# Patient Record
Sex: MALE | Race: WHITE | Employment: OTHER | URBAN - METROPOLITAN AREA
[De-identification: names, ages, dates, MRNs, and addresses within clinical notes are randomized per-mention and may not be internally consistent; named-entity substitution may affect disease eponyms.]

---

## 2019-12-12 ENCOUNTER — OFFICE VISIT (OUTPATIENT)
Dept: INTERNAL MEDICINE CLINIC | Facility: CLINIC | Age: 64
End: 2019-12-12
Payer: MEDICARE

## 2019-12-12 VITALS
OXYGEN SATURATION: 98 % | SYSTOLIC BLOOD PRESSURE: 123 MMHG | TEMPERATURE: 98 F | HEART RATE: 63 BPM | RESPIRATION RATE: 17 BRPM | DIASTOLIC BLOOD PRESSURE: 73 MMHG | WEIGHT: 200 LBS

## 2019-12-12 DIAGNOSIS — E78.5 HYPERLIPIDEMIA, UNSPECIFIED HYPERLIPIDEMIA TYPE: ICD-10-CM

## 2019-12-12 DIAGNOSIS — R06.00 EXERTIONAL DYSPNEA: ICD-10-CM

## 2019-12-12 DIAGNOSIS — I25.119 CORONARY ARTERY DISEASE INVOLVING NATIVE CORONARY ARTERY OF NATIVE HEART WITH ANGINA PECTORIS (HCC): ICD-10-CM

## 2019-12-12 DIAGNOSIS — I10 ESSENTIAL HYPERTENSION: ICD-10-CM

## 2019-12-12 DIAGNOSIS — Z71.6 ENCOUNTER FOR SMOKING CESSATION COUNSELING: ICD-10-CM

## 2019-12-12 DIAGNOSIS — Z76.89 ENCOUNTER TO ESTABLISH CARE: Primary | ICD-10-CM

## 2019-12-12 DIAGNOSIS — N52.9 ERECTILE DYSFUNCTION, UNSPECIFIED ERECTILE DYSFUNCTION TYPE: ICD-10-CM

## 2019-12-12 DIAGNOSIS — R94.31 ABNORMAL EKG: ICD-10-CM

## 2019-12-12 PROCEDURE — 93000 ELECTROCARDIOGRAM COMPLETE: CPT | Performed by: INTERNAL MEDICINE

## 2019-12-12 PROCEDURE — 99204 OFFICE O/P NEW MOD 45 MIN: CPT | Performed by: INTERNAL MEDICINE

## 2019-12-12 NOTE — PROGRESS NOTES
HPI:    Patient ID: Ginna Moreau is a 71year old male. HPI   Comes in today for first time to establish care he is of [facility] descent from [facility].   Patient has not brought his medication with him today he is not sure of the names he knows he ta Negative. Negative for dysuria, flank pain and hematuria. Erectile dysfunction   Musculoskeletal: Negative. Negative for arthralgias, back pain and myalgias. Skin: Negative. Also some redness of the face   Neurological: Negative.   Benita Padilla He has normal reflexes. Skin: Skin is warm and dry. Psychiatric: He has a normal mood and affect. His behavior is normal. Judgment and thought content normal.   Nursing note and vitals reviewed.            ASSESSMENT/PLAN:   Encounter to establish care

## 2020-01-02 ENCOUNTER — HOSPITAL ENCOUNTER (OUTPATIENT)
Dept: NUCLEAR MEDICINE | Facility: HOSPITAL | Age: 65
Discharge: HOME | End: 2020-01-02
Attending: INTERNAL MEDICINE
Payer: MEDICARE

## 2020-01-02 ENCOUNTER — APPOINTMENT (OUTPATIENT)
Dept: NUCLEAR MEDICINE | Facility: HOSPITAL | Age: 65
End: 2020-01-02
Attending: INTERNAL MEDICINE
Payer: MEDICARE

## 2020-01-02 ENCOUNTER — HOSPITAL ENCOUNTER (OUTPATIENT)
Dept: CV DIAGNOSTICS | Facility: HOSPITAL | Age: 65
Discharge: HOME | End: 2020-01-02
Attending: INTERNAL MEDICINE
Payer: MEDICARE

## 2020-01-02 DIAGNOSIS — E78.5 HYPERLIPIDEMIA, UNSPECIFIED HYPERLIPIDEMIA TYPE: ICD-10-CM

## 2020-01-02 DIAGNOSIS — R06.00 EXERTIONAL DYSPNEA: ICD-10-CM

## 2020-01-02 DIAGNOSIS — R94.31 ABNORMAL EKG: ICD-10-CM

## 2020-01-02 DIAGNOSIS — I10 ESSENTIAL HYPERTENSION: ICD-10-CM

## 2020-01-02 DIAGNOSIS — I25.119 CORONARY ARTERY DISEASE INVOLVING NATIVE CORONARY ARTERY OF NATIVE HEART WITH ANGINA PECTORIS (HCC): ICD-10-CM

## 2020-01-03 ENCOUNTER — APPOINTMENT (OUTPATIENT)
Dept: NUCLEAR MEDICINE | Facility: HOSPITAL | Age: 65
End: 2020-01-03
Attending: INTERNAL MEDICINE
Payer: MEDICARE

## 2020-01-03 ENCOUNTER — HOSPITAL ENCOUNTER (OUTPATIENT)
Dept: NUCLEAR MEDICINE | Facility: HOSPITAL | Age: 65
Discharge: HOME | End: 2020-01-03
Attending: INTERNAL MEDICINE
Payer: MEDICARE

## 2020-01-03 ENCOUNTER — HOSPITAL ENCOUNTER (OUTPATIENT)
Dept: CV DIAGNOSTICS | Facility: HOSPITAL | Age: 65
End: 2020-01-03
Attending: INTERNAL MEDICINE
Payer: MEDICARE

## 2020-01-03 PROCEDURE — 78452 HT MUSCLE IMAGE SPECT MULT: CPT | Performed by: INTERNAL MEDICINE

## 2020-01-03 PROCEDURE — 93017 CV STRESS TEST TRACING ONLY: CPT | Performed by: INTERNAL MEDICINE

## 2020-01-09 ENCOUNTER — HOSPITAL ENCOUNTER (OUTPATIENT)
Dept: CV DIAGNOSTICS | Facility: HOSPITAL | Age: 65
Discharge: HOME | End: 2020-01-09
Attending: INTERNAL MEDICINE
Payer: MEDICARE

## 2020-01-09 ENCOUNTER — HOSPITAL ENCOUNTER (OUTPATIENT)
Dept: NUCLEAR MEDICINE | Facility: HOSPITAL | Age: 65
Discharge: HOME | End: 2020-01-09
Attending: INTERNAL MEDICINE
Payer: MEDICARE

## 2020-01-09 PROCEDURE — 93016 CV STRESS TEST SUPVJ ONLY: CPT | Performed by: INTERNAL MEDICINE

## 2020-01-09 PROCEDURE — 93018 CV STRESS TEST I&R ONLY: CPT | Performed by: INTERNAL MEDICINE

## 2020-01-21 PROBLEM — I10 HTN (HYPERTENSION): Status: ACTIVE | Noted: 2020-01-21

## 2020-01-21 PROBLEM — I21.9 MYOCARDIAL INFARCT (CMD): Status: ACTIVE | Noted: 2020-01-21

## 2020-01-21 PROBLEM — E78.5 HYPERLIPIDEMIA: Status: ACTIVE | Noted: 2020-01-21

## 2020-01-22 ENCOUNTER — OFFICE VISIT (OUTPATIENT)
Dept: CARDIOLOGY | Age: 65
End: 2020-01-22

## 2020-01-22 VITALS
WEIGHT: 201 LBS | DIASTOLIC BLOOD PRESSURE: 84 MMHG | HEIGHT: 68 IN | SYSTOLIC BLOOD PRESSURE: 150 MMHG | HEART RATE: 71 BPM | OXYGEN SATURATION: 97 % | RESPIRATION RATE: 18 BRPM | BODY MASS INDEX: 30.46 KG/M2

## 2020-01-22 DIAGNOSIS — E78.00 PURE HYPERCHOLESTEROLEMIA: ICD-10-CM

## 2020-01-22 DIAGNOSIS — I10 ESSENTIAL HYPERTENSION: ICD-10-CM

## 2020-01-22 DIAGNOSIS — I25.10 ATHEROSCLEROSIS OF NATIVE CORONARY ARTERY OF NATIVE HEART WITHOUT ANGINA PECTORIS: Primary | ICD-10-CM

## 2020-01-22 PROCEDURE — 99204 OFFICE O/P NEW MOD 45 MIN: CPT | Performed by: INTERNAL MEDICINE

## 2020-01-22 PROCEDURE — 3079F DIAST BP 80-89 MM HG: CPT | Performed by: INTERNAL MEDICINE

## 2020-01-22 PROCEDURE — 3077F SYST BP >= 140 MM HG: CPT | Performed by: INTERNAL MEDICINE

## 2020-01-22 RX ORDER — ATORVASTATIN CALCIUM 20 MG/1
20 TABLET, FILM COATED ORAL DAILY
COMMUNITY
End: 2020-01-22 | Stop reason: SDUPTHER

## 2020-01-22 RX ORDER — ATORVASTATIN CALCIUM 20 MG/1
20 TABLET, FILM COATED ORAL DAILY
Qty: 90 TABLET | Refills: 3 | Status: SHIPPED | OUTPATIENT
Start: 2020-01-22

## 2020-01-22 RX ORDER — METOPROLOL TARTRATE 100 MG/1
100 TABLET ORAL DAILY
COMMUNITY
End: 2020-01-22 | Stop reason: ALTCHOICE

## 2020-01-22 ASSESSMENT — PATIENT HEALTH QUESTIONNAIRE - PHQ9
1. LITTLE INTEREST OR PLEASURE IN DOING THINGS: NOT AT ALL
SUM OF ALL RESPONSES TO PHQ9 QUESTIONS 1 AND 2: 0
SUM OF ALL RESPONSES TO PHQ9 QUESTIONS 1 AND 2: 0
2. FEELING DOWN, DEPRESSED OR HOPELESS: NOT AT ALL

## 2020-01-23 ENCOUNTER — OFFICE VISIT (OUTPATIENT)
Dept: INTERNAL MEDICINE CLINIC | Facility: CLINIC | Age: 65
End: 2020-01-23
Payer: MEDICARE

## 2020-01-23 ENCOUNTER — TELEPHONE (OUTPATIENT)
Dept: CARDIOLOGY | Age: 65
End: 2020-01-23

## 2020-01-23 VITALS
TEMPERATURE: 98 F | HEART RATE: 61 BPM | RESPIRATION RATE: 16 BRPM | HEIGHT: 68 IN | BODY MASS INDEX: 30.16 KG/M2 | SYSTOLIC BLOOD PRESSURE: 148 MMHG | DIASTOLIC BLOOD PRESSURE: 70 MMHG | OXYGEN SATURATION: 98 % | WEIGHT: 199 LBS

## 2020-01-23 DIAGNOSIS — Z12.11 SCREENING FOR COLON CANCER: ICD-10-CM

## 2020-01-23 DIAGNOSIS — Z87.891 PERSONAL HISTORY OF TOBACCO USE, PRESENTING HAZARDS TO HEALTH: ICD-10-CM

## 2020-01-23 DIAGNOSIS — I10 ESSENTIAL HYPERTENSION: ICD-10-CM

## 2020-01-23 DIAGNOSIS — I25.119 CORONARY ARTERY DISEASE INVOLVING NATIVE CORONARY ARTERY OF NATIVE HEART WITH ANGINA PECTORIS (HCC): ICD-10-CM

## 2020-01-23 DIAGNOSIS — Z86.73 HISTORY OF CVA IN ADULTHOOD: ICD-10-CM

## 2020-01-23 DIAGNOSIS — Z00.00 ENCOUNTER FOR ANNUAL HEALTH EXAMINATION: ICD-10-CM

## 2020-01-23 DIAGNOSIS — Z11.59 NEED FOR HEPATITIS C SCREENING TEST: ICD-10-CM

## 2020-01-23 DIAGNOSIS — Z00.00 PREVENTATIVE HEALTH CARE: ICD-10-CM

## 2020-01-23 DIAGNOSIS — Z28.21 IMMUNIZATION NOT CARRIED OUT BECAUSE OF PATIENT REFUSAL: ICD-10-CM

## 2020-01-23 DIAGNOSIS — Z71.6 ENCOUNTER FOR SMOKING CESSATION COUNSELING: ICD-10-CM

## 2020-01-23 DIAGNOSIS — E78.5 HYPERLIPIDEMIA, UNSPECIFIED HYPERLIPIDEMIA TYPE: ICD-10-CM

## 2020-01-23 DIAGNOSIS — N52.9 ERECTILE DYSFUNCTION, UNSPECIFIED ERECTILE DYSFUNCTION TYPE: ICD-10-CM

## 2020-01-23 DIAGNOSIS — Z00.00 MEDICARE ANNUAL WELLNESS VISIT, INITIAL: Primary | ICD-10-CM

## 2020-01-23 DIAGNOSIS — Z13.6 SCREENING FOR CARDIOVASCULAR CONDITION: ICD-10-CM

## 2020-01-23 DIAGNOSIS — Z53.20 COLONOSCOPY REFUSED: ICD-10-CM

## 2020-01-23 PROBLEM — J41.0 SMOKERS' COUGH (HCC): Chronic | Status: ACTIVE | Noted: 2020-01-23

## 2020-01-23 PROCEDURE — G0402 INITIAL PREVENTIVE EXAM: HCPCS | Performed by: INTERNAL MEDICINE

## 2020-01-23 PROCEDURE — 99397 PER PM REEVAL EST PAT 65+ YR: CPT | Performed by: INTERNAL MEDICINE

## 2020-01-23 PROCEDURE — 96160 PT-FOCUSED HLTH RISK ASSMT: CPT | Performed by: INTERNAL MEDICINE

## 2020-01-23 RX ORDER — SILDENAFIL 100 MG/1
50 TABLET, FILM COATED ORAL AS NEEDED
Qty: 10 TABLET | Refills: 3 | Status: SHIPPED | OUTPATIENT
Start: 2020-01-23 | End: 2021-08-16 | Stop reason: ALTCHOICE

## 2020-01-23 RX ORDER — ASPIRIN 81 MG/1
81 TABLET, CHEWABLE ORAL DAILY
COMMUNITY
End: 2020-04-14

## 2020-01-23 NOTE — PATIENT INSTRUCTIONS
Tarik Camara's SCREENING SCHEDULE   Tests on this list are recommended by your physician but may not be covered, or covered at this frequency, by your insurer. Please check with your insurance carrier before scheduling to verify coverage.     PREVENTATIVE S found for this or any previous visit. No flowsheet data found. Fecal Occult Blood   Covered Annually No results found for: FOB, OCCULTSTOOL No flowsheet data found.      Barium Enema-   uncomfortable but covered  Covered but uncomfortable   Glaucoma Scr benefits     Recommended Websites for Advanced Directives    [email or web address] [email or web address]/[email or web address]/personal_[email or web address]. pdf  An information packet, including necessary form from the [address] website. http://[email or web address] [place]state. [place]publi

## 2020-01-23 NOTE — PROGRESS NOTES
HPI:   Julia Medina is a 71year old male who presents for a Medicare Initial Annual Wellness visit (Once after 12 month Medicare anniversary) .     Patient here for Medicare annual physical overall is doing okay last time was seen with an EKG which was abn discussion of advance directives standard forms performed Face to Face with patient and Family/surrogate (if present), and forms available to patient in AVS            He currently smokes tobacco.  Social History    Tobacco Use      Smoking status: Current mother. SOCIAL HISTORY:   He  reports that he has been smoking cigarettes. He has been smoking about 1.00 pack per day. He has never used smokeless tobacco. He reports previous alcohol use. He reports that he does not use drugs.      REVIEW OF SYSTEMS: because I misunderstand what they say:  No   I misunderstand what others are saying and make inappropriate responses:  No I avoid social activities because I cannot hear well and fear I will reply improperly:  No   Family members and friends have told me t IMMUNOASSAY; Future  -     PSA SCREEN; Future  -     LIPID PANEL; Future  -     HCV ANTIBODY; Future  -     US ABDOMINAL AORTIC ANEURYSM SCREENING (CPT=76706); Future  -     BEHAV CHNG SMOKING > 10 MIN  -     COMP METABOLIC PANEL (14);  Future  -     TSH W PLATELET; Future  -     MICROALB/CREAT RATIO, RANDOM URINE;  Future    Erectile dysfunction, unspecified erectile dysfunction type we will start on medication take as prescribed as needed effects explained that if he get an erection lasting more than 4 hour shot  -     INFLUENZA REFUSED DMG  -     OCCULT BLOOD, FECAL, IMMUNOASSAY; Future  -     PSA SCREEN; Future  -     LIPID PANEL; Future  -     HCV ANTIBODY; Future  -     US ABDOMINAL AORTIC ANEURYSM SCREENING (CPT=76706);  Future  -     BEHAV CHNG SMOKING > TO FREE T4; Future  -     CBC WITH DIFFERENTIAL WITH PLATELET; Future  -     MICROALB/CREAT RATIO, RANDOM URINE;  Future    Preventative health care refer to eye doctor     Colonoscopy refused    Other orders  -     Sildenafil Citrate (VIAGRA) 100 MG Oral T Ophthalmology Visit Annually: Diabetics, FHx Glaucoma, AA>50, > 65 No flowsheet data found.     Prostate Cancer Screening      PSA  Annually PSA due on 11/23/2004  Update Health Maintenance if applicable     Immunizations (Update Immunization Activi

## 2020-01-24 ENCOUNTER — TELEPHONE (OUTPATIENT)
Dept: INTERNAL MEDICINE CLINIC | Facility: CLINIC | Age: 65
End: 2020-01-24

## 2020-01-24 RX ORDER — ATORVASTATIN CALCIUM 20 MG/1
20 TABLET, FILM COATED ORAL NIGHTLY
COMMUNITY
End: 2020-04-14

## 2020-01-24 RX ORDER — METOPROLOL SUCCINATE 100 MG/1
100 TABLET, EXTENDED RELEASE ORAL DAILY
COMMUNITY
End: 2020-04-14

## 2020-01-24 NOTE — TELEPHONE ENCOUNTER
Added Lipitor & Metoprolol Succ. [facility]. Ok per Dr Allie Mcnally.

## 2020-01-28 ENCOUNTER — TELEPHONE (OUTPATIENT)
Dept: CARDIOLOGY | Age: 65
End: 2020-01-28

## 2020-01-28 RX ORDER — METOPROLOL SUCCINATE 100 MG/1
100 TABLET, EXTENDED RELEASE ORAL DAILY
Qty: 90 TABLET | Refills: 1 | Status: SHIPPED | OUTPATIENT
Start: 2020-01-28 | End: 2020-01-29 | Stop reason: SDUPTHER

## 2020-01-29 ENCOUNTER — TELEPHONE (OUTPATIENT)
Dept: CARDIOLOGY | Age: 65
End: 2020-01-29

## 2020-01-29 RX ORDER — METOPROLOL SUCCINATE 100 MG/1
100 TABLET, EXTENDED RELEASE ORAL DAILY
Qty: 90 TABLET | Refills: 1 | Status: SHIPPED | OUTPATIENT
Start: 2020-01-29

## 2020-02-11 ENCOUNTER — TELEPHONE (OUTPATIENT)
Dept: CASE MANAGEMENT | Age: 65
End: 2020-02-11

## 2020-02-11 NOTE — TELEPHONE ENCOUNTER
Patient is eligible for an establish care visit with Dr. Joy Jewell, no answer and call goes to a fast busy signal. Letter sent.

## 2020-02-26 ENCOUNTER — TELEPHONE (OUTPATIENT)
Dept: INTERNAL MEDICINE CLINIC | Facility: CLINIC | Age: 65
End: 2020-02-26

## 2020-02-26 NOTE — TELEPHONE ENCOUNTER
Pt states that the last time the patient saw Dr. Masood Beckwith he was told by the doctor that he was going to be prescribed a medication to stop smoking. Pt went to the pharmacy and they have not received anything from the doctors office.  Patient would like to zenia

## 2020-02-27 NOTE — TELEPHONE ENCOUNTER
Spoke with patient (verified name and ), advised Dr Rodger Paulino note and verbalized understanding.       Chantix ordered please let the patient know also please let her know about side effects if he has any symptoms of depression suicidal thoughts to stop

## 2020-04-14 ENCOUNTER — TELEPHONE (OUTPATIENT)
Dept: INTERNAL MEDICINE CLINIC | Facility: CLINIC | Age: 65
End: 2020-04-14

## 2020-04-14 RX ORDER — ASPIRIN 81 MG/1
81 TABLET, CHEWABLE ORAL DAILY
Qty: 90 TABLET | Refills: 1 | Status: ON HOLD | OUTPATIENT
Start: 2020-04-14 | End: 2021-06-29

## 2020-04-14 RX ORDER — METOPROLOL SUCCINATE 100 MG/1
100 TABLET, EXTENDED RELEASE ORAL DAILY
Qty: 90 TABLET | Refills: 1 | Status: SHIPPED | OUTPATIENT
Start: 2020-04-14 | End: 2020-06-19

## 2020-04-14 RX ORDER — ATORVASTATIN CALCIUM 20 MG/1
20 TABLET, FILM COATED ORAL NIGHTLY
Qty: 90 TABLET | Refills: 1 | Status: SHIPPED | OUTPATIENT
Start: 2020-04-14 | End: 2020-06-19

## 2020-06-19 ENCOUNTER — OFFICE VISIT (OUTPATIENT)
Dept: INTERNAL MEDICINE CLINIC | Facility: CLINIC | Age: 65
End: 2020-06-19
Payer: MEDICARE

## 2020-06-19 VITALS
BODY MASS INDEX: 31.67 KG/M2 | SYSTOLIC BLOOD PRESSURE: 138 MMHG | TEMPERATURE: 98 F | RESPIRATION RATE: 17 BRPM | OXYGEN SATURATION: 98 % | DIASTOLIC BLOOD PRESSURE: 72 MMHG | HEIGHT: 68 IN | WEIGHT: 209 LBS | HEART RATE: 70 BPM

## 2020-06-19 DIAGNOSIS — I25.119 CORONARY ARTERY DISEASE INVOLVING NATIVE CORONARY ARTERY OF NATIVE HEART WITH ANGINA PECTORIS (HCC): Primary | ICD-10-CM

## 2020-06-19 DIAGNOSIS — R20.0 FINGER NUMBNESS: ICD-10-CM

## 2020-06-19 DIAGNOSIS — I10 ESSENTIAL HYPERTENSION: ICD-10-CM

## 2020-06-19 DIAGNOSIS — E78.5 HYPERLIPIDEMIA, UNSPECIFIED HYPERLIPIDEMIA TYPE: ICD-10-CM

## 2020-06-19 DIAGNOSIS — Z71.6 ENCOUNTER FOR SMOKING CESSATION COUNSELING: ICD-10-CM

## 2020-06-19 PROCEDURE — 99214 OFFICE O/P EST MOD 30 MIN: CPT | Performed by: INTERNAL MEDICINE

## 2020-06-19 RX ORDER — METOPROLOL SUCCINATE 100 MG/1
100 TABLET, EXTENDED RELEASE ORAL DAILY
Qty: 90 TABLET | Refills: 1 | Status: SHIPPED | OUTPATIENT
Start: 2020-06-19 | End: 2020-10-08

## 2020-06-19 RX ORDER — ATORVASTATIN CALCIUM 20 MG/1
20 TABLET, FILM COATED ORAL NIGHTLY
Qty: 90 TABLET | Refills: 1 | Status: SHIPPED | OUTPATIENT
Start: 2020-06-19 | End: 2020-10-08

## 2020-06-19 NOTE — PROGRESS NOTES
HPI:    Patient ID: Rush Ruvalcaba is a 71year old male.     HPI  Comes in for 6 months follow-up overall is doing okay only complaint yesterday Saturday schedule and numbness to the index finger since around the nailbeds and not sure if he is injured it is be Allergies    HISTORY:  Past Medical History:   Diagnosis Date   • Heart attack ([facility] [address] 01/01/2010   • HTN (hypertension)    • Hyperlipemia       No past surgical history on file.    Family History   Problem Relation Age of Onset   • No Known Problems Mother hyperlipidemia type patient will do the labs continue with current treatment  Essential hypertension controlled watch salt intake  Encounter for smoking cessation counseling uses quit smoking, will try to lose weight daily exercise walking 30 to 45 minutes

## 2020-06-23 ENCOUNTER — TELEPHONE (OUTPATIENT)
Dept: INTERNAL MEDICINE CLINIC | Facility: CLINIC | Age: 65
End: 2020-06-23

## 2020-06-23 DIAGNOSIS — Z12.5 PROSTATE CANCER SCREENING: Primary | ICD-10-CM

## 2020-06-23 LAB
ALBUMIN SERPL-MCNC: 4.4 G/DL
ALBUMIN/GLOB SERPL: 1.6 {RATIO}
ALP SERPL-CCNC: 80 U/L
ALT SERPL-CCNC: 17 UNITS/L
AST SERPL-CCNC: 16 UNITS/L
BILIRUB SERPL-MCNC: 0.5 MG/DL
BUN SERPL-MCNC: 19 MG/DL
CALCIUM SERPL-MCNC: 9.3 MG/DL
CHLORIDE SERPL-SCNC: 105 MMOL/L
CHOLEST SERPL-MCNC: 130 MG/DL
CO2 SERPL-SCNC: 30 MMOL/L
CREAT SERPL-MCNC: 0.94 MG/DL
GLOBULIN SER-MCNC: 2.8 G/DL
GLUCOSE SERPL-MCNC: 106 MG/DL
HCT VFR BLD CALC: 40.6 %
HDLC SERPL-MCNC: 28 MG/DL
HGB BLD-MCNC: 13.7 G/DL
LDLC SERPL CALC-MCNC: 78 MG/DL
MCH RBC QN AUTO: 29.4 PG
MCHC RBC AUTO-ENTMCNC: 33.7 G/DL
MCV RBC AUTO: 87.1 FL
NONHDLC SERPL-MCNC: 102 MG/DL
PLATELET # BLD: 233 K/MCL
POTASSIUM SERPL-SCNC: 4.6 MMOL/L
PROT SERPL-MCNC: 7.2 G/DL
RBC # BLD: 4.66 10*6/UL
SODIUM SERPL-SCNC: 140 MMOL/L
TRIGL SERPL-MCNC: 139 MG/DL
TSH SERPL-ACNC: 2.74 MCUNITS/ML
WBC # BLD: 7.5 K/MCL

## 2020-06-23 NOTE — TELEPHONE ENCOUNTER
Patient walked into the [place] office states went to lab for lab draw they told him need extra ICD-10 code to be covered by insurance for PSA Screening.  Please call patient will come to office to

## 2020-07-02 ENCOUNTER — OFFICE VISIT (OUTPATIENT)
Dept: INTERNAL MEDICINE CLINIC | Facility: CLINIC | Age: 65
End: 2020-07-02
Payer: MEDICARE

## 2020-07-02 VITALS
HEIGHT: 68 IN | SYSTOLIC BLOOD PRESSURE: 140 MMHG | DIASTOLIC BLOOD PRESSURE: 74 MMHG | WEIGHT: 206 LBS | HEART RATE: 72 BPM | OXYGEN SATURATION: 99 % | BODY MASS INDEX: 31.22 KG/M2 | RESPIRATION RATE: 17 BRPM | TEMPERATURE: 97 F

## 2020-07-02 DIAGNOSIS — H93.12 TINNITUS OF LEFT EAR: ICD-10-CM

## 2020-07-02 DIAGNOSIS — R80.9 MICROALBUMINURIA: ICD-10-CM

## 2020-07-02 DIAGNOSIS — R73.03 PRE-DIABETES: Primary | ICD-10-CM

## 2020-07-02 DIAGNOSIS — Z86.69 HISTORY OF BELL'S PALSY: ICD-10-CM

## 2020-07-02 PROCEDURE — 99214 OFFICE O/P EST MOD 30 MIN: CPT | Performed by: INTERNAL MEDICINE

## 2020-07-02 NOTE — PROGRESS NOTES
HPI:    Patient ID: Rommel Lujan is a 71year old male.     HPI  Patient comes in today with complaint of left ear ringing as per him this been going on for a long time now sometimes is worse can last sometimes for hours sometimes less usually when he wakens Normocephalic and atraumatic. Eyes: Pupils are equal, round, and reactive to light. Conjunctivae and EOM are normal.   Neck: Normal range of motion. Neck supple. No thyromegaly present.    Cardiovascular: Normal rate, regular rhythm, normal heart sounds a

## 2020-09-09 ENCOUNTER — MED REC SCAN ONLY (OUTPATIENT)
Dept: INTERNAL MEDICINE CLINIC | Facility: CLINIC | Age: 65
End: 2020-09-09

## 2020-10-08 ENCOUNTER — OFFICE VISIT (OUTPATIENT)
Dept: INTERNAL MEDICINE CLINIC | Facility: CLINIC | Age: 65
End: 2020-10-08
Payer: MEDICARE

## 2020-10-08 VITALS
DIASTOLIC BLOOD PRESSURE: 68 MMHG | SYSTOLIC BLOOD PRESSURE: 124 MMHG | WEIGHT: 206 LBS | OXYGEN SATURATION: 99 % | RESPIRATION RATE: 17 BRPM | HEIGHT: 68 IN | HEART RATE: 74 BPM | TEMPERATURE: 98 F | BODY MASS INDEX: 31.22 KG/M2

## 2020-10-08 DIAGNOSIS — E78.5 HYPERLIPIDEMIA, UNSPECIFIED HYPERLIPIDEMIA TYPE: ICD-10-CM

## 2020-10-08 DIAGNOSIS — R73.09 ELEVATED GLUCOSE LEVEL: ICD-10-CM

## 2020-10-08 DIAGNOSIS — I10 ESSENTIAL HYPERTENSION: Primary | ICD-10-CM

## 2020-10-08 PROCEDURE — 99214 OFFICE O/P EST MOD 30 MIN: CPT | Performed by: INTERNAL MEDICINE

## 2020-10-08 PROCEDURE — 3074F SYST BP LT 130 MM HG: CPT | Performed by: INTERNAL MEDICINE

## 2020-10-08 PROCEDURE — 3078F DIAST BP <80 MM HG: CPT | Performed by: INTERNAL MEDICINE

## 2020-10-08 PROCEDURE — 36415 COLL VENOUS BLD VENIPUNCTURE: CPT | Performed by: INTERNAL MEDICINE

## 2020-10-08 PROCEDURE — 3008F BODY MASS INDEX DOCD: CPT | Performed by: INTERNAL MEDICINE

## 2020-10-08 RX ORDER — ATORVASTATIN CALCIUM 20 MG/1
20 TABLET, FILM COATED ORAL NIGHTLY
Qty: 90 TABLET | Refills: 1 | Status: SHIPPED | OUTPATIENT
Start: 2020-10-08 | End: 2021-03-23

## 2020-10-08 RX ORDER — METOPROLOL SUCCINATE 100 MG/1
100 TABLET, EXTENDED RELEASE ORAL DAILY
Qty: 90 TABLET | Refills: 1 | Status: SHIPPED | OUTPATIENT
Start: 2020-10-08 | End: 2021-03-23

## 2020-10-08 NOTE — PROGRESS NOTES
HPI:    Patient ID: Mandy Leggett is a 71year old male. HPI  Patient comes in for 3-month follow-up overall doing okay denies any complaints taking his meds his labs are stable A1c 6.2 last time.   He had recent stress test and nuclear stress test which wa Father       Social History: Social History    Tobacco Use      Smoking status: Former Smoker        Packs/day: 1.00        Types: Cigarettes        Quit date: 3/19/2020        Years since quittin.5      Smokeless tobacco: Never Used    Alcohol use: No by mouth daily. • atorvastatin 20 MG Oral Tab 90 tablet 1     Sig: Take 1 tablet (20 mg total) by mouth nightly.        Imaging & Referrals:  None        DG#[id number]

## 2020-10-28 ENCOUNTER — TELEPHONE (OUTPATIENT)
Dept: INTERNAL MEDICINE CLINIC | Facility: CLINIC | Age: 65
End: 2020-10-28

## 2020-10-28 DIAGNOSIS — H53.9 VISION CHANGES: Primary | ICD-10-CM

## 2020-10-28 NOTE — TELEPHONE ENCOUNTER
Patient came in to the [place] [id number] office he is asking for referral for eye doctor he needs eye exam and possible glasses.  He will  when ready at the [place] office

## 2020-11-12 ENCOUNTER — OFFICE VISIT (OUTPATIENT)
Dept: INTERNAL MEDICINE CLINIC | Facility: CLINIC | Age: 65
End: 2020-11-12
Payer: MEDICARE

## 2020-11-12 VITALS
HEIGHT: 68 IN | DIASTOLIC BLOOD PRESSURE: 88 MMHG | OXYGEN SATURATION: 98 % | TEMPERATURE: 98 F | BODY MASS INDEX: 31.37 KG/M2 | HEART RATE: 72 BPM | RESPIRATION RATE: 14 BRPM | WEIGHT: 207 LBS | SYSTOLIC BLOOD PRESSURE: 138 MMHG

## 2020-11-12 DIAGNOSIS — R80.9 MICROALBUMINURIA: ICD-10-CM

## 2020-11-12 DIAGNOSIS — I10 ESSENTIAL HYPERTENSION: Primary | ICD-10-CM

## 2020-11-12 DIAGNOSIS — H53.9 VISION CHANGES: ICD-10-CM

## 2020-11-12 DIAGNOSIS — R73.09 ELEVATED GLUCOSE LEVEL: ICD-10-CM

## 2020-11-12 PROCEDURE — 3008F BODY MASS INDEX DOCD: CPT | Performed by: INTERNAL MEDICINE

## 2020-11-12 PROCEDURE — 90732 PPSV23 VACC 2 YRS+ SUBQ/IM: CPT | Performed by: INTERNAL MEDICINE

## 2020-11-12 PROCEDURE — 99214 OFFICE O/P EST MOD 30 MIN: CPT | Performed by: INTERNAL MEDICINE

## 2020-11-12 PROCEDURE — G0009 ADMIN PNEUMOCOCCAL VACCINE: HCPCS | Performed by: INTERNAL MEDICINE

## 2020-11-12 PROCEDURE — 3075F SYST BP GE 130 - 139MM HG: CPT | Performed by: INTERNAL MEDICINE

## 2020-11-12 PROCEDURE — 3079F DIAST BP 80-89 MM HG: CPT | Performed by: INTERNAL MEDICINE

## 2020-11-12 PROCEDURE — G0008 ADMIN INFLUENZA VIRUS VAC: HCPCS | Performed by: INTERNAL MEDICINE

## 2020-11-12 PROCEDURE — 90662 IIV NO PRSV INCREASED AG IM: CPT | Performed by: INTERNAL MEDICINE

## 2020-11-12 RX ORDER — LISINOPRIL 2.5 MG/1
2.5 TABLET ORAL DAILY
Qty: 30 TABLET | Refills: 2 | Status: SHIPPED | OUTPATIENT
Start: 2020-11-12 | End: 2021-02-04

## 2020-11-12 NOTE — PROGRESS NOTES
HPI:    Patient ID: Lupe Smith is a 71year old male.     HPI  Is in today with complaint that he had 3 urine done blood work for drug testing thinks was told to have proteinuria with known patient to have microalbumin with elevated glucose level was suppos Exam   Constitutional: He is oriented to person, place, and time. He appears well-developed and well-nourished. HENT:   Head: Normocephalic and atraumatic. Eyes: Pupils are equal, round, and reactive to light.  Conjunctivae and EOM are normal.   Neck: N

## 2021-01-26 ENCOUNTER — TELEPHONE (OUTPATIENT)
Dept: INTERNAL MEDICINE CLINIC | Facility: CLINIC | Age: 66
End: 2021-01-26

## 2021-01-26 DIAGNOSIS — I25.10 CORONARY ARTERY DISEASE INVOLVING NATIVE CORONARY ARTERY OF NATIVE HEART WITHOUT ANGINA PECTORIS: Primary | ICD-10-CM

## 2021-01-26 NOTE — TELEPHONE ENCOUNTER
Patient walked into the office requesting referral to see Dr. Shayna Mars, he has a follow up appointment tomorrow.

## 2021-01-27 ENCOUNTER — OFFICE VISIT (OUTPATIENT)
Dept: CARDIOLOGY | Age: 66
End: 2021-01-27

## 2021-01-27 ENCOUNTER — TELEPHONE (OUTPATIENT)
Dept: CASE MANAGEMENT | Age: 66
End: 2021-01-27

## 2021-01-27 ENCOUNTER — TELEPHONE (OUTPATIENT)
Dept: CARDIOLOGY | Age: 66
End: 2021-01-27

## 2021-01-27 VITALS
DIASTOLIC BLOOD PRESSURE: 74 MMHG | HEIGHT: 68 IN | HEART RATE: 63 BPM | SYSTOLIC BLOOD PRESSURE: 158 MMHG | OXYGEN SATURATION: 98 % | BODY MASS INDEX: 31.98 KG/M2 | RESPIRATION RATE: 18 BRPM | WEIGHT: 211 LBS

## 2021-01-27 DIAGNOSIS — G47.33 OSA (OBSTRUCTIVE SLEEP APNEA): Primary | ICD-10-CM

## 2021-01-27 DIAGNOSIS — E78.00 PURE HYPERCHOLESTEROLEMIA: ICD-10-CM

## 2021-01-27 DIAGNOSIS — I25.10 ATHEROSCLEROSIS OF NATIVE CORONARY ARTERY OF NATIVE HEART WITHOUT ANGINA PECTORIS: ICD-10-CM

## 2021-01-27 DIAGNOSIS — I10 ESSENTIAL HYPERTENSION: Primary | ICD-10-CM

## 2021-01-27 DIAGNOSIS — G47.09 OTHER INSOMNIA: ICD-10-CM

## 2021-01-27 PROCEDURE — 99214 OFFICE O/P EST MOD 30 MIN: CPT | Performed by: INTERNAL MEDICINE

## 2021-01-27 PROCEDURE — 3078F DIAST BP <80 MM HG: CPT | Performed by: INTERNAL MEDICINE

## 2021-01-27 PROCEDURE — 3077F SYST BP >= 140 MM HG: CPT | Performed by: INTERNAL MEDICINE

## 2021-01-27 RX ORDER — ASPIRIN 81 MG/1
81 TABLET ORAL DAILY
COMMUNITY

## 2021-01-27 ASSESSMENT — PATIENT HEALTH QUESTIONNAIRE - PHQ9
CLINICAL INTERPRETATION OF PHQ9 SCORE: NO FURTHER SCREENING NEEDED
SUM OF ALL RESPONSES TO PHQ9 QUESTIONS 1 AND 2: 0
2. FEELING DOWN, DEPRESSED OR HOPELESS: NOT AT ALL
1. LITTLE INTEREST OR PLEASURE IN DOING THINGS: NOT AT ALL
SUM OF ALL RESPONSES TO PHQ9 QUESTIONS 1 AND 2: 0
CLINICAL INTERPRETATION OF PHQ2 SCORE: NO FURTHER SCREENING NEEDED

## 2021-01-27 NOTE — TELEPHONE ENCOUNTER
Pt is calling in regards to a sleep study. Pt notes that Dr. Shantell Clark wants him to do a HST.  Please sign off on referral.

## 2021-01-28 ENCOUNTER — MED REC SCAN ONLY (OUTPATIENT)
Dept: INTERNAL MEDICINE CLINIC | Facility: CLINIC | Age: 66
End: 2021-01-28

## 2021-02-01 ENCOUNTER — TELEPHONE (OUTPATIENT)
Dept: INTERNAL MEDICINE CLINIC | Facility: CLINIC | Age: 66
End: 2021-02-01

## 2021-02-04 RX ORDER — LISINOPRIL 2.5 MG/1
TABLET ORAL
Qty: 90 TABLET | Refills: 0 | Status: SHIPPED | OUTPATIENT
Start: 2021-02-04 | End: 2021-03-23

## 2021-03-04 DIAGNOSIS — Z23 NEED FOR VACCINATION: ICD-10-CM

## 2021-03-15 ENCOUNTER — IMMUNIZATION (OUTPATIENT)
Dept: LAB | Age: 66
End: 2021-03-15
Attending: HOSPITALIST
Payer: MEDICARE

## 2021-03-15 DIAGNOSIS — Z23 NEED FOR VACCINATION: Primary | ICD-10-CM

## 2021-03-15 PROCEDURE — 0001A SARSCOV2 VAC 30MCG/0.3ML IM: CPT

## 2021-03-23 ENCOUNTER — TELEPHONE (OUTPATIENT)
Dept: INTERNAL MEDICINE CLINIC | Facility: CLINIC | Age: 66
End: 2021-03-23

## 2021-03-23 ENCOUNTER — OFFICE VISIT (OUTPATIENT)
Dept: INTERNAL MEDICINE CLINIC | Facility: CLINIC | Age: 66
End: 2021-03-23
Payer: MEDICARE

## 2021-03-23 VITALS
OXYGEN SATURATION: 98 % | HEART RATE: 62 BPM | DIASTOLIC BLOOD PRESSURE: 72 MMHG | SYSTOLIC BLOOD PRESSURE: 138 MMHG | WEIGHT: 211.81 LBS | BODY MASS INDEX: 32.1 KG/M2 | HEIGHT: 68 IN

## 2021-03-23 DIAGNOSIS — Z87.891 HISTORY OF SMOKING: ICD-10-CM

## 2021-03-23 DIAGNOSIS — L71.9 ROSACEA: ICD-10-CM

## 2021-03-23 DIAGNOSIS — R73.03 PRE-DIABETES: ICD-10-CM

## 2021-03-23 DIAGNOSIS — R80.9 MICROALBUMINURIA: ICD-10-CM

## 2021-03-23 DIAGNOSIS — I25.119 CORONARY ARTERY DISEASE INVOLVING NATIVE CORONARY ARTERY OF NATIVE HEART WITH ANGINA PECTORIS (HCC): ICD-10-CM

## 2021-03-23 DIAGNOSIS — I10 ESSENTIAL HYPERTENSION: ICD-10-CM

## 2021-03-23 DIAGNOSIS — Z00.00 ENCOUNTER FOR ANNUAL HEALTH EXAMINATION: ICD-10-CM

## 2021-03-23 DIAGNOSIS — Z86.69 HISTORY OF BELL'S PALSY: ICD-10-CM

## 2021-03-23 DIAGNOSIS — R21 RASH OF FACE: ICD-10-CM

## 2021-03-23 DIAGNOSIS — N52.9 ERECTILE DYSFUNCTION, UNSPECIFIED ERECTILE DYSFUNCTION TYPE: ICD-10-CM

## 2021-03-23 DIAGNOSIS — E78.5 HYPERLIPIDEMIA, UNSPECIFIED HYPERLIPIDEMIA TYPE: ICD-10-CM

## 2021-03-23 DIAGNOSIS — Z00.00 MEDICARE ANNUAL WELLNESS VISIT, SUBSEQUENT: Primary | ICD-10-CM

## 2021-03-23 PROCEDURE — G0438 PPPS, INITIAL VISIT: HCPCS | Performed by: INTERNAL MEDICINE

## 2021-03-23 PROCEDURE — 3008F BODY MASS INDEX DOCD: CPT | Performed by: INTERNAL MEDICINE

## 2021-03-23 PROCEDURE — 99397 PER PM REEVAL EST PAT 65+ YR: CPT | Performed by: INTERNAL MEDICINE

## 2021-03-23 PROCEDURE — 96160 PT-FOCUSED HLTH RISK ASSMT: CPT | Performed by: INTERNAL MEDICINE

## 2021-03-23 PROCEDURE — 3078F DIAST BP <80 MM HG: CPT | Performed by: INTERNAL MEDICINE

## 2021-03-23 PROCEDURE — 3075F SYST BP GE 130 - 139MM HG: CPT | Performed by: INTERNAL MEDICINE

## 2021-03-23 RX ORDER — METRONIDAZOLE 10 MG/G
1 GEL TOPICAL 2 TIMES DAILY
Qty: 60 G | Refills: 1 | Status: SHIPPED | OUTPATIENT
Start: 2021-03-23 | End: 2021-03-24

## 2021-03-23 RX ORDER — ATORVASTATIN CALCIUM 20 MG/1
20 TABLET, FILM COATED ORAL NIGHTLY
Qty: 90 TABLET | Refills: 1 | Status: SHIPPED | OUTPATIENT
Start: 2021-03-23 | End: 2021-09-14

## 2021-03-23 RX ORDER — LISINOPRIL 2.5 MG/1
2.5 TABLET ORAL DAILY
Qty: 90 TABLET | Refills: 1 | Status: SHIPPED | OUTPATIENT
Start: 2021-03-23 | End: 2021-03-25

## 2021-03-23 RX ORDER — METOPROLOL SUCCINATE 100 MG/1
100 TABLET, EXTENDED RELEASE ORAL DAILY
Qty: 90 TABLET | Refills: 1 | Status: SHIPPED | OUTPATIENT
Start: 2021-03-23 | End: 2021-09-14

## 2021-03-23 NOTE — TELEPHONE ENCOUNTER
Patient states he was just in the office today and the pharmacy does not have a prescription for the cream for the red rash on his face. Please advise. Patient also states the address on his discharge papers is his old address.  Patient's address is [address]

## 2021-03-24 ENCOUNTER — LAB ENCOUNTER (OUTPATIENT)
Dept: LAB | Age: 66
End: 2021-03-24
Attending: INTERNAL MEDICINE
Payer: MEDICARE

## 2021-03-24 ENCOUNTER — TELEPHONE (OUTPATIENT)
Dept: INTERNAL MEDICINE CLINIC | Facility: CLINIC | Age: 66
End: 2021-03-24

## 2021-03-24 DIAGNOSIS — Z87.891 HISTORY OF SMOKING: ICD-10-CM

## 2021-03-24 DIAGNOSIS — E78.5 HYPERLIPIDEMIA, UNSPECIFIED HYPERLIPIDEMIA TYPE: ICD-10-CM

## 2021-03-24 DIAGNOSIS — R80.9 MICROALBUMINURIA: ICD-10-CM

## 2021-03-24 DIAGNOSIS — Z00.00 MEDICARE ANNUAL WELLNESS VISIT, SUBSEQUENT: ICD-10-CM

## 2021-03-24 DIAGNOSIS — N52.9 ERECTILE DYSFUNCTION, UNSPECIFIED ERECTILE DYSFUNCTION TYPE: ICD-10-CM

## 2021-03-24 DIAGNOSIS — I25.119 CORONARY ARTERY DISEASE INVOLVING NATIVE CORONARY ARTERY OF NATIVE HEART WITH ANGINA PECTORIS (HCC): ICD-10-CM

## 2021-03-24 DIAGNOSIS — I10 ESSENTIAL HYPERTENSION: ICD-10-CM

## 2021-03-24 DIAGNOSIS — R73.03 PRE-DIABETES: ICD-10-CM

## 2021-03-24 LAB
ALBUMIN SERPL-MCNC: 3.8 G/DL (ref 3.4–5)
ALBUMIN/GLOB SERPL: 0.9 {RATIO} (ref 1–2)
ALP LIVER SERPL-CCNC: 79 U/L
ALT SERPL-CCNC: 28 U/L
ANION GAP SERPL CALC-SCNC: 4 MMOL/L (ref 0–18)
AST SERPL-CCNC: 16 U/L (ref 15–37)
BASOPHILS # BLD AUTO: 0.05 X10(3) UL (ref 0–0.2)
BASOPHILS NFR BLD AUTO: 0.7 %
BILIRUB SERPL-MCNC: 0.6 MG/DL (ref 0.1–2)
BILIRUB UR QL: NEGATIVE
BUN BLD-MCNC: 9 MG/DL (ref 7–18)
BUN/CREAT SERPL: 9 (ref 10–20)
CALCIUM BLD-MCNC: 9.2 MG/DL (ref 8.5–10.1)
CHLORIDE SERPL-SCNC: 107 MMOL/L (ref 98–112)
CHOLEST SMN-MCNC: 135 MG/DL (ref ?–200)
CLARITY UR: CLEAR
CO2 SERPL-SCNC: 28 MMOL/L (ref 21–32)
COLOR UR: YELLOW
COMPLEXED PSA SERPL-MCNC: 0.98 NG/ML (ref ?–4)
CREAT BLD-MCNC: 1 MG/DL
CREAT UR-SCNC: 153 MG/DL
DEPRECATED RDW RBC AUTO: 39.6 FL (ref 35.1–46.3)
EOSINOPHIL # BLD AUTO: 0.17 X10(3) UL (ref 0–0.7)
EOSINOPHIL NFR BLD AUTO: 2.3 %
ERYTHROCYTE [DISTWIDTH] IN BLOOD BY AUTOMATED COUNT: 12.3 % (ref 11–15)
EST. AVERAGE GLUCOSE BLD GHB EST-MCNC: 154 MG/DL (ref 68–126)
GLOBULIN PLAS-MCNC: 4.1 G/DL (ref 2.8–4.4)
GLUCOSE BLD-MCNC: 99 MG/DL (ref 70–99)
GLUCOSE UR-MCNC: NEGATIVE MG/DL
HBA1C MFR BLD HPLC: 7 % (ref ?–5.7)
HCT VFR BLD AUTO: 42.7 %
HDLC SERPL-MCNC: 30 MG/DL (ref 40–59)
HGB BLD-MCNC: 14.4 G/DL
HGB UR QL STRIP.AUTO: NEGATIVE
IMM GRANULOCYTES # BLD AUTO: 0.03 X10(3) UL (ref 0–1)
IMM GRANULOCYTES NFR BLD: 0.4 %
KETONES UR-MCNC: NEGATIVE MG/DL
LDLC SERPL CALC-MCNC: 83 MG/DL (ref ?–100)
LEUKOCYTE ESTERASE UR QL STRIP.AUTO: NEGATIVE
LYMPHOCYTES # BLD AUTO: 2.38 X10(3) UL (ref 1–4)
LYMPHOCYTES NFR BLD AUTO: 32.6 %
M PROTEIN MFR SERPL ELPH: 7.9 G/DL (ref 6.4–8.2)
MCH RBC QN AUTO: 29.8 PG (ref 26–34)
MCHC RBC AUTO-ENTMCNC: 33.7 G/DL (ref 31–37)
MCV RBC AUTO: 88.2 FL
MICROALBUMIN UR-MCNC: 45.3 MG/DL
MICROALBUMIN/CREAT 24H UR-RTO: 296.1 UG/MG (ref ?–30)
MONOCYTES # BLD AUTO: 0.52 X10(3) UL (ref 0.1–1)
MONOCYTES NFR BLD AUTO: 7.1 %
NEUTROPHILS # BLD AUTO: 4.14 X10 (3) UL (ref 1.5–7.7)
NEUTROPHILS # BLD AUTO: 4.14 X10(3) UL (ref 1.5–7.7)
NEUTROPHILS NFR BLD AUTO: 56.9 %
NITRITE UR QL STRIP.AUTO: NEGATIVE
NONHDLC SERPL-MCNC: 105 MG/DL (ref ?–130)
OSMOLALITY SERPL CALC.SUM OF ELEC: 287 MOSM/KG (ref 275–295)
PATIENT FASTING Y/N/NP: YES
PATIENT FASTING Y/N/NP: YES
PH UR: 6 [PH] (ref 5–8)
PLATELET # BLD AUTO: 238 10(3)UL (ref 150–450)
POTASSIUM SERPL-SCNC: 4 MMOL/L (ref 3.5–5.1)
PROT UR-MCNC: 100 MG/DL
RBC # BLD AUTO: 4.84 X10(6)UL
SODIUM SERPL-SCNC: 139 MMOL/L (ref 136–145)
SP GR UR STRIP: 1.02 (ref 1–1.03)
TRIGL SERPL-MCNC: 108 MG/DL (ref 30–149)
TSI SER-ACNC: 2.81 MIU/ML (ref 0.36–3.74)
UROBILINOGEN UR STRIP-ACNC: <2
VLDLC SERPL CALC-MCNC: 22 MG/DL (ref 0–30)
WBC # BLD AUTO: 7.3 X10(3) UL (ref 4–11)

## 2021-03-24 PROCEDURE — 80053 COMPREHEN METABOLIC PANEL: CPT

## 2021-03-24 PROCEDURE — 36415 COLL VENOUS BLD VENIPUNCTURE: CPT

## 2021-03-24 PROCEDURE — 3051F HG A1C>EQUAL 7.0%<8.0%: CPT | Performed by: INTERNAL MEDICINE

## 2021-03-24 PROCEDURE — 82043 UR ALBUMIN QUANTITATIVE: CPT

## 2021-03-24 PROCEDURE — 81001 URINALYSIS AUTO W/SCOPE: CPT

## 2021-03-24 PROCEDURE — 3060F POS MICROALBUMINURIA REV: CPT | Performed by: INTERNAL MEDICINE

## 2021-03-24 PROCEDURE — 83036 HEMOGLOBIN GLYCOSYLATED A1C: CPT

## 2021-03-24 PROCEDURE — 85025 COMPLETE CBC W/AUTO DIFF WBC: CPT

## 2021-03-24 PROCEDURE — 82570 ASSAY OF URINE CREATININE: CPT

## 2021-03-24 PROCEDURE — 84443 ASSAY THYROID STIM HORMONE: CPT

## 2021-03-24 PROCEDURE — 80061 LIPID PANEL: CPT

## 2021-03-24 RX ORDER — METRONIDAZOLE 7.5 MG/G
1 LOTION TOPICAL 2 TIMES DAILY
Qty: 59 ML | Refills: 1 | Status: SHIPPED | OUTPATIENT
Start: 2021-03-24

## 2021-03-24 RX ORDER — METRONIDAZOLE 10 MG/G
1 GEL TOPICAL 2 TIMES DAILY
Qty: 60 G | Refills: 1 | Status: SHIPPED | OUTPATIENT
Start: 2021-03-24 | End: 2021-03-24

## 2021-03-24 NOTE — TELEPHONE ENCOUNTER
Tried to call [facility] pharmacy but kept on getting a busy signal.     Patient indicated that [facility] indicated that his insurance covered part of the metronidazole. Patient stated that was costing $200. Went over [employer] discounts for patient and same medicat

## 2021-03-24 NOTE — TELEPHONE ENCOUNTER
Pt came to  stating the following medication was too expensive and asked if Dr could please substitute with a less expensive brand. •  metroNIDAZOLE 1 % External Gel, Apply 1 Application topically 2 (two) times daily. , Disp: 60 g, Rfl: 1

## 2021-03-24 NOTE — PROGRESS NOTES
HPI:   Trish Sebastian is a 76year old male who presents for a Medicare Subsequent Annual Wellness visit (Pt already had Initial Annual Wellness).     Patient comes in for Medicare annual overall doing okay denies any complaints except for rash that he has been Alcohol screening   Rush Ruvalcaba was screened for Alcohol abuse and had a score of 0 so is at low risk.      Patient Care Team: Patient Care Team:  Marvell Soulier, MD as PCP - General (Internal Medicine)    Patient Active Problem List:     Coronary artery dis Hyperlipemia. He  has no past surgical history on file. His family history includes Heart Disorder in his father; No Known Problems in his mother. SOCIAL HISTORY:   He  reports that he quit smoking about a year ago.  His smoking use included cigaret the TV: No I have to strain to understand conversations: No   I have to worry about missing the telephone ring or doorbell: No I have trouble hearing conversations in a noisy background such as a crowded room or restaurant: No   I get confused about where Musculoskeletal:         General: Normal range of motion. Cervical back: Normal range of motion and neck supple. Skin:     General: Skin is warm and dry.       Comments: Rash to forehead and bl cheeks    Neurological:      Mental Status: He is aler Future  -     PSA SCREEN; Future  -     HEMOGLOBIN A1C; Future  -     MICROALB/CREAT RATIO, RANDOM URINE; Future    Pre-diabetes monitor will monitor A1c  -     CBC WITH DIFFERENTIAL WITH PLATELET; Future  -     COMP METABOLIC PANEL (14);  Future  -     LIP Future  -     PSA SCREEN; Future  -     HEMOGLOBIN A1C; Future  -     MICROALB/CREAT RATIO, RANDOM URINE;  Future    Rash of face looks like rosacea will treat with metronidazole cream if not better let us know    Rosacea as above    History of Bell's palsy Cancer Screening      Colonoscopy Screen every 10 years Colonoscopy Never done Update Health Maintenance if applicable    Flex Sigmoidoscopy Screen every 10 years No results found for this or any previous visit. No flowsheet data found.      Fecal Occult Bl 06/23/2020 0.94    No flowsheet data found. Drug Serum Conc  Annually No results found for: DIGOXIN, DIG, VALP No flowsheet data found. COPD      Spirometry Testing Annually Spirometry date:  No flowsheet data found.

## 2021-03-26 ENCOUNTER — HOSPITAL ENCOUNTER (OUTPATIENT)
Dept: CT IMAGING | Age: 66
Discharge: HOME | End: 2021-03-26
Attending: INTERNAL MEDICINE
Payer: MEDICARE

## 2021-03-26 DIAGNOSIS — Z87.891 HISTORY OF SMOKING: ICD-10-CM

## 2021-03-26 PROCEDURE — 71271 CT THORAX LUNG CANCER SCR C-: CPT | Performed by: INTERNAL MEDICINE

## 2021-03-29 ENCOUNTER — TELEPHONE (OUTPATIENT)
Dept: INTERNAL MEDICINE CLINIC | Facility: CLINIC | Age: 66
End: 2021-03-29

## 2021-03-29 NOTE — TELEPHONE ENCOUNTER
Discussed CT lung results with patient    He would like to  a copy of his CT lung and referral to Pulmonology at the [facility] office    Office staff, please assist.       He declined to have results mailed to him.

## 2021-04-05 ENCOUNTER — IMMUNIZATION (OUTPATIENT)
Dept: LAB | Age: 66
End: 2021-04-05
Attending: HOSPITALIST
Payer: MEDICARE

## 2021-04-05 DIAGNOSIS — Z23 NEED FOR VACCINATION: Primary | ICD-10-CM

## 2021-04-05 PROCEDURE — 0002A SARSCOV2 VAC 30MCG/0.3ML IM: CPT

## 2021-04-22 ENCOUNTER — HOSPITAL ENCOUNTER (OUTPATIENT)
Dept: ULTRASOUND IMAGING | Age: 66
Discharge: HOME | End: 2021-04-22
Attending: INTERNAL MEDICINE
Payer: MEDICARE

## 2021-04-22 DIAGNOSIS — Z87.891 HISTORY OF SMOKING: ICD-10-CM

## 2021-04-22 PROCEDURE — 76706 US ABDL AORTA SCREEN AAA: CPT | Performed by: INTERNAL MEDICINE

## 2021-05-13 ENCOUNTER — OFFICE VISIT (OUTPATIENT)
Dept: PULMONOLOGY | Facility: CLINIC | Age: 66
End: 2021-05-13
Payer: MEDICARE

## 2021-05-13 VITALS
HEIGHT: 67 IN | HEART RATE: 72 BPM | SYSTOLIC BLOOD PRESSURE: 147 MMHG | DIASTOLIC BLOOD PRESSURE: 88 MMHG | BODY MASS INDEX: 32.8 KG/M2 | OXYGEN SATURATION: 99 % | WEIGHT: 209 LBS

## 2021-05-13 DIAGNOSIS — Z87.891 PERSONAL HISTORY OF TOBACCO USE, PRESENTING HAZARDS TO HEALTH: ICD-10-CM

## 2021-05-13 DIAGNOSIS — J43.8 PARASEPTAL EMPHYSEMA (HCC): Primary | ICD-10-CM

## 2021-05-13 PROCEDURE — 3008F BODY MASS INDEX DOCD: CPT | Performed by: INTERNAL MEDICINE

## 2021-05-13 PROCEDURE — 3077F SYST BP >= 140 MM HG: CPT | Performed by: INTERNAL MEDICINE

## 2021-05-13 PROCEDURE — 3079F DIAST BP 80-89 MM HG: CPT | Performed by: INTERNAL MEDICINE

## 2021-05-13 PROCEDURE — 99204 OFFICE O/P NEW MOD 45 MIN: CPT | Performed by: INTERNAL MEDICINE

## 2021-05-13 NOTE — H&P
Referring Physician  Margeret Claude, MD    Chief Complaint  Abnormal CT chest    History of Present Illness  Patient is a 60-year-old male who presents to pulmonary clinic for initial visit.   Had lung screening CT with evidence of significant emphysematous ch Denies    Medications  METFORMIN  MG Oral Tab, TAKE 1 TABLET BY MOUTH TWICE A DAY WITH MEALS, Disp: 180 tablet, Rfl: 0  lisinopril 5 MG Oral Tab, Take 1 tablet (5 mg total) by mouth daily. , Disp: 30 tablet, Rfl: 2  metroNIDAZOLE 0.75 % External Loti significant paraseptal emphysema along with possible fibrotic changes. Reviewed CT results with the patient. Denies significant worsening dyspnea symptoms but does admit to some very mild dyspnea with exertion.   Will obtain baseline pulmonary function te

## 2021-05-15 ENCOUNTER — LAB ENCOUNTER (OUTPATIENT)
Dept: LAB | Age: 66
End: 2021-05-15
Attending: INTERNAL MEDICINE
Payer: MEDICARE

## 2021-05-15 DIAGNOSIS — J43.8 PARASEPTAL EMPHYSEMA (HCC): ICD-10-CM

## 2021-05-18 ENCOUNTER — TELEPHONE (OUTPATIENT)
Dept: RESPIRATORY THERAPY | Facility: HOSPITAL | Age: 66
End: 2021-05-18

## 2021-05-18 ENCOUNTER — OFFICE VISIT (OUTPATIENT)
Dept: OPHTHALMOLOGY | Facility: CLINIC | Age: 66
End: 2021-05-18
Payer: MEDICARE

## 2021-05-18 ENCOUNTER — HOSPITAL ENCOUNTER (OUTPATIENT)
Dept: RESPIRATORY THERAPY | Facility: HOSPITAL | Age: 66
Discharge: HOME | End: 2021-05-18
Attending: INTERNAL MEDICINE
Payer: MEDICARE

## 2021-05-18 DIAGNOSIS — J43.8 PARASEPTAL EMPHYSEMA (HCC): ICD-10-CM

## 2021-05-18 DIAGNOSIS — H25.13 AGE-RELATED NUCLEAR CATARACT OF BOTH EYES: ICD-10-CM

## 2021-05-18 DIAGNOSIS — E11.9 TYPE 2 DIABETES MELLITUS WITHOUT RETINOPATHY (HCC): Primary | ICD-10-CM

## 2021-05-18 DIAGNOSIS — H52.4 PRESBYOPIA OF BOTH EYES: ICD-10-CM

## 2021-05-18 PROCEDURE — 94729 DIFFUSING CAPACITY: CPT | Performed by: INTERNAL MEDICINE

## 2021-05-18 PROCEDURE — 94060 EVALUATION OF WHEEZING: CPT | Performed by: INTERNAL MEDICINE

## 2021-05-18 PROCEDURE — 92004 COMPRE OPH EXAM NEW PT 1/>: CPT | Performed by: OPHTHALMOLOGY

## 2021-05-18 PROCEDURE — 94726 PLETHYSMOGRAPHY LUNG VOLUMES: CPT | Performed by: INTERNAL MEDICINE

## 2021-05-18 NOTE — PATIENT INSTRUCTIONS
Presbyopia of both eyes  Continue with +3.00 over the counter for reading. Type 2 diabetes mellitus without retinopathy ([address]  Diabetes type II: no background of retinopathy, no signs of neovascularization noted.   Discussed ocular and systemic benefits

## 2021-05-18 NOTE — PROGRESS NOTES
Maria Dolores Wu is a 76year old male.     HPI:     HPI     Consult      Additional comments: Consult per Dr. Eleazar Sage              Diabetic Eye Exam      Additional comments: Pt has been a diabetic for 1 month       Pt's diabetes is currently controlled by pills tablet (20 mg total) by mouth nightly. 90 tablet 1   • aspirin 81 MG Oral Chew Tab Chew 1 tablet (81 mg total) by mouth daily.  90 tablet 1   • Sildenafil Citrate (VIAGRA) 100 MG Oral Tab Take 0.5 tablets (50 mg total) by mouth as needed for Erectile Dysfun checked with +3.00 in phoropter     OD 20/20  OS 20/20    Recommend +3.00 OTC reading glasses                  ASSESSMENT/PLAN:     Diagnoses and Plan:     Presbyopia of both eyes  Continue with +3.00 over the counter for reading.      Type 2 diabetes jannet

## 2021-05-24 ENCOUNTER — OFFICE VISIT (OUTPATIENT)
Dept: INTERNAL MEDICINE CLINIC | Facility: CLINIC | Age: 66
End: 2021-05-24
Payer: MEDICARE

## 2021-05-24 ENCOUNTER — TELEPHONE (OUTPATIENT)
Dept: PULMONOLOGY | Facility: CLINIC | Age: 66
End: 2021-05-24

## 2021-05-24 ENCOUNTER — HOSPITAL ENCOUNTER (EMERGENCY)
Facility: HOSPITAL | Age: 66
Discharge: HOME | End: 2021-05-24
Attending: EMERGENCY MEDICINE
Payer: MEDICARE

## 2021-05-24 VITALS
DIASTOLIC BLOOD PRESSURE: 73 MMHG | SYSTOLIC BLOOD PRESSURE: 135 MMHG | WEIGHT: 208 LBS | HEIGHT: 68 IN | TEMPERATURE: 98 F | HEART RATE: 80 BPM | OXYGEN SATURATION: 94 % | BODY MASS INDEX: 31.52 KG/M2 | RESPIRATION RATE: 16 BRPM

## 2021-05-24 VITALS
DIASTOLIC BLOOD PRESSURE: 82 MMHG | HEART RATE: 88 BPM | SYSTOLIC BLOOD PRESSURE: 136 MMHG | HEIGHT: 68 IN | WEIGHT: 208 LBS | BODY MASS INDEX: 31.52 KG/M2 | RESPIRATION RATE: 18 BRPM | OXYGEN SATURATION: 98 % | TEMPERATURE: 98 F

## 2021-05-24 DIAGNOSIS — R19.09 UMBILICAL MASS: Primary | ICD-10-CM

## 2021-05-24 DIAGNOSIS — L03.316 CELLULITIS, UMBILICAL: Primary | ICD-10-CM

## 2021-05-24 PROCEDURE — 3008F BODY MASS INDEX DOCD: CPT | Performed by: INTERNAL MEDICINE

## 2021-05-24 PROCEDURE — 80048 BASIC METABOLIC PNL TOTAL CA: CPT | Performed by: EMERGENCY MEDICINE

## 2021-05-24 PROCEDURE — 99213 OFFICE O/P EST LOW 20 MIN: CPT | Performed by: INTERNAL MEDICINE

## 2021-05-24 PROCEDURE — 3079F DIAST BP 80-89 MM HG: CPT | Performed by: INTERNAL MEDICINE

## 2021-05-24 PROCEDURE — 3075F SYST BP GE 130 - 139MM HG: CPT | Performed by: INTERNAL MEDICINE

## 2021-05-24 PROCEDURE — 85025 COMPLETE CBC W/AUTO DIFF WBC: CPT | Performed by: EMERGENCY MEDICINE

## 2021-05-24 PROCEDURE — 96365 THER/PROPH/DIAG IV INF INIT: CPT

## 2021-05-24 PROCEDURE — 99284 EMERGENCY DEPT VISIT MOD MDM: CPT

## 2021-05-24 RX ORDER — CEFADROXIL 500 MG/1
500 CAPSULE ORAL 2 TIMES DAILY
Qty: 20 CAPSULE | Refills: 0 | Status: SHIPPED | OUTPATIENT
Start: 2021-05-24 | End: 2021-06-03

## 2021-05-24 NOTE — TELEPHONE ENCOUNTER
Pt called in to get recent test results from breathing test. Pt is also requesting if the results can be sent to Dr. Yuliya Garcia,  Please follow up

## 2021-05-24 NOTE — PROGRESS NOTES
HPI/Subjective:   Patient ID: Cody Vazquez is a 76year old male.     HPI  Comes in today with complaint of pain around umbilical area started on Friday has history of small umbilical hernia as per patient since Friday has been hard tender swollen and redness Physical Exam  Vitals and nursing note reviewed. Constitutional:       Appearance: He is well-developed. HENT:      Head: Normocephalic and atraumatic.       Right Ear: External ear normal.      Left Ear: External ear normal.      Nose: Nose normal.

## 2021-05-24 NOTE — ED INITIAL ASSESSMENT (HPI)
Kathryn Gusman was sent by Dr. Ebonie Don office for evaluation and treatment of umbilical abscess, probably admit.  Patient noticed swelling and pain to umbilicus on Friday

## 2021-05-25 NOTE — PROCEDURES
[facility]    PFT Interpretation    Mandy Loose     1955 MRN [id number]   Height  79 inh  Age 76year old   Weight  209 lbs  Sex Male         Spirometry:   FEV1 3.05 L which is 100%  FEV1/FVC ratio 72%    No significant bronchodil

## 2021-05-25 NOTE — ED PROVIDER NOTES
Patient Seen in: [facility] AND [facility] Emergency Department    History   Patient presents with:  Abscess      HPI    Patient presents to the ED complaining of pain and swelling and redness to his umbilical hernia and skin surrounding this.   Symptoms started Device None (Room air)       All measures to prevent infection transmission during my interaction with the patient were taken. The patient was already wearing a droplet mask on my arrival to the room.  Personal protective equipment including droplet mask, e orders were created for panel order CBC WITH DIFFERENTIAL WITH PLATELET.                   Procedure                               Abnormality         Status                                     ---------                               -----------         --- surgical consultation. He agrees with plan for discharge home and recommends Duricef for antibiotic coverage. Outpatient follow-up for continued care management. Patient given Rocephin in the ED prior to discharge.     Additional verbal instructions and

## 2021-05-25 NOTE — ADDENDUM NOTE
Encounter addended by: Zechariah Campos MD on: 5/25/2021 3:39 PM   Actions taken: Clinical Note Signed, Charge Capture section accepted

## 2021-05-26 NOTE — TELEPHONE ENCOUNTER
Spoke with patient informed him of Dr. Arvin Schilling result note/order. Patient verbalized understanding and will call pharmacy to see if Anoro inhaler is covered by insurance.   Encounter forwarded to Dr. Arlene Fenton per patient's request.

## 2021-05-26 NOTE — TELEPHONE ENCOUNTER
You may let the patient know that reviewed his PFT results with evidence of mild COPD. If patient is agreeable I have prescribed Anoro for him. We will also forward this message to his primary care physician. For some reason if Anoro is not a preferred inhaler he may ask pharmacist for equivalent and I can prescribe that.

## 2021-05-27 ENCOUNTER — OFFICE VISIT (OUTPATIENT)
Dept: INTERNAL MEDICINE CLINIC | Facility: CLINIC | Age: 66
End: 2021-05-27
Payer: MEDICARE

## 2021-05-27 VITALS
BODY MASS INDEX: 31.22 KG/M2 | SYSTOLIC BLOOD PRESSURE: 118 MMHG | RESPIRATION RATE: 17 BRPM | WEIGHT: 206 LBS | HEART RATE: 76 BPM | TEMPERATURE: 98 F | DIASTOLIC BLOOD PRESSURE: 72 MMHG | HEIGHT: 68 IN | OXYGEN SATURATION: 99 %

## 2021-05-27 DIAGNOSIS — Z09 ENCOUNTER FOR EXAMINATION FOLLOWING TREATMENT AT HOSPITAL: Primary | ICD-10-CM

## 2021-05-27 PROCEDURE — 3078F DIAST BP <80 MM HG: CPT | Performed by: INTERNAL MEDICINE

## 2021-05-27 PROCEDURE — 3074F SYST BP LT 130 MM HG: CPT | Performed by: INTERNAL MEDICINE

## 2021-05-27 PROCEDURE — 99213 OFFICE O/P EST LOW 20 MIN: CPT | Performed by: INTERNAL MEDICINE

## 2021-05-27 PROCEDURE — 3008F BODY MASS INDEX DOCD: CPT | Performed by: INTERNAL MEDICINE

## 2021-05-28 NOTE — PROGRESS NOTES
HPI/Subjective:     Patient ID: Adriel Agosto is a 76year old male.     HPI  Patient comes in for follow-up after he was seen in ER + from the office for further work-up for umbilical area infection patient was started on antibiotic in ER ER spoke with the roth MG Oral Tablet 24 Hr Take 1 tablet (100 mg total) by mouth daily. 90 tablet 1   • atorvastatin 20 MG Oral Tab Take 1 tablet (20 mg total) by mouth nightly. 90 tablet 1   • aspirin 81 MG Oral Chew Tab Chew 1 tablet (81 mg total) by mouth daily.  90 tablet 1 motion. Cervical back: Normal range of motion and neck supple. Skin:     General: Skin is warm and dry. Neurological:      Mental Status: He is alert and oriented to person, place, and time.       Deep Tendon Reflexes: Reflexes are normal and symme

## 2021-06-02 ENCOUNTER — TELEPHONE (OUTPATIENT)
Dept: PULMONOLOGY | Facility: CLINIC | Age: 66
End: 2021-06-02

## 2021-06-02 NOTE — TELEPHONE ENCOUNTER
Can you see if other options such as Bevespi or Stiolto are cheaper.   If not I can prescribe him something like air duo or Sara Less

## 2021-06-02 NOTE — TELEPHONE ENCOUNTER
Spoke with Papo Blankenship at [address] [facility] regarding message below. Per Ken Greenberg is covered under patient's insurance but will cost patient $470. Requesting cheaper inhaler, unable to provide alternative inhalers.

## 2021-06-02 NOTE — TELEPHONE ENCOUNTER
Current Outpatient Medications   Medication Sig Dispense Refill   • umeclidinium-vilanterol (ANORO ELLIPTA) 62.5-25 MCG/INH Inhalation Aerosol Powder, Breath Activated Inhale 1 puff into the lungs daily.  1 each 5     Too expensive- asking for something joya

## 2021-06-03 NOTE — H&P (VIEW-ONLY)
History and Physical      Roni Morgan is a 76year old male. HPI   Patient presents with:  Hernia: Pt referred by ER  for cellulitis of umbilicus. Pt states area has been swollen for yrs but recently became tender.   Pt completed antibiotics and area i tablet 1   • aspirin 81 MG Oral Chew Tab Chew 1 tablet (81 mg total) by mouth daily. 90 tablet 1   • Sildenafil Citrate (VIAGRA) 100 MG Oral Tab Take 0.5 tablets (50 mg total) by mouth as needed for Erectile Dysfunction.  10 tablet 3     ALLERGIES  No Known RLQ scar, indurated and sl red umb skin with non-reducible bulge, nt mostly, no cellulitis or fluctuance, mod diastasis upper midline, no other obvious hernias.        DIAGNOSTICS  Date of Exam:  10/28/2011   PROCEDURE:  CT OF THE ABDOMEN AND PELVIS WITH CO            right lung base. OTHER:              Negative. CONCLUSION: No acute abdominopelvic process to account for patient's   clinical symptoms. There is diffuse hepatic steatosis and there is a small   fat-containing umbilical hernia.       ASSESSME

## 2021-06-03 NOTE — TELEPHONE ENCOUNTER
Spoke with patient. Patient informed of Dr. Carter Melvin order below. Verified pharmacy. Patient verbalized understanding.

## 2021-06-03 NOTE — TELEPHONE ENCOUNTER
Spoke with [facility] regarding cheaper inhaler. Stiolto would be cheaper, cost would be $47.

## 2021-06-03 NOTE — H&P
History and Physical      Dimitrios Lopez is a 76year old male. HPI   Patient presents with:  Hernia: Pt referred by ER  for cellulitis of umbilicus. Pt states area has been swollen for yrs but recently became tender.   Pt completed antibiotics and area i tablet 1   • aspirin 81 MG Oral Chew Tab Chew 1 tablet (81 mg total) by mouth daily. 90 tablet 1   • Sildenafil Citrate (VIAGRA) 100 MG Oral Tab Take 0.5 tablets (50 mg total) by mouth as needed for Erectile Dysfunction.  10 tablet 3     ALLERGIES  No Known RLQ scar, indurated and sl red umb skin with non-reducible bulge, nt mostly, no cellulitis or fluctuance, mod diastasis upper midline, no other obvious hernias.        DIAGNOSTICS  Date of Exam:  10/28/2011   PROCEDURE:  CT OF THE ABDOMEN AND PELVIS WITH CO            right lung base. OTHER:              Negative. CONCLUSION: No acute abdominopelvic process to account for patient's   clinical symptoms. There is diffuse hepatic steatosis and there is a small   fat-containing umbilical hernia.       ASSESSME

## 2021-06-04 ENCOUNTER — TELEPHONE (OUTPATIENT)
Dept: SURGERY | Facility: CLINIC | Age: 66
End: 2021-06-04

## 2021-06-04 ENCOUNTER — OFFICE VISIT (OUTPATIENT)
Dept: SURGERY | Facility: CLINIC | Age: 66
End: 2021-06-04
Payer: MEDICARE

## 2021-06-04 VITALS — WEIGHT: 206 LBS | BODY MASS INDEX: 31.22 KG/M2 | HEIGHT: 68 IN

## 2021-06-04 DIAGNOSIS — K42.0 UMBILICAL HERNIA WITH OBSTRUCTION, WITHOUT GANGRENE: Primary | ICD-10-CM

## 2021-06-04 PROCEDURE — 99204 OFFICE O/P NEW MOD 45 MIN: CPT | Performed by: SURGERY

## 2021-06-04 PROCEDURE — 3008F BODY MASS INDEX DOCD: CPT | Performed by: SURGERY

## 2021-06-04 NOTE — PATIENT INSTRUCTIONS
What Is a Hernia? A hernia is when an organ or tissue pushes through a weak area in the belly (abdominal) wall. This weak area may be there at birth. Or it may be caused by abdominal strain over time.  If not treated, a hernia can get worse with time and can be done quickly and safely. Most hernias are fixed with laparoscopic surgery. This type of surgery is done through several very small cuts. Other hernias may need open surgery. A larger cut is made in the belly.  In some cases, you can go home the isaac

## 2021-06-15 ENCOUNTER — TELEPHONE (OUTPATIENT)
Dept: SURGERY | Facility: CLINIC | Age: 66
End: 2021-06-15

## 2021-06-15 RX ORDER — LISINOPRIL 5 MG/1
5 TABLET ORAL DAILY
Qty: 30 TABLET | Refills: 2 | Status: SHIPPED | OUTPATIENT
Start: 2021-06-15 | End: 2021-09-13

## 2021-06-15 NOTE — TELEPHONE ENCOUNTER
MD Ronak Sherwood, RN  Let pt know he needs to come in for labs and ekg and get a clearance     Left detailed message on patient's answering machine to make appointment with Dr. Giacomo Parr for his medical clearance and his EKG.   Advised

## 2021-06-15 NOTE — TELEPHONE ENCOUNTER
Dr. Allen Shoulder - Mr. Phillip Hernandez is scheduled for an umbilical hernia repair with mesh on June 29, 2021. Cleo Hartmann is asking for a medical clearance so we may proceed with the procedure. Thank you.

## 2021-06-16 NOTE — TELEPHONE ENCOUNTER
Late charting - Left detailed message to see Dr. Ble Medina for his medical clearance, and advised his surgery may be delayed and/or cancelled if he does not see Dr. Bel Medina.   Message left on 6/15/2021 at 2:08 pm.

## 2021-06-24 ENCOUNTER — OFFICE VISIT (OUTPATIENT)
Dept: INTERNAL MEDICINE CLINIC | Facility: CLINIC | Age: 66
End: 2021-06-24
Payer: MEDICARE

## 2021-06-24 VITALS
WEIGHT: 204 LBS | OXYGEN SATURATION: 99 % | HEART RATE: 75 BPM | DIASTOLIC BLOOD PRESSURE: 80 MMHG | HEIGHT: 68 IN | TEMPERATURE: 98 F | BODY MASS INDEX: 30.92 KG/M2 | SYSTOLIC BLOOD PRESSURE: 130 MMHG | RESPIRATION RATE: 18 BRPM

## 2021-06-24 DIAGNOSIS — Z53.20 COLONOSCOPY REFUSED: ICD-10-CM

## 2021-06-24 DIAGNOSIS — E11.9 TYPE 2 DIABETES MELLITUS WITHOUT RETINOPATHY (HCC): ICD-10-CM

## 2021-06-24 DIAGNOSIS — M79.2 PERIPHERAL NEUROPATHIC PAIN: ICD-10-CM

## 2021-06-24 DIAGNOSIS — Z01.818 PRE-OP EXAM: Primary | ICD-10-CM

## 2021-06-24 PROCEDURE — 3008F BODY MASS INDEX DOCD: CPT | Performed by: INTERNAL MEDICINE

## 2021-06-24 PROCEDURE — 3079F DIAST BP 80-89 MM HG: CPT | Performed by: INTERNAL MEDICINE

## 2021-06-24 PROCEDURE — 93000 ELECTROCARDIOGRAM COMPLETE: CPT | Performed by: INTERNAL MEDICINE

## 2021-06-24 PROCEDURE — 36415 COLL VENOUS BLD VENIPUNCTURE: CPT | Performed by: INTERNAL MEDICINE

## 2021-06-24 PROCEDURE — 3075F SYST BP GE 130 - 139MM HG: CPT | Performed by: INTERNAL MEDICINE

## 2021-06-24 PROCEDURE — 99214 OFFICE O/P EST MOD 30 MIN: CPT | Performed by: INTERNAL MEDICINE

## 2021-06-24 RX ORDER — GABAPENTIN 100 MG/1
100 CAPSULE ORAL NIGHTLY
Qty: 30 CAPSULE | Refills: 2 | Status: SHIPPED | OUTPATIENT
Start: 2021-06-24 | End: 2021-09-14

## 2021-06-24 NOTE — PATIENT INSTRUCTIONS
Tarik Camara's SCREENING SCHEDULE   Tests on this list are recommended by your physician but may not be covered, or covered at this frequency, by your insurer. Please check with your insurance carrier before scheduling to verify coverage.    PREVENTATIVE [id number]) covered once after 65 Prevnar 13: -    [id number]: 11/12/2020     No recommendations at this time    Hepatitis B One screening covered for patients with certain risk factors   -  No recommendations at this time    Tetanus Toxoid Not covered b Health. This site has a lot of good information including definitions of the different types of Advance Directives.  It also has the State forms available on it's website for anyone to review and print using their home computer and printer. (the forms are a

## 2021-06-26 ENCOUNTER — LAB ENCOUNTER (OUTPATIENT)
Dept: LAB | Age: 66
End: 2021-06-26
Attending: SURGERY
Payer: MEDICARE

## 2021-06-26 DIAGNOSIS — Z01.818 PREOP TESTING: ICD-10-CM

## 2021-06-28 NOTE — PROGRESS NOTES
HPI/Subjective:   Patient ID: Lupe Smith is a 76year old male. HPI  Pt is here for pre op clearance for umbilical hernia repair. The infection has resolved.  Denies any cp or sob, had stress test last year which was normal. Pt today with some complaint mouth daily. 90 tablet 1   • atorvastatin 20 MG Oral Tab Take 1 tablet (20 mg total) by mouth nightly. 90 tablet 1   • aspirin 81 MG Oral Chew Tab Chew 1 tablet (81 mg total) by mouth daily.  90 tablet 1   • Sildenafil Citrate (VIAGRA) 100 MG Oral Tab Take Occult Blood, Fecal, Immunoassay (Blue cards) [E]      Meds This Visit:  Requested Prescriptions     Signed Prescriptions Disp Refills   • gabapentin 100 MG Oral Cap 30 capsule 2     Sig: Take 1 capsule (100 mg total) by mouth nightly.        Imaging & Refe

## 2021-06-29 ENCOUNTER — ANESTHESIA (OUTPATIENT)
Dept: SURGERY | Facility: HOSPITAL | Age: 66
End: 2021-06-29
Payer: MEDICARE

## 2021-06-29 ENCOUNTER — ANESTHESIA EVENT (OUTPATIENT)
Dept: SURGERY | Facility: HOSPITAL | Age: 66
End: 2021-06-29
Payer: MEDICARE

## 2021-06-29 ENCOUNTER — HOSPITAL ENCOUNTER (OUTPATIENT)
Facility: HOSPITAL | Age: 66
Setting detail: HOSPITAL OUTPATIENT SURGERY
Discharge: HOME | End: 2021-06-29
Attending: SURGERY | Admitting: SURGERY
Payer: MEDICARE

## 2021-06-29 VITALS
WEIGHT: 204 LBS | RESPIRATION RATE: 18 BRPM | HEIGHT: 67 IN | TEMPERATURE: 98 F | HEART RATE: 63 BPM | OXYGEN SATURATION: 97 % | SYSTOLIC BLOOD PRESSURE: 156 MMHG | BODY MASS INDEX: 32.02 KG/M2 | DIASTOLIC BLOOD PRESSURE: 75 MMHG

## 2021-06-29 DIAGNOSIS — K42.0 UMBILICAL HERNIA WITH OBSTRUCTION, WITHOUT GANGRENE: ICD-10-CM

## 2021-06-29 DIAGNOSIS — Z01.818 PREOP TESTING: Primary | ICD-10-CM

## 2021-06-29 LAB
GLUCOSE BLDC GLUCOMTR-MCNC: 103 MG/DL (ref 70–99)
GLUCOSE BLDC GLUCOMTR-MCNC: 103 MG/DL (ref 70–99)

## 2021-06-29 PROCEDURE — 0WQF0ZZ REPAIR ABDOMINAL WALL, OPEN APPROACH: ICD-10-PCS | Performed by: SURGERY

## 2021-06-29 PROCEDURE — 49561 REPAIR INCISIONAL HERNIA,STRANG: CPT | Performed by: SURGERY

## 2021-06-29 RX ORDER — GLYCOPYRROLATE 0.2 MG/ML
INJECTION, SOLUTION INTRAMUSCULAR; INTRAVENOUS AS NEEDED
Status: DISCONTINUED | OUTPATIENT
Start: 2021-06-29 | End: 2021-06-29 | Stop reason: SURG

## 2021-06-29 RX ORDER — HYDROMORPHONE HYDROCHLORIDE 1 MG/ML
0.2 INJECTION, SOLUTION INTRAMUSCULAR; INTRAVENOUS; SUBCUTANEOUS EVERY 5 MIN PRN
Status: DISCONTINUED | OUTPATIENT
Start: 2021-06-29 | End: 2021-06-29

## 2021-06-29 RX ORDER — DEXTROSE MONOHYDRATE 25 G/50ML
50 INJECTION, SOLUTION INTRAVENOUS
Status: DISCONTINUED | OUTPATIENT
Start: 2021-06-29 | End: 2021-06-29

## 2021-06-29 RX ORDER — SODIUM CHLORIDE, SODIUM LACTATE, POTASSIUM CHLORIDE, CALCIUM CHLORIDE 600; 310; 30; 20 MG/100ML; MG/100ML; MG/100ML; MG/100ML
INJECTION, SOLUTION INTRAVENOUS CONTINUOUS
Status: DISCONTINUED | OUTPATIENT
Start: 2021-06-29 | End: 2021-06-29

## 2021-06-29 RX ORDER — CEFAZOLIN SODIUM/WATER 2 G/20 ML
2 SYRINGE (ML) INTRAVENOUS ONCE
Status: COMPLETED | OUTPATIENT
Start: 2021-06-29 | End: 2021-06-29

## 2021-06-29 RX ORDER — MORPHINE SULFATE 4 MG/ML
2 INJECTION, SOLUTION INTRAMUSCULAR; INTRAVENOUS EVERY 10 MIN PRN
Status: DISCONTINUED | OUTPATIENT
Start: 2021-06-29 | End: 2021-06-29

## 2021-06-29 RX ORDER — FAMOTIDINE 20 MG/1
20 TABLET ORAL ONCE
Status: COMPLETED | OUTPATIENT
Start: 2021-06-29 | End: 2021-06-29

## 2021-06-29 RX ORDER — ACETAMINOPHEN 500 MG
1000 TABLET ORAL ONCE
Status: COMPLETED | OUTPATIENT
Start: 2021-06-29 | End: 2021-06-29

## 2021-06-29 RX ORDER — HYDROCODONE BITARTRATE AND ACETAMINOPHEN 5; 325 MG/1; MG/1
2 TABLET ORAL AS NEEDED
Status: DISCONTINUED | OUTPATIENT
Start: 2021-06-29 | End: 2021-06-29

## 2021-06-29 RX ORDER — TRAMADOL HYDROCHLORIDE 50 MG/1
50 TABLET ORAL EVERY 6 HOURS PRN
Qty: 10 TABLET | Refills: 0 | Status: SHIPPED | OUTPATIENT
Start: 2021-06-29 | End: 2021-08-16 | Stop reason: ALTCHOICE

## 2021-06-29 RX ORDER — MORPHINE SULFATE 10 MG/ML
6 INJECTION, SOLUTION INTRAMUSCULAR; INTRAVENOUS EVERY 10 MIN PRN
Status: DISCONTINUED | OUTPATIENT
Start: 2021-06-29 | End: 2021-06-29

## 2021-06-29 RX ORDER — OXYCODONE HYDROCHLORIDE 5 MG/1
5 TABLET ORAL EVERY 6 HOURS PRN
Qty: 6 TABLET | Refills: 0 | Status: SHIPPED | OUTPATIENT
Start: 2021-06-29 | End: 2021-11-18

## 2021-06-29 RX ORDER — HYDROCODONE BITARTRATE AND ACETAMINOPHEN 5; 325 MG/1; MG/1
1 TABLET ORAL AS NEEDED
Status: DISCONTINUED | OUTPATIENT
Start: 2021-06-29 | End: 2021-06-29

## 2021-06-29 RX ORDER — NEOSTIGMINE METHYLSULFATE 1 MG/ML
INJECTION INTRAVENOUS AS NEEDED
Status: DISCONTINUED | OUTPATIENT
Start: 2021-06-29 | End: 2021-06-29 | Stop reason: SURG

## 2021-06-29 RX ORDER — NALOXONE HYDROCHLORIDE 0.4 MG/ML
80 INJECTION, SOLUTION INTRAMUSCULAR; INTRAVENOUS; SUBCUTANEOUS AS NEEDED
Status: DISCONTINUED | OUTPATIENT
Start: 2021-06-29 | End: 2021-06-29

## 2021-06-29 RX ORDER — METOPROLOL TARTRATE 5 MG/5ML
2.5 INJECTION INTRAVENOUS ONCE
Status: DISCONTINUED | OUTPATIENT
Start: 2021-06-29 | End: 2021-06-29

## 2021-06-29 RX ORDER — HYDROMORPHONE HYDROCHLORIDE 1 MG/ML
0.4 INJECTION, SOLUTION INTRAMUSCULAR; INTRAVENOUS; SUBCUTANEOUS EVERY 5 MIN PRN
Status: DISCONTINUED | OUTPATIENT
Start: 2021-06-29 | End: 2021-06-29

## 2021-06-29 RX ORDER — EPHEDRINE SULFATE 50 MG/ML
INJECTION, SOLUTION INTRAVENOUS AS NEEDED
Status: DISCONTINUED | OUTPATIENT
Start: 2021-06-29 | End: 2021-06-29 | Stop reason: SURG

## 2021-06-29 RX ORDER — ROCURONIUM BROMIDE 10 MG/ML
INJECTION, SOLUTION INTRAVENOUS AS NEEDED
Status: DISCONTINUED | OUTPATIENT
Start: 2021-06-29 | End: 2021-06-29 | Stop reason: SURG

## 2021-06-29 RX ORDER — HALOPERIDOL 5 MG/ML
0.25 INJECTION INTRAMUSCULAR ONCE AS NEEDED
Status: DISCONTINUED | OUTPATIENT
Start: 2021-06-29 | End: 2021-06-29

## 2021-06-29 RX ORDER — LIDOCAINE HYDROCHLORIDE 10 MG/ML
INJECTION, SOLUTION EPIDURAL; INFILTRATION; INTRACAUDAL; PERINEURAL AS NEEDED
Status: DISCONTINUED | OUTPATIENT
Start: 2021-06-29 | End: 2021-06-29 | Stop reason: SURG

## 2021-06-29 RX ORDER — MORPHINE SULFATE 4 MG/ML
4 INJECTION, SOLUTION INTRAMUSCULAR; INTRAVENOUS EVERY 10 MIN PRN
Status: DISCONTINUED | OUTPATIENT
Start: 2021-06-29 | End: 2021-06-29

## 2021-06-29 RX ORDER — BUPIVACAINE HYDROCHLORIDE AND EPINEPHRINE 5; 5 MG/ML; UG/ML
INJECTION, SOLUTION PERINEURAL AS NEEDED
Status: DISCONTINUED | OUTPATIENT
Start: 2021-06-29 | End: 2021-06-29 | Stop reason: HOSPADM

## 2021-06-29 RX ORDER — ONDANSETRON 2 MG/ML
4 INJECTION INTRAMUSCULAR; INTRAVENOUS ONCE AS NEEDED
Status: DISCONTINUED | OUTPATIENT
Start: 2021-06-29 | End: 2021-06-29

## 2021-06-29 RX ORDER — HYDROMORPHONE HYDROCHLORIDE 1 MG/ML
0.6 INJECTION, SOLUTION INTRAMUSCULAR; INTRAVENOUS; SUBCUTANEOUS EVERY 5 MIN PRN
Status: DISCONTINUED | OUTPATIENT
Start: 2021-06-29 | End: 2021-06-29

## 2021-06-29 RX ORDER — METOCLOPRAMIDE 10 MG/1
10 TABLET ORAL ONCE
Status: COMPLETED | OUTPATIENT
Start: 2021-06-29 | End: 2021-06-29

## 2021-06-29 RX ORDER — PROCHLORPERAZINE EDISYLATE 5 MG/ML
5 INJECTION INTRAMUSCULAR; INTRAVENOUS ONCE AS NEEDED
Status: DISCONTINUED | OUTPATIENT
Start: 2021-06-29 | End: 2021-06-29

## 2021-06-29 RX ORDER — ONDANSETRON 2 MG/ML
INJECTION INTRAMUSCULAR; INTRAVENOUS AS NEEDED
Status: DISCONTINUED | OUTPATIENT
Start: 2021-06-29 | End: 2021-06-29 | Stop reason: SURG

## 2021-06-29 RX ADMIN — EPHEDRINE SULFATE 10 MG: 50 INJECTION, SOLUTION INTRAVENOUS at 12:23:00

## 2021-06-29 RX ADMIN — CEFAZOLIN SODIUM/WATER 2 G: 2 G/20 ML SYRINGE (ML) INTRAVENOUS at 11:56:00

## 2021-06-29 RX ADMIN — NEOSTIGMINE METHYLSULFATE 5 MG: 1 INJECTION INTRAVENOUS at 12:32:00

## 2021-06-29 RX ADMIN — ROCURONIUM BROMIDE 40 MG: 10 INJECTION, SOLUTION INTRAVENOUS at 11:52:00

## 2021-06-29 RX ADMIN — SODIUM CHLORIDE, SODIUM LACTATE, POTASSIUM CHLORIDE, CALCIUM CHLORIDE: 600; 310; 30; 20 INJECTION, SOLUTION INTRAVENOUS at 12:44:00

## 2021-06-29 RX ADMIN — ONDANSETRON 4 MG: 2 INJECTION INTRAMUSCULAR; INTRAVENOUS at 12:23:00

## 2021-06-29 RX ADMIN — LIDOCAINE HYDROCHLORIDE 50 MG: 10 INJECTION, SOLUTION EPIDURAL; INFILTRATION; INTRACAUDAL; PERINEURAL at 11:52:00

## 2021-06-29 RX ADMIN — SODIUM CHLORIDE, SODIUM LACTATE, POTASSIUM CHLORIDE, CALCIUM CHLORIDE: 600; 310; 30; 20 INJECTION, SOLUTION INTRAVENOUS at 11:46:00

## 2021-06-29 RX ADMIN — GLYCOPYRROLATE 0.6 MG: 0.2 INJECTION, SOLUTION INTRAMUSCULAR; INTRAVENOUS at 12:32:00

## 2021-06-29 NOTE — INTERVAL H&P NOTE
Pre-op Diagnosis: Umbilical hernia with obstruction, without gangrene [K42.0]    The above referenced H&P was reviewed by Maria Antonia Johnson MD on 6/29/2021, the patient was examined and no significant changes have occurred in the patient's condition since the

## 2021-06-29 NOTE — BRIEF OP NOTE
Pre-Operative Diagnosis: Umbilical hernia with obstruction, without gangrene [K42.0]     Post-Operative Diagnosis: Umbilical hernia with obstruction, without gangrene [K42.0]      Procedure Performed:   REPAIR INCARCERATED VENTRAL AND UMBILICAL HERNIA    S

## 2021-06-29 NOTE — ANESTHESIA PREPROCEDURE EVALUATION
Anesthesia PreOp Note    HPI:     Roni Morgan is a 76year old male who presents for preoperative consultation requested by: Oneyda Cerda MD    Date of Surgery: 6/29/2021    Procedure(s):   HERNIA UMBILICAL REPAIR ADULT WITH POSSIBLE MESH  Indication: Chan Calle Procedure Laterality Date   • APPENDECTOMY  1975   • CATH BARE METAL STENT (BMS)     • CATH PERCUTANEOUS  TRANSLUMINAL CORONARY ANGIOPLASTY     • COLONOSCOPY     • PALATE/UVULA SURGERY UNLISTED  2010   • SINUS SURGERY       • UPPER GI ENDOSCOPY,EXAM MD  metoprolol tartrate (LOPRESSOR) tab 25 mg, 25 mg, Oral, Once PRN, Gladystine MD Nikita  ceFAZolin sodium (ANCEF/KEFZOL) 2 GM/20ML premix IV syringe 2 g, 2 g, Intravenous, Once, Gladystine MD Nikita    No current [facility]-ordered outpatient medications on file. of Current or Ex-Partner:       Emotionally Abused:       Physically Abused:       Sexually Abused:     Available pre-op labs reviewed.   Lab Results   Component Value Date    WBC 6.6 05/24/2021    RBC 4.43 05/24/2021    HGB 13.5 05/24/2021    HCT 40.3 05/2 the anesthetic plan, benefits, risks including possible dental damage if relevant, major complications, and any alternative forms of anesthetic management. All of the patient's questions were answered to the best of my ability.  The patient desires the an

## 2021-06-29 NOTE — ANESTHESIA PROCEDURE NOTES
Airway  Date/Time: 6/29/2021 11:54 AM  Urgency: Elective    Airway not difficult    General Information and Staff    Patient location during procedure: OR  Anesthesiologist: Ashanti Sepulveda MD  Performed: anesthesiologist     Indications and Patient C

## 2021-06-29 NOTE — OPERATIVE REPORT
[facility]    PATIENT'S NAME: Silvia Verdugo   ATTENDING PHYSICIAN: Marleen Palmer MD   OPERATING PHYSICIAN: Marleen Palmer MD   PATIENT ACCOUNT#:   [id number]    LOCATION:  [place] [address]  MEDICAL RECORD #:   [id number]       DATE OF BIR the umbilical skin. The hernia sac remained intact. With some effort, we were eventually able to reduce the presumed omentum within the hernia sac along with the hernia sac intra-abdominally. The defect here measured less than 1 cm in diameter.   The edg

## 2021-06-29 NOTE — ANESTHESIA POSTPROCEDURE EVALUATION
Patient: Cliff Callahan    Procedure Summary     Date: 06/29/21 Room / Location: [address] [place] [address] MAIN OR 10 / [address] [place] [address] MAIN OR    Anesthesia Start: 4968 Anesthesia Stop: 2747    Procedure: REPAIR INCARCERATED VENTRAL AND UMBILICAL HERNIA (N/A Abdomen) Diagnosis:       Ness Rodríguez

## 2021-06-30 ENCOUNTER — TELEPHONE (OUTPATIENT)
Dept: INTERNAL MEDICINE CLINIC | Facility: CLINIC | Age: 66
End: 2021-06-30

## 2021-06-30 ENCOUNTER — TELEPHONE (OUTPATIENT)
Dept: SURGERY | Facility: CLINIC | Age: 66
End: 2021-06-30

## 2021-06-30 NOTE — TELEPHONE ENCOUNTER
Patient walked into the [place] office states he just had surgery and is having pain area is also swollen patient states he called surgeon and was told to apply ice patient is following instructions but is requesting pain medication.

## 2021-06-30 NOTE — TELEPHONE ENCOUNTER
Spoke to Krysta Hi from 's office regarding below. Patient walked into 's office, complaining of pain and swelling at surgical site. Patient was not on the line when call was transferred. Spoke to staff member at Dr. Marika Silva office.  She is sending message to nurse at [facility] office

## 2021-06-30 NOTE — TELEPHONE ENCOUNTER
I spoke to Louise Jameson, Triage nurse with Dr. Hiram Diaz, state pt walked into their office c/o pain and swelling at incision site. Surgery yesterday- Umbilical hernia repair. I called pt, He states he has pain medication and he is not having pain.  Pt states ther

## 2021-07-01 ENCOUNTER — TELEPHONE (OUTPATIENT)
Dept: SURGERY | Facility: CLINIC | Age: 66
End: 2021-07-01

## 2021-07-01 NOTE — TELEPHONE ENCOUNTER
Pt dropped off [place]  Papers to be completes. MARGOT was signed and  $25 was paid.  Sent to Forms and placed in MARGOT @ [facility]

## 2021-07-09 NOTE — TELEPHONE ENCOUNTER
Dr. Miguel A Nava,    **2 forms needing signature**     Please sign off on form: Restrictions and Disab recovery 1-2 wks post op  -Highlight the patient and hit \"Chart\" button.   -In Chart Review, w/in the Encounter tab - click 1 time on the Telephone call encou

## 2021-07-12 ENCOUNTER — OFFICE VISIT (OUTPATIENT)
Dept: SURGERY | Facility: CLINIC | Age: 66
End: 2021-07-12
Payer: MEDICARE

## 2021-07-12 VITALS — WEIGHT: 204 LBS | BODY MASS INDEX: 32 KG/M2

## 2021-07-12 DIAGNOSIS — K42.0 UMBILICAL HERNIA WITH OBSTRUCTION, WITHOUT GANGRENE: Primary | ICD-10-CM

## 2021-07-12 DIAGNOSIS — K43.6 VENTRAL HERNIA WITH OBSTRUCTION AND WITHOUT GANGRENE: ICD-10-CM

## 2021-07-12 PROCEDURE — 99024 POSTOP FOLLOW-UP VISIT: CPT | Performed by: SURGERY

## 2021-07-12 NOTE — TELEPHONE ENCOUNTER
Pt was seen in office by Dr Rock De Los Santos on 7/12/21 and a copy of restrictions and disability forms were given to pt by the general surgery .

## 2021-07-14 NOTE — PROGRESS NOTES
Postoperative Patient Follow-up      7/14/2021    Roni Morgan 76year old      HPI  Patient presents with:  Post-Op: S/P Incarcerated umbilical hernia and incarcerated ventral hernia 6/29/2021. Patient states he feels better, still some constipation.   Appe

## 2021-07-21 ENCOUNTER — TELEPHONE (OUTPATIENT)
Dept: PULMONOLOGY | Facility: CLINIC | Age: 66
End: 2021-07-21

## 2021-08-16 ENCOUNTER — OFFICE VISIT (OUTPATIENT)
Dept: SURGERY | Facility: CLINIC | Age: 66
End: 2021-08-16
Payer: MEDICARE

## 2021-08-16 VITALS — WEIGHT: 204 LBS | BODY MASS INDEX: 32 KG/M2

## 2021-08-16 DIAGNOSIS — K43.6 VENTRAL HERNIA WITH OBSTRUCTION AND WITHOUT GANGRENE: ICD-10-CM

## 2021-08-16 DIAGNOSIS — K42.0 UMBILICAL HERNIA WITH OBSTRUCTION, WITHOUT GANGRENE: Primary | ICD-10-CM

## 2021-08-16 PROCEDURE — 99024 POSTOP FOLLOW-UP VISIT: CPT | Performed by: SURGERY

## 2021-08-16 NOTE — PROGRESS NOTES
Postoperative Patient Follow-up      8/16/2021    Rommel Car 76year old      HPI  Patient presents with: Follow - Up: Post Incarcerated umbilical hernia and incarcerated ventral hernia 6/29.   Patient states he feels much better and the swelling has gone

## 2021-09-02 NOTE — TELEPHONE ENCOUNTER
Refill passed per [place] [facility] [place] protocol.    Requested Prescriptions   Pending Prescriptions Disp Refills    METFORMIN 500 MG Oral Tab [Pharmacy Med Name: METFORMIN  MG TABLET] 180 tablet 0     Sig: TAKE 1 TABLET BY MOUTH TWICE A DAY WITH MEALS

## 2021-09-13 RX ORDER — LISINOPRIL 5 MG/1
5 TABLET ORAL DAILY
Qty: 90 TABLET | Refills: 1 | Status: SHIPPED | OUTPATIENT
Start: 2021-09-13 | End: 2022-01-18

## 2021-09-13 NOTE — TELEPHONE ENCOUNTER
Refill passed per [facility], [facility] protocol.     Requested Prescriptions   Pending Prescriptions Disp Refills    LISINOPRIL 5 MG Oral Tab [Pharmacy Med Name: LISINOPRIL 5 MG TABLET] 90 tablet 0     Sig: TAKE 1 TABLET BY MOUTH EVERY DAY        Hypertensive Medications Protocol Passed - 9/13/2021 12:00 AM        Passed - CMP or BMP in past 12 months        Passed - Appointment in past 6 or next 3 months        Passed - GFR Non- > 50     Lab Results   Component Value Date    GFRNAA 88 05/24/2021                       Recent Outpatient Visits              4 weeks ago Umbilical hernia with obstruction, without gangrene    [facility] Lamin Yang MD    Office Visit    2 months ago Umbilical hernia with obstruction, without gangrene    [facility] Lamin Yang [facility]    Office Visit    2 months ago Pre-op exam    [facility]ex Tony, Merlin Klinefelter, MD    Office Visit    3 months ago Umbilical hernia with obstruction, without gangrene    [facility] for Jaquan Murray MD    Office Visit    3 months ago Encounter for examination following treatment at [facility], [facility], [address], Inder Robledo MD    Office Visit

## 2021-09-14 RX ORDER — GABAPENTIN 100 MG/1
CAPSULE ORAL
Qty: 30 CAPSULE | Refills: 2 | Status: SHIPPED | OUTPATIENT
Start: 2021-09-14 | End: 2022-01-18

## 2021-09-14 RX ORDER — ATORVASTATIN CALCIUM 20 MG/1
TABLET, FILM COATED ORAL
Qty: 90 TABLET | Refills: 1 | Status: SHIPPED | OUTPATIENT
Start: 2021-09-14 | End: 2021-11-18

## 2021-09-14 RX ORDER — METOPROLOL SUCCINATE 100 MG/1
TABLET, EXTENDED RELEASE ORAL
Qty: 90 TABLET | Refills: 1 | Status: SHIPPED | OUTPATIENT
Start: 2021-09-14 | End: 2022-01-18

## 2021-11-18 ENCOUNTER — OFFICE VISIT (OUTPATIENT)
Dept: INTERNAL MEDICINE CLINIC | Facility: CLINIC | Age: 66
End: 2021-11-18
Payer: MEDICARE

## 2021-11-18 VITALS
HEART RATE: 74 BPM | RESPIRATION RATE: 17 BRPM | DIASTOLIC BLOOD PRESSURE: 82 MMHG | WEIGHT: 200 LBS | BODY MASS INDEX: 31.39 KG/M2 | OXYGEN SATURATION: 98 % | TEMPERATURE: 98 F | SYSTOLIC BLOOD PRESSURE: 130 MMHG | HEIGHT: 67 IN

## 2021-11-18 DIAGNOSIS — I10 ESSENTIAL HYPERTENSION: ICD-10-CM

## 2021-11-18 DIAGNOSIS — Z53.20 COLONOSCOPY REFUSED: ICD-10-CM

## 2021-11-18 DIAGNOSIS — E11.9 TYPE 2 DIABETES MELLITUS WITHOUT RETINOPATHY (HCC): Primary | ICD-10-CM

## 2021-11-18 DIAGNOSIS — J44.9 COPD, MILD (HCC): ICD-10-CM

## 2021-11-18 PROCEDURE — 3075F SYST BP GE 130 - 139MM HG: CPT | Performed by: INTERNAL MEDICINE

## 2021-11-18 PROCEDURE — 3008F BODY MASS INDEX DOCD: CPT | Performed by: INTERNAL MEDICINE

## 2021-11-18 PROCEDURE — 3079F DIAST BP 80-89 MM HG: CPT | Performed by: INTERNAL MEDICINE

## 2021-11-18 PROCEDURE — 99214 OFFICE O/P EST MOD 30 MIN: CPT | Performed by: INTERNAL MEDICINE

## 2021-11-18 PROCEDURE — G0008 ADMIN INFLUENZA VIRUS VAC: HCPCS | Performed by: INTERNAL MEDICINE

## 2021-11-18 PROCEDURE — 90662 IIV NO PRSV INCREASED AG IM: CPT | Performed by: INTERNAL MEDICINE

## 2021-11-18 RX ORDER — ATORVASTATIN CALCIUM 20 MG/1
20 TABLET, FILM COATED ORAL NIGHTLY
Qty: 90 TABLET | Refills: 1 | Status: SHIPPED | OUTPATIENT
Start: 2021-11-18 | End: 2022-01-18

## 2021-11-18 NOTE — PROGRESS NOTES
Subjective:     Patient ID: Rommel Lujan is a 76year old male. HPI  Patient comes in for follow-up overall doing okay denies any complaints.   Patient had refused colonoscopy in the past occult blood test last year was ok he has not done one this year yet pulmonary disease) [facility]    • Degenerative joint disease (DJD) of lumbar spine    • Diabetes mellitus, type II ([facility]    • Heart attack ([facility] 01/01/2010   • Hyperlipidemia    • Hypertension    • Osteoarthritis    • Sleep apnea     no CPAP      Past Surgical and oriented to person, place, and time. Deep Tendon Reflexes: Reflexes are normal and symmetric.          Assessment & Plan:   Type 2 diabetes mellitus without retinopathy ([facility] [place] [facility] we will recheck A1c watch diet take medication (primary encounter diagnos

## 2021-12-03 ENCOUNTER — TELEPHONE (OUTPATIENT)
Dept: INTERNAL MEDICINE CLINIC | Facility: CLINIC | Age: 66
End: 2021-12-03

## 2021-12-03 DIAGNOSIS — E11.9 TYPE 2 DIABETES MELLITUS WITHOUT COMPLICATION, WITHOUT LONG-TERM CURRENT USE OF INSULIN (HCC): Primary | ICD-10-CM

## 2021-12-03 NOTE — TELEPHONE ENCOUNTER
Patient came in to the [place] office he states you were going to send a rx to [facility] for  Diabetic supplies .  He has a machine at home for One touch ultra 2 is looking for an order strips and lancets or is better to get a new machine with strips and lancets

## 2022-01-18 ENCOUNTER — OFFICE VISIT (OUTPATIENT)
Dept: INTERNAL MEDICINE CLINIC | Facility: CLINIC | Age: 67
End: 2022-01-18
Payer: MEDICARE

## 2022-01-18 VITALS
TEMPERATURE: 98 F | HEART RATE: 76 BPM | BODY MASS INDEX: 32.02 KG/M2 | HEIGHT: 67 IN | WEIGHT: 204 LBS | RESPIRATION RATE: 17 BRPM | SYSTOLIC BLOOD PRESSURE: 132 MMHG | OXYGEN SATURATION: 98 % | DIASTOLIC BLOOD PRESSURE: 76 MMHG

## 2022-01-18 DIAGNOSIS — E11.9 TYPE 2 DIABETES MELLITUS WITHOUT COMPLICATION, WITHOUT LONG-TERM CURRENT USE OF INSULIN (HCC): ICD-10-CM

## 2022-01-18 DIAGNOSIS — Z53.20 COLONOSCOPY REFUSED: ICD-10-CM

## 2022-01-18 DIAGNOSIS — K59.09 OTHER CONSTIPATION: Primary | ICD-10-CM

## 2022-01-18 PROCEDURE — 99213 OFFICE O/P EST LOW 20 MIN: CPT | Performed by: INTERNAL MEDICINE

## 2022-01-18 PROCEDURE — 3078F DIAST BP <80 MM HG: CPT | Performed by: INTERNAL MEDICINE

## 2022-01-18 PROCEDURE — 3008F BODY MASS INDEX DOCD: CPT | Performed by: INTERNAL MEDICINE

## 2022-01-18 PROCEDURE — 3075F SYST BP GE 130 - 139MM HG: CPT | Performed by: INTERNAL MEDICINE

## 2022-01-18 RX ORDER — LISINOPRIL 5 MG/1
5 TABLET ORAL DAILY
Qty: 90 TABLET | Refills: 1 | Status: SHIPPED | OUTPATIENT
Start: 2022-01-18

## 2022-01-18 RX ORDER — GABAPENTIN 100 MG/1
100 CAPSULE ORAL EVERY EVENING
Qty: 90 CAPSULE | Refills: 2 | Status: SHIPPED | OUTPATIENT
Start: 2022-01-18

## 2022-01-18 RX ORDER — METOPROLOL SUCCINATE 100 MG/1
100 TABLET, EXTENDED RELEASE ORAL DAILY
Qty: 90 TABLET | Refills: 2 | Status: SHIPPED | OUTPATIENT
Start: 2022-01-18

## 2022-01-18 RX ORDER — ATORVASTATIN CALCIUM 20 MG/1
20 TABLET, FILM COATED ORAL NIGHTLY
Qty: 90 TABLET | Refills: 1 | Status: SHIPPED | OUTPATIENT
Start: 2022-01-18

## 2022-01-24 NOTE — PROGRESS NOTES
Subjective:     Patient ID: Gabby Wing is a 78year old male. HPI  Patient comes in today for follow-up complaining of increased constipation lately has been trying prunes which help a little.   History/Other:   Review of Systems   Constitutional: Dolly Ee History:   Procedure Laterality Date   • APPENDECTOMY  1975   • CORONARY STENT PLACEMENT  2010   • PALATE/UVULA SURGERY UNLISTED  2010   • VENTRAL HERNIA REPAIR  06/29/2021    primary repair, ventral and umbilical hernias      Family History   Problem Rela (20 mg total) by mouth nightly. • gabapentin 100 MG Oral Cap 90 capsule 2     Sig: Take 1 capsule (100 mg total) by mouth every evening. • lisinopril 5 MG Oral Tab 90 tablet 1     Sig: Take 1 tablet (5 mg total) by mouth daily.    • metFORMIN 500 MG Ora

## 2022-02-04 ENCOUNTER — TELEPHONE (OUTPATIENT)
Dept: PULMONOLOGY | Facility: CLINIC | Age: 67
End: 2022-02-04

## 2022-02-04 NOTE — TELEPHONE ENCOUNTER
Patient is overdue for CT lung low dose screening as ordered by Dr. Geneva Hennessy. Certified letter mailed to patient.

## 2022-02-09 ENCOUNTER — TELEPHONE (OUTPATIENT)
Dept: PULMONOLOGY | Facility: CLINIC | Age: 67
End: 2022-02-09

## 2022-03-07 ENCOUNTER — TELEPHONE (OUTPATIENT)
Dept: INTERNAL MEDICINE CLINIC | Facility: CLINIC | Age: 67
End: 2022-03-07

## 2022-03-07 RX ORDER — LISINOPRIL 5 MG/1
5 TABLET ORAL DAILY
Qty: 90 TABLET | Refills: 1 | Status: SHIPPED | OUTPATIENT
Start: 2022-03-07 | End: 2022-08-25

## 2022-03-07 NOTE — TELEPHONE ENCOUNTER
Refill passed per [facility] protocol.       Requested Prescriptions   Pending Prescriptions Disp Refills    LISINOPRIL 5 MG Oral Tab [Pharmacy Med Name: LISINOPRIL 5 MG TABLET] 90 tablet 1     Sig: TAKE 1 TABLET BY MOUTH EVERY DAY        Hypertensive Medications Protocol Passed - 3/7/2022 12:02 AM        Passed - CMP or BMP in past 12 months        Passed - Appointment in past 6 or next 3 months        Passed - GFR Non- > 50     Lab Results   Component Value Date    GFRNAA 88 05/24/2021                            Recent Outpatient Visits              1 month ago Other constipation    Bill Melgoza MD    Office Visit    3 months ago Type 2 diabetes mellitus without retinopathy [facility])    [employer], [facility], [address], [place], Julian Robert MD    Office Visit    6 months ago Umbilical hernia with obstruction, without gangrene    [facility] [place] [facility] Alice Thorne MD    Office Visit    7 months ago Umbilical hernia with obstruction, without gangrene    [facility] [place] [facility] for Hannah Peterson MD    Office Visit    8 months ago Pre-op exam    Bill Melgoza MD    Office Visit

## 2022-04-06 ENCOUNTER — TELEPHONE (OUTPATIENT)
Dept: INTERNAL MEDICINE CLINIC | Facility: CLINIC | Age: 67
End: 2022-04-06

## 2022-06-24 ENCOUNTER — HOSPITAL ENCOUNTER (OUTPATIENT)
Age: 67
Discharge: HOME | End: 2022-06-24
Payer: MEDICARE

## 2022-06-24 VITALS
BODY MASS INDEX: 30.92 KG/M2 | SYSTOLIC BLOOD PRESSURE: 172 MMHG | TEMPERATURE: 100 F | DIASTOLIC BLOOD PRESSURE: 78 MMHG | HEART RATE: 72 BPM | WEIGHT: 204 LBS | OXYGEN SATURATION: 99 % | RESPIRATION RATE: 16 BRPM | HEIGHT: 68 IN

## 2022-06-24 DIAGNOSIS — U07.1 COVID-19: Primary | ICD-10-CM

## 2022-06-24 LAB — SARS-COV-2 RNA RESP QL NAA+PROBE: DETECTED

## 2022-06-24 PROCEDURE — U0002 COVID-19 LAB TEST NON-CDC: HCPCS | Performed by: NURSE PRACTITIONER

## 2022-06-24 PROCEDURE — 99203 OFFICE O/P NEW LOW 30 MIN: CPT | Performed by: NURSE PRACTITIONER

## 2022-06-24 NOTE — ED INITIAL ASSESSMENT (HPI)
Patient presents a&o 4/4 with runny nose, subjective fevers, generalized weakness. Started yesterday.

## 2022-07-11 ENCOUNTER — TELEPHONE (OUTPATIENT)
Dept: INTERNAL MEDICINE CLINIC | Facility: CLINIC | Age: 67
End: 2022-07-11

## 2022-08-01 ENCOUNTER — TELEPHONE (OUTPATIENT)
Dept: INTERNAL MEDICINE CLINIC | Facility: CLINIC | Age: 67
End: 2022-08-01

## 2022-08-01 NOTE — TELEPHONE ENCOUNTER
Marysol Dorado at [facility] called stating that patient had a in home wellness screening. Provider noticed that patient had high blood pressure.

## 2022-08-02 NOTE — TELEPHONE ENCOUNTER
Spoke to patient, name and  verified. Patient states that he is in the middle of something and will call back to schedule an appointment. No future appointments.

## 2022-08-04 ENCOUNTER — LAB ENCOUNTER (OUTPATIENT)
Dept: LAB | Age: 67
End: 2022-08-04
Attending: INTERNAL MEDICINE
Payer: MEDICARE

## 2022-08-04 DIAGNOSIS — Z53.20 COLONOSCOPY REFUSED: ICD-10-CM

## 2022-08-04 DIAGNOSIS — I10 ESSENTIAL HYPERTENSION: ICD-10-CM

## 2022-08-04 DIAGNOSIS — E11.9 TYPE 2 DIABETES MELLITUS WITHOUT RETINOPATHY (HCC): ICD-10-CM

## 2022-08-04 DIAGNOSIS — J44.9 COPD, MILD (HCC): ICD-10-CM

## 2022-08-04 LAB
CHOLEST SERPL-MCNC: 117 MG/DL (ref ?–200)
EST. AVERAGE GLUCOSE BLD GHB EST-MCNC: 131 MG/DL (ref 68–126)
FASTING PATIENT LIPID ANSWER: YES
HBA1C MFR BLD: 6.2 % (ref ?–5.7)
HDLC SERPL-MCNC: 24 MG/DL (ref 40–59)
LDLC SERPL CALC-MCNC: 65 MG/DL (ref ?–100)
NONHDLC SERPL-MCNC: 93 MG/DL (ref ?–130)
TRIGL SERPL-MCNC: 160 MG/DL (ref 30–149)
VLDLC SERPL CALC-MCNC: 24 MG/DL (ref 0–30)

## 2022-08-04 PROCEDURE — 3044F HG A1C LEVEL LT 7.0%: CPT | Performed by: INTERNAL MEDICINE

## 2022-08-04 PROCEDURE — 80061 LIPID PANEL: CPT

## 2022-08-04 PROCEDURE — 83036 HEMOGLOBIN GLYCOSYLATED A1C: CPT

## 2022-08-04 PROCEDURE — 36415 COLL VENOUS BLD VENIPUNCTURE: CPT

## 2022-08-05 ENCOUNTER — OFFICE VISIT (OUTPATIENT)
Dept: INTERNAL MEDICINE CLINIC | Facility: CLINIC | Age: 67
End: 2022-08-05
Payer: MEDICARE

## 2022-08-05 VITALS
DIASTOLIC BLOOD PRESSURE: 70 MMHG | SYSTOLIC BLOOD PRESSURE: 130 MMHG | BODY MASS INDEX: 31.13 KG/M2 | WEIGHT: 205.38 LBS | OXYGEN SATURATION: 99 % | HEIGHT: 68 IN | HEART RATE: 91 BPM

## 2022-08-05 DIAGNOSIS — E78.5 HYPERLIPIDEMIA, UNSPECIFIED HYPERLIPIDEMIA TYPE: ICD-10-CM

## 2022-08-05 DIAGNOSIS — J44.9 COPD, MILD (HCC): ICD-10-CM

## 2022-08-05 DIAGNOSIS — R20.0 FINGER NUMBNESS: ICD-10-CM

## 2022-08-05 DIAGNOSIS — I25.119 CORONARY ARTERY DISEASE INVOLVING NATIVE CORONARY ARTERY OF NATIVE HEART WITH ANGINA PECTORIS (HCC): ICD-10-CM

## 2022-08-05 DIAGNOSIS — I10 ESSENTIAL HYPERTENSION: Primary | ICD-10-CM

## 2022-08-05 DIAGNOSIS — E11.9 TYPE 2 DIABETES MELLITUS WITHOUT COMPLICATION, WITHOUT LONG-TERM CURRENT USE OF INSULIN (HCC): ICD-10-CM

## 2022-08-05 DIAGNOSIS — Z53.20 COLONOSCOPY REFUSED: ICD-10-CM

## 2022-08-05 PROCEDURE — 3008F BODY MASS INDEX DOCD: CPT | Performed by: INTERNAL MEDICINE

## 2022-08-05 PROCEDURE — 99214 OFFICE O/P EST MOD 30 MIN: CPT | Performed by: INTERNAL MEDICINE

## 2022-08-05 PROCEDURE — 3075F SYST BP GE 130 - 139MM HG: CPT | Performed by: INTERNAL MEDICINE

## 2022-08-05 PROCEDURE — 3078F DIAST BP <80 MM HG: CPT | Performed by: INTERNAL MEDICINE

## 2022-08-05 RX ORDER — METRONIDAZOLE 7.5 MG/G
1 LOTION TOPICAL 2 TIMES DAILY
Qty: 59 ML | Refills: 1 | Status: SHIPPED | OUTPATIENT
Start: 2022-08-05

## 2022-08-05 RX ORDER — METOPROLOL SUCCINATE 100 MG/1
100 TABLET, EXTENDED RELEASE ORAL DAILY
COMMUNITY

## 2022-08-10 ENCOUNTER — LAB ENCOUNTER (OUTPATIENT)
Dept: LAB | Age: 67
End: 2022-08-10
Attending: INTERNAL MEDICINE
Payer: MEDICARE

## 2022-08-10 ENCOUNTER — TELEPHONE (OUTPATIENT)
Dept: INTERNAL MEDICINE CLINIC | Facility: CLINIC | Age: 67
End: 2022-08-10

## 2022-08-10 LAB — HEMOCCULT STL QL: NEGATIVE

## 2022-08-10 PROCEDURE — 82274 ASSAY TEST FOR BLOOD FECAL: CPT | Performed by: INTERNAL MEDICINE

## 2022-08-10 RX ORDER — METRONIDAZOLE 7.5 MG/G
1 GEL TOPICAL 2 TIMES DAILY
Qty: 1 EACH | Refills: 0 | Status: SHIPPED | OUTPATIENT
Start: 2022-08-10

## 2022-08-10 NOTE — TELEPHONE ENCOUNTER
Patient states the medication the lotion  given to him is to expensive he can not afford it he is asking if you can prescribe something generic and send to [facility] instead he is also switching his pharmacy. Please advice .

## 2022-08-11 NOTE — TELEPHONE ENCOUNTER
First Call attempt. Left Voicemail to call back our office. Office phone number provided with office hours. Yes

## 2022-08-15 ENCOUNTER — TELEPHONE (OUTPATIENT)
Dept: INTERNAL MEDICINE CLINIC | Facility: CLINIC | Age: 67
End: 2022-08-15

## 2022-08-25 RX ORDER — ATORVASTATIN CALCIUM 20 MG/1
TABLET, FILM COATED ORAL
Qty: 90 TABLET | Refills: 1 | Status: SHIPPED | OUTPATIENT
Start: 2022-08-25

## 2022-08-25 RX ORDER — LISINOPRIL 5 MG/1
TABLET ORAL
Qty: 90 TABLET | Refills: 1 | Status: SHIPPED | OUTPATIENT
Start: 2022-08-25

## 2022-09-08 ENCOUNTER — TELEPHONE (OUTPATIENT)
Dept: INTERNAL MEDICINE CLINIC | Facility: CLINIC | Age: 67
End: 2022-09-08

## 2022-09-08 RX ORDER — METOPROLOL SUCCINATE 100 MG/1
100 TABLET, EXTENDED RELEASE ORAL DAILY
Qty: 90 TABLET | Refills: 1 | Status: SHIPPED | OUTPATIENT
Start: 2022-09-08 | End: 2023-01-23

## 2022-09-15 ENCOUNTER — TELEPHONE (OUTPATIENT)
Dept: INTERNAL MEDICINE CLINIC | Facility: CLINIC | Age: 67
End: 2022-09-15

## 2022-09-15 DIAGNOSIS — E11.9 TYPE 2 DIABETES MELLITUS WITHOUT COMPLICATION, WITHOUT LONG-TERM CURRENT USE OF INSULIN (HCC): ICD-10-CM

## 2022-09-15 DIAGNOSIS — E11.10 DM (DIABETES MELLITUS) TYPE 2, UNCONTROLLED, WITH KETOACIDOSIS (HCC): Primary | ICD-10-CM

## 2022-09-15 NOTE — TELEPHONE ENCOUNTER
Patient came in to the office he states Dr. Katharina Erazo does not take his insurance he states he saw Dr. Lawrence before never had a problem . They made an appointment at Dr. Katharina Erazo and when he got there they turn him away.

## 2022-10-12 ENCOUNTER — TELEPHONE (OUTPATIENT)
Dept: INTERNAL MEDICINE CLINIC | Facility: CLINIC | Age: 67
End: 2022-10-12

## 2022-10-12 NOTE — TELEPHONE ENCOUNTER
Spoke to patient, he cannot schedule the annual wellness exam at this time.   Patient will call back and schedule the appointment when he can

## 2022-11-15 ENCOUNTER — OFFICE VISIT (OUTPATIENT)
Dept: INTERNAL MEDICINE CLINIC | Facility: CLINIC | Age: 67
End: 2022-11-15
Payer: MEDICARE

## 2022-11-15 VITALS
WEIGHT: 214 LBS | OXYGEN SATURATION: 98 % | SYSTOLIC BLOOD PRESSURE: 130 MMHG | TEMPERATURE: 98 F | DIASTOLIC BLOOD PRESSURE: 78 MMHG | BODY MASS INDEX: 33 KG/M2 | HEART RATE: 63 BPM

## 2022-11-15 DIAGNOSIS — E78.5 HYPERLIPIDEMIA, UNSPECIFIED HYPERLIPIDEMIA TYPE: ICD-10-CM

## 2022-11-15 DIAGNOSIS — E11.9 TYPE 2 DIABETES MELLITUS WITHOUT RETINOPATHY (HCC): ICD-10-CM

## 2022-11-15 DIAGNOSIS — Z00.00 MEDICARE ANNUAL WELLNESS VISIT, SUBSEQUENT: Primary | ICD-10-CM

## 2022-11-15 DIAGNOSIS — Z86.69 HISTORY OF BELL'S PALSY: ICD-10-CM

## 2022-11-15 DIAGNOSIS — M79.672 LEFT FOOT PAIN: ICD-10-CM

## 2022-11-15 DIAGNOSIS — I10 ESSENTIAL HYPERTENSION: ICD-10-CM

## 2022-11-15 DIAGNOSIS — Z00.00 ENCOUNTER FOR ANNUAL HEALTH EXAMINATION: ICD-10-CM

## 2022-11-15 DIAGNOSIS — I25.119 CORONARY ARTERY DISEASE INVOLVING NATIVE CORONARY ARTERY OF NATIVE HEART WITH ANGINA PECTORIS (HCC): ICD-10-CM

## 2022-11-15 DIAGNOSIS — H25.13 AGE-RELATED NUCLEAR CATARACT OF BOTH EYES: ICD-10-CM

## 2022-11-15 DIAGNOSIS — E11.9 TYPE 2 DIABETES MELLITUS WITHOUT COMPLICATION, WITHOUT LONG-TERM CURRENT USE OF INSULIN (HCC): ICD-10-CM

## 2022-11-15 DIAGNOSIS — M79.2 PERIPHERAL NEUROPATHIC PAIN: ICD-10-CM

## 2022-11-15 DIAGNOSIS — N52.9 ERECTILE DYSFUNCTION, UNSPECIFIED ERECTILE DYSFUNCTION TYPE: ICD-10-CM

## 2022-11-15 DIAGNOSIS — H52.4 PRESBYOPIA OF BOTH EYES: ICD-10-CM

## 2022-11-15 DIAGNOSIS — Z87.891 HISTORY OF SMOKING: ICD-10-CM

## 2022-11-15 DIAGNOSIS — J44.9 COPD, MILD (HCC): ICD-10-CM

## 2022-11-15 DIAGNOSIS — Z53.20 COLONOSCOPY REFUSED: ICD-10-CM

## 2022-11-15 DIAGNOSIS — R80.9 MICROALBUMINURIA: ICD-10-CM

## 2022-11-15 DIAGNOSIS — L71.9 ROSACEA: ICD-10-CM

## 2022-11-15 DIAGNOSIS — J43.8 PARASEPTAL EMPHYSEMA (HCC): ICD-10-CM

## 2022-11-15 DIAGNOSIS — K59.09 OTHER CONSTIPATION: ICD-10-CM

## 2022-11-15 PROBLEM — H93.12 TINNITUS OF LEFT EAR: Status: RESOLVED | Noted: 2020-07-02 | Resolved: 2022-11-15

## 2022-11-15 PROBLEM — J41.0 SMOKERS' COUGH (HCC): Chronic | Status: RESOLVED | Noted: 2020-01-23 | Resolved: 2022-11-15

## 2022-11-15 PROBLEM — Z71.6 ENCOUNTER FOR SMOKING CESSATION COUNSELING: Status: RESOLVED | Noted: 2019-12-12 | Resolved: 2022-11-15

## 2022-11-15 PROBLEM — R73.03 PRE-DIABETES: Status: RESOLVED | Noted: 2020-07-02 | Resolved: 2022-11-15

## 2022-12-08 ENCOUNTER — OFFICE VISIT (OUTPATIENT)
Dept: INTERNAL MEDICINE CLINIC | Facility: CLINIC | Age: 67
End: 2022-12-08
Payer: MEDICARE

## 2022-12-08 VITALS
TEMPERATURE: 99 F | SYSTOLIC BLOOD PRESSURE: 138 MMHG | WEIGHT: 218 LBS | DIASTOLIC BLOOD PRESSURE: 76 MMHG | HEART RATE: 87 BPM | BODY MASS INDEX: 33.04 KG/M2 | RESPIRATION RATE: 18 BRPM | OXYGEN SATURATION: 96 % | HEIGHT: 68 IN

## 2022-12-08 DIAGNOSIS — R05.1 ACUTE COUGH: ICD-10-CM

## 2022-12-08 DIAGNOSIS — R06.2 WHEEZING: ICD-10-CM

## 2022-12-08 DIAGNOSIS — R09.89 CHEST CONGESTION: ICD-10-CM

## 2022-12-08 DIAGNOSIS — R50.9 FEVER, UNSPECIFIED FEVER CAUSE: ICD-10-CM

## 2022-12-08 DIAGNOSIS — J06.9 UPPER RESPIRATORY TRACT INFECTION, UNSPECIFIED TYPE: Primary | ICD-10-CM

## 2022-12-08 DIAGNOSIS — J44.1 COPD EXACERBATION (HCC): ICD-10-CM

## 2022-12-08 DIAGNOSIS — G47.00 INSOMNIA, UNSPECIFIED TYPE: ICD-10-CM

## 2022-12-08 PROCEDURE — 1125F AMNT PAIN NOTED PAIN PRSNT: CPT | Performed by: INTERNAL MEDICINE

## 2022-12-08 PROCEDURE — 3078F DIAST BP <80 MM HG: CPT | Performed by: INTERNAL MEDICINE

## 2022-12-08 PROCEDURE — 99214 OFFICE O/P EST MOD 30 MIN: CPT | Performed by: INTERNAL MEDICINE

## 2022-12-08 PROCEDURE — 3008F BODY MASS INDEX DOCD: CPT | Performed by: INTERNAL MEDICINE

## 2022-12-08 PROCEDURE — 3075F SYST BP GE 130 - 139MM HG: CPT | Performed by: INTERNAL MEDICINE

## 2022-12-08 RX ORDER — AZITHROMYCIN 250 MG/1
TABLET, FILM COATED ORAL
Qty: 6 TABLET | Refills: 0 | Status: SHIPPED | OUTPATIENT
Start: 2022-12-08 | End: 2022-12-13

## 2022-12-08 RX ORDER — PREDNISONE 1 MG/1
5 TABLET ORAL 2 TIMES DAILY
Qty: 10 TABLET | Refills: 0 | Status: SHIPPED | OUTPATIENT
Start: 2022-12-08 | End: 2022-12-13

## 2022-12-09 LAB
FLUAV + FLUBV RNA SPEC NAA+PROBE: NOT DETECTED
FLUAV + FLUBV RNA SPEC NAA+PROBE: NOT DETECTED
RSV RNA SPEC NAA+PROBE: NOT DETECTED
SARS-COV-2 RNA RESP QL NAA+PROBE: NOT DETECTED

## 2022-12-19 ENCOUNTER — LAB ENCOUNTER (OUTPATIENT)
Dept: LAB | Age: 67
End: 2022-12-19
Attending: INTERNAL MEDICINE
Payer: MEDICARE

## 2022-12-19 DIAGNOSIS — M79.672 LEFT FOOT PAIN: ICD-10-CM

## 2022-12-19 DIAGNOSIS — J44.9 COPD, MILD (HCC): ICD-10-CM

## 2022-12-19 DIAGNOSIS — Z00.00 MEDICARE ANNUAL WELLNESS VISIT, SUBSEQUENT: ICD-10-CM

## 2022-12-19 DIAGNOSIS — E11.9 TYPE 2 DIABETES MELLITUS WITHOUT RETINOPATHY (HCC): ICD-10-CM

## 2022-12-19 DIAGNOSIS — E78.5 HYPERLIPIDEMIA, UNSPECIFIED HYPERLIPIDEMIA TYPE: ICD-10-CM

## 2022-12-19 DIAGNOSIS — E11.9 TYPE 2 DIABETES MELLITUS WITHOUT COMPLICATION, WITHOUT LONG-TERM CURRENT USE OF INSULIN (HCC): ICD-10-CM

## 2022-12-19 DIAGNOSIS — Z53.20 COLONOSCOPY REFUSED: ICD-10-CM

## 2022-12-19 DIAGNOSIS — I25.119 CORONARY ARTERY DISEASE INVOLVING NATIVE CORONARY ARTERY OF NATIVE HEART WITH ANGINA PECTORIS (HCC): ICD-10-CM

## 2022-12-19 DIAGNOSIS — I10 ESSENTIAL HYPERTENSION: ICD-10-CM

## 2022-12-19 DIAGNOSIS — R80.9 MICROALBUMINURIA: ICD-10-CM

## 2022-12-19 LAB
ALBUMIN SERPL-MCNC: 3.2 G/DL (ref 3.4–5)
ALBUMIN/GLOB SERPL: 0.7 {RATIO} (ref 1–2)
ALP LIVER SERPL-CCNC: 76 U/L
ALT SERPL-CCNC: 38 U/L
ANION GAP SERPL CALC-SCNC: 3 MMOL/L (ref 0–18)
AST SERPL-CCNC: 18 U/L (ref 15–37)
BASOPHILS # BLD AUTO: 0.04 X10(3) UL (ref 0–0.2)
BASOPHILS NFR BLD AUTO: 0.5 %
BILIRUB SERPL-MCNC: 0.5 MG/DL (ref 0.1–2)
BILIRUB UR QL: NEGATIVE
BUN BLD-MCNC: 12 MG/DL (ref 7–18)
BUN/CREAT SERPL: 12.1 (ref 10–20)
CALCIUM BLD-MCNC: 9.2 MG/DL (ref 8.5–10.1)
CHLORIDE SERPL-SCNC: 105 MMOL/L (ref 98–112)
CHOLEST SERPL-MCNC: 116 MG/DL (ref ?–200)
CLARITY UR: CLEAR
CO2 SERPL-SCNC: 31 MMOL/L (ref 21–32)
COLOR UR: YELLOW
COMPLEXED PSA SERPL-MCNC: 1.4 NG/ML (ref ?–4)
CREAT BLD-MCNC: 0.99 MG/DL
CREAT UR-SCNC: 98.3 MG/DL
DEPRECATED RDW RBC AUTO: 41.2 FL (ref 35.1–46.3)
EOSINOPHIL # BLD AUTO: 0.23 X10(3) UL (ref 0–0.7)
EOSINOPHIL NFR BLD AUTO: 2.8 %
ERYTHROCYTE [DISTWIDTH] IN BLOOD BY AUTOMATED COUNT: 12.3 % (ref 11–15)
EST. AVERAGE GLUCOSE BLD GHB EST-MCNC: 163 MG/DL (ref 68–126)
FASTING PATIENT LIPID ANSWER: YES
FASTING STATUS PATIENT QL REPORTED: YES
GFR SERPLBLD BASED ON 1.73 SQ M-ARVRAT: 83 ML/MIN/1.73M2 (ref 60–?)
GLOBULIN PLAS-MCNC: 4.3 G/DL (ref 2.8–4.4)
GLUCOSE BLD-MCNC: 118 MG/DL (ref 70–99)
GLUCOSE UR-MCNC: NEGATIVE MG/DL
HBA1C MFR BLD: 7.3 % (ref ?–5.7)
HCT VFR BLD AUTO: 42.4 %
HDLC SERPL-MCNC: 23 MG/DL (ref 40–59)
HGB BLD-MCNC: 13.3 G/DL
HGB UR QL STRIP.AUTO: NEGATIVE
IMM GRANULOCYTES # BLD AUTO: 0.03 X10(3) UL (ref 0–1)
IMM GRANULOCYTES NFR BLD: 0.4 %
KETONES UR-MCNC: NEGATIVE MG/DL
LDLC SERPL CALC-MCNC: 63 MG/DL (ref ?–100)
LEUKOCYTE ESTERASE UR QL STRIP.AUTO: NEGATIVE
LYMPHOCYTES # BLD AUTO: 2.39 X10(3) UL (ref 1–4)
LYMPHOCYTES NFR BLD AUTO: 29.3 %
MCH RBC QN AUTO: 29.1 PG (ref 26–34)
MCHC RBC AUTO-ENTMCNC: 31.4 G/DL (ref 31–37)
MCV RBC AUTO: 92.8 FL
MICROALBUMIN UR-MCNC: 21.5 MG/DL
MICROALBUMIN/CREAT 24H UR-RTO: 218.7 UG/MG (ref ?–30)
MONOCYTES # BLD AUTO: 0.53 X10(3) UL (ref 0.1–1)
MONOCYTES NFR BLD AUTO: 6.5 %
NEUTROPHILS # BLD AUTO: 4.93 X10 (3) UL (ref 1.5–7.7)
NEUTROPHILS # BLD AUTO: 4.93 X10(3) UL (ref 1.5–7.7)
NEUTROPHILS NFR BLD AUTO: 60.5 %
NITRITE UR QL STRIP.AUTO: NEGATIVE
NONHDLC SERPL-MCNC: 93 MG/DL (ref ?–130)
OSMOLALITY SERPL CALC.SUM OF ELEC: 289 MOSM/KG (ref 275–295)
PH UR: 5.5 [PH] (ref 5–8)
PLATELET # BLD AUTO: 423 10(3)UL (ref 150–450)
POTASSIUM SERPL-SCNC: 4.6 MMOL/L (ref 3.5–5.1)
PROT SERPL-MCNC: 7.5 G/DL (ref 6.4–8.2)
RBC # BLD AUTO: 4.57 X10(6)UL
SODIUM SERPL-SCNC: 139 MMOL/L (ref 136–145)
SP GR UR STRIP: 1.02 (ref 1–1.03)
TRIGL SERPL-MCNC: 176 MG/DL (ref 30–149)
TSI SER-ACNC: 2.38 MIU/ML (ref 0.36–3.74)
UROBILINOGEN UR STRIP-ACNC: 0.2
VLDLC SERPL CALC-MCNC: 26 MG/DL (ref 0–30)
WBC # BLD AUTO: 8.2 X10(3) UL (ref 4–11)

## 2022-12-19 PROCEDURE — 84443 ASSAY THYROID STIM HORMONE: CPT | Performed by: INTERNAL MEDICINE

## 2022-12-19 PROCEDURE — 81001 URINALYSIS AUTO W/SCOPE: CPT | Performed by: INTERNAL MEDICINE

## 2022-12-19 PROCEDURE — 36415 COLL VENOUS BLD VENIPUNCTURE: CPT | Performed by: INTERNAL MEDICINE

## 2022-12-19 PROCEDURE — 81015 MICROSCOPIC EXAM OF URINE: CPT | Performed by: INTERNAL MEDICINE

## 2022-12-19 PROCEDURE — 83036 HEMOGLOBIN GLYCOSYLATED A1C: CPT | Performed by: INTERNAL MEDICINE

## 2022-12-19 PROCEDURE — 80053 COMPREHEN METABOLIC PANEL: CPT | Performed by: INTERNAL MEDICINE

## 2022-12-19 PROCEDURE — 85025 COMPLETE CBC W/AUTO DIFF WBC: CPT | Performed by: INTERNAL MEDICINE

## 2022-12-19 PROCEDURE — 82043 UR ALBUMIN QUANTITATIVE: CPT | Performed by: INTERNAL MEDICINE

## 2022-12-19 PROCEDURE — 80061 LIPID PANEL: CPT | Performed by: INTERNAL MEDICINE

## 2022-12-19 PROCEDURE — 82570 ASSAY OF URINE CREATININE: CPT | Performed by: INTERNAL MEDICINE

## 2023-01-24 ENCOUNTER — OFFICE VISIT (OUTPATIENT)
Dept: INTERNAL MEDICINE CLINIC | Facility: CLINIC | Age: 68
End: 2023-01-24

## 2023-01-24 VITALS
TEMPERATURE: 98 F | OXYGEN SATURATION: 98 % | HEART RATE: 78 BPM | RESPIRATION RATE: 18 BRPM | WEIGHT: 210 LBS | BODY MASS INDEX: 31.83 KG/M2 | HEIGHT: 68 IN | SYSTOLIC BLOOD PRESSURE: 124 MMHG | DIASTOLIC BLOOD PRESSURE: 78 MMHG

## 2023-01-24 DIAGNOSIS — Z00.00 MEDICARE ANNUAL WELLNESS VISIT, SUBSEQUENT: Primary | ICD-10-CM

## 2023-01-24 DIAGNOSIS — Z86.69 HISTORY OF BELL'S PALSY: ICD-10-CM

## 2023-01-24 DIAGNOSIS — Z00.00 ENCOUNTER FOR ANNUAL HEALTH EXAMINATION: ICD-10-CM

## 2023-01-24 DIAGNOSIS — I25.119 CORONARY ARTERY DISEASE INVOLVING NATIVE CORONARY ARTERY OF NATIVE HEART WITH ANGINA PECTORIS (HCC): ICD-10-CM

## 2023-01-24 DIAGNOSIS — H25.13 AGE-RELATED NUCLEAR CATARACT OF BOTH EYES: ICD-10-CM

## 2023-01-24 DIAGNOSIS — M79.2 PERIPHERAL NEUROPATHIC PAIN: ICD-10-CM

## 2023-01-24 DIAGNOSIS — E78.5 HYPERLIPIDEMIA, UNSPECIFIED HYPERLIPIDEMIA TYPE: ICD-10-CM

## 2023-01-24 DIAGNOSIS — G58.8 OTHER MONONEUROPATHY: ICD-10-CM

## 2023-01-24 DIAGNOSIS — E11.9 TYPE 2 DIABETES MELLITUS WITHOUT COMPLICATION, WITHOUT LONG-TERM CURRENT USE OF INSULIN (HCC): ICD-10-CM

## 2023-01-24 DIAGNOSIS — R80.9 MICROALBUMINURIA: ICD-10-CM

## 2023-01-24 DIAGNOSIS — J43.8 PARASEPTAL EMPHYSEMA (HCC): ICD-10-CM

## 2023-01-24 DIAGNOSIS — K59.09 OTHER CONSTIPATION: ICD-10-CM

## 2023-01-24 DIAGNOSIS — L71.9 ROSACEA: ICD-10-CM

## 2023-01-24 DIAGNOSIS — Z87.891 HISTORY OF SMOKING: ICD-10-CM

## 2023-01-24 DIAGNOSIS — J44.9 COPD, MILD (HCC): ICD-10-CM

## 2023-01-24 DIAGNOSIS — N52.9 ERECTILE DYSFUNCTION, UNSPECIFIED ERECTILE DYSFUNCTION TYPE: ICD-10-CM

## 2023-01-24 DIAGNOSIS — I10 ESSENTIAL HYPERTENSION: ICD-10-CM

## 2023-01-24 DIAGNOSIS — H52.4 PRESBYOPIA OF BOTH EYES: ICD-10-CM

## 2023-01-24 PROBLEM — E11.40 TYPE 2 DIABETES MELLITUS WITH DIABETIC NEUROPATHY (HCC): Status: ACTIVE | Noted: 2023-01-24

## 2023-01-24 PROCEDURE — 1126F AMNT PAIN NOTED NONE PRSNT: CPT | Performed by: INTERNAL MEDICINE

## 2023-01-24 PROCEDURE — 96160 PT-FOCUSED HLTH RISK ASSMT: CPT | Performed by: INTERNAL MEDICINE

## 2023-01-24 PROCEDURE — 3008F BODY MASS INDEX DOCD: CPT | Performed by: INTERNAL MEDICINE

## 2023-01-24 PROCEDURE — 3078F DIAST BP <80 MM HG: CPT | Performed by: INTERNAL MEDICINE

## 2023-01-24 PROCEDURE — 3074F SYST BP LT 130 MM HG: CPT | Performed by: INTERNAL MEDICINE

## 2023-01-24 PROCEDURE — 99397 PER PM REEVAL EST PAT 65+ YR: CPT | Performed by: INTERNAL MEDICINE

## 2023-01-24 PROCEDURE — G0439 PPPS, SUBSEQ VISIT: HCPCS | Performed by: INTERNAL MEDICINE

## 2023-01-24 RX ORDER — GABAPENTIN 100 MG/1
200 CAPSULE ORAL EVERY EVENING
Qty: 60 CAPSULE | Refills: 1 | Status: SHIPPED | OUTPATIENT
Start: 2023-01-24

## 2023-06-08 ENCOUNTER — OFFICE VISIT (OUTPATIENT)
Dept: INTERNAL MEDICINE CLINIC | Facility: CLINIC | Age: 68
End: 2023-06-08

## 2023-06-08 VITALS
DIASTOLIC BLOOD PRESSURE: 80 MMHG | HEART RATE: 60 BPM | TEMPERATURE: 98 F | OXYGEN SATURATION: 98 % | HEIGHT: 68 IN | SYSTOLIC BLOOD PRESSURE: 130 MMHG | RESPIRATION RATE: 16 BRPM | BODY MASS INDEX: 31.52 KG/M2 | WEIGHT: 208 LBS

## 2023-06-08 DIAGNOSIS — E11.9 TYPE 2 DIABETES MELLITUS WITHOUT COMPLICATION, WITHOUT LONG-TERM CURRENT USE OF INSULIN (HCC): ICD-10-CM

## 2023-06-08 DIAGNOSIS — H10.11 ALLERGIC CONJUNCTIVITIS OF RIGHT EYE: Primary | ICD-10-CM

## 2023-06-08 PROCEDURE — 1160F RVW MEDS BY RX/DR IN RCRD: CPT | Performed by: INTERNAL MEDICINE

## 2023-06-08 PROCEDURE — 1126F AMNT PAIN NOTED NONE PRSNT: CPT | Performed by: INTERNAL MEDICINE

## 2023-06-08 PROCEDURE — 3075F SYST BP GE 130 - 139MM HG: CPT | Performed by: INTERNAL MEDICINE

## 2023-06-08 PROCEDURE — 99213 OFFICE O/P EST LOW 20 MIN: CPT | Performed by: INTERNAL MEDICINE

## 2023-06-08 PROCEDURE — 1159F MED LIST DOCD IN RCRD: CPT | Performed by: INTERNAL MEDICINE

## 2023-06-08 PROCEDURE — 3008F BODY MASS INDEX DOCD: CPT | Performed by: INTERNAL MEDICINE

## 2023-06-08 PROCEDURE — 3079F DIAST BP 80-89 MM HG: CPT | Performed by: INTERNAL MEDICINE

## 2023-07-03 ENCOUNTER — TELEPHONE (OUTPATIENT)
Dept: INTERNAL MEDICINE CLINIC | Facility: CLINIC | Age: 68
End: 2023-07-03

## 2023-07-03 RX ORDER — ATORVASTATIN CALCIUM 20 MG/1
20 TABLET, FILM COATED ORAL NIGHTLY
Qty: 90 TABLET | Refills: 1 | Status: SHIPPED | OUTPATIENT
Start: 2023-07-03

## 2023-07-03 RX ORDER — METOPROLOL SUCCINATE 100 MG/1
100 TABLET, EXTENDED RELEASE ORAL DAILY
Qty: 90 TABLET | Refills: 1 | Status: SHIPPED | OUTPATIENT
Start: 2023-07-03

## 2023-07-03 RX ORDER — LISINOPRIL 5 MG/1
5 TABLET ORAL DAILY
Qty: 90 TABLET | Refills: 1 | Status: SHIPPED | OUTPATIENT
Start: 2023-07-03

## 2023-07-03 NOTE — TELEPHONE ENCOUNTER
NEEDS 90 DAYS   Current Outpatient Medications:     metFORMIN 850 MG Oral Tab, Take 1 tablet (850 mg total) by mouth 2 (two) times daily with meals. , Disp: 180 tablet, Rfl: 3        atorvastatin 20 MG Oral Tab, Take 1 tablet (20 mg total) by mouth nightly., Disp: 90 tablet, Rfl: 1      lisinopril 5 MG Oral Tab, Take 1 tablet (5 mg total) by mouth daily. , Disp: 90 tablet, Rfl: 1      metoprolol succinate  MG Oral Tablet 24 Hr, Take 1 tablet (100 mg total) by mouth daily. , Disp: 90 tablet, Rfl: 1

## 2023-07-24 ENCOUNTER — MED REC SCAN ONLY (OUTPATIENT)
Dept: INTERNAL MEDICINE CLINIC | Facility: CLINIC | Age: 68
End: 2023-07-24

## 2023-10-26 ENCOUNTER — OFFICE VISIT (OUTPATIENT)
Dept: OPHTHALMOLOGY | Facility: CLINIC | Age: 68
End: 2023-10-26

## 2023-10-26 DIAGNOSIS — E11.9 TYPE 2 DIABETES MELLITUS WITHOUT RETINOPATHY (HCC): Primary | ICD-10-CM

## 2023-10-26 DIAGNOSIS — H11.823 CONJUNCTIVOCHALASIS OF BOTH EYES: ICD-10-CM

## 2023-10-26 DIAGNOSIS — H25.13 AGE-RELATED NUCLEAR CATARACT OF BOTH EYES: ICD-10-CM

## 2023-10-26 PROCEDURE — 92015 DETERMINE REFRACTIVE STATE: CPT | Performed by: OPHTHALMOLOGY

## 2023-10-26 PROCEDURE — 92014 COMPRE OPH EXAM EST PT 1/>: CPT | Performed by: OPHTHALMOLOGY

## 2023-10-26 PROCEDURE — 3072F LOW RISK FOR RETINOPATHY: CPT | Performed by: OPHTHALMOLOGY

## 2023-10-26 PROCEDURE — 1160F RVW MEDS BY RX/DR IN RCRD: CPT | Performed by: OPHTHALMOLOGY

## 2023-10-26 PROCEDURE — 2023F DILAT RTA XM W/O RTNOPTHY: CPT | Performed by: OPHTHALMOLOGY

## 2023-10-26 PROCEDURE — 1159F MED LIST DOCD IN RCRD: CPT | Performed by: OPHTHALMOLOGY

## 2023-10-26 NOTE — PROGRESS NOTES
Allan Ackerman is a 75year old male. HPI:     HPI    Pt is here for a diabetic eye exam.  Pt complains of frequent tearing in the right eye for about 6 months. Pt was given some allergy drops from his primary care but he only used them once. Pt states that vision is good for both near and distance. Patient states he was given a prescription for glasses from us but he only got OTC readers which he likes. Pt was in [facility] and he had trouble with glare, he was given some glasses by an  which also help. Pt has been a diabetic for 2 years       Pt's diabetes is currently controlled by pills  Pt checks BS does not check   Pt's last blood sugar was  118 on 12/19/22  Last HA1C was 7.3 on 12/19/22  Endocrinologist: none    Consult:per Dr. Arlene Fenton  Last edited by Lucia Gaytan O.T. on 10/26/2023  2:40 PM.        Patient History:  Past Medical History:   Diagnosis Date    Bell's palsy     COPD (chronic obstructive pulmonary disease) ([facility]     Degenerative joint disease (DJD) of lumbar spine     Diabetes mellitus, type II ([facility]     Heart attack ([facility] 01/01/2010    Hyperlipidemia     Hypertension     Osteoarthritis     Sleep apnea     no CPAP       Surgical History: Allan Ackerman has a past surgical history that includes appendectomy (1975); palate/uvula surgery unlisted (2010); coronary stent placement (2010); and Ventral hernia repair (06/29/2021) (primary repair, ventral and umbilical hernias).     Family History   Problem Relation Age of Onset    No Known Problems Mother     Heart Disorder Father     Diabetes Neg     Glaucoma Neg     Macular degeneration Neg        Social History:   Social History     Socioeconomic History    Marital status:     Number of children: 3   Occupational History    Occupation:    Tobacco Use    Smoking status: Former     Packs/day: 1     Types: Cigarettes     Quit date: 3/19/2020     Years since quitting: 3.6    Smokeless tobacco: Never   Vaping Use    Vaping Use: Never used   Substance and Sexual Activity    Alcohol use: Not Currently    Drug use: Never       Medications:  Current Outpatient Medications   Medication Sig Dispense Refill    atorvastatin 20 MG Oral Tab Take 1 tablet (20 mg total) by mouth nightly. 90 tablet 1    lisinopril 5 MG Oral Tab Take 1 tablet (5 mg total) by mouth daily. 90 tablet 1    metoprolol succinate  MG Oral Tablet 24 Hr Take 1 tablet (100 mg total) by mouth daily. 90 tablet 1    metFORMIN 850 MG Oral Tab Take 1 tablet (850 mg total) by mouth 2 (two) times daily with meals. 180 tablet 3    gabapentin 100 MG Oral Cap Take 2 capsules (200 mg total) by mouth every evening. 60 capsule 1    nicotine polacrilex 4 MG Mouth/Throat Gum Take 4 mg by mouth as needed for Smoking cessation. Allergies:  Not on File    ROS:     ROS    Positive for: Endocrine, Eyes  Negative for: Constitutional, Gastrointestinal, Neurological, Skin, Genitourinary, Musculoskeletal, HENT, Cardiovascular, Respiratory, Psychiatric, Allergic/Imm, Heme/Lymph  Last edited by Ulysses Reilly, OARLENE on 10/26/2023  2:40 PM.          PHYSICAL EXAM:     Base Eye Exam       Visual Acuity (Snellen - Linear)         Right Left    Dist sc 20/40 -2 20/50 +1    Dist ph sc 20/25 -2 20/30 -2    Near cc 20/20 20/20   Checked near [place] with loose lens.              Tonometry (Icare, 2:58 PM)         Right Left    Pressure 10 9              Pupils         Pupils    Right PERRL    Left PERRL              Visual Fields         Left Right     Full Full              Extraocular Movement         Right Left     Full, Ortho Full, Ortho              Neuro/Psych       Oriented x3: Yes    Mood/Affect: Normal              Dilation       Both eyes: 1.0% Mydriacyl and 2.5% [place] Synephrine @ 2:58 PM                  Slit Lamp and Fundus Exam       Slit Lamp Exam         Right Left    Lids/Lashes Dermatochalasis, Meibomian gland dysfunction Dermatochalasis, Meibomian gland dysfunction Conjunctiva/Sclera Nasal/temp pinguecula, Conjunctivochalasis Nasal/temp pinguecula, Conjunctivochalasis    Cornea 1-2+ Arcus 1-2+ Arcus    Anterior Chamber Deep and quiet Deep and quiet    Iris Normal Normal    Lens 1+ Nuclear sclerosis, Trace Cortical cataract 1+ Nuclear sclerosis    Vitreous Clear Clear              Fundus Exam         Right Left    Disc Good rim, Temporal crescent Good rim, Temporal crescent    C/D Ratio 0.3 0.3    Macula Normal- no BDR Normal- no BDR    Vessels Normal Normal    Periphery Normal Normal                  Lacrimal Exam       Schirmers         Right Left     11 mm 10 mm      Anesthesia: Yes                  Refraction       Wearing Rx         Sphere Cylinder    Right +3.00 Sphere    Left +3.00 Sphere      Type: Forgot glasses              Manifest Refraction         Sphere Cylinder [place] Dist [facility] Add Near [place]    Right +1.00 +0.50 080 20/30- +3.00 20/20    Left +2.00 Sphere  20/30- +3.00 20/20              Final Rx         Sphere Cylinder Erhard Dist [facility] Add Near [place]    Right +1.00 +0.50 080 20/30- +3.00 20/20    Left +2.00 Sphere  20/30- +3.00 20/20      Type: Flat top bifocal                     ASSESSMENT/PLAN:     Diagnoses and Plan:     Type 2 diabetes mellitus without retinopathy (HCC)  Diabetes type II: no background of retinopathy, no signs of neovascularization noted. Discussed ocular and systemic benefits of blood sugar control. Diagnosis and treatment discussed in detail with patient. Will see patient in 1 year for a diabetic exam    Stressed importance of yearly eye exams    Age-related nuclear cataract of both eyes  Discussed early cataracts with patient. No treatment recommended at this time. New glasses Rx given today, update as needed    Conjunctivochalasis of both eyes  Recommend warm compresses 2 times a day    No orders of the defined types were placed in this encounter.       Meds This Visit:  Requested Prescriptions      No prescriptions requested or ordered in this encounter        Follow up instructions:  Return in about 1 year (around 10/26/2024) for Diabetic eye exam.    10/26/2023  Scribed by: Shruthi Gordon MD

## 2023-10-26 NOTE — PATIENT INSTRUCTIONS
Type 2 diabetes mellitus without retinopathy ([facility]  Diabetes type II: no background of retinopathy, no signs of neovascularization noted. Discussed ocular and systemic benefits of blood sugar control. Diagnosis and treatment discussed in detail with patient. Will see patient in 1 year for a diabetic exam    Stressed importance of yearly eye exams    Age-related nuclear cataract of both eyes  Discussed early cataracts with patient. No treatment recommended at this time.      New glasses Rx given today, update as needed    Conjunctivochalasis of both eyes  Recommend warm compresses 2 times a day

## 2023-10-26 NOTE — ASSESSMENT & PLAN NOTE
Discussed early cataracts with patient. No treatment recommended at this time.      New glasses Rx given today, update as needed

## 2023-11-01 ENCOUNTER — TELEPHONE (OUTPATIENT)
Dept: INTERNAL MEDICINE CLINIC | Facility: CLINIC | Age: 68
End: 2023-11-01

## 2023-11-01 NOTE — TELEPHONE ENCOUNTER
Patient came in to the [place] office asking to speak to the doctor . He wanted to know if he should go to ER he has alonso having abdominal pain for 3 days and is not going away. I advice him the doctor was at [facility] and he decided to go to er instead .He stated he was ok to drive and will go to ER

## 2023-11-06 ENCOUNTER — PATIENT OUTREACH (OUTPATIENT)
Dept: CASE MANAGEMENT | Age: 68
End: 2023-11-06

## 2023-11-06 PROCEDURE — 1111F DSCHRG MED/CURRENT MED MERGE: CPT

## 2023-11-07 ENCOUNTER — OFFICE VISIT (OUTPATIENT)
Dept: INTERNAL MEDICINE CLINIC | Facility: CLINIC | Age: 68
End: 2023-11-07

## 2023-11-07 ENCOUNTER — TELEPHONE (OUTPATIENT)
Dept: GASTROENTEROLOGY | Facility: CLINIC | Age: 68
End: 2023-11-07

## 2023-11-07 VITALS
BODY MASS INDEX: 31.98 KG/M2 | DIASTOLIC BLOOD PRESSURE: 82 MMHG | RESPIRATION RATE: 18 BRPM | HEIGHT: 68 IN | OXYGEN SATURATION: 98 % | TEMPERATURE: 98 F | WEIGHT: 211 LBS | HEART RATE: 78 BPM | SYSTOLIC BLOOD PRESSURE: 126 MMHG

## 2023-11-07 DIAGNOSIS — K86.89 PANCREATIC MASS: ICD-10-CM

## 2023-11-07 DIAGNOSIS — Z09 HOSPITAL DISCHARGE FOLLOW-UP: ICD-10-CM

## 2023-11-07 DIAGNOSIS — R91.8 LUNG MASS: Primary | ICD-10-CM

## 2023-11-07 PROCEDURE — G0008 ADMIN INFLUENZA VIRUS VAC: HCPCS | Performed by: INTERNAL MEDICINE

## 2023-11-07 PROCEDURE — 90662 IIV NO PRSV INCREASED AG IM: CPT | Performed by: INTERNAL MEDICINE

## 2023-11-07 PROCEDURE — 99214 OFFICE O/P EST MOD 30 MIN: CPT | Performed by: INTERNAL MEDICINE

## 2023-11-07 RX ORDER — ASPIRIN 81 MG/1
81 TABLET ORAL DAILY
COMMUNITY

## 2023-11-07 RX ORDER — TRAMADOL HYDROCHLORIDE 50 MG/1
50 TABLET ORAL EVERY 8 HOURS PRN
COMMUNITY
Start: 2023-11-03 | End: 2023-11-12

## 2023-11-07 RX ORDER — ONDANSETRON 4 MG/1
4 TABLET, FILM COATED ORAL
COMMUNITY
Start: 2023-11-03 | End: 2023-11-12

## 2023-11-07 NOTE — PROGRESS NOTES
Initial Post Discharge Follow Up   Discharge Date from [place]: 11/03/23  Contact Date: 11/7/2023    Consent Verification:  Assessment Completed With: Patient  HIPAA Verified? No    Discharge Dx:   Pancreatic mass  CAD s/p PCI  Diabetes mellitus type 2     General:   NCM briefly spoke to the patient and the call was disconnected. NCM attempted to reach the patient again to complete a TCM call. Left a message for the patient to call the NCM back at [phone].

## 2023-11-07 NOTE — TELEPHONE ENCOUNTER
GI staff:    Please offer the patient an appointment with me at 12:30 PM on 11/16 at [facility] in the office    Thanks    Tania Jang MD  [address] - Gastroenterology  11/7/2023  3:26 PM

## 2023-11-07 NOTE — PROGRESS NOTES
Initial Post Discharge Follow Up   Discharge Date from [facility]: 11/3/23  Contact Date: 11/7/2023    Consent Verification:  Assessment Completed With: Patient  HIPAA Verified? Yes    Discharge Dx:   Pancreatic mass  CAD s/p PCI  Diabetes mellitus type 2    General:   How have you been since your discharge from the hospital? The patient reported abdominal pain has resolved. The patient has been tolerating a general diet. The patient denies any difficulty breathing/ SOB, dizziness/syncope, chest pain, cough, wheezing or edema. The patient has not checked BP or glucose since discharge. Do you have any pain since discharge? No    How well was your pain managed while in the hospital?   On a scale of 1-5   1- Very Poor and 5- Very well   Very Well  When you were leaving the hospital were your discharge instructions reviewed with you? Yes  How well were your discharge instructions explained to you? On a scale of 1-5   1- Very Poor and 5- Very well   1  Do you have any questions about your discharge instructions? No  Before leaving the hospital was your diagnoses explained to you? Yes  Do you have any questions about your diagnoses? Yes; The patient would like to discuss this further with the PCP today. Are you able to perform normal daily activities of living as you have prior to your hospital stay (dressing, bathing, ambulating to the bathroom, etc)? yes  (NCM) Was patient given a different diet per AVS? no      Medications:   Current Outpatient Medications   Medication Sig Dispense Refill    atorvastatin 20 MG Oral Tab Take 1 tablet (20 mg total) by mouth nightly. 90 tablet 1    lisinopril 5 MG Oral Tab Take 1 tablet (5 mg total) by mouth daily. 90 tablet 1    metoprolol succinate  MG Oral Tablet 24 Hr Take 1 tablet (100 mg total) by mouth daily. 90 tablet 1    metFORMIN 850 MG Oral Tab Take 1 tablet (850 mg total) by mouth 2 (two) times daily with meals.  180 tablet 3    gabapentin 100 MG Oral Cap Take 2 capsules (200 mg total) by mouth every evening. 60 capsule 1    nicotine polacrilex 4 MG Mouth/Throat Gum Take 4 mg by mouth as needed for Smoking cessation. Were there any changes to your current medication(s) noted on the AVS? Yes  If so, were these medication changes discussed with you prior to leaving the hospital? Yes  If a new medication was prescribed:    Was the new medication's purpose & side effects reviewed? Yes  Do you have any questions about your new medication? No  Did you  your discharge medications when you left the hospital? Yes  Let's go over your medications together to make sure we are not missing anything. Medications Reviewed  Are there any reasons that keep you from taking your medication as prescribed? No  Are you having any concerns with constipation? No  Did patient receive their flu shot (Sept-March)? No    Discharge medications reviewed/discussed/and reconciled against outpatient medications with patient. Any changes or updates to medications sent to PCP. Patient Acknowledged     Referrals/orders at D/C:  Referrals/orders placed at D/C? no  Except for [facility] mentioned above, have you scheduled these other services? No    DME ordered at D/C? No      Discharge orders, AVS reviewed and discussed with patient. Any changes or updates to orders sent to PCP.   Patient Acknowledged      SDOH:   Transportation:   Transportation Needs: No Transportation Needs (11/7/2023)    Transportation Needs     Lack of Transportation: No       Financial Strain:    Financial Resource Strain: Low Risk  (11/7/2023)    Financial Resource Strain     Difficulty of Paying Living Expenses: Not hard at all     Med Affordability: No     Follow up appointments:      Your appointments       Date & Time Appointment Department [facility])    Nov 07, 2023  1:00 PM [facility] Follow Up with Ross Ramirez MD [address], [address], [place] ([address] Oct 29, 2024  1:00 PM CDT Exam - Established with Iman Schuster MD [address], [address], [facility]              [address], [address], [facility] [address] [id number]  [address], [address], [address] [facility] [address] [id number] [phone]            TCC  Was TCC ordered: No      PCP (If no TCC appointment)  Does patient already have a PCP appointment scheduled? Yes  NCM Confirmed PCP office TCM appointment with patient. Specialist    Does the patient have any other follow up appointment(s) needing to be scheduled? Yes  If yes: NCM reviewed upcoming specialist appointment with patient: Yes  The patient has contacted the recommended GI office for assistance with scheduling/further recommendations. Does the patient need assistance scheduling appointment(s): No    Is there any reason as to why you cannot make your appointment(s)? No     Needs post D/C:   Now that you are home, are there any needs or concerns you need addressed before your next visit with your PCP?  (DME, meds, questions, etc.): No    Interventions by NCM:    Reviewed all discharge instructions with the patient. All medications were reviewed and educated on the importance of taking the medications as directed. Patient symptoms were assessed, education was completed for signs/symptoms to monitor, and reviewed when to contact providers vs go to the ED/call 911. Appointments were reviewed and stressed the importance of a close follow up with providers. The patient verbalized understanding and will contact the office with any further questions or concerns. Overall Rating:    How would you rate the care you received while in the hospital? fair    CCM referral placed:    No    BOOK BY DATE: 11/17/2023

## 2023-11-07 NOTE — TELEPHONE ENCOUNTER
----- Message from Wayne Mccollum MD sent at 11/7/2023  1:37 PM CST -----  HI, can you please try to see this pt soon, he was at another hospital you can see it through care everywhere with abdominal pain he had CT scan which showed pancreatic mass he could not do MRI due to back pain issues was told to follow-up with GI for endoscopic ultrasound as outpatient but he wants to follow-up with us is outpatient so I referred him to you.   Please have your staff call him and make an appointment thank you

## 2023-11-08 ENCOUNTER — TELEPHONE (OUTPATIENT)
Dept: INTERNAL MEDICINE CLINIC | Facility: CLINIC | Age: 68
End: 2023-11-08

## 2023-11-08 NOTE — TELEPHONE ENCOUNTER
Actually appointment open 9:30 AM tomorrow 11/9/23.  Please offer this    Thank you    Ricardo Howard MD  [address] - Gastroenterology  11/8/2023  6:57 AM

## 2023-11-08 NOTE — TELEPHONE ENCOUNTER
Left message to call back. Please offer appt tomorrow 11/9 at 9:30am [facility] with Dr. Diya Chowdhury. Ok to add if accepted. Route back if confirm/decline.

## 2023-11-08 NOTE — TELEPHONE ENCOUNTER
Called patient again and left message to call back. Also tried home # but rang many times and then went to busy tone. Please offer appt tomorrow 11/9 at 9:30am [facility] with Dr. Gerardo Penn. Ok to add if accepted. Route back if confirm/decline.

## 2023-11-08 NOTE — TELEPHONE ENCOUNTER
Contacted patient x2 today (morning/afternoon) and left message to call back. Tried home # x2 rings then busy tone. Dr. Hector Oscar has an appt available tomorrow 11/9 at 9:30 [facility] [place].

## 2023-11-08 NOTE — TELEPHONE ENCOUNTER
Per patient the earliest appointment he can have for GI is on 12/21/2023 and would like to know if that is fine with Dr Gucci Gillespie.

## 2023-11-08 NOTE — TELEPHONE ENCOUNTER
I asked my office to call him to schedule for tomorrow    Thanks    Gilson Kowalski MD  [address] - Gastroenterology  11/8/2023  3:49 PM

## 2023-11-09 ENCOUNTER — OFFICE VISIT (OUTPATIENT)
Dept: GASTROENTEROLOGY | Facility: CLINIC | Age: 68
End: 2023-11-09

## 2023-11-09 VITALS
DIASTOLIC BLOOD PRESSURE: 75 MMHG | WEIGHT: 214.81 LBS | SYSTOLIC BLOOD PRESSURE: 144 MMHG | HEIGHT: 68 IN | BODY MASS INDEX: 32.56 KG/M2 | HEART RATE: 73 BPM

## 2023-11-09 DIAGNOSIS — Q45.3 PANCREATIC ABNORMALITY: ICD-10-CM

## 2023-11-09 DIAGNOSIS — R93.3 ABNORMAL MAGNETIC RESONANCE CHOLANGIOPANCREATOGRAPHY (MRCP): Primary | ICD-10-CM

## 2023-11-09 DIAGNOSIS — K86.9 LESION OF PANCREAS: ICD-10-CM

## 2023-11-09 PROCEDURE — 99204 OFFICE O/P NEW MOD 45 MIN: CPT | Performed by: INTERNAL MEDICINE

## 2023-11-09 PROCEDURE — 3078F DIAST BP <80 MM HG: CPT | Performed by: INTERNAL MEDICINE

## 2023-11-09 PROCEDURE — 3008F BODY MASS INDEX DOCD: CPT | Performed by: INTERNAL MEDICINE

## 2023-11-09 PROCEDURE — 1126F AMNT PAIN NOTED NONE PRSNT: CPT | Performed by: INTERNAL MEDICINE

## 2023-11-09 PROCEDURE — 3077F SYST BP >= 140 MM HG: CPT | Performed by: INTERNAL MEDICINE

## 2023-11-09 PROCEDURE — 1159F MED LIST DOCD IN RCRD: CPT | Performed by: INTERNAL MEDICINE

## 2023-11-09 NOTE — TELEPHONE ENCOUNTER
Left message to call back.     If patient returns call please offer appt today 11/9 at 3:30pm [facility] w/ Dr. Adry Dumont

## 2023-11-09 NOTE — PATIENT INSTRUCTIONS
Call to schedule the CT scan of your pancreas  As we discussed, I would make an appointment to see your cardiologist to be completed prior to a procedure

## 2023-11-12 NOTE — PROGRESS NOTES
Subjective:     Patient ID: Skylar Gaytan is a 75year old male. HPI    Pt comes in for follow up after he was seen at an outside hospital for abd pain, he was admitted and had ct abd and chest was patient was found to have pancreatic lesion: Lesion cannot do the MRI due to back pain was recommended outpatient endoscopic ultrasound he says has been trying to reach to GI but has not been able to and he wants to follow-up with GI at [facility].   Abdominal pain is ok     History/Other:   Review of Systems   Constitutional: Negative. Negative for fatigue and fever. HENT: Negative. Negative for congestion. Eyes: Negative. Respiratory: Negative. Negative for cough, shortness of breath and wheezing. Cardiovascular: Negative. Negative for chest pain, palpitations and leg swelling. Gastrointestinal: Negative. Endocrine: Negative for cold intolerance and heat intolerance. Genitourinary: Negative. Negative for dysuria, flank pain and hematuria. Musculoskeletal: Negative. Negative for arthralgias, back pain and myalgias. Skin: Negative. Neurological: Negative. Negative for dizziness, tremors, syncope, weakness and headaches. Psychiatric/Behavioral: Negative. Negative for agitation, behavioral problems and suicidal ideas. The patient is not nervous/anxious. Current Outpatient Medications   Medication Sig Dispense Refill    aspirin 81 MG Oral Tab EC Take 1 tablet (81 mg total) by mouth daily. atorvastatin 20 MG Oral Tab Take 1 tablet (20 mg total) by mouth nightly. 90 tablet 1    lisinopril 5 MG Oral Tab Take 1 tablet (5 mg total) by mouth daily. 90 tablet 1    metoprolol succinate  MG Oral Tablet 24 Hr Take 1 tablet (100 mg total) by mouth daily. 90 tablet 1    metFORMIN 850 MG Oral Tab Take 1 tablet (850 mg total) by mouth 2 (two) times daily with meals.  180 tablet 3    ondansetron (ZOFRAN) 4 mg tablet Take 1 tablet (4 mg total) by mouth.      gabapentin 100 MG Oral Cap Take 2 capsules (200 mg total) by mouth every evening. (Patient not taking: Reported on 11/7/2023) 60 capsule 1    nicotine polacrilex 4 MG Mouth/Throat Gum Take 4 mg by mouth as needed for Smoking cessation. (Patient not taking: Reported on 11/7/2023)       Allergies:No Known Allergies    Past Medical History:   Diagnosis Date    Bell's palsy     COPD (chronic obstructive pulmonary disease) (HCC)     Degenerative joint disease (DJD) of lumbar spine     Diabetes mellitus, type II ([address]     Heart attack ([facility] [address] 01/01/2010    Hyperlipidemia     Hypertension     Osteoarthritis     Sleep apnea     no CPAP      Past Surgical History:   Procedure Laterality Date    APPENDECTOMY  1975    CORONARY STENT PLACEMENT  2010    PALATE/UVULA SURGERY UNLISTED  2010    VENTRAL HERNIA REPAIR  06/29/2021    primary repair, ventral and umbilical hernias      Family History   Problem Relation Age of Onset    No Known Problems Mother     Heart Disorder Father     Diabetes Neg     Glaucoma Neg     Macular degeneration Neg       Social History:   Social History     Socioeconomic History    Marital status:     Number of children: 3   Occupational History    Occupation:    Tobacco Use    Smoking status: Former     Packs/day: 1     Types: Cigarettes     Quit date: 3/19/2020     Years since quitting: 3.6    Smokeless tobacco: Never   Vaping Use    Vaping Use: Never used   Substance and Sexual Activity    Alcohol use: Not Currently    Drug use: Never     Social Determinants of Health     Financial Resource Strain: Low Risk  (11/7/2023)    Financial Resource Strain     Difficulty of Paying Living Expenses: Not hard at all     Med Affordability: No   Transportation Needs: No Transportation Needs (11/7/2023)    Transportation Needs     Lack of Transportation: No        Objective:   Physical Exam  Vitals and nursing note reviewed. Constitutional:       Appearance: He is well-developed. HENT:      Head: Normocephalic and atraumatic. Right Ear: External ear normal.      Left Ear: External ear normal.      Nose: Nose normal.   Eyes:      Conjunctiva/sclera: Conjunctivae normal.      Pupils: Pupils are equal, round, and reactive to light. Cardiovascular:      Rate and Rhythm: Normal rate and regular rhythm. Heart sounds: Normal heart sounds. Pulmonary:      Effort: Pulmonary effort is normal.      Breath sounds: Normal breath sounds. Abdominal:      General: Bowel sounds are normal.      Palpations: Abdomen is soft. Genitourinary:     Penis: Normal.       Prostate: Normal.      Rectum: Normal.   Musculoskeletal:         General: Normal range of motion. Cervical back: Normal range of motion and neck supple. Skin:     General: Skin is warm and dry. Neurological:      Mental Status: He is alert and oriented to person, place, and time. Deep Tendon Reflexes: Reflexes are normal and symmetric. Assessment & Plan:   1. Lung mass - will order ct lungs with contrast and will refer to pulmo   2. Pancreatic mass - will refer to Gi at [facility]   3.  Hospital discharge follow-up - doing ok will follow above results and recommendations        Orders Placed This Encounter   Procedures    FLU VACC HIGH DOSE PRSV FREE       Meds This Visit:  Requested Prescriptions      No prescriptions requested or ordered in this encounter       Imaging & Referrals:  FLU VACC HIGH DOSE PRSV FREE  GASTRO - INTERNAL  PULMONARY - INTERNAL  CT CHEST(CONTRAST ONLY) (CPT=71260)

## 2023-11-15 ENCOUNTER — TELEPHONE (OUTPATIENT)
Dept: PULMONOLOGY | Facility: CLINIC | Age: 68
End: 2023-11-15

## 2023-11-15 NOTE — TELEPHONE ENCOUNTER
Per Dr. Altamirano Dad pt had CT lung which was abnormal & pt to come see him after CT in the next 2-3 wks.

## 2023-11-16 NOTE — TELEPHONE ENCOUNTER
Pt scheduled for CT on 11/19. He accepted appt w/ Dr. Ana Luisa Trujillo on 11/28 10:45 am [facility]. Appt info given. Pt voiced understanding.

## 2023-11-19 ENCOUNTER — HOSPITAL ENCOUNTER (OUTPATIENT)
Dept: CT IMAGING | Age: 68
End: 2023-11-19
Attending: INTERNAL MEDICINE
Payer: MEDICARE

## 2023-11-19 ENCOUNTER — HOSPITAL ENCOUNTER (OUTPATIENT)
Dept: CT IMAGING | Age: 68
Discharge: HOME | End: 2023-11-19
Attending: INTERNAL MEDICINE
Payer: MEDICARE

## 2023-11-19 DIAGNOSIS — K86.9 LESION OF PANCREAS: ICD-10-CM

## 2023-11-19 DIAGNOSIS — Q45.3 PANCREATIC ABNORMALITY: ICD-10-CM

## 2023-11-19 DIAGNOSIS — R93.3 ABNORMAL MAGNETIC RESONANCE CHOLANGIOPANCREATOGRAPHY (MRCP): ICD-10-CM

## 2023-11-19 LAB
CREAT BLD-MCNC: 1.3 MG/DL
EGFRCR SERPLBLD CKD-EPI 2021: 60 ML/MIN/1.73M2 (ref 60–?)

## 2023-11-19 PROCEDURE — 82565 ASSAY OF CREATININE: CPT

## 2023-11-19 PROCEDURE — 74170 CT ABD WO CNTRST FLWD CNTRST: CPT | Performed by: INTERNAL MEDICINE

## 2023-11-21 ENCOUNTER — TELEPHONE (OUTPATIENT)
Dept: GASTROENTEROLOGY | Facility: CLINIC | Age: 68
End: 2023-11-21

## 2023-11-21 NOTE — TELEPHONE ENCOUNTER
GI staff:    Please contact the patient. Please let him know his CT scan of the pancreas looked unremarkable without a mass in the pancreas or other concerning pancreas abnormality. Recommend he follow up with his primary physician.     Thank you    Noni Weeks MD  [address] - Gastroenterology  11/21/2023  3:54 PM

## 2023-11-21 NOTE — TELEPHONE ENCOUNTER
Spoke to patient and reviewed note below. He asked that I send this to Dr. Essence Camargo since it is easier for him to see it this way.

## 2023-11-22 NOTE — TELEPHONE ENCOUNTER
Spoke with patient, (  Name and  verified ) informed of Dr. Barrientos Chilean  instructions below    Patient verbalizes understanding and agrees with plan.

## 2023-11-22 NOTE — TELEPHONE ENCOUNTER
Toby Sharma let pt know that the repeat ct scan was ok, no need for any further testing at this time

## 2023-11-29 ENCOUNTER — TELEPHONE (OUTPATIENT)
Dept: PULMONOLOGY | Facility: CLINIC | Age: 68
End: 2023-11-29

## 2023-11-29 NOTE — TELEPHONE ENCOUNTER
Pt missed the appt yesterday and is declining the next date due to Recent CT after abn CT pt requesting to speak to Rn please call

## 2023-12-04 NOTE — TELEPHONE ENCOUNTER
Spoke with patient requesting new appointment with Dr. Oni Jewell. Missed appointment on 11/28/23. Dr. Oni Jewell - When can patient be added to your schedule? Wants to discuss abnormal CT 11/19/23.

## 2023-12-05 NOTE — TELEPHONE ENCOUNTER
Spoke with patient scheduled appointment with Dr. Sheng Fang 12/8/23 at 12pm.  Verified date, time and location, patient verbalized understanding.

## 2023-12-08 ENCOUNTER — OFFICE VISIT (OUTPATIENT)
Dept: PULMONOLOGY | Facility: CLINIC | Age: 68
End: 2023-12-08

## 2023-12-08 VITALS
HEART RATE: 67 BPM | SYSTOLIC BLOOD PRESSURE: 155 MMHG | HEIGHT: 68 IN | WEIGHT: 214 LBS | DIASTOLIC BLOOD PRESSURE: 77 MMHG | OXYGEN SATURATION: 97 % | BODY MASS INDEX: 32.43 KG/M2

## 2023-12-08 DIAGNOSIS — R91.8 LUNG MASS: Primary | ICD-10-CM

## 2023-12-08 PROCEDURE — 3008F BODY MASS INDEX DOCD: CPT | Performed by: INTERNAL MEDICINE

## 2023-12-08 PROCEDURE — 1126F AMNT PAIN NOTED NONE PRSNT: CPT | Performed by: INTERNAL MEDICINE

## 2023-12-08 PROCEDURE — 1159F MED LIST DOCD IN RCRD: CPT | Performed by: INTERNAL MEDICINE

## 2023-12-08 PROCEDURE — 3078F DIAST BP <80 MM HG: CPT | Performed by: INTERNAL MEDICINE

## 2023-12-08 PROCEDURE — 3077F SYST BP >= 140 MM HG: CPT | Performed by: INTERNAL MEDICINE

## 2023-12-08 PROCEDURE — 99214 OFFICE O/P EST MOD 30 MIN: CPT | Performed by: INTERNAL MEDICINE

## 2023-12-08 NOTE — PROGRESS NOTES
Referring Physician  Margarita Cummings MD    History of Present Illness  Patient presents today for follow-up visit to pulmonary clinic. Had recent CT chest abdomen pelvis performed at [facility] on November 1, 2023 with concern for partially visualized left lower lung field masslike process. Emphysematous and fibrotic changes seen. Patient denies worsening dyspnea symptoms, weight loss, loss of appetite. States in the past inhaler her therapy had been too expensive. Medications  Current Outpatient Medications on File Prior to Visit   Medication Sig Dispense Refill    aspirin 81 MG Oral Tab EC Take 1 tablet (81 mg total) by mouth daily. atorvastatin 20 MG Oral Tab Take 1 tablet (20 mg total) by mouth nightly. 90 tablet 1    lisinopril 5 MG Oral Tab Take 1 tablet (5 mg total) by mouth daily. 90 tablet 1    metoprolol succinate  MG Oral Tablet 24 Hr Take 1 tablet (100 mg total) by mouth daily. 90 tablet 1    metFORMIN 850 MG Oral Tab Take 1 tablet (850 mg total) by mouth 2 (two) times daily with meals. 180 tablet 3     No current facility-administered medications on file prior to visit. Allergies  No Known Allergies    Physical Exam  Constitutional: no acute distress  HEENT: PERRL  Neck: supple, no JVD  Cardio: RRR, S1 S2  Respiratory: clear to auscultation bilaterally, no wheezing, rales, rhonchi, crackles  GI: abdomen soft, non tender, active bowel sounds, no organomegaly  Extremities: no clubbing, cyanosis, edema  Neurologic: no gross motor deficits  Skin: warm, dry  Lymphatic: no supraclavicular lymphadenopathy     Assessment  1. Possible left lung mass  2. COPD  3. Likely ILD    Plan  -Patient presents today for pulmonary follow-up after recent CT chest abdomen pelvis at [facility] on 11/1/2023 with partially visualized masslike process in left lower lung field measuring 3.5 cm in size. Emphysematous and fibrotic changes seen.   I have ordered repeat CT chest for the patient to further evaluate. Unable to obtain imaging directly from [facility] at this time. Once I review repeat imaging we will make further recommendations. Patient prior pulmonary function testing with evidence of mild obstructive disease seen. Denies significant dyspnea symptoms at this time. Prefers to hold off any inhaler medication from pulmonary perspective.     Rossy Benton DO  Pulmonary Medicine  [facility]  12/8/2023  12:39 PM

## 2023-12-22 ENCOUNTER — HOSPITAL ENCOUNTER (OUTPATIENT)
Dept: CT IMAGING | Age: 68
Discharge: HOME | End: 2023-12-22
Attending: INTERNAL MEDICINE
Payer: MEDICARE

## 2023-12-22 DIAGNOSIS — R91.8 LUNG MASS: ICD-10-CM

## 2023-12-22 LAB
CREAT BLD-MCNC: 1.1 MG/DL
EGFRCR SERPLBLD CKD-EPI 2021: 73 ML/MIN/1.73M2 (ref 60–?)

## 2023-12-22 PROCEDURE — 82565 ASSAY OF CREATININE: CPT

## 2023-12-22 PROCEDURE — 71260 CT THORAX DX C+: CPT | Performed by: INTERNAL MEDICINE

## 2023-12-27 ENCOUNTER — OFFICE VISIT (OUTPATIENT)
Dept: PULMONOLOGY | Facility: CLINIC | Age: 68
End: 2023-12-27

## 2023-12-27 ENCOUNTER — TELEPHONE (OUTPATIENT)
Dept: PULMONOLOGY | Facility: CLINIC | Age: 68
End: 2023-12-27

## 2023-12-27 VITALS
SYSTOLIC BLOOD PRESSURE: 185 MMHG | OXYGEN SATURATION: 97 % | DIASTOLIC BLOOD PRESSURE: 80 MMHG | HEART RATE: 70 BPM | WEIGHT: 216 LBS | BODY MASS INDEX: 32.74 KG/M2 | HEIGHT: 68 IN | RESPIRATION RATE: 14 BRPM

## 2023-12-27 DIAGNOSIS — R91.8 MASS OF LEFT LUNG: Primary | ICD-10-CM

## 2023-12-27 PROCEDURE — 3008F BODY MASS INDEX DOCD: CPT | Performed by: INTERNAL MEDICINE

## 2023-12-27 PROCEDURE — 3079F DIAST BP 80-89 MM HG: CPT | Performed by: INTERNAL MEDICINE

## 2023-12-27 PROCEDURE — 3077F SYST BP >= 140 MM HG: CPT | Performed by: INTERNAL MEDICINE

## 2023-12-27 PROCEDURE — 99214 OFFICE O/P EST MOD 30 MIN: CPT | Performed by: INTERNAL MEDICINE

## 2023-12-27 PROCEDURE — 1126F AMNT PAIN NOTED NONE PRSNT: CPT | Performed by: INTERNAL MEDICINE

## 2023-12-27 NOTE — PROGRESS NOTES
Referring Physician  Iesha Camacho MD    History of Present Illness  Patient presents today for follow-up visit to pulmonary clinic. Had recent CT chest performed with evidence of left lung mass and left hilar lymphadenopathy. Denies significant worsening dyspnea symptoms. Denies fevers or chills. Medications  Current Outpatient Medications on File Prior to Visit   Medication Sig Dispense Refill    aspirin 81 MG Oral Tab EC Take 1 tablet (81 mg total) by mouth daily. atorvastatin 20 MG Oral Tab Take 1 tablet (20 mg total) by mouth nightly. 90 tablet 1    lisinopril 5 MG Oral Tab Take 1 tablet (5 mg total) by mouth daily. 90 tablet 1    metoprolol succinate  MG Oral Tablet 24 Hr Take 1 tablet (100 mg total) by mouth daily. 90 tablet 1    metFORMIN 850 MG Oral Tab Take 1 tablet (850 mg total) by mouth 2 (two) times daily with meals. 180 tablet 3     No current facility-administered medications on file prior to visit. Allergies  No Known Allergies    Physical Exam  Constitutional: no acute distress  HEENT: PERRL  Neck: supple, no JVD  Cardio: RRR, S1 S2  Respiratory: clear to auscultation bilaterally, no wheezing, rales, rhonchi, crackles  GI: abdomen soft, non tender, active bowel sounds, no organomegaly  Extremities: no clubbing, cyanosis, edema  Neurologic: no gross motor deficits  Skin: warm, dry  Lymphatic: no supraclavicular lymphadenopathy     Assessment  1. Lung mass  2. COPD  3. Likely ILD  4. Hilar lymphadenopathy    Plan  -Patient presents today for pulmonary follow-up CT chest performed on 12/22/2023 with evidence of 4.2 cm left lung mass along with left hilar lymphadenopathy seen concerning for malignancy. Discussed findings with patient and primary care physician. Will proceed with robotic navigational bronchoscopy and endobronchial ultrasound transbronchial needle aspiration of hilar lymph node station. Scheduled for January 3 at 10 AM.  Instructions provided.     Sandy Goldberg Veronika Lieberman, [address] [facility]  12/8/2023  12:39 PM

## 2023-12-27 NOTE — TELEPHONE ENCOUNTER
Managed Care and [facility] team: Patient has ION CT Chest and Robotic navigational  bronchoscopy/endobronchial ultrasound scheduled on 1/3/24. Please obtain prior authorization.

## 2023-12-27 NOTE — TELEPHONE ENCOUNTER
Spoke to patient and instructions given for CT. Where to park and restrictions prior to robotic bronch.

## 2023-12-27 NOTE — TELEPHONE ENCOUNTER
Spoke with Juan Jose in the CT Department to get permission for patient to have CT Chest at 8AM on 1/3/24 prior to procedure. Spoke with Vandana in central scheduling, CT Chest scheduled for 8AM on 1/3/24.     Dr. Carrillo: Please review/sign pended order for MISC Proc for Managed Care Auth.

## 2023-12-28 NOTE — TELEPHONE ENCOUNTER
Robyn Cavanaugh  Pulmo Clinical Staff  Cc: [facility] Team  Unable to initiate authorization for the following CPT Code 90113  CT CHEST BRONCH NAVIGATION PRE OP LESS THAN 6 WEEKS.    Please verify CPT Code,  patient has an upcoming appt: 01/03/2024

## 2023-12-28 NOTE — TELEPHONE ENCOUNTER
Spoke with Katharina in Managed Care regarding message below. Informed her CPT code is 07660. Per Liz, states she will send message to staff working on prior authorization.

## 2023-12-29 ENCOUNTER — OFFICE VISIT (OUTPATIENT)
Dept: INTERNAL MEDICINE CLINIC | Facility: CLINIC | Age: 68
End: 2023-12-29

## 2023-12-29 VITALS
DIASTOLIC BLOOD PRESSURE: 75 MMHG | BODY MASS INDEX: 32.61 KG/M2 | HEIGHT: 68 IN | WEIGHT: 215.19 LBS | OXYGEN SATURATION: 98 % | TEMPERATURE: 98 F | HEART RATE: 71 BPM | SYSTOLIC BLOOD PRESSURE: 132 MMHG

## 2023-12-29 DIAGNOSIS — E11.9 TYPE 2 DIABETES MELLITUS WITHOUT COMPLICATION, WITHOUT LONG-TERM CURRENT USE OF INSULIN (HCC): ICD-10-CM

## 2023-12-29 DIAGNOSIS — Z00.00 ENCOUNTER FOR SCREENING AND PREVENTATIVE CARE: ICD-10-CM

## 2023-12-29 DIAGNOSIS — I10 ESSENTIAL HYPERTENSION: ICD-10-CM

## 2023-12-29 DIAGNOSIS — E78.2 MIXED HYPERLIPIDEMIA: ICD-10-CM

## 2023-12-29 DIAGNOSIS — R91.8 LUNG MASS: Primary | ICD-10-CM

## 2023-12-29 PROCEDURE — 1160F RVW MEDS BY RX/DR IN RCRD: CPT | Performed by: INTERNAL MEDICINE

## 2023-12-29 PROCEDURE — 3075F SYST BP GE 130 - 139MM HG: CPT | Performed by: INTERNAL MEDICINE

## 2023-12-29 PROCEDURE — 99214 OFFICE O/P EST MOD 30 MIN: CPT | Performed by: INTERNAL MEDICINE

## 2023-12-29 PROCEDURE — 3078F DIAST BP <80 MM HG: CPT | Performed by: INTERNAL MEDICINE

## 2023-12-29 PROCEDURE — 1159F MED LIST DOCD IN RCRD: CPT | Performed by: INTERNAL MEDICINE

## 2023-12-29 PROCEDURE — 3008F BODY MASS INDEX DOCD: CPT | Performed by: INTERNAL MEDICINE

## 2023-12-29 PROCEDURE — 1126F AMNT PAIN NOTED NONE PRSNT: CPT | Performed by: INTERNAL MEDICINE

## 2023-12-29 RX ORDER — LISINOPRIL 20 MG/1
20 TABLET ORAL DAILY
Qty: 30 TABLET | Refills: 1 | Status: SHIPPED | OUTPATIENT
Start: 2023-12-29

## 2024-01-02 NOTE — TELEPHONE ENCOUNTER
Updated Dr. Carrillo regarding pending authorization for CT Chest. Per Dr. Carrillo: will proceed with procedure as scheduled.

## 2024-01-02 NOTE — TELEPHONE ENCOUNTER
Spoke with Urvashi from [facility], states information is going to be sent to medical director and peer to peer may be needed. Requesting contact information for Dr. Carrillo so medical reviewer could contact with decision and/or if peer to peer is needed. Provided Dr. Carrillo's cell number.     Ike also provided her contact information [phone].

## 2024-01-02 NOTE — TELEPHONE ENCOUNTER
Spoke with Robyn ([employer] team) regarding pending prior authorization. Informed her CT Chest needed prior to procedure scheduled for tomorrow. Robyn states she will keep an eye on prior authorization. Robyn states she also notified patient regarding pending authorization and that she will call patient back with update.

## 2024-01-02 NOTE — TELEPHONE ENCOUNTER
Contacted patient and informed him Dr. Carrillo would like to proceed with CT Chest and procedure. Provided robotic navigational bronchoscopy instructions. Patient verbalized understanding.

## 2024-01-03 ENCOUNTER — ANESTHESIA (OUTPATIENT)
Dept: ENDOSCOPY | Facility: HOSPITAL | Age: 69
End: 2024-01-03
Payer: MEDICARE

## 2024-01-03 ENCOUNTER — APPOINTMENT (OUTPATIENT)
Dept: GENERAL RADIOLOGY | Facility: HOSPITAL | Age: 69
End: 2024-01-03
Attending: INTERNAL MEDICINE
Payer: MEDICARE

## 2024-01-03 ENCOUNTER — ANESTHESIA EVENT (OUTPATIENT)
Dept: ENDOSCOPY | Facility: HOSPITAL | Age: 69
End: 2024-01-03
Payer: MEDICARE

## 2024-01-03 ENCOUNTER — HOSPITAL ENCOUNTER (OUTPATIENT)
Facility: HOSPITAL | Age: 69
Setting detail: HOSPITAL OUTPATIENT SURGERY
Discharge: HOME | End: 2024-01-03
Attending: INTERNAL MEDICINE | Admitting: INTERNAL MEDICINE
Payer: MEDICARE

## 2024-01-03 ENCOUNTER — HOSPITAL ENCOUNTER (OUTPATIENT)
Dept: CT IMAGING | Facility: HOSPITAL | Age: 69
Discharge: HOME | End: 2024-01-03
Attending: INTERNAL MEDICINE
Payer: MEDICARE

## 2024-01-03 VITALS
HEART RATE: 78 BPM | HEIGHT: 68 IN | WEIGHT: 210 LBS | SYSTOLIC BLOOD PRESSURE: 162 MMHG | DIASTOLIC BLOOD PRESSURE: 84 MMHG | RESPIRATION RATE: 20 BRPM | OXYGEN SATURATION: 96 % | TEMPERATURE: 98 F | BODY MASS INDEX: 31.83 KG/M2

## 2024-01-03 DIAGNOSIS — R59.0 HILAR LYMPHADENOPATHY: ICD-10-CM

## 2024-01-03 DIAGNOSIS — R91.8 MASS OF LEFT LUNG: ICD-10-CM

## 2024-01-03 LAB
BASOPHILS NFR BRONCH: 0 %
EOSINOPHIL NFR BRONCH: 2 %
GLUCOSE BLDC GLUCOMTR-MCNC: 101 MG/DL (ref 70–99)
GLUCOSE BLDC GLUCOMTR-MCNC: 118 MG/DL (ref 70–99)
LYMPHOCYTES NFR BRONCH: 12 %
MONOS+MACROS NFR BRONCH: 2 %
NEUTROPHILS NFR BRONCH: 84 %
RBC # FLD: ABNORMAL /CUMM (ref ?–1)
TOTAL CELLS COUNTED BRONCH: 204 /CUMM (ref ?–1)
TOTAL CELLS COUNTED FLD: 100
WBC CALC (IRIS) BRW: 204 /CUMM

## 2024-01-03 PROCEDURE — 31629 BRONCHOSCOPY/NEEDLE BX EACH: CPT | Performed by: INTERNAL MEDICINE

## 2024-01-03 PROCEDURE — 31652 BRONCH EBUS SAMPLNG 1/2 NODE: CPT | Performed by: INTERNAL MEDICINE

## 2024-01-03 PROCEDURE — 31628 BRONCHOSCOPY/LUNG BX EACH: CPT | Performed by: INTERNAL MEDICINE

## 2024-01-03 PROCEDURE — 71045 X-RAY EXAM CHEST 1 VIEW: CPT | Performed by: INTERNAL MEDICINE

## 2024-01-03 PROCEDURE — 8E0W8CZ ROBOTIC ASSISTED PROCEDURE OF TRUNK REGION, VIA NATURAL OR ARTIFICIAL OPENING ENDOSCOPIC: ICD-10-PCS | Performed by: INTERNAL MEDICINE

## 2024-01-03 PROCEDURE — 31627 NAVIGATIONAL BRONCHOSCOPY: CPT | Performed by: INTERNAL MEDICINE

## 2024-01-03 PROCEDURE — 31654 BRONCH EBUS IVNTJ PERPH LES: CPT | Performed by: INTERNAL MEDICINE

## 2024-01-03 PROCEDURE — 0B9G8ZZ DRAINAGE OF LEFT UPPER LUNG LOBE, VIA NATURAL OR ARTIFICIAL OPENING ENDOSCOPIC: ICD-10-PCS | Performed by: INTERNAL MEDICINE

## 2024-01-03 PROCEDURE — 76497 UNLISTED CT PROCEDURE: CPT | Performed by: INTERNAL MEDICINE

## 2024-01-03 PROCEDURE — 76000 FLUOROSCOPY <1 HR PHYS/QHP: CPT | Performed by: INTERNAL MEDICINE

## 2024-01-03 PROCEDURE — 31624 DX BRONCHOSCOPE/LAVAGE: CPT | Performed by: INTERNAL MEDICINE

## 2024-01-03 PROCEDURE — 0BBG8ZX EXCISION OF LEFT UPPER LUNG LOBE, VIA NATURAL OR ARTIFICIAL OPENING ENDOSCOPIC, DIAGNOSTIC: ICD-10-PCS | Performed by: INTERNAL MEDICINE

## 2024-01-03 RX ORDER — ACETAMINOPHEN 500 MG
1000 TABLET ORAL ONCE AS NEEDED
Status: DISCONTINUED | OUTPATIENT
Start: 2024-01-03 | End: 2024-01-03 | Stop reason: HOSPADM

## 2024-01-03 RX ORDER — SODIUM CHLORIDE, SODIUM LACTATE, POTASSIUM CHLORIDE, CALCIUM CHLORIDE 600; 310; 30; 20 MG/100ML; MG/100ML; MG/100ML; MG/100ML
INJECTION, SOLUTION INTRAVENOUS CONTINUOUS
Status: DISCONTINUED | OUTPATIENT
Start: 2024-01-03 | End: 2024-01-03 | Stop reason: HOSPADM

## 2024-01-03 RX ORDER — HYDROMORPHONE HYDROCHLORIDE 1 MG/ML
0.4 INJECTION, SOLUTION INTRAMUSCULAR; INTRAVENOUS; SUBCUTANEOUS EVERY 5 MIN PRN
Status: DISCONTINUED | OUTPATIENT
Start: 2024-01-03 | End: 2024-01-03 | Stop reason: HOSPADM

## 2024-01-03 RX ORDER — HYDROMORPHONE HYDROCHLORIDE 1 MG/ML
0.2 INJECTION, SOLUTION INTRAMUSCULAR; INTRAVENOUS; SUBCUTANEOUS EVERY 5 MIN PRN
Status: DISCONTINUED | OUTPATIENT
Start: 2024-01-03 | End: 2024-01-03 | Stop reason: HOSPADM

## 2024-01-03 RX ORDER — ROCURONIUM BROMIDE 10 MG/ML
INJECTION, SOLUTION INTRAVENOUS AS NEEDED
Status: DISCONTINUED | OUTPATIENT
Start: 2024-01-03 | End: 2024-01-03 | Stop reason: SURG

## 2024-01-03 RX ORDER — METOPROLOL SUCCINATE 100 MG/1
100 TABLET, EXTENDED RELEASE ORAL ONCE
Status: COMPLETED | OUTPATIENT
Start: 2024-01-03 | End: 2024-01-03

## 2024-01-03 RX ORDER — ONDANSETRON 2 MG/ML
4 INJECTION INTRAMUSCULAR; INTRAVENOUS EVERY 6 HOURS PRN
Status: DISCONTINUED | OUTPATIENT
Start: 2024-01-03 | End: 2024-01-03 | Stop reason: HOSPADM

## 2024-01-03 RX ORDER — NALOXONE HYDROCHLORIDE 0.4 MG/ML
0.08 INJECTION, SOLUTION INTRAMUSCULAR; INTRAVENOUS; SUBCUTANEOUS AS NEEDED
Status: DISCONTINUED | OUTPATIENT
Start: 2024-01-03 | End: 2024-01-03 | Stop reason: HOSPADM

## 2024-01-03 RX ORDER — SODIUM CHLORIDE, SODIUM LACTATE, POTASSIUM CHLORIDE, CALCIUM CHLORIDE 600; 310; 30; 20 MG/100ML; MG/100ML; MG/100ML; MG/100ML
INJECTION, SOLUTION INTRAVENOUS CONTINUOUS
Status: DISCONTINUED | OUTPATIENT
Start: 2024-01-03 | End: 2024-01-03

## 2024-01-03 RX ORDER — GLYCOPYRROLATE 0.2 MG/ML
INJECTION, SOLUTION INTRAMUSCULAR; INTRAVENOUS AS NEEDED
Status: DISCONTINUED | OUTPATIENT
Start: 2024-01-03 | End: 2024-01-03 | Stop reason: SURG

## 2024-01-03 RX ORDER — MORPHINE SULFATE 4 MG/ML
2 INJECTION, SOLUTION INTRAMUSCULAR; INTRAVENOUS EVERY 10 MIN PRN
Status: DISCONTINUED | OUTPATIENT
Start: 2024-01-03 | End: 2024-01-03 | Stop reason: HOSPADM

## 2024-01-03 RX ORDER — HYDROCODONE BITARTRATE AND ACETAMINOPHEN 5; 325 MG/1; MG/1
1 TABLET ORAL ONCE AS NEEDED
Status: DISCONTINUED | OUTPATIENT
Start: 2024-01-03 | End: 2024-01-03 | Stop reason: HOSPADM

## 2024-01-03 RX ORDER — LIDOCAINE HYDROCHLORIDE 10 MG/ML
INJECTION, SOLUTION EPIDURAL; INFILTRATION; INTRACAUDAL; PERINEURAL AS NEEDED
Status: DISCONTINUED | OUTPATIENT
Start: 2024-01-03 | End: 2024-01-03 | Stop reason: SURG

## 2024-01-03 RX ORDER — ONDANSETRON 2 MG/ML
INJECTION INTRAMUSCULAR; INTRAVENOUS AS NEEDED
Status: DISCONTINUED | OUTPATIENT
Start: 2024-01-03 | End: 2024-01-03 | Stop reason: SURG

## 2024-01-03 RX ORDER — NICOTINE POLACRILEX 4 MG
15 LOZENGE BUCCAL
Status: DISCONTINUED | OUTPATIENT
Start: 2024-01-03 | End: 2024-01-03 | Stop reason: HOSPADM

## 2024-01-03 RX ORDER — DEXTROSE MONOHYDRATE 25 G/50ML
50 INJECTION, SOLUTION INTRAVENOUS
Status: DISCONTINUED | OUTPATIENT
Start: 2024-01-03 | End: 2024-01-03 | Stop reason: HOSPADM

## 2024-01-03 RX ORDER — HYDROMORPHONE HYDROCHLORIDE 1 MG/ML
0.6 INJECTION, SOLUTION INTRAMUSCULAR; INTRAVENOUS; SUBCUTANEOUS EVERY 5 MIN PRN
Status: DISCONTINUED | OUTPATIENT
Start: 2024-01-03 | End: 2024-01-03 | Stop reason: HOSPADM

## 2024-01-03 RX ORDER — NICOTINE POLACRILEX 4 MG
30 LOZENGE BUCCAL
Status: DISCONTINUED | OUTPATIENT
Start: 2024-01-03 | End: 2024-01-03 | Stop reason: HOSPADM

## 2024-01-03 RX ORDER — METOCLOPRAMIDE HYDROCHLORIDE 5 MG/ML
10 INJECTION INTRAMUSCULAR; INTRAVENOUS EVERY 8 HOURS PRN
Status: DISCONTINUED | OUTPATIENT
Start: 2024-01-03 | End: 2024-01-03 | Stop reason: HOSPADM

## 2024-01-03 RX ORDER — MORPHINE SULFATE 4 MG/ML
4 INJECTION, SOLUTION INTRAMUSCULAR; INTRAVENOUS EVERY 10 MIN PRN
Status: DISCONTINUED | OUTPATIENT
Start: 2024-01-03 | End: 2024-01-03 | Stop reason: HOSPADM

## 2024-01-03 RX ORDER — IPRATROPIUM BROMIDE AND ALBUTEROL SULFATE 2.5; .5 MG/3ML; MG/3ML
3 SOLUTION RESPIRATORY (INHALATION) ONCE
Status: COMPLETED | OUTPATIENT
Start: 2024-01-03 | End: 2024-01-03

## 2024-01-03 RX ORDER — NEOSTIGMINE METHYLSULFATE 1 MG/ML
INJECTION, SOLUTION INTRAVENOUS AS NEEDED
Status: DISCONTINUED | OUTPATIENT
Start: 2024-01-03 | End: 2024-01-03 | Stop reason: SURG

## 2024-01-03 RX ORDER — MORPHINE SULFATE 10 MG/ML
6 INJECTION, SOLUTION INTRAMUSCULAR; INTRAVENOUS EVERY 10 MIN PRN
Status: DISCONTINUED | OUTPATIENT
Start: 2024-01-03 | End: 2024-01-03 | Stop reason: HOSPADM

## 2024-01-03 RX ORDER — HYDROCODONE BITARTRATE AND ACETAMINOPHEN 5; 325 MG/1; MG/1
2 TABLET ORAL ONCE AS NEEDED
Status: DISCONTINUED | OUTPATIENT
Start: 2024-01-03 | End: 2024-01-03 | Stop reason: HOSPADM

## 2024-01-03 RX ADMIN — ROCURONIUM BROMIDE 10 MG: 10 INJECTION, SOLUTION INTRAVENOUS at 10:52:00

## 2024-01-03 RX ADMIN — GLYCOPYRROLATE 1 MG: 0.2 INJECTION, SOLUTION INTRAMUSCULAR; INTRAVENOUS at 12:27:00

## 2024-01-03 RX ADMIN — SODIUM CHLORIDE, SODIUM LACTATE, POTASSIUM CHLORIDE, CALCIUM CHLORIDE: 600; 310; 30; 20 INJECTION, SOLUTION INTRAVENOUS at 12:20:00

## 2024-01-03 RX ADMIN — NEOSTIGMINE METHYLSULFATE 5 MG: 1 INJECTION, SOLUTION INTRAVENOUS at 12:27:00

## 2024-01-03 RX ADMIN — LIDOCAINE HYDROCHLORIDE 50 MG: 10 INJECTION, SOLUTION EPIDURAL; INFILTRATION; INTRACAUDAL; PERINEURAL at 10:52:00

## 2024-01-03 RX ADMIN — SODIUM CHLORIDE, SODIUM LACTATE, POTASSIUM CHLORIDE, CALCIUM CHLORIDE: 600; 310; 30; 20 INJECTION, SOLUTION INTRAVENOUS at 10:52:00

## 2024-01-03 RX ADMIN — GLYCOPYRROLATE 0.2 MG: 0.2 INJECTION, SOLUTION INTRAMUSCULAR; INTRAVENOUS at 10:52:00

## 2024-01-03 RX ADMIN — SODIUM CHLORIDE, SODIUM LACTATE, POTASSIUM CHLORIDE, CALCIUM CHLORIDE: 600; 310; 30; 20 INJECTION, SOLUTION INTRAVENOUS at 12:21:00

## 2024-01-03 RX ADMIN — ROCURONIUM BROMIDE 20 MG: 10 INJECTION, SOLUTION INTRAVENOUS at 10:58:00

## 2024-01-03 RX ADMIN — ONDANSETRON 4 MG: 2 INJECTION INTRAMUSCULAR; INTRAVENOUS at 10:52:00

## 2024-01-03 NOTE — ANESTHESIA PROCEDURE NOTES
Airway  Date/Time: 1/3/2024 10:53 AM  Urgency: Elective      General Information and Staff    Patient location during procedure: OR  Anesthesiologist: Florence Vila MD  Performed: anesthesiologist   Performed by: Florence Vila MD  Authorized by: Florence Vila MD      Indications and Patient Condition  Indications for airway management: anesthesia  Sedation level: deep  Preoxygenated: yes  Patient position: sniffing  Mask difficulty assessment: 1 - vent by mask    Final Airway Details  Final airway type: endotracheal airway      Successful airway: ETT  Cuffed: yes   Successful intubation technique: Video laryngoscopy  Endotracheal tube insertion site: oral  Blade size: #4  ETT size (mm): 7.5    Placement verified by: capnometry   Cuff volume (mL): 10  Measured from: lips  ETT to lips (cm): 23  Number of attempts at approach: 1       Kristie from [facility] is calling to speak with Dr Sebastian Eubanks  nurse regarding his standing orders for his pro-time. Due to the new system they need new orders to be placed.

## 2024-01-03 NOTE — PROCEDURES
Robotic navigational bronchoscopy with transbronchial needle aspiration, transbronchial biopsy and bronchoalveolar lavage of left upper lobe mass with radial probe endobronchial ultrasound and fluoroscopic guidance and linear endobronchial ultrasound with transbronchial needle aspiration of lymph node station 11 L    Preoperative diagnosis: Left upper lobe mass, left hilar lymphadenopathy    Postoperative diagnosis: Left upper lobe mass, left hilar lymphadenopathy    Anesthesia: General    Consent: Risks and benefits were reviewed with the patient in detail regarding the procedure as well as anesthesia prior to the procedure.  All questions were answered.    Procedure:  Prior to procedure Ion protocol CT chest was loaded on Intuitive Ion Planpoint software.  Target was marked and measured.  Visual pathways were created to the lesion.  The information was stored to her USB drive and loaded on the Ion controller software.    After patient intubated video bronchoscope advanced through endotracheal tube and lower trachea visualized with appeared grossly normal in appearance. Main beronica was sharp and mobile.  The bronchoscope was advanced into the right mainstem bronchus and the right upper, middle and lower lobe segmental and subsegmental anatomy was visualized with appeared normal in appearance without evidence of any lesions, inflammatory changes or significant secretions.  The bronchoscope was next advanced into the left mainstem bronchus and the left upper, lingular and lower lobe segmental and subsegmental anatomy was visualized without any gross abnormalities seen without evidence of any lesions, inflammatory changes or significant secretions.      The robotic IM catheter was introduced via the swivel adapter into the endotracheal tube.  Registration was performed and confirmed with acceptable divergence.  Using shaped sensing robotic catheter guidance, the upper lobe mass was located.  Subsequently, radial probe  ultrasound was introduced through the robotic catheter confirming appropriate positioning with concentric view.  Using fluoroscopic guidance, transbronchial needle aspiration, transbronchial biopsies and BAL were performed.  Passes were given to the cytopathologist for screening.  No evidence of significant bleeding appreciated.  Robotic catheter was withdrawn.    The robotic bronchoscope was removed and EBUS bronchoscope advanced through ET tube.  Under direct ultrasound guidance, lymph node station 11L identified and EBUS TBNA obtained from lymph node station.   Mediastinal and hilar lymph nodes were otherwise inspected with no other significant lymphadenopathy noted.  All the airways were inspected once before with no evidence of bleeding and bronchoscope was removed and procedure completed.  All samples sent for analysis.  Saturations remained stable throughout the procedure.    Immediate blood loss: <5 ml      Giles Carrillo DO  Pulmonary Critical Care Medicine  [facility]

## 2024-01-03 NOTE — ANESTHESIA PREPROCEDURE EVALUATION
Anesthesia PreOp Note    HPI:     Tarik Camara is a 68 year old male who presents for preoperative consultation requested by: Giles Carrillo DO    Date of Surgery: 1/3/2024    Procedure(s):  ROBOT-ASSISTED NAVIGATIONAL BRONCHOSCOPY  ENDOBRONCHIAL ULTRASOUND (EBUS)  Indication: Mass of left lung /Hilar lymphadenopathy    Relevant Problems   No relevant active problems       NPO:  Last Liquid Consumption Date: 01/03/24  Last Liquid Consumption Time: 0000  Last Solid Consumption Date: 01/02/24  Last Solid Consumption Time: 2200  Last Liquid Consumption Date: 01/03/24          History Review:  Patient Active Problem List    Diagnosis Date Noted    Conjunctivochalasis of both eyes 10/26/2023    Type 2 diabetes mellitus with diabetic neuropathy (HCC) 01/24/2023    Type 2 diabetes mellitus without retinopathy (HCC) 05/18/2021    Presbyopia of both eyes 05/18/2021    Age-related nuclear cataract of both eyes 05/18/2021    Paraseptal emphysema ([facility])     Microalbuminuria 07/02/2020    History of Bell's palsy 07/02/2020    Coronary artery disease involving native coronary artery of native heart with angina pectoris ([facility]) 12/12/2019    Hyperlipidemia 12/12/2019    Essential hypertension 12/12/2019    Erectile dysfunction 12/12/2019       Past Medical History:   Diagnosis Date    Bell's palsy     COPD (chronic obstructive pulmonary disease) ([facility])     Degenerative joint disease (DJD) of lumbar spine     Diabetes ([facility])     Diabetes mellitus, type II ([facility])     Heart attack ([facility]) 01/01/2010    High blood pressure     High cholesterol     Hyperlipidemia     Hypertension     Osteoarthritis     Sleep apnea     no CPAP       Past Surgical History:   Procedure Laterality Date    APPENDECTOMY  1975    CORONARY STENT PLACEMENT  2010    PALATE/UVULA SURGERY UNLISTED  2010    VENTRAL HERNIA REPAIR  06/29/2021    primary repair, ventral and umbilical hernias       Medications Prior to Admission   Medication Sig Dispense Refill Last Dose     aspirin 81 MG Oral Tab EC Take 1 tablet (81 mg total) by mouth daily.   1/2/2024 at 2130    atorvastatin 20 MG Oral Tab Take 1 tablet (20 mg total) by mouth nightly. 90 tablet 1 1/2/2024 at 2130    metoprolol succinate  MG Oral Tablet 24 Hr Take 1 tablet (100 mg total) by mouth daily. 90 tablet 1 1/2/2024 at 2130    metFORMIN 850 MG Oral Tab Take 1 tablet (850 mg total) by mouth 2 (two) times daily with meals. 180 tablet 3 1/2/2024 at 2130    lisinopril 20 MG Oral Tab Take 1 tablet (20 mg total) by mouth daily. 30 tablet 1 1/2/2024 at 2130     Current Facility-Administered Medications Ordered in Epic   Medication Dose Route Frequency Provider Last Rate Last Admin    lactated ringers infusion   Intravenous Continuous Giles Carrillo DO 20 mL/hr at 01/03/24 1000 New Bag at 01/03/24 1000     No current [facility]-ordered outpatient medications on file.       No Known Allergies    Family History   Problem Relation Age of Onset    No Known Problems Mother     Heart Disorder Father     Diabetes Neg     Glaucoma Neg     Macular degeneration Neg      Social History     Socioeconomic History    Marital status:     Number of children: 3   Occupational History    Occupation: [employer]   Tobacco Use    Smoking status: Former     Packs/day: 1     Types: Cigarettes     Quit date: 3/19/2020     Years since quitting: 3.7    Smokeless tobacco: Never    Tobacco comments:     Occasionally, not in last 6 months   Vaping Use    Vaping Use: Never used   Substance and Sexual Activity    Alcohol use: Not Currently    Drug use: Never       Available pre-op labs reviewed.     Lab Results   Component Value Date    PGLU 118 (H) 01/03/2024          Vital Signs:  Body mass index is 31.93 kg/m².   height is 1.727 m (5' 8\") and weight is 95.3 kg (210 lb). His blood pressure is 158/87 and his pulse is 65. His respiration is 17 and oxygen saturation is 98%.   Vitals:    12/27/23 1503 01/03/24 0944 01/03/24 0946   BP:   158/87   Pulse:  61  65   Resp:  12 17   SpO2:   98%   Weight: 95.3 kg (210 lb)     Height: 1.727 m (5' 8\")          Anesthesia Evaluation     Patient summary reviewed and Nursing notes reviewed    Airway   Mallampati: III  TM distance: >3 FB  Neck ROM: limited  Dental    (+) upper dentures and lower dentures    Pulmonary    (+) COPD, sleep apnea, decreased breath sounds  Cardiovascular - normal exam  (+) hypertension, CAD    Neuro/Psych    (+)  neuromuscular disease,        GI/Hepatic/Renal - negative ROS     Endo/Other    (+) diabetes mellitus type 2  Abdominal   (+) obese                 Anesthesia Plan:   ASA:  3  Plan:   General  Airway:  ETT and Video laryngoscope  Informed Consent Plan and Risks Discussed With:  Patient  Discussed plan with:  Surgeon      I have informed Tarik Camara and/or legal guardian or family member of the nature of the anesthetic plan, benefits, risks including possible dental damage if relevant, major complications, and any alternative forms of anesthetic management.   All of the patient's questions were answered to the best of my ability. The patient desires the anesthetic management as planned.  GIANNI BOSWELL MD  1/3/2024 10:26 AM  Present on Admission:  **None**

## 2024-01-03 NOTE — DISCHARGE INSTRUCTIONS
Home Care Instructions Following Bronchoscopy / Endobronchial Ultrasound with Sedation    Diet:  Prior to your examination, a local anesthetic was used to numb the back of your throat. Do not eat or drink for two hours. Your nurse will instruct you with the time you may resume your diet.  Sip fluids initially and advance to your regular diet as tolerated.  Do not drink alcohol today.    Medication:  If you have questions about resuming your normal medications, please contact your Primary Care Physician.    Activities:  Do not drive today.  Do not operate any machinery today (including kitchen equipment).    What to Expect:  A sore throat  A cough  Hoarseness  A small amount of blood in your sputum    Contact Your Doctor If:  You have difficulty breathing  You have chest pain  You have a fever greater than 102°F  You cough up more than a few tablespoons of blood in your sputum    **If unable to reach your doctor, please go to the [facility] Emergency Room**    - Your referring physician will receive a full report of your examination.  - Please contact your physician’s office within one week for results if appointment not scheduled.    You may be able to see your laboratory results in [employer] between 4 and 7 business days.  In some cases, your physician may not have viewed the results before they are released to [employer].  If you have questions regarding your results contact the physician who ordered the test/exam by phone or via [employer] by choosing \"Ask a Medical Question.\"

## 2024-01-03 NOTE — PROGRESS NOTES
Patient blood pressure elevated in recovery at 170/82. Patient with history of hypertension, and is asymptomatic. Dr. Vila notified. This RN received orders to give Metoprolol 100 mg PO (home dose) once prior to discharge. Per , can discharge patient without additional blood pressure monitoring post medication administration. Dr. Vila also advised that patient skip his evening dose of Metoprolol tonight.     Information relayed to patient, who verbalized understanding. Patient discharged and educated without any issues.

## 2024-01-03 NOTE — ANESTHESIA POSTPROCEDURE EVALUATION
Patient: Tarik Camara    Procedure Summary       Date: 01/03/24 Room / Location: [facility] ENDOSCOPY 05 / [facility] ENDOSCOPY    Anesthesia Start: 1045 Anesthesia Stop: 1250    Procedures:       ROBOT-ASSISTED NAVIGATIONAL BRONCHOSCOPY      ENDOBRONCHIAL ULTRASOUND (EBUS) Diagnosis:       Mass of left lung      Hilar lymphadenopathy      (Mass of left lung /Hilar lymphadenopathy)    Surgeons: Giles Carrillo DO Anesthesiologist: Florence Vila MD    Anesthesia Type: general ASA Status: 3            Anesthesia Type: general    Vitals Value Taken Time   /76 01/03/24 1249   Temp 97.4 °F (36.3 °C) 01/03/24 1249   Pulse 86 01/03/24 1249   Resp 18 01/03/24 1249   SpO2 97 % 01/03/24 1249   Vitals shown include unfiled device data.    [facility] AN Post Evaluation:   Patient Evaluated in PACU  Patient Participation: complete - patient participated  Level of Consciousness: awake  Pain Management: adequate  Airway Patency:patent  Dental exam unchanged from preop  Yes    Cardiovascular Status: acceptable and hemodynamically stable  Respiratory Status: acceptable, nonlabored ventilation, spontaneous ventilation and nasal cannula  Postoperative Hydration acceptable      Mel Angeles MD  1/3/2024 12:50 PM

## 2024-01-04 LAB — M TB CMPLX RRNA SPEC QL PROBE: NOT DETECTED

## 2024-01-09 LAB — ASPERGILLUS AG BAL/SERUM: 1.89 INDEX

## 2024-01-10 ENCOUNTER — TELEPHONE (OUTPATIENT)
Dept: HEMATOLOGY/ONCOLOGY | Facility: HOSPITAL | Age: 69
End: 2024-01-10

## 2024-01-10 NOTE — TELEPHONE ENCOUNTER
Called patient with apt with Dr Larios for 1/15/24 at 400 pm at the [facility].  He verbalizes understanding.

## 2024-01-15 ENCOUNTER — OFFICE VISIT (OUTPATIENT)
Dept: HEMATOLOGY/ONCOLOGY | Facility: HOSPITAL | Age: 69
End: 2024-01-15
Attending: INTERNAL MEDICINE
Payer: MEDICARE

## 2024-01-15 VITALS
DIASTOLIC BLOOD PRESSURE: 68 MMHG | OXYGEN SATURATION: 96 % | HEIGHT: 67.32 IN | WEIGHT: 214.38 LBS | SYSTOLIC BLOOD PRESSURE: 170 MMHG | TEMPERATURE: 98 F | RESPIRATION RATE: 18 BRPM | BODY MASS INDEX: 33.25 KG/M2 | HEART RATE: 73 BPM

## 2024-01-15 DIAGNOSIS — C34.92 MALIGNANT NEOPLASM OF LEFT LUNG, UNSPECIFIED PART OF LUNG (HCC): Primary | ICD-10-CM

## 2024-01-15 PROCEDURE — 99205 OFFICE O/P NEW HI 60 MIN: CPT | Performed by: INTERNAL MEDICINE

## 2024-01-15 NOTE — PROGRESS NOTES
Oncology Consult    1/15/24    Requesting Dr. 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  (Staging pending).    Diagnosis 1/3/2024 via transbronchial biopsy bronchoscopy with Dr. Carrillo.    On CT 12/22/23 4.2 x 3.3 cm left upper lobe mass enlarged AP reflection lymph node left hilar adenopathy      Past Medical History:   Diagnosis Date    Bell's palsy     COPD (chronic obstructive pulmonary disease) (HCC)     Degenerative joint disease (DJD) of lumbar spine     Diabetes (HCC)     Diabetes mellitus, type II (HCC)     Heart attack (HCC) 01/01/2010    High blood pressure     High cholesterol     Hyperlipidemia     Hypertension     Osteoarthritis     Sleep apnea     no CPAP     Social History     Socioeconomic History    Marital status:     Number of children: 3   Occupational History    Occupation: [employer]   Tobacco Use    Smoking status: Former     Packs/day: 1     Types: Cigarettes     Quit date: 3/19/2020     Years since quitting: 3.8    Smokeless tobacco: Never    Tobacco comments:     Occasionally, not in last 6 months   Vaping Use    Vaping Use: Never used   Substance and Sexual Activity    Alcohol use: Not Currently    Drug use: Never     Family History   Problem Relation Age of Onset    No Known Problems Mother     Heart Disorder Father     Diabetes Neg     Glaucoma Neg     Macular degeneration Neg      No Known Allergies  Current Outpatient Medications on File Prior to Visit   Medication Sig Dispense Refill    lisinopril 20 MG Oral Tab Take 1 tablet (20 mg total) by mouth daily. 30 tablet 1    aspirin 81 MG Oral Tab EC Take 1 tablet (81 mg total) by mouth daily.      atorvastatin 20 MG Oral Tab Take 1 tablet (20 mg total) by mouth nightly. 90 tablet 1    metoprolol succinate  MG Oral Tablet 24 Hr Take 1 tablet (100 mg total) by mouth daily. 90 tablet 1    metFORMIN 850 MG Oral Tab Take 1 tablet (850 mg total) by mouth 2  (two) times daily with meals. 180 tablet 3     Current Facility-Administered Medications on File Prior to Visit   Medication Dose Route Frequency Provider Last Rate Last Admin    [COMPLETED] ipratropium-albuterol (Duoneb) 0.5-2.5 (3) MG/3ML inhalation solution 3 mL  3 mL Nebulization Once Giles Carrillo, DO   3 mL at 01/03/24 0933    [COMPLETED] metoprolol succinate ER (Toprol XL) 24 hr tab 100 mg  100 mg Oral Once Florence Vila MD   100 mg at 01/03/24 1418    [COMPLETED] iopamidol 76% (ISOVUE-370) injection for power injector  80 mL Intravenous ONCE PRN Giles Carrillo, DO   80 mL at 12/22/23 1131     Vitals:    01/15/24 1614   BP: (!) 170/68   Pulse: 73   Resp: 18   Temp: 97.7 °F (36.5 °C)     NAD, RR, nd, no edema, padilla, no deformity, nl skin, nl mood    A/P:  68 year old with 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  -- PET/CT and brain MRI were ordered today.       On EBUS 11L lymph node negative    Discussed at tumor conference.

## 2024-01-17 ENCOUNTER — HOSPITAL ENCOUNTER (OUTPATIENT)
Dept: NUCLEAR MEDICINE | Facility: HOSPITAL | Age: 69
End: 2024-01-17
Attending: INTERNAL MEDICINE
Payer: MEDICARE

## 2024-01-17 ENCOUNTER — HOSPITAL ENCOUNTER (OUTPATIENT)
Dept: NUCLEAR MEDICINE | Facility: HOSPITAL | Age: 69
Discharge: HOME | End: 2024-01-17
Attending: INTERNAL MEDICINE
Payer: MEDICARE

## 2024-01-17 ENCOUNTER — TELEPHONE (OUTPATIENT)
Dept: INTERNAL MEDICINE CLINIC | Facility: CLINIC | Age: 69
End: 2024-01-17

## 2024-01-17 DIAGNOSIS — C34.92 MALIGNANT NEOPLASM OF LEFT LUNG, UNSPECIFIED PART OF LUNG (HCC): ICD-10-CM

## 2024-01-17 LAB — GLUCOSE BLDC GLUCOMTR-MCNC: 110 MG/DL (ref 70–99)

## 2024-01-17 PROCEDURE — 78815 PET IMAGE W/CT SKULL-THIGH: CPT | Performed by: INTERNAL MEDICINE

## 2024-01-17 PROCEDURE — 82962 GLUCOSE BLOOD TEST: CPT

## 2024-01-17 RX ORDER — ATORVASTATIN CALCIUM 20 MG/1
20 TABLET, FILM COATED ORAL NIGHTLY
Qty: 90 TABLET | Refills: 0 | Status: SHIPPED | OUTPATIENT
Start: 2024-01-17

## 2024-01-17 RX ORDER — LISINOPRIL 5 MG/1
5 TABLET ORAL DAILY
Qty: 90 TABLET | Refills: 1 | OUTPATIENT
Start: 2024-01-17

## 2024-01-17 NOTE — TELEPHONE ENCOUNTER
Please review; protocol failed/No Protocol  Called patient to complete labs from 12/2023  Requested Prescriptions   Pending Prescriptions Disp Refills    ATORVASTATIN 20 MG Oral Tab [Pharmacy Med Name: ATORVASTATIN 20 MG TABLET] 90 tablet 1     Sig: TAKE 1 TABLET BY MOUTH EVERY DAY AT NIGHT       Cholesterol Medication Protocol Failed - 1/17/2024 12:04 AM        Failed - ALT in past 12 months        Failed - LDL in past 12 months        Failed - Last ALT < 80     Lab Results   Component Value Date    ALT 38 12/19/2022             Failed - Last LDL < 130     Lab Results   Component Value Date    LDL 63 12/19/2022             Passed - In person appointment or virtual visit in the past 12 mos or appointment in next 3 mos     Recent Outpatient Visits              2 days ago Malignant neoplasm of left lung, unspecified part of lung (HCC)    [facility] Hematology Oncology Venancio Larios MD    Office Visit    2 weeks ago Lung mass    [facility] Jacob Erazo MD    Office Visit    3 weeks ago Mass of left lung    [facility] Giles Carrillo, DO    Office Visit    1 month ago Lung mass    [facility] Giles Carrillo, DO    Office Visit    2 months ago Abnormal magnetic resonance cholangiopancreatography (MRCP)    [facility] Jorge A Velasquez MD    Office Visit          Future Appointments         Provider Department Appt Notes    In 5 days [facility] MRI RM2 (3T WIDE) [facility] MRI     In 6 days Veanncio Larios MD [facility] Hematology Oncology discuss scans and treatment plan    In 1 week FERCHO Desir MD [facility] CLN screen, Pancreatic mass  (Policy informed)    In 9 months Ho Vasquez MD [facility] EP DM EE                Refused  Prescriptions Disp Refills    LISINOPRIL 5 MG Oral Tab [Pharmacy Med Name: LISINOPRIL 5 MG TABLET] 90 tablet 1     Sig: TAKE 1 TABLET (5 MG TOTAL) BY MOUTH DAILY.       Hypertensive Medications Protocol Failed - 1/17/2024 12:04 AM        Failed - Last BP reading less than 140/90     BP Readings from Last 1 Encounters:   01/15/24 (!) 170/68               Failed - CMP or BMP in past 6 months     No results found for this or any previous visit (from the past 4392 hour(s)).            Passed - In person appointment in the past 12 or next 3 months     Recent Outpatient Visits              2 days ago Malignant neoplasm of left lung, unspecified part of lung (HCC)    [facility] Hematology Oncology Venancio Larios MD    Office Visit    2 weeks ago Lung mass    [facility] Jacob Erazo MD    Office Visit    3 weeks ago Mass of left lung    [facility] Giles Carrillo, DO    Office Visit    1 month ago Lung mass    [facility] Giles Carrillo, DO    Office Visit    2 months ago Abnormal magnetic resonance cholangiopancreatography (MRCP)    [facility] Jorge A Velasquez MD    Office Visit          Future Appointments         Provider Department Appt Notes    In 5 days [facility] MRI RM2 (3T WIDE) [facility] MRI     In 6 days Venancio Larios MD [facility] Hematology Oncology discuss scans and treatment plan    In 1 week FERCHO Desir MD [facility] CLN screen, Pancreatic mass  (Policy informed)    In 9 months Ho Vasquez MD [facility] EP DM EE               Passed - In person appointment or virtual visit in the past 6 months     Recent Outpatient Visits              2 days ago Malignant neoplasm of left lung, unspecified part of lung (HCC)     [facility] Hematology Oncology Venancio Larios MD    Office Visit    2 weeks ago Lung mass    [facility], Jacob Wilson MD    Office Visit    3 weeks ago Mass of left lung    [facility] [place] [place]    Office Visit    1 month ago Lung mass    [facility] [place] [place]    Office Visit    2 months ago Abnormal magnetic resonance cholangiopancreatography (MRCP)    [facility] [place] Jorge A Velasquez MD    Office Visit          Future Appointments         Provider Department Appt Notes    In 5 days [facility] MRI 2 (3T WIDE) [facility] MRI     In 6 days Venancio Larios MD [facility] Hematology Oncology discuss scans and treatment plan    In 1 week FERCHO Desir MD [facility] CLN screen, Pancreatic mass  (Policy informed)    In 9 months Ho Vasquez MD [facility] EP DM EE               Passed - EGFRCR or GFRNAA > 50     GFR Evaluation  EGFRCR: 73 , resulted on 12/22/2023             Future Appointments         Provider Department Appt Notes    In 5 days [facility] MRI 2 (3T WIDE) [facility] MRI     In 6 days Venancio Larios MD [facility] Hematology Oncology discuss scans and treatment plan    In 1 week FERCHO Desir MD [facility] CLN screen, Pancreatic mass  (Policy informed)    In 9 months Ho Vasquez MD [facility] EP DM EE          Recent Outpatient Visits              2 days ago Malignant neoplasm of left lung, unspecified part of lung (HCC)    [facility] Hematology Oncology Venancio Larios MD    Office Visit    2 weeks ago Lung mass    [facility], Jacob Wilson MD    Office Visit     3 weeks ago Mass of left lung    [facility], Giles Lin, DO    Office Visit    1 month ago Lung mass    [facility], [facility], Giles Lin, DO    Office Visit    2 months ago Abnormal magnetic resonance cholangiopancreatography (MRCP)    [facility], [facility] [place] Jorge A Velasquez MD    Office Visit

## 2024-01-18 ENCOUNTER — LAB ENCOUNTER (OUTPATIENT)
Dept: LAB | Facility: HOSPITAL | Age: 69
End: 2024-01-18
Attending: INTERNAL MEDICINE
Payer: MEDICARE

## 2024-01-18 LAB
CHOLEST SERPL-MCNC: 112 MG/DL (ref ?–200)
CREAT UR-SCNC: 81 MG/DL
EST. AVERAGE GLUCOSE BLD GHB EST-MCNC: 166 MG/DL (ref 68–126)
FASTING PATIENT LIPID ANSWER: NO
HBA1C MFR BLD: 7.4 % (ref ?–5.7)
HDLC SERPL-MCNC: 23 MG/DL (ref 40–59)
LDLC SERPL CALC-MCNC: 65 MG/DL (ref ?–100)
MICROALBUMIN UR-MCNC: 35.4 MG/DL
MICROALBUMIN/CREAT 24H UR-RTO: 437 UG/MG (ref ?–30)
NONHDLC SERPL-MCNC: 89 MG/DL (ref ?–130)
TRIGL SERPL-MCNC: 133 MG/DL (ref 30–149)
VLDLC SERPL CALC-MCNC: 20 MG/DL (ref 0–30)

## 2024-01-18 PROCEDURE — 83036 HEMOGLOBIN GLYCOSYLATED A1C: CPT | Performed by: INTERNAL MEDICINE

## 2024-01-18 PROCEDURE — 3060F POS MICROALBUMINURIA REV: CPT | Performed by: INTERNAL MEDICINE

## 2024-01-18 PROCEDURE — 82043 UR ALBUMIN QUANTITATIVE: CPT | Performed by: INTERNAL MEDICINE

## 2024-01-18 PROCEDURE — 80061 LIPID PANEL: CPT | Performed by: INTERNAL MEDICINE

## 2024-01-18 PROCEDURE — 36415 COLL VENOUS BLD VENIPUNCTURE: CPT | Performed by: INTERNAL MEDICINE

## 2024-01-18 PROCEDURE — 82570 ASSAY OF URINE CREATININE: CPT | Performed by: INTERNAL MEDICINE

## 2024-01-18 PROCEDURE — 3051F HG A1C>EQUAL 7.0%<8.0%: CPT | Performed by: INTERNAL MEDICINE

## 2024-01-22 ENCOUNTER — HOSPITAL ENCOUNTER (OUTPATIENT)
Dept: MRI IMAGING | Facility: HOSPITAL | Age: 69
Discharge: HOME | End: 2024-01-22
Attending: INTERNAL MEDICINE
Payer: MEDICARE

## 2024-01-22 ENCOUNTER — TELEPHONE (OUTPATIENT)
Dept: INTERNAL MEDICINE CLINIC | Facility: CLINIC | Age: 69
End: 2024-01-22

## 2024-01-22 DIAGNOSIS — C34.92 MALIGNANT NEOPLASM OF LEFT LUNG, UNSPECIFIED PART OF LUNG (HCC): ICD-10-CM

## 2024-01-22 PROCEDURE — 70553 MRI BRAIN STEM W/O & W/DYE: CPT | Performed by: INTERNAL MEDICINE

## 2024-01-22 PROCEDURE — A9575 INJ GADOTERATE MEGLUMI 0.1ML: HCPCS | Performed by: INTERNAL MEDICINE

## 2024-01-22 RX ORDER — GADOTERATE MEGLUMINE 376.9 MG/ML
20 INJECTION INTRAVENOUS
Status: COMPLETED | OUTPATIENT
Start: 2024-01-22 | End: 2024-01-22

## 2024-01-22 RX ADMIN — GADOTERATE MEGLUMINE 20 ML: 376.9 INJECTION INTRAVENOUS at 18:21:00

## 2024-01-23 ENCOUNTER — OFFICE VISIT (OUTPATIENT)
Dept: HEMATOLOGY/ONCOLOGY | Facility: HOSPITAL | Age: 69
End: 2024-01-23
Attending: INTERNAL MEDICINE
Payer: MEDICARE

## 2024-01-23 ENCOUNTER — TELEPHONE (OUTPATIENT)
Dept: INTERNAL MEDICINE CLINIC | Facility: CLINIC | Age: 69
End: 2024-01-23

## 2024-01-23 VITALS
BODY MASS INDEX: 33.01 KG/M2 | OXYGEN SATURATION: 96 % | WEIGHT: 212.81 LBS | DIASTOLIC BLOOD PRESSURE: 64 MMHG | RESPIRATION RATE: 16 BRPM | HEART RATE: 75 BPM | SYSTOLIC BLOOD PRESSURE: 146 MMHG | TEMPERATURE: 98 F | HEIGHT: 67.32 IN

## 2024-01-23 DIAGNOSIS — C34.92 MALIGNANT NEOPLASM OF LEFT LUNG, UNSPECIFIED PART OF LUNG (HCC): Primary | ICD-10-CM

## 2024-01-23 DIAGNOSIS — C79.51 METASTASIS TO BONE (HCC): ICD-10-CM

## 2024-01-23 DIAGNOSIS — Z51.11 CHEMOTHERAPY MANAGEMENT, ENCOUNTER FOR: ICD-10-CM

## 2024-01-23 PROBLEM — C34.90 LUNG CANCER (HCC): Status: ACTIVE | Noted: 2024-01-23

## 2024-01-23 PROCEDURE — 99215 OFFICE O/P EST HI 40 MIN: CPT | Performed by: INTERNAL MEDICINE

## 2024-01-23 NOTE — PROGRESS NOTES
Oncology Consult    1/15/24    Requesting Dr. 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  PDL1 0%    Diagnosis 1/3/2024 via transbronchial biopsy bronchoscopy with Dr. Carrillo.    On CT 12/22/23 4.2 x 3.3 cm left upper lobe mass enlarged AP reflection lymph node left hilar adenopathy.  PET/CT showed bone metastasis      Past Medical History:   Diagnosis Date    Bell's palsy     COPD (chronic obstructive pulmonary disease) (HCC)     Degenerative joint disease (DJD) of lumbar spine     Diabetes (HCC)     Diabetes mellitus, type II (HCC)     Heart attack (HCC) 01/01/2010    High blood pressure     High cholesterol     Hyperlipidemia     Hypertension     Osteoarthritis     Sleep apnea     no CPAP     Social History     Socioeconomic History    Marital status:     Number of children: 3   Occupational History    Occupation: [employer]   Tobacco Use    Smoking status: Former     Packs/day: 1     Types: Cigarettes     Quit date: 3/19/2020     Years since quitting: 3.8    Smokeless tobacco: Never    Tobacco comments:     Occasionally, not in last 6 months   Vaping Use    Vaping Use: Never used   Substance and Sexual Activity    Alcohol use: Not Currently    Drug use: Never     Family History   Problem Relation Age of Onset    No Known Problems Mother     Heart Disorder Father     Diabetes Neg     Glaucoma Neg     Macular degeneration Neg      No Known Allergies  Current Outpatient Medications on File Prior to Visit   Medication Sig Dispense Refill    empagliflozin 10 MG Oral Tab Take 1 tablet (10 mg total) by mouth daily. 90 tablet 0    atorvastatin 20 MG Oral Tab Take 1 tablet (20 mg total) by mouth nightly. 90 tablet 0    lisinopril 20 MG Oral Tab Take 1 tablet (20 mg total) by mouth daily. 30 tablet 1    aspirin 81 MG Oral Tab EC Take 1 tablet (81 mg total) by mouth daily.      metoprolol succinate  MG Oral Tablet 24 Hr Take 1  tablet (100 mg total) by mouth daily. 90 tablet 1    metFORMIN 850 MG Oral Tab Take 1 tablet (850 mg total) by mouth 2 (two) times daily with meals. 180 tablet 3     Current Facility-Administered Medications on File Prior to Visit   Medication Dose Route Frequency Provider Last Rate Last Admin    [COMPLETED] gadoterate meglumine (Dotarem) 10 MMOL/20ML injection 20 mL  20 mL Intravenous ONCE PRN Venancio Larios MD   20 mL at 01/22/24 1821    [COMPLETED] ipratropium-albuterol (Duoneb) 0.5-2.5 (3) MG/3ML inhalation solution 3 mL  3 mL Nebulization Once Giles Carrillo DO   3 mL at 01/03/24 0933    [COMPLETED] metoprolol succinate ER (Toprol XL) 24 hr tab 100 mg  100 mg Oral Once Florence Vila MD   100 mg at 01/03/24 1418     Vitals:    01/23/24 1547   BP: 146/64   Pulse: 75   Resp: 16   Temp: 97.8 °F (36.6 °C)     NAD, RR, nd, no edema, padilla, no deformity, nl skin, nl mood    A/P:  68 year old with former  smoker with 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  PDL1 0%.  Bone metastasis  --carboplatin paclitaxel pembrolizuamb orders placed 1/23/24     we will continue to be focal point of care in setting of malignancy undergoing ongoing treatment.

## 2024-01-23 NOTE — TELEPHONE ENCOUNTER
Patient walked in to the [place] office he wanted to speak to you about all the testing he had done and results . He made an appointment for  1-  . He states he started the new blood pressure medication and his reading today was 150/70 . He states it is working he will bring more readings on Friday

## 2024-01-25 ENCOUNTER — OFFICE VISIT (OUTPATIENT)
Facility: CLINIC | Age: 69
End: 2024-01-25

## 2024-01-25 VITALS
DIASTOLIC BLOOD PRESSURE: 71 MMHG | WEIGHT: 211 LBS | BODY MASS INDEX: 32.73 KG/M2 | SYSTOLIC BLOOD PRESSURE: 146 MMHG | HEIGHT: 67.32 IN | HEART RATE: 81 BPM

## 2024-01-25 DIAGNOSIS — R14.0 ABDOMINAL BLOATING: Primary | ICD-10-CM

## 2024-01-25 DIAGNOSIS — K59.04 CHRONIC IDIOPATHIC CONSTIPATION: ICD-10-CM

## 2024-01-25 PROCEDURE — 1160F RVW MEDS BY RX/DR IN RCRD: CPT | Performed by: INTERNAL MEDICINE

## 2024-01-25 PROCEDURE — 3077F SYST BP >= 140 MM HG: CPT | Performed by: INTERNAL MEDICINE

## 2024-01-25 PROCEDURE — 1159F MED LIST DOCD IN RCRD: CPT | Performed by: INTERNAL MEDICINE

## 2024-01-25 PROCEDURE — 3008F BODY MASS INDEX DOCD: CPT | Performed by: INTERNAL MEDICINE

## 2024-01-25 PROCEDURE — 3078F DIAST BP <80 MM HG: CPT | Performed by: INTERNAL MEDICINE

## 2024-01-25 PROCEDURE — 1125F AMNT PAIN NOTED PAIN PRSNT: CPT | Performed by: INTERNAL MEDICINE

## 2024-01-25 PROCEDURE — 99213 OFFICE O/P EST LOW 20 MIN: CPT | Performed by: INTERNAL MEDICINE

## 2024-01-25 NOTE — PROGRESS NOTES
[facility] - Gastroenterology                                                                                                      Clinic Follow-up Visit          Subjective/HPI:    67 year old man with history of BMI 32, coronary artery disease s/p coronary artery stent placement, hyperlipidemia, hypertension, emphysema/COPD, diabetes, ventral hernia repair, appendectomy, prior tobacco use, sleep apnea, heart attack, recent diagnosis of metastatic squamous lung cancer (seeing Dr. Larios), who presents for constipation/bloating.    -saw Dr. Velasquez few months ago for concern for panc lesion, however repeat imaging showed CT scan of the pancreas looked unremarkable without a mass in the pancreas or other concerning pancreas abnormality.     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  PDL1 0%. Diagnosis 1/3/2024 via transbronchial biopsy bronchoscopy with Dr. Carrillo.    -On CT 12/22/23 4.2 x 3.3 cm left upper lobe mass enlarged AP reflection lymph node left hilar adenopathy.  PET/CT showed bone metastasis     -Dealing with mild constipation issues/bloating, moves bowels usually daily. No melena or hematochezia. Constipation issues date back several years, tried miralax but not helpful.    -Soc: Portuguese, . Works as .    PET scan (1/17/24)  1. Malignant FDG-avid left upper lobe pulmonary mass.   2. Metastatic FDG-avid lymphadenopathy involving the left hilum, mediastinum, and right hilum.  The largest metastatic lymph node conglomerate involves the left hilum.   3. Metastatic FDG-avid osseous lesion involving the lateral right 8th rib with associated extraosseous extension.   4. Lesser incidental findings described above.           Wt Readings from Last 6 Encounters:   01/25/24 211 lb (95.7 kg)   01/23/24 212 lb 12.8 oz (96.5 kg)   01/15/24 214 lb 6.4 oz  (97.3 kg)   12/27/23 210 lb (95.3 kg)   12/29/23 215 lb 3.2 oz (97.6 kg)   12/27/23 216 lb (98 kg)        History, Medications, Allergies, ROS:      Past Medical History:   Diagnosis Date    Bell's palsy     COPD (chronic obstructive pulmonary disease) ([facility])     Degenerative joint disease (DJD) of lumbar spine     Diabetes (HCC)     Diabetes mellitus, type II (HCC)     Heart attack (HCC) 01/01/2010    High blood pressure     High cholesterol     Hyperlipidemia     Hypertension     Osteoarthritis     Sleep apnea     no CPAP      Past Surgical History:   Procedure Laterality Date    APPENDECTOMY  1975    CORONARY STENT PLACEMENT  2010    PALATE/UVULA SURGERY UNLISTED  2010    VENTRAL HERNIA REPAIR  06/29/2021    primary repair, ventral and umbilical hernias      Family History   Problem Relation Age of Onset    No Known Problems Mother     Heart Disorder Father     Diabetes Neg     Glaucoma Neg     Macular degeneration Neg       Social History:   Social History     Socioeconomic History    Marital status:     Number of children: 3   Occupational History    Occupation:    Tobacco Use    Smoking status: Former     Packs/day: 1     Types: Cigarettes     Quit date: 3/19/2020     Years since quitting: 3.8    Smokeless tobacco: Never    Tobacco comments:     Occasionally, not in last 6 months   Vaping Use    Vaping Use: Never used   Substance and Sexual Activity    Alcohol use: Not Currently    Drug use: Never     Social Determinants of Health     Financial Resource Strain: Low Risk  (11/7/2023)    Financial Resource Strain     Difficulty of Paying Living Expenses: Not hard at all     Med Affordability: No   Transportation Needs: No Transportation Needs (11/7/2023)    Transportation Needs     Lack of Transportation: No        Medications (Active prior to today's visit):  Current Outpatient Medications   Medication Sig Dispense Refill    empagliflozin 10 MG Oral Tab Take 1 tablet (10 mg total) by mouth daily.  90 tablet 0    atorvastatin 20 MG Oral Tab Take 1 tablet (20 mg total) by mouth nightly. 90 tablet 0    lisinopril 20 MG Oral Tab Take 1 tablet (20 mg total) by mouth daily. 30 tablet 1    aspirin 81 MG Oral Tab EC Take 1 tablet (81 mg total) by mouth daily.      metoprolol succinate  MG Oral Tablet 24 Hr Take 1 tablet (100 mg total) by mouth daily. 90 tablet 1    metFORMIN 850 MG Oral Tab Take 1 tablet (850 mg total) by mouth 2 (two) times daily with meals. 180 tablet 3       Allergies:  No Known Allergies    ROS:   CONSTITUTIONAL:  negative for fevers, chills  EYES:  negative for change in vision  RESPIRATORY:  negative for  shortness of breath  CARDIOVASCULAR:  negative for  chest pain  GASTROINTESTINAL:  see HPI  GENITOURINARY:  negative for dysuria  INTEGUMENT/BREAST:  SKIN:  negative for  rash  ALLERGIC/IMMUNOLOGIC:  negative for hay fever  ENDOCRINE:  negative for cold intolerance and heat intolerance  MUSCULOSKELETAL:  negative for  joint stiffness and joint swelling  BEHAVIOR/PSYCH:  negative for depressed mood    PHYSICAL EXAM:   Blood pressure 146/71, pulse 81, height 5' 7.32\" (1.71 m), weight 211 lb (95.7 kg).    Gen- Patient appears comfortable and in no acute discomfort  HEENT: the sclera appears anicteric, oropharynx clear, mucus membranes appear moist  CV- regular rate and rhythm, the extremities are warm and well perfused   Lung- Moves air well; No labored breathing  Abdomen- soft, non-tender exam in all quadrants without rigidity or guarding, non-distended, no abnormal bowel sounds noted, no masses are palpated  Skin- No jaundice  Ext: no cyanosis, clubbing or edema is evident.   Neuro- Alert and oriented x4, and patient is having movement of all 4 extremities   Psych - appropriate, non-agitated    Labs/Imaging:     Patient's labs and imaging were reviewed and discussed with patient today.     .  ASSESSMENT/PLAN:    67 year old man with history of BMI 32, coronary artery disease s/p coronary  artery stent placement, hyperlipidemia, hypertension, emphysema/COPD, diabetes, ventral hernia repair, appendectomy, prior tobacco use, sleep apnea, heart attack, recent diagnosis of metastatic squamous lung cancer (seeing Dr. Larios), who presents for constipation/bloating.    #Metastatic lung cancer - f/u with oncologist re: treatment    #Bloating/constipation- start dulcolax three times a week, given miralax not effective. If not improving can consider flush out. No abnormal colonic findings on PET.     #Screening -given metastatic lung cancer, at this time, I would not recommend screening unless recommended by oncology.    PLAN:  Start dulcolax LAXATIVE three times a week to help move your bowels  I will contact your oncologist re: if you need a colonoscopy      Orders This Visit:  No orders of the defined types were placed in this encounter.      Meds This Visit:  Requested Prescriptions      No prescriptions requested or ordered in this encounter       Imaging & Referrals:  None     1/25/2024    MARGIE Desir MD  Pager: [phone]        This note was partially prepared using Dragon Medical voice recognition dictation software. As a result, errors may occur. When identified, these errors have been corrected. While every attempt is made to correct errors during dictation, discrepancies may still exist.

## 2024-01-25 NOTE — PATIENT INSTRUCTIONS
Start dulcolax LAXATIVE three times a week to help move your bowels  I will contact your oncologist re: if you need a colonoscopy

## 2024-01-26 ENCOUNTER — OFFICE VISIT (OUTPATIENT)
Dept: INTERNAL MEDICINE CLINIC | Facility: CLINIC | Age: 69
End: 2024-01-26

## 2024-01-26 VITALS
BODY MASS INDEX: 33.51 KG/M2 | RESPIRATION RATE: 18 BRPM | SYSTOLIC BLOOD PRESSURE: 132 MMHG | HEART RATE: 78 BPM | OXYGEN SATURATION: 98 % | TEMPERATURE: 98 F | WEIGHT: 216 LBS | HEIGHT: 67.32 IN | DIASTOLIC BLOOD PRESSURE: 72 MMHG

## 2024-01-26 DIAGNOSIS — C34.90 LUNG CANCER METASTATIC TO BONE (HCC): Primary | ICD-10-CM

## 2024-01-26 DIAGNOSIS — C79.51 LUNG CANCER METASTATIC TO BONE (HCC): Primary | ICD-10-CM

## 2024-01-26 DIAGNOSIS — I10 ESSENTIAL HYPERTENSION: ICD-10-CM

## 2024-01-26 DIAGNOSIS — Z51.12 ENCOUNTER FOR ANTINEOPLASTIC CHEMOTHERAPY AND IMMUNOTHERAPY: Primary | ICD-10-CM

## 2024-01-26 DIAGNOSIS — Z51.11 ENCOUNTER FOR ANTINEOPLASTIC CHEMOTHERAPY AND IMMUNOTHERAPY: Primary | ICD-10-CM

## 2024-01-26 PROCEDURE — 3008F BODY MASS INDEX DOCD: CPT | Performed by: INTERNAL MEDICINE

## 2024-01-26 PROCEDURE — 3078F DIAST BP <80 MM HG: CPT | Performed by: INTERNAL MEDICINE

## 2024-01-26 PROCEDURE — 1125F AMNT PAIN NOTED PAIN PRSNT: CPT | Performed by: INTERNAL MEDICINE

## 2024-01-26 PROCEDURE — 1159F MED LIST DOCD IN RCRD: CPT | Performed by: INTERNAL MEDICINE

## 2024-01-26 PROCEDURE — 99214 OFFICE O/P EST MOD 30 MIN: CPT | Performed by: INTERNAL MEDICINE

## 2024-01-26 PROCEDURE — 3075F SYST BP GE 130 - 139MM HG: CPT | Performed by: INTERNAL MEDICINE

## 2024-01-26 PROCEDURE — 1160F RVW MEDS BY RX/DR IN RCRD: CPT | Performed by: INTERNAL MEDICINE

## 2024-01-29 ENCOUNTER — NURSE ONLY (OUTPATIENT)
Dept: HEMATOLOGY/ONCOLOGY | Facility: HOSPITAL | Age: 69
End: 2024-01-29
Attending: INTERNAL MEDICINE
Payer: MEDICARE

## 2024-01-29 DIAGNOSIS — C79.51 METASTASIS TO BONE (HCC): ICD-10-CM

## 2024-01-29 DIAGNOSIS — C34.90 MALIGNANT NEOPLASM OF LUNG, UNSPECIFIED LATERALITY, UNSPECIFIED PART OF LUNG (HCC): Primary | ICD-10-CM

## 2024-01-29 DIAGNOSIS — Z51.11 ENCOUNTER FOR ANTINEOPLASTIC CHEMOTHERAPY AND IMMUNOTHERAPY: ICD-10-CM

## 2024-01-29 DIAGNOSIS — Z51.12 ENCOUNTER FOR ANTINEOPLASTIC CHEMOTHERAPY AND IMMUNOTHERAPY: ICD-10-CM

## 2024-01-29 DIAGNOSIS — Z71.9 ENCOUNTER FOR HEALTH EDUCATION: ICD-10-CM

## 2024-01-29 DIAGNOSIS — C34.92 MALIGNANT NEOPLASM OF LEFT LUNG, UNSPECIFIED PART OF LUNG (HCC): Primary | ICD-10-CM

## 2024-01-29 DIAGNOSIS — Z01.89 LABORATORY EXAMINATION: ICD-10-CM

## 2024-01-29 LAB
ALBUMIN SERPL-MCNC: 4.4 G/DL (ref 3.2–4.8)
ALBUMIN/GLOB SERPL: 1.1 {RATIO} (ref 1–2)
ALP LIVER SERPL-CCNC: 80 U/L
ALT SERPL-CCNC: 12 U/L
ANION GAP SERPL CALC-SCNC: 7 MMOL/L (ref 0–18)
AST SERPL-CCNC: 16 U/L (ref ?–34)
BASOPHILS # BLD AUTO: 0.03 X10(3) UL (ref 0–0.2)
BASOPHILS NFR BLD AUTO: 0.4 %
BILIRUB SERPL-MCNC: 0.4 MG/DL (ref 0.2–1.1)
BUN BLD-MCNC: 11 MG/DL (ref 9–23)
BUN/CREAT SERPL: 10.3 (ref 10–20)
CALCIUM BLD-MCNC: 9.8 MG/DL (ref 8.7–10.4)
CHLORIDE SERPL-SCNC: 102 MMOL/L (ref 98–112)
CO2 SERPL-SCNC: 28 MMOL/L (ref 21–32)
CREAT BLD-MCNC: 1.07 MG/DL
DEPRECATED RDW RBC AUTO: 41.4 FL (ref 35.1–46.3)
EGFRCR SERPLBLD CKD-EPI 2021: 75 ML/MIN/1.73M2 (ref 60–?)
EOSINOPHIL # BLD AUTO: 0.18 X10(3) UL (ref 0–0.7)
EOSINOPHIL NFR BLD AUTO: 2.4 %
ERYTHROCYTE [DISTWIDTH] IN BLOOD BY AUTOMATED COUNT: 13.1 % (ref 11–15)
GLOBULIN PLAS-MCNC: 4 G/DL (ref 2.8–4.4)
GLUCOSE BLD-MCNC: 122 MG/DL (ref 70–99)
HCT VFR BLD AUTO: 41.8 %
HGB BLD-MCNC: 12.9 G/DL
IMM GRANULOCYTES # BLD AUTO: 0.02 X10(3) UL (ref 0–1)
IMM GRANULOCYTES NFR BLD: 0.3 %
LYMPHOCYTES # BLD AUTO: 1.82 X10(3) UL (ref 1–4)
LYMPHOCYTES NFR BLD AUTO: 24.4 %
MCH RBC QN AUTO: 26.8 PG (ref 26–34)
MCHC RBC AUTO-ENTMCNC: 30.9 G/DL (ref 31–37)
MCV RBC AUTO: 86.9 FL
MONOCYTES # BLD AUTO: 0.49 X10(3) UL (ref 0.1–1)
MONOCYTES NFR BLD AUTO: 6.6 %
NEUTROPHILS # BLD AUTO: 4.92 X10 (3) UL (ref 1.5–7.7)
NEUTROPHILS # BLD AUTO: 4.92 X10(3) UL (ref 1.5–7.7)
NEUTROPHILS NFR BLD AUTO: 65.9 %
OSMOLALITY SERPL CALC.SUM OF ELEC: 285 MOSM/KG (ref 275–295)
PLATELET # BLD AUTO: 393 10(3)UL (ref 150–450)
POTASSIUM SERPL-SCNC: 4.9 MMOL/L (ref 3.5–5.1)
PROT SERPL-MCNC: 8.4 G/DL (ref 5.7–8.2)
RBC # BLD AUTO: 4.81 X10(6)UL
SODIUM SERPL-SCNC: 137 MMOL/L (ref 136–145)
T4 FREE SERPL-MCNC: 1 NG/DL (ref 0.8–1.7)
TSI SER-ACNC: 2.5 MIU/ML (ref 0.55–4.78)
WBC # BLD AUTO: 7.5 X10(3) UL (ref 4–11)

## 2024-01-29 PROCEDURE — 36415 COLL VENOUS BLD VENIPUNCTURE: CPT

## 2024-01-29 PROCEDURE — 84443 ASSAY THYROID STIM HORMONE: CPT

## 2024-01-29 PROCEDURE — 80053 COMPREHEN METABOLIC PANEL: CPT

## 2024-01-29 PROCEDURE — 84439 ASSAY OF FREE THYROXINE: CPT

## 2024-01-29 PROCEDURE — 85025 COMPLETE CBC W/AUTO DIFF WBC: CPT

## 2024-01-29 PROCEDURE — 99211 OFF/OP EST MAY X REQ PHY/QHP: CPT

## 2024-01-29 RX ORDER — DIPHENHYDRAMINE HYDROCHLORIDE 50 MG/ML
50 INJECTION INTRAMUSCULAR; INTRAVENOUS ONCE
OUTPATIENT
Start: 2024-02-01

## 2024-01-29 RX ORDER — FAMOTIDINE 10 MG/ML
20 INJECTION, SOLUTION INTRAVENOUS ONCE
OUTPATIENT
Start: 2024-02-01

## 2024-01-29 NOTE — PATIENT INSTRUCTIONS
Medication Education Record: IV Therapy    Learner:  Patient and Family Member    Barriers / Limitations:  Communication barrier    Psychosocial Assessment:  patient psychosocial response appropriate  used    Diagnosis:    metastatic squamous cell carcinoma of the left upper lobe of the lung     IV Cancer Treatment Name(s): Paclitaxel, carboplatin x 4 cycles every 3 weeks, pembrolizumab every 3 weeks  IV Cancer Treatment Frequency above  Number of cycles planned  based on tolerance and response   Plan for appointments and lab testing prior to each cycle  Verified Consent to Chemotherapy/Biotherapy Cancer Treatment form signed by patient and provider:  Yes    Cancer Treatment Side Effects (refer to Chemo Care Handouts for further information):  Allergic reactions  Constipation  Diarrhea  Eye disorders  Fatigue  Forgetfulness  Hair loss  Heart effects  Hormone gland changes involving the thyroid, pituitary, adrenal, and pancreas  Kidney / Bladder effects  Liver effects  Loss of appetite  Low red blood cell count / Anemia  Low White Blood Cell Count/Risk of infection  Low Platelet Count/Risk of Bleeding  Lung Effects  Mouth or Throat Sores  Muscle / Bone Effects  Nausea / Vomiting  Nerve Effects  Reproductive / Fertility Effects  Secondary Malignancies  Skin Effects  Taste Changes    IV administration risks:  Potential leaking of drug outside of vein during administration   Signs/symptoms include redness, swelling, pain, burning at the site of administration  Notify Infusion Nurse immediately if any of these symptoms occur during or after the infusion  Allergic reaction: there is a chance for allergic reaction with some medications.  If your prescribed therapy has a higher risk for this, steps will be taken to prevent and minimize this from occurring.    Recommended Anti-nausea medications (as directed by your provider):  Prochloperazine (Compazine) 10 mg every 6 hours and Ondansetron (Zofran) 8 mg every 8  hours  Take as needed      Helpful hints during cancer treatment:    Diet:  Avoid greasy or spicy foods on days surrounding chemotherapy  Eat small frequent meals per day (6-7 meals) rather than 3 large meals  Choose high calorie/high protein foods (chicken, hard cooked eggs, peanut butter, cheese)  If nauseated, try dry foods, such as toast, crackers or pretzels; light or bland foods, such as applesauce or oatmeal.    Fluid intake:  Drink 8-10 cups of liquid a day and take a water bottle wherever you go.  Any fluid is acceptable, but caffeinated products do not count towards your intake and should be limited to 1-2 drinks/day.    Physical Activity    If your doctor approves, be as physically active as you can, but start out slowly, and increase your activity over time as you feel stronger.  Listen to your body and rest when you need to.  Do what you can when you feel up to it.      Oral Care  Keep your mouth clean.  Rinse your mouth before and after meals with plain water or with a mild mouth rinse (made with 1 quart water, 1 teaspoon salt, and 1 teaspoon baking soda, shake before using)   Avoid commercial mouthwashes that contain alcohol, alcoholic or acidic drinks or tobacco  An acceptable mouthwash is Biotene®   If soreness or sores develop, contact the office.    Diarrhea or Constipation    Imodium A-D use for diarrhea:  Take 2 tabs (4mg) at the first sign of diarrhea; then take 1 tab (2mg) every 2 hours until you have had no diarrhea for at least 12 hours; during the night take 2 tabs (4mg) every 4 hours as needed.  Maximum of 8 tablets in 24 hours  Constipation: Senna S tablets 2 at bed time, at 1-2 in AM if needed. Miralax powder daily in 8 oz of any fluid if no BM in 2days.     If you have persistent diarrhea or constipation, please contact the triage nurse for further instructions.  Skin Care  Avoid direct sunlight.  Wear a broad-spectrum sunscreen with an SPF of 30 or higher on any skin exposed to the  sun.  Re-apply every 2 hours if in the sun and after bathing or sweating.  For dry skin, use an alcohol-free lotion twice per day, especially after baths.  If scalp hair loss has occurred, protect the scalp from the sun by wearing a hat and use sunscreen.  Apply alcohol-free moisturizer as needed.    When to call the doctor:  Fever of 100.5 or greater or shaking chills  Nausea/vomiting not controlled with anti-nausea medications: Unable to drink for 24 hours or have signs of dehydration: tiredness, thirst, dry mouth, dark and decreased amount of urine  Diarrhea - not controlled with Imodium AD or more than 6 episodes in 24 hours  Constipation -no bowel movement x 3 days, no response to stool softeners or laxatives  Mouth sores, sore throat or blisters on the lips affecting oral intake  Difficulty breathing, chest pain, or new cough  Excessive tiredness or weakness, confusion or loss of balance  New rash  Tingling or burning, redness, swelling of the palms of hands or soles of feet  Sudden new unexpected symptom -such as change in vision, swelling in arm or leg  Increase in numbness and tingling of hands or feet  Unusual bleeding (nose bleeds, blood in urine, stool or phlegm)  Pain with urination  Persistent mood changes, depression, nervousness, difficulty sleeping   Pain, redness, swelling or blistering at the IV site  If you go to Emergency Room for any reason or seek medical attention elsewhere  If you should need to cancel or reschedule any visit, it is important that you contact the office    What Phone Number to Call:  Cancer Center [phone] / Triage Nurse    Teaching Materials Provided:   Chemo Care chemotherapy information sheets     Please refer to the following link if you are interested in additional information about chemotherapy for yourself or family members: [email or web address]    Handling Body Waste:   Caregivers must wear gloves if exposed to the patient’s blood,  urine, stool or vomit.  Dispose of the used gloves after each use and wash hands with soap and water.  Any sheets or clothes soiled with the bodily fluids should be machine washed twice in hot water with regular laundry detergent.  Do not wash soiled garments with hands.  If the soiled garments cannot be washed right away, place them in a sealed plastic bag until they can be washed.   Absorbable undergarments, or any other items contaminated with chemotherapy, should be placed in a sealed plastic bag for disposal, separate from other trash.  Toilets should be flushed twice with the lid closed while taking this medication and for 48 hours after the last dose.  Wash your hands after using the toilet.  Wash any skin area that has come in contact with urine or stool.      Safety for my family and friends:  Due to safety concerns and the nature of this treatment environment, children are not permitted in the infusion center.  Please make appropriate arrangements.   Hugging and kissing is safe for you and your partner or family members.  You can visit, sit with, hug and kiss the children in your life.    You can be around pregnant women, though (if possible) they should not clean up any of your body fluids after you have treatment.   Sexual activity is safe while throughout treatment.  It is possible that traces of the oral chemotherapy may be present in vaginal fluid or semen for 48 hours after taking.  A condom should be used during this time.  Effective birth control should be used throughout treatment to prevent pregnancy while on these medications and for several months or years after therapy.  Chemotherapy can have harmful side effects to the fetus, especially in the first trimester.  In addition, menstrual cycles can become irregular during and after treatment, so you may not know if you are at a time in your cycle when you could become pregnant or if you are actually pregnant.

## 2024-01-29 NOTE — PROGRESS NOTES
Medication Education Record: IV Therapy    Learner:  Patient and Family Member    Barriers / Limitations:  Communication barrier    Psychosocial Assessment:  patient psychosocial response appropriate  used    Diagnosis:    metastatic squamous cell carcinoma of the left upper lobe of the lung     IV Cancer Treatment Name(s): Paclitaxel, carboplatin x 4 cycles every 3 weeks, pembrolizumab every 3 weeks  IV Cancer Treatment Frequency above  Number of cycles planned  based on tolerance and response   Plan for appointments and lab testing prior to each cycle  Verified Consent to Chemotherapy/Biotherapy Cancer Treatment form signed by patient and provider:  Yes    Cancer Treatment Side Effects (refer to Chemo Care Handouts for further information):  Allergic reactions  Constipation  Diarrhea  Eye disorders  Fatigue  Forgetfulness  Hair loss  Heart effects  Hormone gland changes involving the thyroid, pituitary, adrenal, and pancreas  Kidney / Bladder effects  Liver effects  Loss of appetite  Low red blood cell count / Anemia  Low White Blood Cell Count/Risk of infection  Low Platelet Count/Risk of Bleeding  Lung Effects  Mouth or Throat Sores  Muscle / Bone Effects  Nausea / Vomiting  Nerve Effects  Reproductive / Fertility Effects  Secondary Malignancies  Skin Effects  Taste Changes    IV administration risks:  Potential leaking of drug outside of vein during administration   Signs/symptoms include redness, swelling, pain, burning at the site of administration  Notify Infusion Nurse immediately if any of these symptoms occur during or after the infusion  Allergic reaction: there is a chance for allergic reaction with some medications.  If your prescribed therapy has a higher risk for this, steps will be taken to prevent and minimize this from occurring.    Recommended Anti-nausea medications (as directed by your provider):  Prochloperazine (Compazine) 10 mg every 6 hours and Ondansetron (Zofran) 8 mg every 8  hours  Take as needed      Helpful hints during cancer treatment:    Diet:  Avoid greasy or spicy foods on days surrounding chemotherapy  Eat small frequent meals per day (6-7 meals) rather than 3 large meals  Choose high calorie/high protein foods (chicken, hard cooked eggs, peanut butter, cheese)  If nauseated, try dry foods, such as toast, crackers or pretzels; light or bland foods, such as applesauce or oatmeal.    Fluid intake:  Drink 8-10 cups of liquid a day and take a water bottle wherever you go.  Any fluid is acceptable, but caffeinated products do not count towards your intake and should be limited to 1-2 drinks/day.    Physical Activity    If your doctor approves, be as physically active as you can, but start out slowly, and increase your activity over time as you feel stronger.  Listen to your body and rest when you need to.  Do what you can when you feel up to it.      Oral Care  Keep your mouth clean.  Rinse your mouth before and after meals with plain water or with a mild mouth rinse (made with 1 quart water, 1 teaspoon salt, and 1 teaspoon baking soda, shake before using)   Avoid commercial mouthwashes that contain alcohol, alcoholic or acidic drinks or tobacco  An acceptable mouthwash is Biotene®   If soreness or sores develop, contact the office.    Diarrhea or Constipation    Imodium A-D use for diarrhea:  Take 2 tabs (4mg) at the first sign of diarrhea; then take 1 tab (2mg) every 2 hours until you have had no diarrhea for at least 12 hours; during the night take 2 tabs (4mg) every 4 hours as needed.  Maximum of 8 tablets in 24 hours  Constipation: Senna S tablets 2 at bed time, at 1-2 in AM if needed. Miralax powder daily in 8 oz of any fluid if no BM in 2days.     If you have persistent diarrhea or constipation, please contact the triage nurse for further instructions.  Skin Care  Avoid direct sunlight.  Wear a broad-spectrum sunscreen with an SPF of 30 or higher on any skin exposed to the  sun.  Re-apply every 2 hours if in the sun and after bathing or sweating.  For dry skin, use an alcohol-free lotion twice per day, especially after baths.  If scalp hair loss has occurred, protect the scalp from the sun by wearing a hat and use sunscreen.  Apply alcohol-free moisturizer as needed.    When to call the doctor:  Fever of 100.5 or greater or shaking chills  Nausea/vomiting not controlled with anti-nausea medications: Unable to drink for 24 hours or have signs of dehydration: tiredness, thirst, dry mouth, dark and decreased amount of urine  Diarrhea - not controlled with Imodium AD or more than 6 episodes in 24 hours  Constipation -no bowel movement x 3 days, no response to stool softeners or laxatives  Mouth sores, sore throat or blisters on the lips affecting oral intake  Difficulty breathing, chest pain, or new cough  Excessive tiredness or weakness, confusion or loss of balance  New rash  Tingling or burning, redness, swelling of the palms of hands or soles of feet  Sudden new unexpected symptom -such as change in vision, swelling in arm or leg  Increase in numbness and tingling of hands or feet  Unusual bleeding (nose bleeds, blood in urine, stool or phlegm)  Pain with urination  Persistent mood changes, depression, nervousness, difficulty sleeping   Pain, redness, swelling or blistering at the IV site  If you go to Emergency Room for any reason or seek medical attention elsewhere  If you should need to cancel or reschedule any visit, it is important that you contact the office    What Phone Number to Call:  Cancer Center [phone] / Triage Nurse    Teaching Materials Provided:   Chemo Care chemotherapy information sheets     Please refer to the following link if you are interested in additional information about chemotherapy for yourself or family members: [email or web address]    Handling Body Waste:   Caregivers must wear gloves if exposed to the patient’s blood,  urine, stool or vomit.  Dispose of the used gloves after each use and wash hands with soap and water.  Any sheets or clothes soiled with the bodily fluids should be machine washed twice in hot water with regular laundry detergent.  Do not wash soiled garments with hands.  If the soiled garments cannot be washed right away, place them in a sealed plastic bag until they can be washed.   Absorbable undergarments, or any other items contaminated with chemotherapy, should be placed in a sealed plastic bag for disposal, separate from other trash.  Toilets should be flushed twice with the lid closed while taking this medication and for 48 hours after the last dose.  Wash your hands after using the toilet.  Wash any skin area that has come in contact with urine or stool.      Safety for my family and friends:  Due to safety concerns and the nature of this treatment environment, children are not permitted in the infusion center.  Please make appropriate arrangements.   Hugging and kissing is safe for you and your partner or family members.  You can visit, sit with, hug and kiss the children in your life.    You can be around pregnant women, though (if possible) they should not clean up any of your body fluids after you have treatment.   Sexual activity is safe while throughout treatment.  It is possible that traces of the oral chemotherapy may be present in vaginal fluid or semen for 48 hours after taking.  A condom should be used during this time.  Effective birth control should be used throughout treatment to prevent pregnancy while on these medications and for several months or years after therapy.  Chemotherapy can have harmful side effects to the fetus, especially in the first trimester.  In addition, menstrual cycles can become irregular during and after treatment, so you may not know if you are at a time in your cycle when you could become pregnant or if you are actually pregnant.    Educated and counseled on above  treatment plan and supportive care for 40 minutes  Ranjana Jean, APRN

## 2024-01-31 NOTE — PROGRESS NOTES
Subjective:   Patient ID: Tarik Camara is a 68 year old male.    HPI    Stop talking only.  Do this please patient comes in for follow-up recently diagnosed with lung cancer with mets to bone he is starting treatment seen by oncology Mrs. He is feeling okay no shortness of breath no chest pain patient's blood pressure has been okay since he started lisinopril.    For telephone/  History/Other:   Review of Systems   Constitutional: Negative.    HENT: Negative.     Eyes: Negative.    Respiratory: Negative.     Cardiovascular: Negative.    Gastrointestinal: Negative.    Genitourinary: Negative.    Musculoskeletal: Negative.    Skin: Negative.    Neurological: Negative.    Psychiatric/Behavioral: Negative.       Allergies:No Known Allergies    Past Medical History:   Diagnosis Date    Bell's palsy     COPD (chronic obstructive pulmonary disease) ([facility])     Degenerative joint disease (DJD) of lumbar spine     Diabetes ([facility])     Diabetes mellitus, type II ([facility])     Heart attack ([facility]) 01/01/2010    High blood pressure     High cholesterol     Hyperlipidemia     Hypertension     Osteoarthritis     Sleep apnea     no CPAP      Past Surgical History:   Procedure Laterality Date    APPENDECTOMY  1975    CORONARY STENT PLACEMENT  2010    PALATE/UVULA SURGERY UNLISTED  2010    VENTRAL HERNIA REPAIR  06/29/2021    primary repair, ventral and umbilical hernias      Family History   Problem Relation Age of Onset    No Known Problems Mother     Heart Disorder Father     Diabetes Neg     Glaucoma Neg     Macular degeneration Neg       Social History:   Social History     Socioeconomic History    Marital status:     Number of children: 3   Occupational History    Occupation:    Tobacco Use    Smoking status: Former     Packs/day: 1     Types: Cigarettes     Quit date: 3/19/2020     Years since quitting: 3.8    Smokeless tobacco: Never    Tobacco comments:     Occasionally, not in last 6 months   Vaping Use    Vaping Use:  Never used   Substance and Sexual Activity    Alcohol use: Not Currently    Drug use: Never     Social Determinants of Health     Financial Resource Strain: Low Risk  (11/7/2023)    Financial Resource Strain     Difficulty of Paying Living Expenses: Not hard at all     Med Affordability: No   Transportation Needs: No Transportation Needs (11/7/2023)    Transportation Needs     Lack of Transportation: No        Objective:   Physical Exam  Vitals and nursing note reviewed.   Constitutional:       Appearance: He is well-developed.   HENT:      Head: Normocephalic and atraumatic.      Right Ear: External ear normal.      Left Ear: External ear normal.      Nose: Nose normal.   Eyes:      Conjunctiva/sclera: Conjunctivae normal.      Pupils: Pupils are equal, round, and reactive to light.   Cardiovascular:      Rate and Rhythm: Normal rate and regular rhythm.      Heart sounds: Normal heart sounds.   Pulmonary:      Effort: Pulmonary effort is normal.      Breath sounds: Normal breath sounds.   Abdominal:      General: Bowel sounds are normal.      Palpations: Abdomen is soft.   Genitourinary:     Penis: Normal.       Prostate: Normal.      Rectum: Normal.   Musculoskeletal:         General: Normal range of motion.      Cervical back: Normal range of motion and neck supple.   Skin:     General: Skin is warm and dry.   Neurological:      Mental Status: He is alert and oriented to person, place, and time.      Deep Tendon Reflexes: Reflexes are normal and symmetric.         Assessment & Plan:   Lung cancer with mets to bone- seen oncology to start with treatment   Htn- well controlled

## 2024-02-01 ENCOUNTER — NURSE ONLY (OUTPATIENT)
Dept: HEMATOLOGY/ONCOLOGY | Facility: HOSPITAL | Age: 69
End: 2024-02-01
Attending: INTERNAL MEDICINE
Payer: MEDICARE

## 2024-02-01 ENCOUNTER — SOCIAL WORK SERVICES (OUTPATIENT)
Dept: HEMATOLOGY/ONCOLOGY | Facility: HOSPITAL | Age: 69
End: 2024-02-01

## 2024-02-01 VITALS
HEART RATE: 77 BPM | TEMPERATURE: 97 F | SYSTOLIC BLOOD PRESSURE: 128 MMHG | HEIGHT: 67 IN | WEIGHT: 210 LBS | DIASTOLIC BLOOD PRESSURE: 67 MMHG | BODY MASS INDEX: 32.96 KG/M2 | RESPIRATION RATE: 16 BRPM | OXYGEN SATURATION: 97 %

## 2024-02-01 DIAGNOSIS — Z51.11 ENCOUNTER FOR ANTINEOPLASTIC CHEMOTHERAPY AND IMMUNOTHERAPY: ICD-10-CM

## 2024-02-01 DIAGNOSIS — Z51.12 ENCOUNTER FOR ANTINEOPLASTIC CHEMOTHERAPY AND IMMUNOTHERAPY: ICD-10-CM

## 2024-02-01 DIAGNOSIS — C34.90 MALIGNANT NEOPLASM OF LUNG, UNSPECIFIED LATERALITY, UNSPECIFIED PART OF LUNG (HCC): Primary | ICD-10-CM

## 2024-02-01 PROCEDURE — S0028 INJECTION, FAMOTIDINE, 20 MG: HCPCS

## 2024-02-01 PROCEDURE — 96413 CHEMO IV INFUSION 1 HR: CPT

## 2024-02-01 PROCEDURE — 96375 TX/PRO/DX INJ NEW DRUG ADDON: CPT

## 2024-02-01 PROCEDURE — 96417 CHEMO IV INFUS EACH ADDL SEQ: CPT

## 2024-02-01 PROCEDURE — 96415 CHEMO IV INFUSION ADDL HR: CPT

## 2024-02-01 RX ORDER — FAMOTIDINE 10 MG/ML
INJECTION, SOLUTION INTRAVENOUS
Status: COMPLETED
Start: 2024-02-01 | End: 2024-02-01

## 2024-02-01 RX ORDER — DIPHENHYDRAMINE HYDROCHLORIDE 50 MG/ML
INJECTION INTRAMUSCULAR; INTRAVENOUS
Status: COMPLETED
Start: 2024-02-01 | End: 2024-02-01

## 2024-02-01 RX ORDER — FAMOTIDINE 10 MG/ML
20 INJECTION, SOLUTION INTRAVENOUS ONCE
Status: COMPLETED | OUTPATIENT
Start: 2024-02-01 | End: 2024-02-01

## 2024-02-01 RX ORDER — DIPHENHYDRAMINE HYDROCHLORIDE 50 MG/ML
50 INJECTION INTRAMUSCULAR; INTRAVENOUS ONCE
Status: COMPLETED | OUTPATIENT
Start: 2024-02-01 | End: 2024-02-01

## 2024-02-01 RX ADMIN — FAMOTIDINE 20 MG: 10 INJECTION, SOLUTION INTRAVENOUS at 10:27:00

## 2024-02-01 RX ADMIN — DIPHENHYDRAMINE HYDROCHLORIDE 50 MG: 50 INJECTION INTRAMUSCULAR; INTRAVENOUS at 10:24:00

## 2024-02-01 NOTE — PROGRESS NOTES
SW completed an assessment with pt to provide support and encouragement due to new chemo starting today.     Pt lives in [place], [place] with his spouse.    Patient has a total of 5 adult children who are not arround    Patient is retired    Social determinants of health assessment completed with pt and no needs identified at this time.    Pt presents with appropriate affect and mood. Patient did not identify any feelings of anxiety about starting chemotherapy today.    SW reviewed cancer support resources provided by [employer]. SW provided a pack of resources including information on transportation assistance, homecare/home health agencies and counseling resources. SW reviewed Advance Directives and importance of completion. SW explained role of SW in Cancer Center and provided [employer] gift bag. SW contact information given. Coping skills reviewed and support services encouraged due to new cancer dx.

## 2024-02-01 NOTE — PROGRESS NOTES
Pt here for C1D1 Drug(s)Keytruda/Taxol.Carbo.  Arrives Ambulating independently, accompanied by Self and Spouse     Patient was evaluated today by Treatment Nurse.    Oral medications included in this regimen:  no    Patient confirms comprehension of cancer treatment schedule:  yes    Pregnancy screening:  Not applicable    Modifications in dose or schedule:  No    Medications appearance and physical integrity checked by RN: yes.    Chemotherapy IV pump settings verified by 2 RNs:  Yes.  Frequency of blood return and site check throughout administration: Prior to administration, Prior to each drug, and At completion of therapy     Infusion/treatment outcome:  patient tolerated treatment without incident    Education Record    Learner:  Patient and Spouse  Barriers / Limitations:  None  Method:  Brief focused and Discussion  Education / instructions given:  Plan of care for treatment today.  Reviewed antiemetic plan for home  Outcome:  Shows understanding    Discharged Home, Ambulating independently, accompanied by:Self and Spouse    Patient/family verbalized understanding of future appointments: by printed AVS

## 2024-02-02 ENCOUNTER — TELEPHONE (OUTPATIENT)
Dept: HEMATOLOGY/ONCOLOGY | Facility: HOSPITAL | Age: 69
End: 2024-02-02

## 2024-02-02 ENCOUNTER — OFFICE VISIT (OUTPATIENT)
Dept: INTERNAL MEDICINE CLINIC | Facility: CLINIC | Age: 69
End: 2024-02-02

## 2024-02-02 VITALS
DIASTOLIC BLOOD PRESSURE: 82 MMHG | TEMPERATURE: 98 F | HEIGHT: 67 IN | RESPIRATION RATE: 17 BRPM | BODY MASS INDEX: 33.12 KG/M2 | HEART RATE: 83 BPM | SYSTOLIC BLOOD PRESSURE: 126 MMHG | OXYGEN SATURATION: 98 % | WEIGHT: 211 LBS

## 2024-02-02 DIAGNOSIS — R61 EXCESSIVE SWEATING: Primary | ICD-10-CM

## 2024-02-02 PROCEDURE — 1126F AMNT PAIN NOTED NONE PRSNT: CPT | Performed by: INTERNAL MEDICINE

## 2024-02-02 PROCEDURE — 99213 OFFICE O/P EST LOW 20 MIN: CPT | Performed by: INTERNAL MEDICINE

## 2024-02-02 PROCEDURE — 3074F SYST BP LT 130 MM HG: CPT | Performed by: INTERNAL MEDICINE

## 2024-02-02 PROCEDURE — 3008F BODY MASS INDEX DOCD: CPT | Performed by: INTERNAL MEDICINE

## 2024-02-02 PROCEDURE — 3079F DIAST BP 80-89 MM HG: CPT | Performed by: INTERNAL MEDICINE

## 2024-02-02 NOTE — TELEPHONE ENCOUNTER
Toxicities: C1 D1 Pembrolizumab/Paclitaxel/Carboplatin on 2/1/2024    Guille reports that about 2 hours after her got home after treatment he started to \"sweat a lot.\" He said it gradually slowed down and then stopped around 3 am.  I asked if he checked his blood sugar? He said no he does not routinely check it. He does have a home glucose monitor. Today he feels \"good.\" No side effects at this time. I told Aydecherelle I will update Dr Larios and MANJINDER Bhat. I asked him to contact Dr Erazo and let him know since he manages his diabetes. I asked him to please call the office if he is not feeling well or has any questions or concerns. He agreed and thanked me for checking on him.

## 2024-02-04 ENCOUNTER — HOSPITAL ENCOUNTER (EMERGENCY)
Facility: HOSPITAL | Age: 69
Discharge: HOME | End: 2024-02-04
Attending: STUDENT IN AN ORGANIZED HEALTH CARE EDUCATION/TRAINING PROGRAM
Payer: MEDICARE

## 2024-02-04 VITALS
WEIGHT: 210 LBS | DIASTOLIC BLOOD PRESSURE: 71 MMHG | RESPIRATION RATE: 20 BRPM | TEMPERATURE: 99 F | HEART RATE: 90 BPM | SYSTOLIC BLOOD PRESSURE: 142 MMHG | OXYGEN SATURATION: 98 % | BODY MASS INDEX: 33 KG/M2

## 2024-02-04 DIAGNOSIS — G89.3 CANCER RELATED PAIN: Primary | ICD-10-CM

## 2024-02-04 DIAGNOSIS — M79.10 MYALGIA: ICD-10-CM

## 2024-02-04 LAB
ALBUMIN SERPL-MCNC: 4.1 G/DL (ref 3.2–4.8)
ALP LIVER SERPL-CCNC: 74 U/L
ALT SERPL-CCNC: 27 U/L
ANION GAP SERPL CALC-SCNC: 6 MMOL/L (ref 0–18)
AST SERPL-CCNC: 31 U/L (ref ?–34)
BASOPHILS # BLD AUTO: 0.02 X10(3) UL (ref 0–0.2)
BASOPHILS NFR BLD AUTO: 0.2 %
BILIRUB DIRECT SERPL-MCNC: 0.2 MG/DL (ref ?–0.3)
BILIRUB SERPL-MCNC: 0.8 MG/DL (ref 0.2–1.1)
BUN BLD-MCNC: 15 MG/DL (ref 9–23)
BUN/CREAT SERPL: 14.6 (ref 10–20)
CALCIUM BLD-MCNC: 9.1 MG/DL (ref 8.7–10.4)
CHLORIDE SERPL-SCNC: 105 MMOL/L (ref 98–112)
CK SERPL-CCNC: 99 U/L
CO2 SERPL-SCNC: 25 MMOL/L (ref 21–32)
CREAT BLD-MCNC: 1.03 MG/DL
DEPRECATED RDW RBC AUTO: 40.6 FL (ref 35.1–46.3)
EGFRCR SERPLBLD CKD-EPI 2021: 79 ML/MIN/1.73M2 (ref 60–?)
EOSINOPHIL # BLD AUTO: 0.22 X10(3) UL (ref 0–0.7)
EOSINOPHIL NFR BLD AUTO: 2.1 %
ERYTHROCYTE [DISTWIDTH] IN BLOOD BY AUTOMATED COUNT: 13.2 % (ref 11–15)
FLUAV + FLUBV RNA SPEC NAA+PROBE: NEGATIVE
FLUAV + FLUBV RNA SPEC NAA+PROBE: NEGATIVE
GLUCOSE BLD-MCNC: 195 MG/DL (ref 70–99)
HCT VFR BLD AUTO: 39.4 %
HGB BLD-MCNC: 13.1 G/DL
IMM GRANULOCYTES # BLD AUTO: 0.04 X10(3) UL (ref 0–1)
IMM GRANULOCYTES NFR BLD: 0.4 %
LYMPHOCYTES # BLD AUTO: 1.26 X10(3) UL (ref 1–4)
LYMPHOCYTES NFR BLD AUTO: 12.3 %
MCH RBC QN AUTO: 28.1 PG (ref 26–34)
MCHC RBC AUTO-ENTMCNC: 33.2 G/DL (ref 31–37)
MCV RBC AUTO: 84.4 FL
MONOCYTES # BLD AUTO: 0.12 X10(3) UL (ref 0.1–1)
MONOCYTES NFR BLD AUTO: 1.2 %
NEUTROPHILS # BLD AUTO: 8.58 X10 (3) UL (ref 1.5–7.7)
NEUTROPHILS # BLD AUTO: 8.58 X10(3) UL (ref 1.5–7.7)
NEUTROPHILS NFR BLD AUTO: 83.8 %
OSMOLALITY SERPL CALC.SUM OF ELEC: 288 MOSM/KG (ref 275–295)
PLATELET # BLD AUTO: 320 10(3)UL (ref 150–450)
POTASSIUM SERPL-SCNC: 4.2 MMOL/L (ref 3.5–5.1)
PROT SERPL-MCNC: 7.8 G/DL (ref 5.7–8.2)
RBC # BLD AUTO: 4.67 X10(6)UL
RSV RNA SPEC NAA+PROBE: NEGATIVE
SARS-COV-2 RNA RESP QL NAA+PROBE: NOT DETECTED
SODIUM SERPL-SCNC: 136 MMOL/L (ref 136–145)
WBC # BLD AUTO: 10.2 X10(3) UL (ref 4–11)

## 2024-02-04 PROCEDURE — 0241U SARS-COV-2/FLU A AND B/RSV BY PCR (GENEXPERT): CPT | Performed by: STUDENT IN AN ORGANIZED HEALTH CARE EDUCATION/TRAINING PROGRAM

## 2024-02-04 PROCEDURE — 96374 THER/PROPH/DIAG INJ IV PUSH: CPT

## 2024-02-04 PROCEDURE — 99285 EMERGENCY DEPT VISIT HI MDM: CPT

## 2024-02-04 PROCEDURE — 96361 HYDRATE IV INFUSION ADD-ON: CPT

## 2024-02-04 PROCEDURE — 85025 COMPLETE CBC W/AUTO DIFF WBC: CPT | Performed by: STUDENT IN AN ORGANIZED HEALTH CARE EDUCATION/TRAINING PROGRAM

## 2024-02-04 PROCEDURE — 99284 EMERGENCY DEPT VISIT MOD MDM: CPT

## 2024-02-04 PROCEDURE — 80048 BASIC METABOLIC PNL TOTAL CA: CPT | Performed by: STUDENT IN AN ORGANIZED HEALTH CARE EDUCATION/TRAINING PROGRAM

## 2024-02-04 PROCEDURE — 82550 ASSAY OF CK (CPK): CPT | Performed by: STUDENT IN AN ORGANIZED HEALTH CARE EDUCATION/TRAINING PROGRAM

## 2024-02-04 PROCEDURE — 0241U SARS-COV-2/FLU A AND B/RSV BY PCR (GENEXPERT): CPT

## 2024-02-04 PROCEDURE — 80076 HEPATIC FUNCTION PANEL: CPT | Performed by: STUDENT IN AN ORGANIZED HEALTH CARE EDUCATION/TRAINING PROGRAM

## 2024-02-04 RX ORDER — KETOROLAC TROMETHAMINE 15 MG/ML
15 INJECTION, SOLUTION INTRAMUSCULAR; INTRAVENOUS ONCE
Status: COMPLETED | OUTPATIENT
Start: 2024-02-04 | End: 2024-02-04

## 2024-02-04 NOTE — ED PROVIDER NOTES
History     Chief Complaint   Patient presents with    Pain       HPI    68 year old male with history of metastatic lung CA with metastasis to bones who started chemotherapy last week presents with pain.  Reports since yesterday he has developed diffuse muscle aches and joint pains.  Worse with movement.  He did not take anything for the symptoms.  This was his first dose of chemo.  He denies any fevers, vomiting, diarrhea.          Past Medical History:   Diagnosis Date    Bell's palsy     COPD (chronic obstructive pulmonary disease) ([facility])     Degenerative joint disease (DJD) of lumbar spine     Diabetes ([facility])     Diabetes mellitus, type II ([facility])     Heart attack ([facility]) 01/01/2010    High blood pressure     High cholesterol     Hyperlipidemia     Hypertension     Osteoarthritis     Sleep apnea     no CPAP       Past Surgical History:   Procedure Laterality Date    APPENDECTOMY  1975    CORONARY STENT PLACEMENT  2010    PALATE/UVULA SURGERY UNLISTED  2010    VENTRAL HERNIA REPAIR  06/29/2021    primary repair, ventral and umbilical hernias       Social History     Socioeconomic History    Marital status:     Number of children: 3   Occupational History    Occupation:    Tobacco Use    Smoking status: Former     Packs/day: 1     Types: Cigarettes     Quit date: 3/19/2020     Years since quitting: 3.8    Smokeless tobacco: Never    Tobacco comments:     Occasionally, not in last 6 months   Vaping Use    Vaping Use: Never used   Substance and Sexual Activity    Alcohol use: Not Currently    Drug use: Never     Social Determinants of Health     Financial Resource Strain: Low Risk  (11/7/2023)    Financial Resource Strain     Difficulty of Paying Living Expenses: Not hard at all     Med Affordability: No   Transportation Needs: No Transportation Needs (11/7/2023)    Transportation Needs     Lack of Transportation: No                   Physical Exam     ED Triage Vitals [02/04/24 1335]   /81    Pulse 111   Resp 20   Temp 98.5 °F (36.9 °C)   Temp src Temporal   SpO2 97 %   O2 Device None (Room air)       Physical Exam  Constitutional:       General: He is not in acute distress.  Eyes:      Extraocular Movements: Extraocular movements intact.   Cardiovascular:      Rate and Rhythm: Normal rate.      Pulses: Normal pulses.   Pulmonary:      Effort: Pulmonary effort is normal. No respiratory distress.   Abdominal:      General: Abdomen is flat. There is no distension.      Tenderness: There is no abdominal tenderness.   Musculoskeletal:         General: No swelling, tenderness or deformity. Normal range of motion.      Cervical back: Normal range of motion.      Comments: Compartments are soft.  Ranging all joints easily   Neurological:      General: No focal deficit present.      Mental Status: He is alert.              ED Course     Labs Reviewed   BASIC METABOLIC PANEL (8) - Abnormal; Notable for the following components:       Result Value    Glucose 195 (*)     All other components within normal limits   CBC W/ DIFFERENTIAL - Abnormal; Notable for the following components:    Neutrophil Absolute Prelim 8.58 (*)     Neutrophil Absolute 8.58 (*)     All other components within normal limits   HEPATIC FUNCTION PANEL (7) - Normal   CK CREATINE KINASE (NOT CREATININE) - Normal   SARS-COV-2/FLU A AND B/RSV BY PCR ([facility]) - Normal    Narrative:     This test is intended for the qualitative detection and differentiation of SARS-CoV-2, influenza A, influenza B, and respiratory syncytial virus (RSV) viral RNA in nasopharyngeal or nares swabs from individuals suspected of respiratory viral infection consistent with COVID-19 by their healthcare provider. Signs and symptoms of respiratory viral infection due to SARS-CoV-2, influenza, and RSV can be similar.    Test performed using the Xpert Xpress SARS-CoV-2/FLU/RSV (real time RT-PCR)  assay on the GeneXpert instrument, [employer], [place] [address].   This test  is being used under the Food and Drug Administration's Emergency Use Authorization.    The authorized Fact Sheet for Healthcare Providers for this assay is available upon request from the laboratory.   CBC WITH DIFFERENTIAL WITH PLATELET    Narrative:     The following orders were created for panel order CBC With Differential With Platelet.  Procedure                               Abnormality         Status                     ---------                               -----------         ------                     CBC W/ DIFFERENTIAL[[id number]]          Abnormal            Final result                 Please view results for these tests on the individual orders.     No results found.        MDM     Vitals:    02/04/24 1335 02/04/24 1630   BP: 145/81 142/71   Pulse: 111 90   Resp: 20 20   Temp: 98.5 °F (36.9 °C)    TempSrc: Temporal    SpO2: 97% 98%   Weight: 95.3 kg        Myalgias, arthralgias in the setting of chemotherapy.  Possible chemo induced symptoms, dehydration, electro abnormalities, viral syndrome versus rhabdomyolysis.  Will check labs, give fluids and pain control    ED Course as of 02/04/24 1826  ------------------------------------------------------------  Time: 02/04 1628  Comment: Labs with reassuring renal function, LFTs.  No evidence of dehydration.  No rhabdomyolysis.  Patient has tramadol at home which she is not using, advised him to take this medicine for pain as needed, in addition he can use Tylenol.  Discussed all findings.  Patient is feeling well to be discharged.  Vitals remain stable.  Ambulating without difficulty.  Given written and verbal instructions regarding this condition and strict return precautions.   Will follow up in clinic.   Patient verbalizes understanding of instructions and follow up plan, as well as indications to return to the emergency department.  Improvement with medications.         Disposition and Plan     Clinical Impression:  1. Cancer related pain    2. Myalgia         Disposition:  Discharge    Follow-up:  Jacob Erazo MD  [address] [id number]  [phone]    Follow up        Medications Prescribed:  Discharge Medication List as of 2/4/2024  4:30 PM

## 2024-02-04 NOTE — PROGRESS NOTES
Subjective:   Patient ID: Tarik Camara is a 68 year old male.    HPI  Patient comes in today with complaint of having his first chemotherapy she was later having excessive sweating happened overnight to patient did not really check his sugars to see if his sugar is low, today is feeling fine patient says his wife checks his sugar he does not know how to do it . Otherwise feels ok     History/Other:   Review of Systems   Constitutional: Negative.  Negative for fatigue and fever.   HENT: Negative.  Negative for congestion.    Eyes: Negative.         Excessive sweating    Respiratory: Negative.  Negative for cough, shortness of breath and wheezing.    Cardiovascular: Negative.  Negative for chest pain, palpitations and leg swelling.   Gastrointestinal: Negative.    Endocrine: Negative for cold intolerance and heat intolerance.   Genitourinary: Negative.  Negative for dysuria, flank pain and hematuria.   Musculoskeletal: Negative.  Negative for arthralgias, back pain and myalgias.   Skin: Negative.    Neurological: Negative.  Negative for dizziness, tremors, syncope, weakness and headaches.   Psychiatric/Behavioral: Negative.  Negative for agitation, behavioral problems and suicidal ideas. The patient is not nervous/anxious.      Current Outpatient Medications   Medication Sig Dispense Refill    prochlorperazine (COMPAZINE) 10 mg tablet Take 1 tablet (10 mg total) by mouth every 6 (six) hours as needed for Nausea. 30 tablet 3    ondansetron (ZOFRAN) 8 MG tablet Take 1 tablet (8 mg total) by mouth every 8 (eight) hours as needed for Nausea. 30 tablet 3    empagliflozin 10 MG Oral Tab Take 1 tablet (10 mg total) by mouth daily. 90 tablet 0    atorvastatin 20 MG Oral Tab Take 1 tablet (20 mg total) by mouth nightly. 90 tablet 0    lisinopril 20 MG Oral Tab Take 1 tablet (20 mg total) by mouth daily. 30 tablet 1    aspirin 81 MG Oral Tab EC Take 1 tablet (81 mg total) by mouth daily.      metoprolol succinate  MG Oral  Tablet 24 Hr Take 1 tablet (100 mg total) by mouth daily. 90 tablet 1    metFORMIN 850 MG Oral Tab Take 1 tablet (850 mg total) by mouth 2 (two) times daily with meals. 180 tablet 3     Allergies:No Known Allergies    Objective:   Physical Exam  Vitals and nursing note reviewed.   Constitutional:       Appearance: He is well-developed.   HENT:      Head: Normocephalic and atraumatic.      Right Ear: External ear normal.      Left Ear: External ear normal.      Nose: Nose normal.   Eyes:      Conjunctiva/sclera: Conjunctivae normal.      Pupils: Pupils are equal, round, and reactive to light.   Cardiovascular:      Rate and Rhythm: Normal rate and regular rhythm.      Heart sounds: Normal heart sounds.   Pulmonary:      Effort: Pulmonary effort is normal.      Breath sounds: Normal breath sounds.   Abdominal:      General: Bowel sounds are normal.      Palpations: Abdomen is soft.   Genitourinary:     Penis: Normal.       Prostate: Normal.      Rectum: Normal.   Musculoskeletal:         General: Normal range of motion.      Cervical back: Normal range of motion and neck supple.   Skin:     General: Skin is warm and dry.   Neurological:      Mental Status: He is alert and oriented to person, place, and time.      Deep Tendon Reflexes: Reflexes are normal and symmetric.         Assessment & Plan:   1. Excessive sweating -possible reaction to chemo , possible low sugar monitor blood sugars let us know if recurrent symptoms       No orders of the defined types were placed in this encounter.      Meds This Visit:  Requested Prescriptions      No prescriptions requested or ordered in this encounter       Imaging & Referrals:  None

## 2024-02-04 NOTE — ED INITIAL ASSESSMENT (HPI)
Patient arrives ambulatory through triage c/o body aches after chemo since yesterday,  HX of Chemo to the lung.

## 2024-02-05 RX ORDER — METOPROLOL SUCCINATE 100 MG/1
100 TABLET, EXTENDED RELEASE ORAL DAILY
Qty: 90 TABLET | Refills: 1 | Status: SHIPPED | OUTPATIENT
Start: 2024-02-05

## 2024-02-05 NOTE — TELEPHONE ENCOUNTER
[facility].  Requested Prescriptions   Pending Prescriptions Disp Refills    METOPROLOL SUCCINATE  MG Oral Tablet 24 Hr [Pharmacy Med Name: METOPROLOL SUCC  MG TAB] 90 tablet 1     Sig: TAKE 1 TABLET BY MOUTH EVERY DAY       Hypertensive Medications Protocol Passed - 2/3/2024  6:51 AM        Passed - In person appointment in the past 12 or next 3 months     Recent Outpatient Visits              3 days ago Excessive sweating    [facility] Jacob Erazo MD    Office Visit    4 days ago Malignant neoplasm of lung, unspecified laterality, unspecified part of lung (HCC)    [facility] - Infusion    Nurse Only    1 week ago Malignant neoplasm of lung, unspecified laterality, unspecified part of lung (HCC)    [facility] Hematology Oncology    Nurse Only    1 week ago Malignant neoplasm of left lung, unspecified part of lung (HCC)    [facility] Hematology Oncology Ranjana Jean APRN    Office Visit    1 week ago 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)    [facility] Jacob Erazo MD    Office Visit          Future Appointments         Provider Department Appt Notes    In 2 weeks [facility] Hematology Oncology FT CBC-CMP-TSH-PIV-MYJ    In 2 weeks Ranjana Jean APRANASTACIO [facility] Hematology Oncology PRE CHEMO VISIT    In 2 weeks EM CC INFRN 5 [facility] - Infusion C2 D1-KEYTRUDA-TAXOL(3 HOURS)-CARBO-PIV-MYJ    In 1 month [facility] Hematology Oncology FT CBC-CMP-TSH-PIV-MYJ    In 1 month Ranjana Jean APRN [facility] Hematology Oncology PRE CHEMO VISIT    In 1 month EM CC INFRN 5 [facility] - Infusion C2 D1-KEYTRUDA-TAXOL(3 HOURS)-CARBO-PIV-MYJ    In 8 months Ho Vasquez MD [facility] JON LAUGHLIN                 Passed - Last BP reading less than 140/90     BP Readings from Last 1 Encounters:   02/04/24 142/71               Passed - CMP or BMP in past 6 months     Recent Results (from the past 4392 hour(s))   Basic Metabolic Panel (8)    Collection Time: 02/04/24  2:35 PM   Result Value Ref Range    Glucose 195 (H) 70 - 99 mg/dL    Sodium 136 136 - 145 mmol/L    Potassium 4.2 3.5 - 5.1 mmol/L    Chloride 105 98 - 112 mmol/L    CO2 25.0 21.0 - 32.0 mmol/L    Anion Gap 6 0 - 18 mmol/L    BUN 15 9 - 23 mg/dL    Creatinine 1.03 0.70 - 1.30 mg/dL    BUN/CREA Ratio 14.6 10.0 - 20.0    Calcium, Total 9.1 8.7 - 10.4 mg/dL    Calculated Osmolality 288 275 - 295 mOsm/kg    eGFR-Cr 79 >=60 mL/min/1.73m2     *Note: Due to a large number of results and/or encounters for the requested time period, some results have not been displayed. A complete set of results can be found in Results Review.               Passed - In person appointment or virtual visit in the past 6 months     Recent Outpatient Visits              3 days ago Excessive sweating    [facility] Jacob Erazo MD    Office Visit    4 days ago Malignant neoplasm of lung, unspecified laterality, unspecified part of lung (HCC)    [facility] - Infusion    Nurse Only    1 week ago Malignant neoplasm of lung, unspecified laterality, unspecified part of lung (HCC)    [facility] Hematology Oncology    Nurse Only    1 week ago Malignant neoplasm of left lung, unspecified part of lung (HCC)    [facility] Hematology Oncology Ranjana Jean APRN    Office Visit    1 week ago 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[facility]          Future Appointments         Provider Department Appt Notes    In 2 weeks [facility] RESOURCE [facility] Hematology Oncology FT CBC-CMP-TSH-PIV-MYJ    In 2 weeks Ranjana Jean APRN [place]  [facility] Hematology Oncology PRE CHEMO VISIT    In 2 weeks [facility] INFRN 5 [facility] - Infusion C2 D1-KEYTRUDA-TAXOL(3 HOURS)-CARBO-PIV-MYJ    In 1 month [facility] Hematology Oncology FT CBC-CMP-TSH-PIV-MYJ    In 1 month Ranjana Jean APRANASTACIO [facility] Hematology Oncology PRE CHEMO VISIT    In 1 month [facility] INFRN 5 [facility] - Infusion C2 D1-KEYTRUDA-TAXOL(3 HOURS)-CARBO-PIV-MYJ    In 8 months Ho Vasquez MD [facility] EP DM EE               Passed - EGFRCR or GFRNAA > 50     GFR Evaluation  EGFRCR: 79 , resulted on 2/4/2024             Future Appointments         Provider Department Appt Notes    In 2 weeks [facility] Hematology Oncology FT CBC-CMP-TSH-PIV-MYJ    In 2 weeks Ranjana Jean APRANASTACIO [facility] Hematology Oncology PRE CHEMO VISIT    In 2 weeks [facility] INFR[facility] 5 [facility] - Infusion C2 D1-KEYTRUDA-TAXOL(3 HOURS)-CARBO-PIV-MYJ    In 1 month [facility] Hematology Oncology FT CBC-CMP-TSH-PIV-MYJ    In 1 month Ranjana Jean APRANASTACIO [facility] Hematology Oncology PRE CHEMO VISIT    In 1 month [facility] INFRN 5 [facility] - Infusion C2 D1-KEYTRUDA-TAXOL(3 HOURS)-CARBO-PIV-MYJ    In 8 months Ho Vasquez MD [facility] EP DM EE          Recent Outpatient Visits              3 days ago Excessive sweating    [facility] Jacob Erazo MD    Office Visit    4 days ago Malignant neoplasm of lung, unspecified laterality, unspecified part of lung (HCC)    [facility] - Infusion    Nurse Only    1 week ago Malignant neoplasm of lung, unspecified laterality, unspecified part of lung (HCC)    [facility] Hematology Oncology    Nurse Only    1 week ago Malignant neoplasm of left lung, unspecified  part of lung (HCC)    [facility] Hematology Oncology Ranjana Jean APRN    Office Visit    1 week ago 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)    [facility] [place] Jacob Eraoz MD    Office Visit

## 2024-02-09 ENCOUNTER — PATIENT OUTREACH (OUTPATIENT)
Dept: CASE MANAGEMENT | Age: 69
End: 2024-02-09

## 2024-02-21 ENCOUNTER — OFFICE VISIT (OUTPATIENT)
Dept: HEMATOLOGY/ONCOLOGY | Facility: HOSPITAL | Age: 69
End: 2024-02-21
Attending: INTERNAL MEDICINE
Payer: MEDICARE

## 2024-02-21 VITALS
OXYGEN SATURATION: 98 % | RESPIRATION RATE: 18 BRPM | BODY MASS INDEX: 34.48 KG/M2 | WEIGHT: 219.69 LBS | SYSTOLIC BLOOD PRESSURE: 160 MMHG | HEIGHT: 67.01 IN | DIASTOLIC BLOOD PRESSURE: 72 MMHG | TEMPERATURE: 97 F | HEART RATE: 62 BPM

## 2024-02-21 DIAGNOSIS — C34.90 MALIGNANT NEOPLASM OF LUNG, UNSPECIFIED LATERALITY, UNSPECIFIED PART OF LUNG (HCC): Primary | ICD-10-CM

## 2024-02-21 DIAGNOSIS — Z51.12 ENCOUNTER FOR ANTINEOPLASTIC CHEMOTHERAPY AND IMMUNOTHERAPY: ICD-10-CM

## 2024-02-21 DIAGNOSIS — T45.1X5A ANTINEOPLASTIC CHEMOTHERAPY INDUCED ANEMIA: ICD-10-CM

## 2024-02-21 DIAGNOSIS — Z51.11 ENCOUNTER FOR ANTINEOPLASTIC CHEMOTHERAPY AND IMMUNOTHERAPY: ICD-10-CM

## 2024-02-21 DIAGNOSIS — C79.51 METASTASIS TO BONE (HCC): Primary | ICD-10-CM

## 2024-02-21 DIAGNOSIS — M25.50 ARTHRALGIA, UNSPECIFIED JOINT: ICD-10-CM

## 2024-02-21 DIAGNOSIS — D64.81 ANTINEOPLASTIC CHEMOTHERAPY INDUCED ANEMIA: ICD-10-CM

## 2024-02-21 DIAGNOSIS — C34.90 MALIGNANT NEOPLASM OF LUNG, UNSPECIFIED LATERALITY, UNSPECIFIED PART OF LUNG (HCC): ICD-10-CM

## 2024-02-21 LAB
ALBUMIN SERPL-MCNC: 4.3 G/DL (ref 3.2–4.8)
ALBUMIN/GLOB SERPL: 1.3 {RATIO} (ref 1–2)
ALP LIVER SERPL-CCNC: 90 U/L
ALT SERPL-CCNC: 36 U/L
ANION GAP SERPL CALC-SCNC: 5 MMOL/L (ref 0–18)
AST SERPL-CCNC: 27 U/L (ref ?–34)
BASOPHILS # BLD AUTO: 0.06 X10(3) UL (ref 0–0.2)
BASOPHILS NFR BLD AUTO: 0.9 %
BILIRUB SERPL-MCNC: 0.3 MG/DL (ref 0.2–1.1)
BUN BLD-MCNC: 10 MG/DL (ref 9–23)
BUN/CREAT SERPL: 9.7 (ref 10–20)
CALCIUM BLD-MCNC: 9.6 MG/DL (ref 8.7–10.4)
CHLORIDE SERPL-SCNC: 104 MMOL/L (ref 98–112)
CO2 SERPL-SCNC: 28 MMOL/L (ref 21–32)
CREAT BLD-MCNC: 1.03 MG/DL
DEPRECATED RDW RBC AUTO: 45.8 FL (ref 35.1–46.3)
EGFRCR SERPLBLD CKD-EPI 2021: 79 ML/MIN/1.73M2 (ref 60–?)
EOSINOPHIL # BLD AUTO: 0.07 X10(3) UL (ref 0–0.7)
EOSINOPHIL NFR BLD AUTO: 1.1 %
ERYTHROCYTE [DISTWIDTH] IN BLOOD BY AUTOMATED COUNT: 14.9 % (ref 11–15)
FASTING STATUS PATIENT QL REPORTED: NO
GLOBULIN PLAS-MCNC: 3.3 G/DL (ref 2.8–4.4)
GLUCOSE BLD-MCNC: 129 MG/DL (ref 70–99)
HCT VFR BLD AUTO: 40.2 %
HGB BLD-MCNC: 12.6 G/DL
IMM GRANULOCYTES # BLD AUTO: 0.04 X10(3) UL (ref 0–1)
IMM GRANULOCYTES NFR BLD: 0.6 %
LYMPHOCYTES # BLD AUTO: 1.6 X10(3) UL (ref 1–4)
LYMPHOCYTES NFR BLD AUTO: 25 %
MCH RBC QN AUTO: 27.5 PG (ref 26–34)
MCHC RBC AUTO-ENTMCNC: 31.3 G/DL (ref 31–37)
MCV RBC AUTO: 87.8 FL
MONOCYTES # BLD AUTO: 0.63 X10(3) UL (ref 0.1–1)
MONOCYTES NFR BLD AUTO: 9.8 %
NEUTROPHILS # BLD AUTO: 4 X10 (3) UL (ref 1.5–7.7)
NEUTROPHILS # BLD AUTO: 4 X10(3) UL (ref 1.5–7.7)
NEUTROPHILS NFR BLD AUTO: 62.6 %
OSMOLALITY SERPL CALC.SUM OF ELEC: 285 MOSM/KG (ref 275–295)
PLATELET # BLD AUTO: 225 10(3)UL (ref 150–450)
POTASSIUM SERPL-SCNC: 4.6 MMOL/L (ref 3.5–5.1)
PROT SERPL-MCNC: 7.6 G/DL (ref 5.7–8.2)
RBC # BLD AUTO: 4.58 X10(6)UL
SODIUM SERPL-SCNC: 137 MMOL/L (ref 136–145)
T4 FREE SERPL-MCNC: 1.1 NG/DL (ref 0.8–1.7)
TSI SER-ACNC: 3.67 MIU/ML (ref 0.55–4.78)
WBC # BLD AUTO: 6.4 X10(3) UL (ref 4–11)

## 2024-02-21 PROCEDURE — 84443 ASSAY THYROID STIM HORMONE: CPT

## 2024-02-21 PROCEDURE — G2211 COMPLEX E/M VISIT ADD ON: HCPCS | Performed by: INTERNAL MEDICINE

## 2024-02-21 PROCEDURE — 84439 ASSAY OF FREE THYROXINE: CPT

## 2024-02-21 PROCEDURE — 36415 COLL VENOUS BLD VENIPUNCTURE: CPT

## 2024-02-21 PROCEDURE — 80053 COMPREHEN METABOLIC PANEL: CPT

## 2024-02-21 PROCEDURE — 99215 OFFICE O/P EST HI 40 MIN: CPT | Performed by: INTERNAL MEDICINE

## 2024-02-21 PROCEDURE — 85025 COMPLETE CBC W/AUTO DIFF WBC: CPT

## 2024-02-21 RX ORDER — DIPHENHYDRAMINE HYDROCHLORIDE 50 MG/ML
50 INJECTION INTRAMUSCULAR; INTRAVENOUS ONCE
Status: CANCELLED | OUTPATIENT
Start: 2024-02-22

## 2024-02-21 RX ORDER — TRAMADOL HYDROCHLORIDE 50 MG/1
TABLET ORAL EVERY 6 HOURS PRN
Qty: 30 TABLET | Refills: 0 | Status: SHIPPED | OUTPATIENT
Start: 2024-02-21

## 2024-02-21 RX ORDER — FAMOTIDINE 10 MG/ML
20 INJECTION, SOLUTION INTRAVENOUS ONCE
Status: CANCELLED | OUTPATIENT
Start: 2024-02-22

## 2024-02-21 NOTE — PATIENT INSTRUCTIONS
Extra strength tylenol if aches and pains in joints/muscles come back after chemotherapy. If does not help, try as needed tramadol (ultram) every 6 hours-sent to pharmacy    Take pepcid (famotidine) over the counter once daily on day 2 and day 3 after chemotherapy        Sleep:    Over the counter pill called Doxylamine (unisom) take 1/2 tab to 1 tab at bedtime for sleep

## 2024-02-21 NOTE — PROGRESS NOTES
Oncology Follow up        Requesting Dr. 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  PDL1 0%    Diagnosis 1/3/2024 via transbronchial biopsy bronchoscopy with Dr. Carrillo.    On CT 12/22/23 4.2 x 3.3 cm left upper lobe mass enlarged AP reflection lymph node left hilar adenopathy.  PET/CT showed bone metastasis    Initiated Carboplatin Paclitaxel Pembrolizumab 2/1/24    Interval History:  Follow up for consideration of next treatment. Feels well. No pain today. Had severe generalized arthralgias/myalgias with first few days of treatment. Presented to ER for pain control. Since then, no new aches or pains. No fever, chills or changes in breathing. No changes in bowel or bladder habits. No skin rash or active bleeding. No focal neurological deficits.      Past Medical History:   Diagnosis Date    Bell's palsy     COPD (chronic obstructive pulmonary disease) (HCC)     Degenerative joint disease (DJD) of lumbar spine     Diabetes ([facility])     Diabetes mellitus, type II (HCC)     Heart attack ([facility]) 01/01/2010    High blood pressure     High cholesterol     Hyperlipidemia     Hypertension     Osteoarthritis     Sleep apnea     no CPAP     Social History     Socioeconomic History    Marital status:     Number of children: 3   Occupational History    Occupation:    Tobacco Use    Smoking status: Former     Packs/day: 1     Types: Cigarettes     Quit date: 3/19/2020     Years since quitting: 3.9    Smokeless tobacco: Never    Tobacco comments:     Occasionally, not in last 6 months   Vaping Use    Vaping Use: Never used   Substance and Sexual Activity    Alcohol use: Not Currently    Drug use: Never     Family History   Problem Relation Age of Onset    No Known Problems Mother     Heart Disorder Father     Diabetes Neg     Glaucoma Neg     Macular degeneration Neg      No Known Allergies  Current Outpatient Medications on File  Prior to Visit   Medication Sig Dispense Refill    metoprolol succinate  MG Oral Tablet 24 Hr Take 1 tablet (100 mg total) by mouth daily. 90 tablet 1    prochlorperazine (COMPAZINE) 10 mg tablet Take 1 tablet (10 mg total) by mouth every 6 (six) hours as needed for Nausea. 30 tablet 3    ondansetron (ZOFRAN) 8 MG tablet Take 1 tablet (8 mg total) by mouth every 8 (eight) hours as needed for Nausea. 30 tablet 3    empagliflozin 10 MG Oral Tab Take 1 tablet (10 mg total) by mouth daily. 90 tablet 0    atorvastatin 20 MG Oral Tab Take 1 tablet (20 mg total) by mouth nightly. 90 tablet 0    lisinopril 20 MG Oral Tab Take 1 tablet (20 mg total) by mouth daily. 30 tablet 1    aspirin 81 MG Oral Tab EC Take 1 tablet (81 mg total) by mouth daily.      metFORMIN 850 MG Oral Tab Take 1 tablet (850 mg total) by mouth 2 (two) times daily with meals. 180 tablet 3     Current Facility-Administered Medications on File Prior to Visit   Medication Dose Route Frequency Provider Last Rate Last Admin    [COMPLETED] sodium chloride 0.9 % IV bolus 1,000 mL  1,000 mL Intravenous Once Donte London MD   Stopped at 02/04/24 1531    [COMPLETED] ketorolac (Toradol) 15 MG/ML injection 15 mg  15 mg Intravenous Once Donte London MD   15 mg at 02/04/24 1429    [COMPLETED] ondansetron (Zofran) 16 mg, dexAMETHasone (Decadron) 20 mg in sodium chloride 0.9% 110 mL IVPB   Intravenous Once Ranjana Jean APRN   Stopped at 02/01/24 1045    [COMPLETED] diphenhydrAMINE (Benadryl) 50 mg/mL  injection 50 mg  50 mg Intravenous Once Ranjana Jean APRN   50 mg at 02/01/24 1024    [COMPLETED] famotidine (Pepcid) 20 mg/2mL injection 20 mg  20 mg Intravenous Once Ranjana Jean APRN   20 mg at 02/01/24 1027    [COMPLETED] pembrolizumab (Keytruda) 200 mg in sodium chloride 0.9% 108 mL IVPB  200 mg Intravenous Once Venancio Larios MD   Stopped at 02/01/24 1015    [COMPLETED] PACLitaxel (Taxol) 414 mg in sodium chloride 0.9% 569 mL infusion   200 mg/m2 (Treatment Plan Recorded) Intravenous Once Venancio Larios MD   Stopped at 02/01/24 1408    [COMPLETED] CARBOplatin (Paraplatin) 680 mg in sodium chloride 0.9% 318 mL infusion (by AUC)  680 mg Intravenous Once Venancio Larios MD   Stopped at 02/01/24 1442    [COMPLETED] gadoterate meglumine (Dotarem) 10 MMOL/20ML injection 20 mL  20 mL Intravenous ONCE PRN Venancio Larios MD   20 mL at 01/22/24 1821     Vitals:    02/21/24 0927   BP: 160/72   Pulse: 62   Resp: 18   Temp: 97.3 °F (36.3 °C)     NAD, RR, nd, no edema, padilla, no deformity, nl skin, nl mood    A/P:  68 year old with former  smoker with 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  PDL1 0%.  Bone metastasis  --carboplatin paclitaxel pembrolizuamb-initiated 2/1/24.  ER visit 2/4 for myalgias/arthralgias with normal CK and unremarkable eval.   Ok to treat with Cycle #2 Carboplatin Paclitaxel pembrolizumab    Chemo induced anemia mild. Monitor.    Arthralgias/myalgias immediately post C1, resolved after day 3. Likely related to paclixael. CK in ER negative, likely not immune mediated without ongoing symptoms or weakness.  Discussed supportive care with antihistamines and prn pain meds at home. Monitor. Call if worsens.     we will continue to be focal point of care in setting of malignancy undergoing ongoing treatment.     MANJINDER Villegas    Seen exam with DENA Jean agree with noted above metastatic squamous cell carcinoma of the left lung with bone metastasis proceed with cycle #2 of carboplatin paclitaxel pembrolizumab on exam comfortable unlabored respirations anemia mild monitor    Management plan for cancer treatment leading high risk MDM was formulated by physician.

## 2024-02-22 ENCOUNTER — OFFICE VISIT (OUTPATIENT)
Dept: HEMATOLOGY/ONCOLOGY | Facility: HOSPITAL | Age: 69
End: 2024-02-22
Attending: INTERNAL MEDICINE
Payer: MEDICARE

## 2024-02-22 VITALS
HEART RATE: 66 BPM | DIASTOLIC BLOOD PRESSURE: 65 MMHG | RESPIRATION RATE: 18 BRPM | TEMPERATURE: 98 F | OXYGEN SATURATION: 98 % | SYSTOLIC BLOOD PRESSURE: 153 MMHG

## 2024-02-22 DIAGNOSIS — C34.90 MALIGNANT NEOPLASM OF LUNG, UNSPECIFIED LATERALITY, UNSPECIFIED PART OF LUNG (HCC): Primary | ICD-10-CM

## 2024-02-22 PROCEDURE — 96415 CHEMO IV INFUSION ADDL HR: CPT

## 2024-02-22 PROCEDURE — S0028 INJECTION, FAMOTIDINE, 20 MG: HCPCS

## 2024-02-22 PROCEDURE — 96375 TX/PRO/DX INJ NEW DRUG ADDON: CPT

## 2024-02-22 PROCEDURE — 96417 CHEMO IV INFUS EACH ADDL SEQ: CPT

## 2024-02-22 PROCEDURE — 96413 CHEMO IV INFUSION 1 HR: CPT

## 2024-02-22 RX ORDER — DIPHENHYDRAMINE HYDROCHLORIDE 50 MG/ML
50 INJECTION INTRAMUSCULAR; INTRAVENOUS ONCE
Status: COMPLETED | OUTPATIENT
Start: 2024-02-22 | End: 2024-02-22

## 2024-02-22 RX ORDER — DIPHENHYDRAMINE HYDROCHLORIDE 50 MG/ML
INJECTION INTRAMUSCULAR; INTRAVENOUS
Status: COMPLETED
Start: 2024-02-22 | End: 2024-02-22

## 2024-02-22 RX ORDER — FAMOTIDINE 10 MG/ML
INJECTION, SOLUTION INTRAVENOUS
Status: COMPLETED
Start: 2024-02-22 | End: 2024-02-22

## 2024-02-22 RX ORDER — FAMOTIDINE 10 MG/ML
20 INJECTION, SOLUTION INTRAVENOUS ONCE
Status: COMPLETED | OUTPATIENT
Start: 2024-02-22 | End: 2024-02-22

## 2024-02-22 RX ADMIN — FAMOTIDINE 20 MG: 10 INJECTION, SOLUTION INTRAVENOUS at 09:23:00

## 2024-02-22 RX ADMIN — DIPHENHYDRAMINE HYDROCHLORIDE 50 MG: 50 INJECTION INTRAMUSCULAR; INTRAVENOUS at 09:21:00

## 2024-02-22 NOTE — PROGRESS NOTES
Pt here for C2D1 Drug(s)Keytruda/Taxol.Carbo.  Arrives Ambulating independently, accompanied by Self and Spouse     Patient was evaluated today by Treatment Nurse.    Oral medications included in this regimen:  no    Patient confirms comprehension of cancer treatment schedule:  yes    Pregnancy screening:  Not applicable    Modifications in dose or schedule:  No    Medications appearance and physical integrity checked by RN: yes.    Chemotherapy IV pump settings verified by 2 RNs:  Yes.  Frequency of blood return and site check throughout administration: Prior to administration, Prior to each drug, and At completion of therapy     Infusion/treatment outcome:  patient tolerated treatment without incident    Education Record    Learner:  Patient and Spouse  Barriers / Limitations:  None  Method:  Brief focused and Discussion  Education / instructions given:  Plan of care for treatment today.  Reviewed antiemetic plan for home  Outcome:  Shows understanding    Discharged Home, Ambulating independently, accompanied by:Self and Spouse    Patient/family verbalized understanding of future appointments: by printed AVS

## 2024-03-13 ENCOUNTER — NURSE ONLY (OUTPATIENT)
Dept: HEMATOLOGY/ONCOLOGY | Facility: HOSPITAL | Age: 69
End: 2024-03-13
Attending: INTERNAL MEDICINE
Payer: MEDICARE

## 2024-03-13 ENCOUNTER — OFFICE VISIT (OUTPATIENT)
Dept: HEMATOLOGY/ONCOLOGY | Facility: HOSPITAL | Age: 69
End: 2024-03-13
Attending: NURSE PRACTITIONER
Payer: MEDICARE

## 2024-03-13 VITALS
SYSTOLIC BLOOD PRESSURE: 176 MMHG | HEART RATE: 70 BPM | BODY MASS INDEX: 33.43 KG/M2 | DIASTOLIC BLOOD PRESSURE: 76 MMHG | HEIGHT: 67 IN | OXYGEN SATURATION: 97 % | WEIGHT: 213 LBS | TEMPERATURE: 98 F | RESPIRATION RATE: 18 BRPM

## 2024-03-13 DIAGNOSIS — Z51.11 ENCOUNTER FOR ANTINEOPLASTIC CHEMOTHERAPY AND IMMUNOTHERAPY: ICD-10-CM

## 2024-03-13 DIAGNOSIS — C79.51 METASTASIS TO BONE (HCC): ICD-10-CM

## 2024-03-13 DIAGNOSIS — Z51.12 ENCOUNTER FOR ANTINEOPLASTIC CHEMOTHERAPY AND IMMUNOTHERAPY: ICD-10-CM

## 2024-03-13 DIAGNOSIS — C34.90 MALIGNANT NEOPLASM OF LUNG, UNSPECIFIED LATERALITY, UNSPECIFIED PART OF LUNG (HCC): Primary | ICD-10-CM

## 2024-03-13 LAB
ALBUMIN SERPL-MCNC: 4.4 G/DL (ref 3.2–4.8)
ALBUMIN/GLOB SERPL: 1.3 {RATIO} (ref 1–2)
ALP LIVER SERPL-CCNC: 84 U/L
ALT SERPL-CCNC: 32 U/L
ANION GAP SERPL CALC-SCNC: <0 MMOL/L (ref 0–18)
AST SERPL-CCNC: 24 U/L (ref ?–34)
BASOPHILS # BLD AUTO: 0.05 X10(3) UL (ref 0–0.2)
BASOPHILS NFR BLD AUTO: 0.9 %
BILIRUB SERPL-MCNC: 0.5 MG/DL (ref 0.2–1.1)
BUN BLD-MCNC: 10 MG/DL (ref 9–23)
BUN/CREAT SERPL: 9.9 (ref 10–20)
CALCIUM BLD-MCNC: 9.8 MG/DL (ref 8.7–10.4)
CHLORIDE SERPL-SCNC: 110 MMOL/L (ref 98–112)
CO2 SERPL-SCNC: 27 MMOL/L (ref 21–32)
CREAT BLD-MCNC: 1.01 MG/DL
DEPRECATED RDW RBC AUTO: 50.5 FL (ref 35.1–46.3)
EGFRCR SERPLBLD CKD-EPI 2021: 81 ML/MIN/1.73M2 (ref 60–?)
EOSINOPHIL # BLD AUTO: 0.04 X10(3) UL (ref 0–0.7)
EOSINOPHIL NFR BLD AUTO: 0.7 %
ERYTHROCYTE [DISTWIDTH] IN BLOOD BY AUTOMATED COUNT: 16.5 % (ref 11–15)
FASTING STATUS PATIENT QL REPORTED: NO
GLOBULIN PLAS-MCNC: 3.3 G/DL (ref 2.8–4.4)
GLUCOSE BLD-MCNC: 140 MG/DL (ref 70–99)
HCT VFR BLD AUTO: 40.5 %
HGB BLD-MCNC: 13 G/DL
IMM GRANULOCYTES # BLD AUTO: 0.01 X10(3) UL (ref 0–1)
IMM GRANULOCYTES NFR BLD: 0.2 %
LYMPHOCYTES # BLD AUTO: 1.29 X10(3) UL (ref 1–4)
LYMPHOCYTES NFR BLD AUTO: 22.3 %
MCH RBC QN AUTO: 28 PG (ref 26–34)
MCHC RBC AUTO-ENTMCNC: 32.1 G/DL (ref 31–37)
MCV RBC AUTO: 87.3 FL
MONOCYTES # BLD AUTO: 0.72 X10(3) UL (ref 0.1–1)
MONOCYTES NFR BLD AUTO: 12.5 %
NEUTROPHILS # BLD AUTO: 3.67 X10 (3) UL (ref 1.5–7.7)
NEUTROPHILS # BLD AUTO: 3.67 X10(3) UL (ref 1.5–7.7)
NEUTROPHILS NFR BLD AUTO: 63.4 %
OSMOLALITY SERPL CALC.SUM OF ELEC: 283 MOSM/KG (ref 275–295)
PLATELET # BLD AUTO: 190 10(3)UL (ref 150–450)
POTASSIUM SERPL-SCNC: 4.4 MMOL/L (ref 3.5–5.1)
PROT SERPL-MCNC: 7.7 G/DL (ref 5.7–8.2)
RBC # BLD AUTO: 4.64 X10(6)UL
SODIUM SERPL-SCNC: 136 MMOL/L (ref 136–145)
T4 FREE SERPL-MCNC: 1.2 NG/DL (ref 0.8–1.7)
TSI SER-ACNC: 3.05 MIU/ML (ref 0.55–4.78)
WBC # BLD AUTO: 5.8 X10(3) UL (ref 4–11)

## 2024-03-13 PROCEDURE — 84443 ASSAY THYROID STIM HORMONE: CPT

## 2024-03-13 PROCEDURE — 36415 COLL VENOUS BLD VENIPUNCTURE: CPT

## 2024-03-13 PROCEDURE — 85025 COMPLETE CBC W/AUTO DIFF WBC: CPT

## 2024-03-13 PROCEDURE — 80053 COMPREHEN METABOLIC PANEL: CPT

## 2024-03-13 PROCEDURE — G2211 COMPLEX E/M VISIT ADD ON: HCPCS | Performed by: INTERNAL MEDICINE

## 2024-03-13 PROCEDURE — 84439 ASSAY OF FREE THYROXINE: CPT

## 2024-03-13 PROCEDURE — 99215 OFFICE O/P EST HI 40 MIN: CPT | Performed by: INTERNAL MEDICINE

## 2024-03-13 RX ORDER — DIPHENHYDRAMINE HYDROCHLORIDE 50 MG/ML
50 INJECTION INTRAMUSCULAR; INTRAVENOUS ONCE
Status: CANCELLED | OUTPATIENT
Start: 2024-03-14

## 2024-03-13 RX ORDER — LISINOPRIL 20 MG/1
20 TABLET ORAL DAILY
Qty: 30 TABLET | Refills: 1 | Status: SHIPPED | OUTPATIENT
Start: 2024-03-13 | End: 2024-03-16

## 2024-03-13 RX ORDER — FAMOTIDINE 10 MG/ML
20 INJECTION, SOLUTION INTRAVENOUS ONCE
Status: CANCELLED | OUTPATIENT
Start: 2024-03-14

## 2024-03-13 NOTE — TELEPHONE ENCOUNTER
He is completely out   Current Outpatient Medications:     lisinopril 20 MG Oral Tab, Take 1 tablet (20 mg total) by mouth daily., Disp: 30 tablet, Rfl: 1    
negative

## 2024-03-13 NOTE — PATIENT INSTRUCTIONS
Daily claritin (antihistamine) every day for itching prevention    Hydrocortisone 1% cream over the counter- twice daily to itch or rash.    Always use daily moisturizer to prevent dry skin from steroid cream  (cerave)

## 2024-03-13 NOTE — PROGRESS NOTES
Seen exam with APN Ted agree APN note metastatic squamous cell carcinoma of the left lung with bone metastasis proceed with cycle #3 of carboplatin paclitaxel pembrolizumab on exam comfortable unlabored respirations anemia mild monitor    Management plan for cancer treatment leading high risk MDM was formulated by physician.

## 2024-03-13 NOTE — PROGRESS NOTES
Oncology Follow up        Requesting Dr. 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  PDL1 0%    Diagnosis 1/3/2024 via transbronchial biopsy bronchoscopy with Dr. Carrillo.    On CT 12/22/23 4.2 x 3.3 cm left upper lobe mass enlarged AP reflection lymph node left hilar adenopathy.  PET/CT showed bone metastasis    Initiated Carboplatin Paclitaxel Pembrolizumab 2/1/24      Interval History:  Follow up for consideration of next treatment. Feels well. No pain today. mild generalized arthralgias/myalgias with first few days of treatment, better than after C1.  Since then, no new aches or pains, not taking pain meds. No fever, chills or changes in breathing. No changes in bowel or bladder habits. No skin rash or active bleeding. No focal neurological deficits.      Past Medical History:   Diagnosis Date    Bell's palsy     COPD (chronic obstructive pulmonary disease) (HCC)     Degenerative joint disease (DJD) of lumbar spine     Diabetes ([facility])     Diabetes mellitus, type II (HCC)     Heart attack ([facility]) 01/01/2010    High blood pressure     High cholesterol     Hyperlipidemia     Hypertension     Osteoarthritis     Sleep apnea     no CPAP     Social History     Socioeconomic History    Marital status:     Number of children: 3   Occupational History    Occupation:    Tobacco Use    Smoking status: Former     Packs/day: 1     Types: Cigarettes     Quit date: 3/19/2020     Years since quitting: 3.9    Smokeless tobacco: Never    Tobacco comments:     Occasionally, not in last 6 months   Vaping Use    Vaping Use: Never used   Substance and Sexual Activity    Alcohol use: Not Currently    Drug use: Never     Family History   Problem Relation Age of Onset    No Known Problems Mother     Heart Disorder Father     Diabetes Neg     Glaucoma Neg     Macular degeneration Neg      No Known Allergies  Current Outpatient Medications on  File Prior to Visit   Medication Sig Dispense Refill    traMADol 50 MG Oral Tab Take 1-2 tablets ( mg total) by mouth every 6 (six) hours as needed for Pain. 30 tablet 0    metoprolol succinate  MG Oral Tablet 24 Hr Take 1 tablet (100 mg total) by mouth daily. 90 tablet 1    prochlorperazine (COMPAZINE) 10 mg tablet Take 1 tablet (10 mg total) by mouth every 6 (six) hours as needed for Nausea. 30 tablet 3    ondansetron (ZOFRAN) 8 MG tablet Take 1 tablet (8 mg total) by mouth every 8 (eight) hours as needed for Nausea. 30 tablet 3    empagliflozin 10 MG Oral Tab Take 1 tablet (10 mg total) by mouth daily. 90 tablet 0    atorvastatin 20 MG Oral Tab Take 1 tablet (20 mg total) by mouth nightly. 90 tablet 0    lisinopril 20 MG Oral Tab Take 1 tablet (20 mg total) by mouth daily. 30 tablet 1    aspirin 81 MG Oral Tab EC Take 1 tablet (81 mg total) by mouth daily.      metFORMIN 850 MG Oral Tab Take 1 tablet (850 mg total) by mouth 2 (two) times daily with meals. 180 tablet 3     Current Facility-Administered Medications on File Prior to Visit   Medication Dose Route Frequency Provider Last Rate Last Admin    [COMPLETED] ondansetron (Zofran) 16 mg, dexAMETHasone (Decadron) 20 mg in sodium chloride 0.9% 110 mL IVPB   Intravenous Once Ranjana Jean APRN   Stopped at 02/22/24 0938    [COMPLETED] diphenhydrAMINE (Benadryl) 50 mg/mL  injection 50 mg  50 mg Intravenous Once Ranjana Jean APRN   50 mg at 02/22/24 0921    [COMPLETED] famotidine (Pepcid) 20 mg/2mL injection 20 mg  20 mg Intravenous Once Ranjana Jean APRN   20 mg at 02/22/24 0923    [COMPLETED] pembrolizumab (Keytruda) 200 mg in sodium chloride 0.9% 108 mL IVPB  200 mg Intravenous Once Ranjana Jean APRN   Stopped at 02/22/24 1011    [COMPLETED] PACLitaxel (Taxol) 414 mg in sodium chloride 0.9% 569 mL infusion  200 mg/m2 (Treatment Plan Recorded) Intravenous Once Ranjana Jean APRN   Stopped at 02/22/24 1402     [COMPLETED] CARBOplatin (Paraplatin) 700 mg in sodium chloride 0.9% 320 mL infusion (by AUC)  700 mg Intravenous Once Ranjana Jean APRN   Stopped at 02/22/24 1437     Vitals:    03/13/24 0929   BP: (!) 176/76   Pulse: 70   Resp: 18   Temp: 98.2 °F (36.8 °C)     NAD, RR, nd, no edema, padilla, no deformity, nl skin, nl mood    A/P:  68 year old with former  smoker with 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  PDL1 0%.  Bone metastasis  --carboplatin paclitaxel pembrolizuamb-initiated 2/1/24.  ER visit 2/4 for myalgias/arthralgias with normal CK and unremarkable eval.   Ok to treat with Cycle #3 Carboplatin Paclitaxel pembrolizumab. Imaging after C4-order at next visit.    Chemo induced anemia resolved. Monitor.    Arthralgias/myalgias immediately post C1, resolved after day 3. Likely related to paclixael. CK in ER negative, likely not immune mediated without ongoing symptoms or weakness. Improved/resolved post C2.  Discussed supportive care with antihistamines and prn pain meds at home. Monitor. Call if worsens.     we will continue to be focal point of care in setting of malignancy undergoing ongoing treatment.     MANJINDER Villegas      Management plan for cancer treatment leading high risk MDM was formulated by physician.

## 2024-03-14 ENCOUNTER — OFFICE VISIT (OUTPATIENT)
Dept: HEMATOLOGY/ONCOLOGY | Facility: HOSPITAL | Age: 69
End: 2024-03-14
Attending: INTERNAL MEDICINE
Payer: MEDICARE

## 2024-03-14 VITALS
HEART RATE: 67 BPM | SYSTOLIC BLOOD PRESSURE: 160 MMHG | DIASTOLIC BLOOD PRESSURE: 74 MMHG | OXYGEN SATURATION: 99 % | TEMPERATURE: 98 F | RESPIRATION RATE: 18 BRPM

## 2024-03-14 DIAGNOSIS — C34.90 MALIGNANT NEOPLASM OF LUNG, UNSPECIFIED LATERALITY, UNSPECIFIED PART OF LUNG (HCC): Primary | ICD-10-CM

## 2024-03-14 DIAGNOSIS — Z51.11 ENCOUNTER FOR ANTINEOPLASTIC CHEMOTHERAPY AND IMMUNOTHERAPY: ICD-10-CM

## 2024-03-14 DIAGNOSIS — Z51.12 ENCOUNTER FOR ANTINEOPLASTIC CHEMOTHERAPY AND IMMUNOTHERAPY: ICD-10-CM

## 2024-03-14 PROCEDURE — 96375 TX/PRO/DX INJ NEW DRUG ADDON: CPT

## 2024-03-14 PROCEDURE — 96417 CHEMO IV INFUS EACH ADDL SEQ: CPT

## 2024-03-14 PROCEDURE — S0028 INJECTION, FAMOTIDINE, 20 MG: HCPCS

## 2024-03-14 PROCEDURE — 96415 CHEMO IV INFUSION ADDL HR: CPT

## 2024-03-14 PROCEDURE — 96413 CHEMO IV INFUSION 1 HR: CPT

## 2024-03-14 RX ORDER — FAMOTIDINE 10 MG/ML
20 INJECTION, SOLUTION INTRAVENOUS ONCE
Status: COMPLETED | OUTPATIENT
Start: 2024-03-14 | End: 2024-03-14

## 2024-03-14 RX ORDER — DIPHENHYDRAMINE HYDROCHLORIDE 50 MG/ML
50 INJECTION INTRAMUSCULAR; INTRAVENOUS ONCE
Status: COMPLETED | OUTPATIENT
Start: 2024-03-14 | End: 2024-03-14

## 2024-03-14 RX ORDER — DIPHENHYDRAMINE HYDROCHLORIDE 50 MG/ML
INJECTION INTRAMUSCULAR; INTRAVENOUS
Status: COMPLETED
Start: 2024-03-14 | End: 2024-03-14

## 2024-03-14 RX ORDER — FAMOTIDINE 10 MG/ML
INJECTION, SOLUTION INTRAVENOUS
Status: DISCONTINUED
Start: 2024-03-14 | End: 2024-03-14 | Stop reason: WASHOUT

## 2024-03-14 RX ORDER — FAMOTIDINE 10 MG/ML
INJECTION, SOLUTION INTRAVENOUS
Status: COMPLETED
Start: 2024-03-14 | End: 2024-03-14

## 2024-03-14 RX ADMIN — FAMOTIDINE 20 MG: 10 INJECTION, SOLUTION INTRAVENOUS at 09:57:00

## 2024-03-14 RX ADMIN — DIPHENHYDRAMINE HYDROCHLORIDE 50 MG: 50 INJECTION INTRAMUSCULAR; INTRAVENOUS at 09:54:00

## 2024-03-14 NOTE — PROGRESS NOTES
Pt here for C3D1  Drug(s)Keytruda/Taxol/Carbo.  Arrives Ambulating independently, accompanied by Spouse     Patient was evaluated today by Treatment Nurse.    Oral medications included in this regimen:  no    Patient confirms comprehension of cancer treatment schedule:  yes    Pregnancy screening:  Not applicable    Modifications in dose or schedule:  No    Medications appearance and physical integrity checked by RN: yes.    Chemotherapy IV pump settings verified by 2 RNs:  Yes.  Frequency of blood return and site check throughout administration: Prior to administration, Prior to each drug, and At completion of therapy     Infusion/treatment outcome:  patient tolerated treatment without incident    Education Record    Learner:  Patient and Family Member  Barriers / Limitations:  None  Method:  Brief focused and Discussion  Education / instructions given:  Plan of care for treatment today  Outcome:  Shows understanding    Discharged Home, Ambulating independently, accompanied by:Self and Spouse    Patient/family verbalized understanding of future appointments: by printed AVS

## 2024-03-15 ENCOUNTER — TELEPHONE (OUTPATIENT)
Dept: HEMATOLOGY/ONCOLOGY | Facility: HOSPITAL | Age: 69
End: 2024-03-15

## 2024-03-15 NOTE — TELEPHONE ENCOUNTER
Called patient back regarding his questions about Chemotherapy and how many cycles he would have. Reinforced that the chemotherapy will drop off and he will continue on Immunotherapy based on tolerance and response. But he will be on therapy indefinitely due to his diagnosis. He is overwhelmed with diagnosis and how it will ultimately effect his life. Stressed to him that we can answer all of his questions at his next appointment on 4/3. He voiced understanding.

## 2024-03-16 ENCOUNTER — OFFICE VISIT (OUTPATIENT)
Dept: INTERNAL MEDICINE CLINIC | Facility: CLINIC | Age: 69
End: 2024-03-16

## 2024-03-16 VITALS
TEMPERATURE: 98 F | RESPIRATION RATE: 17 BRPM | OXYGEN SATURATION: 98 % | HEART RATE: 84 BPM | BODY MASS INDEX: 34.21 KG/M2 | WEIGHT: 218 LBS | HEIGHT: 67 IN | DIASTOLIC BLOOD PRESSURE: 66 MMHG | SYSTOLIC BLOOD PRESSURE: 124 MMHG

## 2024-03-16 DIAGNOSIS — C79.51 LUNG CANCER METASTATIC TO BONE (HCC): ICD-10-CM

## 2024-03-16 DIAGNOSIS — I10 ESSENTIAL HYPERTENSION: ICD-10-CM

## 2024-03-16 DIAGNOSIS — C34.90 LUNG CANCER METASTATIC TO BONE (HCC): ICD-10-CM

## 2024-03-16 DIAGNOSIS — E11.9 TYPE 2 DIABETES MELLITUS WITHOUT COMPLICATION, WITHOUT LONG-TERM CURRENT USE OF INSULIN (HCC): Primary | ICD-10-CM

## 2024-03-16 PROCEDURE — 3078F DIAST BP <80 MM HG: CPT | Performed by: INTERNAL MEDICINE

## 2024-03-16 PROCEDURE — 1159F MED LIST DOCD IN RCRD: CPT | Performed by: INTERNAL MEDICINE

## 2024-03-16 PROCEDURE — 3008F BODY MASS INDEX DOCD: CPT | Performed by: INTERNAL MEDICINE

## 2024-03-16 PROCEDURE — 1160F RVW MEDS BY RX/DR IN RCRD: CPT | Performed by: INTERNAL MEDICINE

## 2024-03-16 PROCEDURE — 3074F SYST BP LT 130 MM HG: CPT | Performed by: INTERNAL MEDICINE

## 2024-03-16 PROCEDURE — 99214 OFFICE O/P EST MOD 30 MIN: CPT | Performed by: INTERNAL MEDICINE

## 2024-03-16 PROCEDURE — 1126F AMNT PAIN NOTED NONE PRSNT: CPT | Performed by: INTERNAL MEDICINE

## 2024-03-16 RX ORDER — LISINOPRIL 30 MG/1
30 TABLET ORAL DAILY
Qty: 30 TABLET | Refills: 2 | Status: SHIPPED | OUTPATIENT
Start: 2024-03-16

## 2024-03-30 NOTE — PROGRESS NOTES
Subjective:   Patient ID: Tarik Camara is a 68 year old male.    Patient comes in for follow-up overall doing okay continues with treatment for his lung cancer denies any complaints  His sugars have been okay        History/Other:   Review of Systems   Constitutional: Negative.    HENT: Negative.     Eyes: Negative.    Respiratory: Negative.     Cardiovascular: Negative.    Gastrointestinal: Negative.    Genitourinary: Negative.    Musculoskeletal: Negative.    Skin: Negative.    Neurological: Negative.    Psychiatric/Behavioral: Negative.       Current Outpatient Medications   Medication Sig Dispense Refill    lisinopril 30 MG Oral Tab Take 1 tablet (30 mg total) by mouth daily. 30 tablet 2    traMADol 50 MG Oral Tab Take 1-2 tablets ( mg total) by mouth every 6 (six) hours as needed for Pain. 30 tablet 0    metoprolol succinate  MG Oral Tablet 24 Hr Take 1 tablet (100 mg total) by mouth daily. 90 tablet 1    prochlorperazine (COMPAZINE) 10 mg tablet Take 1 tablet (10 mg total) by mouth every 6 (six) hours as needed for Nausea. 30 tablet 3    ondansetron (ZOFRAN) 8 MG tablet Take 1 tablet (8 mg total) by mouth every 8 (eight) hours as needed for Nausea. 30 tablet 3    empagliflozin 10 MG Oral Tab Take 1 tablet (10 mg total) by mouth daily. 90 tablet 0    atorvastatin 20 MG Oral Tab Take 1 tablet (20 mg total) by mouth nightly. 90 tablet 0    aspirin 81 MG Oral Tab EC Take 1 tablet (81 mg total) by mouth daily.      metFORMIN 850 MG Oral Tab Take 1 tablet (850 mg total) by mouth 2 (two) times daily with meals. 180 tablet 3     Allergies:No Known Allergies    Objective:   Physical Exam  Vitals and nursing note reviewed.   Constitutional:       Appearance: He is well-developed.   HENT:      Head: Normocephalic and atraumatic.      Right Ear: External ear normal.      Left Ear: External ear normal.      Nose: Nose normal.   Eyes:      Conjunctiva/sclera: Conjunctivae normal.      Pupils: Pupils are equal,  round, and reactive to light.   Cardiovascular:      Rate and Rhythm: Normal rate and regular rhythm.      Heart sounds: Normal heart sounds.   Pulmonary:      Effort: Pulmonary effort is normal.      Breath sounds: Normal breath sounds.   Abdominal:      General: Bowel sounds are normal.      Palpations: Abdomen is soft.   Genitourinary:     Penis: Normal.       Prostate: Normal.      Rectum: Normal.   Musculoskeletal:         General: Normal range of motion.      Cervical back: Normal range of motion and neck supple.   Skin:     General: Skin is warm and dry.   Neurological:      Mental Status: He is alert and oriented to person, place, and time.      Deep Tendon Reflexes: Reflexes are normal and symmetric.         Assessment & Plan:   1. Type 2 diabetes mellitus without complication, without long-term current use of insulin (HCC) continue current treatment watch diet take medication   2. Essential hypertension well-controlled   3. 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) continue current treatment       No orders of the defined types were placed in this encounter.      Meds This Visit:  Requested Prescriptions     Signed Prescriptions Disp Refills    lisinopril 30 MG Oral Tab 30 tablet 2     Sig: Take 1 tablet (30 mg total) by mouth daily.       Imaging & Referrals:  None

## 2024-04-03 ENCOUNTER — NURSE ONLY (OUTPATIENT)
Dept: HEMATOLOGY/ONCOLOGY | Facility: HOSPITAL | Age: 69
End: 2024-04-03
Attending: INTERNAL MEDICINE
Payer: MEDICARE

## 2024-04-03 VITALS
RESPIRATION RATE: 18 BRPM | BODY MASS INDEX: 33.4 KG/M2 | SYSTOLIC BLOOD PRESSURE: 159 MMHG | HEART RATE: 70 BPM | WEIGHT: 212.81 LBS | TEMPERATURE: 98 F | OXYGEN SATURATION: 97 % | HEIGHT: 67 IN | DIASTOLIC BLOOD PRESSURE: 75 MMHG

## 2024-04-03 DIAGNOSIS — Z51.12 ENCOUNTER FOR ANTINEOPLASTIC CHEMOTHERAPY AND IMMUNOTHERAPY: Primary | ICD-10-CM

## 2024-04-03 DIAGNOSIS — C79.51 METASTASIS TO BONE (HCC): ICD-10-CM

## 2024-04-03 DIAGNOSIS — Z51.11 ENCOUNTER FOR ANTINEOPLASTIC CHEMOTHERAPY AND IMMUNOTHERAPY: Primary | ICD-10-CM

## 2024-04-03 DIAGNOSIS — Z51.11 ENCOUNTER FOR ANTINEOPLASTIC CHEMOTHERAPY AND IMMUNOTHERAPY: ICD-10-CM

## 2024-04-03 DIAGNOSIS — Z51.12 ENCOUNTER FOR ANTINEOPLASTIC CHEMOTHERAPY AND IMMUNOTHERAPY: ICD-10-CM

## 2024-04-03 DIAGNOSIS — G62.0 CHEMOTHERAPY-INDUCED PERIPHERAL NEUROPATHY (HCC): ICD-10-CM

## 2024-04-03 DIAGNOSIS — C34.90 MALIGNANT NEOPLASM OF LUNG, UNSPECIFIED LATERALITY, UNSPECIFIED PART OF LUNG (HCC): ICD-10-CM

## 2024-04-03 DIAGNOSIS — T45.1X5A CHEMOTHERAPY-INDUCED PERIPHERAL NEUROPATHY (HCC): ICD-10-CM

## 2024-04-03 DIAGNOSIS — C34.90 MALIGNANT NEOPLASM OF LUNG, UNSPECIFIED LATERALITY, UNSPECIFIED PART OF LUNG (HCC): Primary | ICD-10-CM

## 2024-04-03 LAB
ALBUMIN SERPL-MCNC: 4.7 G/DL (ref 3.2–4.8)
ALBUMIN/GLOB SERPL: 1.4 {RATIO} (ref 1–2)
ALP LIVER SERPL-CCNC: 88 U/L
ALT SERPL-CCNC: 19 U/L
ANION GAP SERPL CALC-SCNC: 2 MMOL/L (ref 0–18)
AST SERPL-CCNC: 19 U/L (ref ?–34)
BASOPHILS # BLD AUTO: 0.06 X10(3) UL (ref 0–0.2)
BASOPHILS NFR BLD AUTO: 0.9 %
BILIRUB SERPL-MCNC: 0.4 MG/DL (ref 0.2–1.1)
BUN BLD-MCNC: 8 MG/DL (ref 9–23)
BUN/CREAT SERPL: 8.2 (ref 10–20)
CALCIUM BLD-MCNC: 10.1 MG/DL (ref 8.7–10.4)
CHLORIDE SERPL-SCNC: 107 MMOL/L (ref 98–112)
CO2 SERPL-SCNC: 28 MMOL/L (ref 21–32)
CREAT BLD-MCNC: 0.97 MG/DL
DEPRECATED RDW RBC AUTO: 50.8 FL (ref 35.1–46.3)
EGFRCR SERPLBLD CKD-EPI 2021: 85 ML/MIN/1.73M2 (ref 60–?)
EOSINOPHIL # BLD AUTO: 0.03 X10(3) UL (ref 0–0.7)
EOSINOPHIL NFR BLD AUTO: 0.4 %
ERYTHROCYTE [DISTWIDTH] IN BLOOD BY AUTOMATED COUNT: 16.5 % (ref 11–15)
FASTING STATUS PATIENT QL REPORTED: NO
GLOBULIN PLAS-MCNC: 3.3 G/DL (ref 2.8–4.4)
GLUCOSE BLD-MCNC: 122 MG/DL (ref 70–99)
HCT VFR BLD AUTO: 39.4 %
HGB BLD-MCNC: 13.2 G/DL
IMM GRANULOCYTES # BLD AUTO: 0.03 X10(3) UL (ref 0–1)
IMM GRANULOCYTES NFR BLD: 0.4 %
LYMPHOCYTES # BLD AUTO: 1.63 X10(3) UL (ref 1–4)
LYMPHOCYTES NFR BLD AUTO: 23.6 %
MCH RBC QN AUTO: 29.1 PG (ref 26–34)
MCHC RBC AUTO-ENTMCNC: 33.5 G/DL (ref 31–37)
MCV RBC AUTO: 87 FL
MONOCYTES # BLD AUTO: 0.81 X10(3) UL (ref 0.1–1)
MONOCYTES NFR BLD AUTO: 11.7 %
NEUTROPHILS # BLD AUTO: 4.36 X10 (3) UL (ref 1.5–7.7)
NEUTROPHILS # BLD AUTO: 4.36 X10(3) UL (ref 1.5–7.7)
NEUTROPHILS NFR BLD AUTO: 63 %
OSMOLALITY SERPL CALC.SUM OF ELEC: 284 MOSM/KG (ref 275–295)
PLATELET # BLD AUTO: 208 10(3)UL (ref 150–450)
POTASSIUM SERPL-SCNC: 4.7 MMOL/L (ref 3.5–5.1)
PROT SERPL-MCNC: 8 G/DL (ref 5.7–8.2)
RBC # BLD AUTO: 4.53 X10(6)UL
SODIUM SERPL-SCNC: 137 MMOL/L (ref 136–145)
T4 FREE SERPL-MCNC: 1.2 NG/DL (ref 0.8–1.7)
TSI SER-ACNC: 2.92 MIU/ML (ref 0.55–4.78)
WBC # BLD AUTO: 6.9 X10(3) UL (ref 4–11)

## 2024-04-03 PROCEDURE — G2211 COMPLEX E/M VISIT ADD ON: HCPCS | Performed by: INTERNAL MEDICINE

## 2024-04-03 PROCEDURE — 84443 ASSAY THYROID STIM HORMONE: CPT

## 2024-04-03 PROCEDURE — 85025 COMPLETE CBC W/AUTO DIFF WBC: CPT

## 2024-04-03 PROCEDURE — 36415 COLL VENOUS BLD VENIPUNCTURE: CPT

## 2024-04-03 PROCEDURE — 99215 OFFICE O/P EST HI 40 MIN: CPT | Performed by: INTERNAL MEDICINE

## 2024-04-03 PROCEDURE — 84439 ASSAY OF FREE THYROXINE: CPT

## 2024-04-03 PROCEDURE — 80053 COMPREHEN METABOLIC PANEL: CPT

## 2024-04-03 NOTE — PATIENT INSTRUCTIONS
Decrease doses of chemo d/t neuropathy to feet    Continue immunotherapy alone after this cycle today.    CT scan 1-2 days prior to next cycle ~4/23. Call to schedule

## 2024-04-03 NOTE — PROGRESS NOTES
Seen exam with APN Ted agree APN note metastatic squamous cell carcinoma of the left lung with bone metastasis proceed with cycle #4 of carboplatin paclitaxel pembrolizumab on exam comfortable unlabored respirations anemia mild monitor    Dose reduce chemo for neuropahty    Management plan for cancer treatment leading high risk MDM was formulated by physician.   we will continue to be focal point of care in setting of malignancy undergoing ongoing treatment.

## 2024-04-03 NOTE — PROGRESS NOTES
Oncology Follow up        Requesting Dr. 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  PDL1 0%    Diagnosis 1/3/2024 via transbronchial biopsy bronchoscopy with Dr. Carrillo.    On CT 23 4.2 x 3.3 cm left upper lobe mass enlarged AP reflection lymph node left hilar adenopathy.  PET/CT showed bone metastasis    Initiated Carboplatin Paclitaxel Pembrolizumab 24        Interval History:  Follow up for consideration of next treatment. Feels well. No pain today. mild generalized arthralgias/myalgias with first few days of treatment, better than after C1.  Since then, no new aches or pains, not taking pain meds. No fever, chills or changes in breathing. No changes in bowel or bladder habits. No skin rash or active bleeding. No focal neurological deficits.        Past Medical History:   Diagnosis Date    Bell's palsy     COPD (chronic obstructive pulmonary disease) (HCC)     Degenerative joint disease (DJD) of lumbar spine     Diabetes (HCC)     Diabetes mellitus, type II (HCC)     Heart attack ([facility] 2010    High blood pressure     High cholesterol     Hyperlipidemia     Hypertension     Osteoarthritis     Sleep apnea     no CPAP     Social History     Socioeconomic History    Marital status:     Number of children: 3   Occupational History    Occupation:    Tobacco Use    Smoking status: Former     Packs/day: 1     Types: Cigarettes     Quit date: 3/19/2020     Years since quittin.0    Smokeless tobacco: Never    Tobacco comments:     Occasionally, not in last 6 months   Vaping Use    Vaping Use: Never used   Substance and Sexual Activity    Alcohol use: Not Currently    Drug use: Never     Family History   Problem Relation Age of Onset    No Known Problems Mother     Heart Disorder Father     Diabetes Neg     Glaucoma Neg     Macular degeneration Neg      No Known Allergies  Current Outpatient Medications  on File Prior to Visit   Medication Sig Dispense Refill    lisinopril 30 MG Oral Tab Take 1 tablet (30 mg total) by mouth daily. 30 tablet 2    traMADol 50 MG Oral Tab Take 1-2 tablets ( mg total) by mouth every 6 (six) hours as needed for Pain. 30 tablet 0    metoprolol succinate  MG Oral Tablet 24 Hr Take 1 tablet (100 mg total) by mouth daily. 90 tablet 1    prochlorperazine (COMPAZINE) 10 mg tablet Take 1 tablet (10 mg total) by mouth every 6 (six) hours as needed for Nausea. 30 tablet 3    ondansetron (ZOFRAN) 8 MG tablet Take 1 tablet (8 mg total) by mouth every 8 (eight) hours as needed for Nausea. 30 tablet 3    empagliflozin 10 MG Oral Tab Take 1 tablet (10 mg total) by mouth daily. 90 tablet 0    atorvastatin 20 MG Oral Tab Take 1 tablet (20 mg total) by mouth nightly. 90 tablet 0    aspirin 81 MG Oral Tab EC Take 1 tablet (81 mg total) by mouth daily.      metFORMIN 850 MG Oral Tab Take 1 tablet (850 mg total) by mouth 2 (two) times daily with meals. 180 tablet 3     Current Facility-Administered Medications on File Prior to Visit   Medication Dose Route Frequency Provider Last Rate Last Admin    [COMPLETED] ondansetron (Zofran) 16 mg, dexAMETHasone (Decadron) 20 mg in sodium chloride 0.9% 110 mL IVPB   Intravenous Once Ranjana Jean APRN   Stopped at 03/14/24 1055    [COMPLETED] diphenhydrAMINE (Benadryl) 50 mg/mL  injection 50 mg  50 mg Intravenous Once Ranjana Jean APRN   50 mg at 03/14/24 0954    [COMPLETED] famotidine (Pepcid) 20 mg/2mL injection 20 mg  20 mg Intravenous Once Ranjana Jean APRN   20 mg at 03/14/24 0957    [COMPLETED] pembrolizumab (Keytruda) 200 mg in sodium chloride 0.9% 108 mL IVPB  200 mg Intravenous Once Ranjana Jean APRN   Stopped at 03/14/24 1035    [COMPLETED] PACLitaxel (Taxol) 414 mg in sodium chloride 0.9% 569 mL infusion  200 mg/m2 (Treatment Plan Recorded) Intravenous Once Ranjana Jean APRN   Stopped at 03/14/24 9675     [COMPLETED] CARBOplatin (Paraplatin) 720 mg in sodium chloride 0.9% 322 mL infusion (by AUC)  720 mg Intravenous Once Ranjana Jean APRN   Stopped at 03/14/24 1507     Vitals:    04/03/24 1003   BP: 159/75   Pulse: 70   Resp: 18   Temp: 97.7 °F (36.5 °C)     NAD, RR, nd, no edema, padilla, no deformity, nl skin, nl mood    A/P:  68 year old with former  smoker with 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  PDL1 0%.  Bone metastasis  --carboplatin paclitaxel pembrolizuamb-initiated 2/1/24.  ER visit 2/4 for myalgias/arthralgias with normal CK and unremarkable eval.   Ok to treat with Cycle #4 Carboplatin Paclitaxel Pembrolizumab; dose reduce carbo to AUC 5 and paclitaxel by 20% due CIPN to feet.   Imaging after C4-order placed.    Chemo induced anemia resolved. Monitor.    Arthralgias/myalgias immediately post C1, resolved after day 3. Likely related to paclixael. CK in ER negative, likely not immune mediated without ongoing symptoms or weakness. Improved/resolved post C2.  Discussed supportive care with antihistamines and prn pain meds at home. Monitor. Call if worsens.      we will continue to be focal point of care in setting of malignancy undergoing ongoing treatment.     Management plan for cancer treatment leading high risk MDM was formulated by physician.     MANJINDER Villegas

## 2024-04-04 ENCOUNTER — OFFICE VISIT (OUTPATIENT)
Dept: HEMATOLOGY/ONCOLOGY | Facility: HOSPITAL | Age: 69
End: 2024-04-04
Attending: INTERNAL MEDICINE
Payer: MEDICARE

## 2024-04-04 VITALS
RESPIRATION RATE: 18 BRPM | SYSTOLIC BLOOD PRESSURE: 152 MMHG | TEMPERATURE: 99 F | HEART RATE: 71 BPM | OXYGEN SATURATION: 97 % | DIASTOLIC BLOOD PRESSURE: 68 MMHG

## 2024-04-04 DIAGNOSIS — Z51.11 ENCOUNTER FOR ANTINEOPLASTIC CHEMOTHERAPY AND IMMUNOTHERAPY: ICD-10-CM

## 2024-04-04 DIAGNOSIS — C34.90 MALIGNANT NEOPLASM OF LUNG, UNSPECIFIED LATERALITY, UNSPECIFIED PART OF LUNG (HCC): Primary | ICD-10-CM

## 2024-04-04 DIAGNOSIS — Z51.12 ENCOUNTER FOR ANTINEOPLASTIC CHEMOTHERAPY AND IMMUNOTHERAPY: ICD-10-CM

## 2024-04-04 PROCEDURE — 96415 CHEMO IV INFUSION ADDL HR: CPT

## 2024-04-04 PROCEDURE — 96417 CHEMO IV INFUS EACH ADDL SEQ: CPT

## 2024-04-04 PROCEDURE — 96413 CHEMO IV INFUSION 1 HR: CPT

## 2024-04-04 PROCEDURE — S0028 INJECTION, FAMOTIDINE, 20 MG: HCPCS

## 2024-04-04 PROCEDURE — 96375 TX/PRO/DX INJ NEW DRUG ADDON: CPT

## 2024-04-04 RX ORDER — FAMOTIDINE 10 MG/ML
INJECTION, SOLUTION INTRAVENOUS
Status: COMPLETED
Start: 2024-04-04 | End: 2024-04-04

## 2024-04-04 RX ORDER — DIPHENHYDRAMINE HYDROCHLORIDE 50 MG/ML
INJECTION INTRAMUSCULAR; INTRAVENOUS
Status: COMPLETED
Start: 2024-04-04 | End: 2024-04-04

## 2024-04-04 RX ORDER — FAMOTIDINE 10 MG/ML
20 INJECTION, SOLUTION INTRAVENOUS ONCE
Status: COMPLETED | OUTPATIENT
Start: 2024-04-04 | End: 2024-04-04

## 2024-04-04 RX ORDER — DIPHENHYDRAMINE HYDROCHLORIDE 50 MG/ML
50 INJECTION INTRAMUSCULAR; INTRAVENOUS ONCE
Status: COMPLETED | OUTPATIENT
Start: 2024-04-04 | End: 2024-04-04

## 2024-04-04 RX ADMIN — DIPHENHYDRAMINE HYDROCHLORIDE 50 MG: 50 INJECTION INTRAMUSCULAR; INTRAVENOUS at 12:11:00

## 2024-04-04 RX ADMIN — FAMOTIDINE 20 MG: 10 INJECTION, SOLUTION INTRAVENOUS at 12:15:00

## 2024-04-04 NOTE — PROGRESS NOTES
Pt here for C4 keytrdua, taxol, + carboplatin    Patient was evaluated today by Treatment Nurse.  Had exam yesterday with Thomas DUNBAR who approved of tx today.  Oral medications included in this regimen:  no    Patient confirms comprehension of cancer treatment schedule:  yes    Pregnancy screening:  Not applicable    Modifications in dose or schedule:  Yes-dose reduction of taxol    Medications appearance and physical integrity checked by RN: yes.    Chemotherapy IV pump settings verified by 2 RNs:  Yes.except for Keytruda which does not require at this time per current protocol.  Frequency of blood return and site check throughout administration: Prior to administration, Prior to each drug, and At completion of therapy     Infusion/treatment outcome:  patient tolerated treatment without incident    Education Record    Learner:  Patient and Spouse  Barriers / Limitations:  None  Method:  Discussion  Education / instructions given:  plan of care  Outcome:  Shows understanding    Discharged Other stable from infusion , Ambulating independently, accompanied by:Spouse    Patient/family verbalized understanding of future appointments: by printed AVS

## 2024-04-12 ENCOUNTER — OFFICE VISIT (OUTPATIENT)
Dept: INTERNAL MEDICINE CLINIC | Facility: CLINIC | Age: 69
End: 2024-04-12

## 2024-04-12 VITALS
TEMPERATURE: 98 F | BODY MASS INDEX: 32.96 KG/M2 | HEIGHT: 67 IN | RESPIRATION RATE: 16 BRPM | SYSTOLIC BLOOD PRESSURE: 124 MMHG | HEART RATE: 72 BPM | DIASTOLIC BLOOD PRESSURE: 60 MMHG | OXYGEN SATURATION: 97 % | WEIGHT: 210 LBS

## 2024-04-12 DIAGNOSIS — E11.9 TYPE 2 DIABETES MELLITUS WITHOUT COMPLICATION, WITHOUT LONG-TERM CURRENT USE OF INSULIN (HCC): Primary | ICD-10-CM

## 2024-04-12 DIAGNOSIS — G57.93 NEUROPATHY OF BOTH FEET: ICD-10-CM

## 2024-04-12 PROCEDURE — 1159F MED LIST DOCD IN RCRD: CPT | Performed by: INTERNAL MEDICINE

## 2024-04-12 PROCEDURE — 3008F BODY MASS INDEX DOCD: CPT | Performed by: INTERNAL MEDICINE

## 2024-04-12 PROCEDURE — 3074F SYST BP LT 130 MM HG: CPT | Performed by: INTERNAL MEDICINE

## 2024-04-12 PROCEDURE — 36415 COLL VENOUS BLD VENIPUNCTURE: CPT | Performed by: INTERNAL MEDICINE

## 2024-04-12 PROCEDURE — 1160F RVW MEDS BY RX/DR IN RCRD: CPT | Performed by: INTERNAL MEDICINE

## 2024-04-12 PROCEDURE — 1126F AMNT PAIN NOTED NONE PRSNT: CPT | Performed by: INTERNAL MEDICINE

## 2024-04-12 PROCEDURE — 99214 OFFICE O/P EST MOD 30 MIN: CPT | Performed by: INTERNAL MEDICINE

## 2024-04-12 PROCEDURE — 3051F HG A1C>EQUAL 7.0%<8.0%: CPT | Performed by: INTERNAL MEDICINE

## 2024-04-12 PROCEDURE — 3078F DIAST BP <80 MM HG: CPT | Performed by: INTERNAL MEDICINE

## 2024-04-12 RX ORDER — GABAPENTIN 100 MG/1
100 CAPSULE ORAL NIGHTLY
Qty: 30 CAPSULE | Refills: 2 | Status: SHIPPED | OUTPATIENT
Start: 2024-04-12

## 2024-04-12 NOTE — PROGRESS NOTES
Subjective:     Patient ID: Tarik Camara is a 68 year old male.    HPI  Patient comes in today with complaint of numbness tingling in his feet it has been going on for few months he is not sure if it got worse after starting treatment for his cancer patient has diabetes A1c is in the low 7.  Symptoms worse at night    History/Other:   Review of Systems   Constitutional: Negative.    HENT: Negative.     Eyes: Negative.    Respiratory: Negative.     Cardiovascular: Negative.    Gastrointestinal: Negative.    Genitourinary: Negative.    Musculoskeletal: Negative.    Skin: Negative.    Neurological:  Positive for numbness.   Psychiatric/Behavioral: Negative.       Current Outpatient Medications   Medication Sig Dispense Refill    gabapentin 100 MG Oral Cap Take 1 capsule (100 mg total) by mouth nightly. 30 capsule 2    lisinopril 30 MG Oral Tab Take 1 tablet (30 mg total) by mouth daily. 30 tablet 2    traMADol 50 MG Oral Tab Take 1-2 tablets ( mg total) by mouth every 6 (six) hours as needed for Pain. 30 tablet 0    metoprolol succinate  MG Oral Tablet 24 Hr Take 1 tablet (100 mg total) by mouth daily. 90 tablet 1    prochlorperazine (COMPAZINE) 10 mg tablet Take 1 tablet (10 mg total) by mouth every 6 (six) hours as needed for Nausea. 30 tablet 3    ondansetron (ZOFRAN) 8 MG tablet Take 1 tablet (8 mg total) by mouth every 8 (eight) hours as needed for Nausea. 30 tablet 3    empagliflozin 10 MG Oral Tab Take 1 tablet (10 mg total) by mouth daily. 90 tablet 0    atorvastatin 20 MG Oral Tab Take 1 tablet (20 mg total) by mouth nightly. 90 tablet 0    aspirin 81 MG Oral Tab EC Take 1 tablet (81 mg total) by mouth daily.      metFORMIN 850 MG Oral Tab Take 1 tablet (850 mg total) by mouth 2 (two) times daily with meals. 180 tablet 3     Allergies:No Known Allergies    Past Medical History:    Bell's palsy    COPD (chronic obstructive pulmonary disease) (HCC)    Degenerative joint disease (DJD) of lumbar spine     Diabetes (HCC)    Diabetes mellitus, type II (HCC)    Heart attack (HCC)    High blood pressure    High cholesterol    Hyperlipidemia    Hypertension    Osteoarthritis    Sleep apnea    no CPAP      Past Surgical History:   Procedure Laterality Date    Appendectomy  1975    Coronary stent placement      Palate/uvula surgery unlisted      Ventral hernia repair  2021    primary repair, ventral and umbilical hernias      Family History   Problem Relation Age of Onset    No Known Problems Mother     Heart Disorder Father     Diabetes Neg     Glaucoma Neg     Macular degeneration Neg       Social History:   Social History     Socioeconomic History    Marital status:     Number of children: 3   Occupational History    Occupation: [employer]   Tobacco Use    Smoking status: Former     Current packs/day: 0.00     Types: Cigarettes     Quit date: 3/19/2020     Years since quittin.0    Smokeless tobacco: Never    Tobacco comments:     Occasionally, not in last 6 months   Vaping Use    Vaping status: Never Used   Substance and Sexual Activity    Alcohol use: Not Currently    Drug use: Never     Social Determinants of Health     Financial Resource Strain: Low Risk  (2023)    Financial Resource Strain     Difficulty of Paying Living Expenses: Not hard at all     Med Affordability: No   Transportation Needs: No Transportation Needs (2023)    Transportation Needs     Lack of Transportation: No        Objective:   Physical Exam  Vitals and nursing note reviewed.   Constitutional:       Appearance: He is well-developed.   HENT:      Head: Normocephalic and atraumatic.      Right Ear: External ear normal.      Left Ear: External ear normal.      Nose: Nose normal.   Eyes:      Conjunctiva/sclera: Conjunctivae normal.      Pupils: Pupils are equal, round, and reactive to light.   Cardiovascular:      Rate and Rhythm: Normal rate and regular rhythm.      Heart sounds: Normal heart sounds.   Pulmonary:       Effort: Pulmonary effort is normal.      Breath sounds: Normal breath sounds.   Abdominal:      General: Bowel sounds are normal.      Palpations: Abdomen is soft.   Genitourinary:     Penis: Normal.       Prostate: Normal.      Rectum: Normal.   Musculoskeletal:         General: Normal range of motion.      Cervical back: Normal range of motion and neck supple.      Comments: Numbness to bilateral feet   Skin:     General: Skin is warm and dry.   Neurological:      Mental Status: He is alert and oriented to person, place, and time.      Deep Tendon Reflexes: Reflexes are normal and symmetric.         Assessment & Plan:   1. Type 2 diabetes mellitus without complication, without long-term current use of insulin (formerly [facility]) will recheck A1c   2. Neuropathy of both feet possibly combination of diabetes and cancer treatment we will treat with gabapentin take at night let us know  if not better       Orders Placed This Encounter   Procedures    Hemoglobin A1C       Meds This Visit:  Requested Prescriptions     Signed Prescriptions Disp Refills    gabapentin 100 MG Oral Cap 30 capsule 2     Sig: Take 1 capsule (100 mg total) by mouth nightly.       Imaging & Referrals:  None

## 2024-04-13 LAB
EST. AVERAGE GLUCOSE BLD GHB EST-MCNC: 166 MG/DL (ref 68–126)
HBA1C MFR BLD: 7.4 % (ref ?–5.7)

## 2024-04-16 ENCOUNTER — HOSPITAL ENCOUNTER (OUTPATIENT)
Dept: CT IMAGING | Facility: HOSPITAL | Age: 69
Discharge: HOME | End: 2024-04-16
Attending: NURSE PRACTITIONER
Payer: MEDICARE

## 2024-04-16 DIAGNOSIS — E11.9 TYPE 2 DIABETES MELLITUS WITHOUT COMPLICATION, WITHOUT LONG-TERM CURRENT USE OF INSULIN (HCC): Primary | ICD-10-CM

## 2024-04-16 DIAGNOSIS — Z51.11 ENCOUNTER FOR ANTINEOPLASTIC CHEMOTHERAPY AND IMMUNOTHERAPY: ICD-10-CM

## 2024-04-16 DIAGNOSIS — C34.90 MALIGNANT NEOPLASM OF LUNG, UNSPECIFIED LATERALITY, UNSPECIFIED PART OF LUNG (HCC): ICD-10-CM

## 2024-04-16 DIAGNOSIS — C79.51 METASTASIS TO BONE (HCC): ICD-10-CM

## 2024-04-16 DIAGNOSIS — Z51.12 ENCOUNTER FOR ANTINEOPLASTIC CHEMOTHERAPY AND IMMUNOTHERAPY: ICD-10-CM

## 2024-04-16 PROCEDURE — 71260 CT THORAX DX C+: CPT | Performed by: NURSE PRACTITIONER

## 2024-04-16 PROCEDURE — 74177 CT ABD & PELVIS W/CONTRAST: CPT | Performed by: NURSE PRACTITIONER

## 2024-04-16 RX ORDER — ATORVASTATIN CALCIUM 20 MG/1
20 TABLET, FILM COATED ORAL NIGHTLY
Qty: 90 TABLET | Refills: 3 | Status: SHIPPED | OUTPATIENT
Start: 2024-04-16

## 2024-04-16 NOTE — TELEPHONE ENCOUNTER
Refill passed per protocol.    Future Appointments   Date Time Provider Department Center   6/21/2024 10:45 AM Jacob Erazo MD [id number]  [place] [address]     Requested Prescriptions   Pending Prescriptions Disp Refills    JARDIANCE 10 MG Oral Tab [Pharmacy Med Name: JARDIANCE 10 MG TABLET] 90 tablet 0     Sig: TAKE 1 TABLET BY MOUTH EVERY DAY       Diabetes Medication Protocol Passed - 4/15/2024 12:33 AM        Passed - Last A1C < 7.5 and within past 6 months     Lab Results   Component Value Date    A1C 7.4 (H) 04/12/2024             Passed - In person appointment or virtual visit in the past 6 mos or appointment in next 3 mos     Recent Outpatient Visits              4 days ago Type 2 diabetes mellitus without complication, without long-term current use of insulin ([facility])    [facility] Jacob Erazo MD    Office Visit    1 week ago Malignant neoplasm of lung, unspecified laterality, unspecified part of lung (HCC)    [facility] - Infusion    Office Visit    1 week ago Encounter for antineoplastic chemotherapy and immunotherapy    [facility] Hematology Oncology Venancio Larios MD    Office Visit    1 week ago Malignant neoplasm of lung, unspecified laterality, unspecified part of lung (HCC)    [facility] Hematology Oncology    Nurse Only    1 month ago Type 2 diabetes mellitus without complication, without long-term current use of insulin ([facility])    [facility] Jacob Erazo MD    Office Visit          Future Appointments         Provider Department Appt Notes    In 1 week CMA RESOURCE [facility] Hematology Oncology FT CBC-CMP-TSH-PIV-SL    In 1 week Ranjana Jean APRN [facility] Hematology Oncology PRE CHEMO VISIT    In 1 week EM CC INFRN 4 [facility] - Infusion C5 D1-KEYTRUDA-TAXOL(3 HOURS)-CARBO-PIV-SL    In 2 months Jacob Erazo MD [facility] physica;    In 6 months Ho Vasquez MD [facility] EP DM EE                    Passed - Microalbumin procedure in past 12 months or taking ACE/ARB        Passed - EGFRCR or GFRNAA > 50     GFR Evaluation  EGFRCR: 85 , resulted on 4/3/2024          Passed - GFR in the past 12 months             Future Appointments         Provider Department Appt Notes    In 1 week CMA RESOURCE [facility] Hematology Oncology FT CBC-CMP-TSH-PIV-SL    In 1 week Ranjana Jean APRN [facility] Hematology Oncology PRE CHEMO VISIT    In 1 week EM CC INFRN 4 [facility] - Infusion C5 D1-KEYTRUDA-TAXOL(3 HOURS)-CARBO-PIV-SL    In 2 months Jacob Erazo MD [facility] physica;    In 6 months Ho Vasquez MD [facility] EP DM EE          Recent Outpatient Visits              4 days ago Type 2 diabetes mellitus without complication, without long-term current use of insulin (HCC)    [facility] Jacob Erazo MD    Office Visit    1 week ago Malignant neoplasm of lung, unspecified laterality, unspecified part of lung (HCC)    [facility] - Infusion    Office Visit    1 week ago Encounter for antineoplastic chemotherapy and immunotherapy    [facility] Hematology Oncology Venancio Larios MD    Office Visit    1 week ago Malignant neoplasm of lung, unspecified laterality, unspecified part of lung ([facility])    [facility] Hematology Oncology    Nurse Only    1 month ago Type 2 diabetes mellitus without complication, without long-term current use of insulin (HCC)    [facility] Jacob Erazo MD    Office Visit

## 2024-04-16 NOTE — TELEPHONE ENCOUNTER
Refill passed per [facility] protocol.    Requested Prescriptions   Pending Prescriptions Disp Refills    ATORVASTATIN 20 MG Oral Tab [Pharmacy Med Name: ATORVASTATIN 20 MG TABLET] 90 tablet 0     Sig: TAKE 1 TABLET BY MOUTH EVERY DAY AT NIGHT       Cholesterol Medication Protocol Passed - 4/15/2024 12:33 AM        Passed - ALT < 80     Lab Results   Component Value Date    ALT 19 04/03/2024             Passed - ALT resulted within past year        Passed - Lipid panel within past 12 months     Lab Results   Component Value Date    CHOLEST 112 01/18/2024    TRIG 133 01/18/2024    HDL 23 (L) 01/18/2024    LDL 65 01/18/2024    VLDL 20 01/18/2024    TCHDLRATIO 4.6 06/23/2020    NONHDLC 89 01/18/2024             Passed - In person appointment or virtual visit in the past 12 mos or appointment in next 3 mos     Recent Outpatient Visits              4 days ago Type 2 diabetes mellitus without complication, without long-term current use of insulin (HCC)    [facility] Jacob Erazo MD    Office Visit    1 week ago Malignant neoplasm of lung, unspecified laterality, unspecified part of lung (HCC)    [facility] - In[facility]    Office Visit    1 week ago Encounter for antineoplastic chemotherapy and immunotherapy    [facility] Hematology Oncology Venancio Larios MD    Office Visit    1 week ago Malignant neoplasm of lung, unspecified laterality, unspecified part of lung (HCC)    [facility] Hematology Oncology    Nurse Only    1 month ago Type 2 diabetes mellitus without complication, without long-term current use of insulin (HCC)    [facility] [place] Jacob Erazo MD    Office Visit          Future Appointments         Provider Department Appt Notes    Today [facility] CT RM1 [facility] CT [facility] qa jg    In 1 week CMA RESOURCE [facility] Hematology Oncology FT CBC-CMP-TSH-PIV-SL    In 1 week  Ranjana Jean APRANASTACIO [facility] Hematology Oncology PRE CHEMO VISIT    In 1 week EM CC INFRN 4 [place] COLLAZO [facility] - Infusion C5 D1-KEYTRUDA-TAXOL(3 HOURS)-CARBO-PIV-SL    In 2 months Jacob Erazo MD [facility] physica;    In 6 months Ho Vasquez MD [facility] EP DM EE                       Future Appointments         Provider Department Appt Notes    Today [facility] CT RM1 [facility] CT [facility] qa jg    In 1 week CMA RESOURCE [facility] Hematology Oncology FT CBC-CMP-TSH-PIV-SL    In 1 week Ranjana Jean APRANASTACIO [facility] Hematology Oncology PRE CHEMO VISIT    In 1 week EM CC INFRN 4 [place] COLLAZO [facility] - Infusion C5 D1-KEYTRUDA-TAXOL(3 HOURS)-CARBO-PIV-SL    In 2 months Jacob Erazo MD [facility] physica;    In 6 months Ho Vasquez MD [facility] EP DM EE          Recent Outpatient Visits              4 days ago Type 2 diabetes mellitus without complication, without long-term current use of insulin ([facility])    [facility] Jacob Erazo MD    Office Visit    1 week ago Malignant neoplasm of lung, unspecified laterality, unspecified part of lung (HCC)    [facility]    Office Visit    1 week ago Encounter for antineoplastic chemotherapy and immunotherapy    [facility] Hematology Oncology Venancio Larios MD    Office Visit    1 week ago Malignant neoplasm of lung, unspecified laterality, unspecified part of lung ([facility])    [facility] Hematology Oncology    Nurse Only    1 month ago Type 2 diabetes mellitus without complication, without long-term current use of insulin ([facility])    [facility] Jacob Erazo MD    Office Visit

## 2024-04-17 ENCOUNTER — TELEPHONE (OUTPATIENT)
Dept: INTERNAL MEDICINE CLINIC | Facility: CLINIC | Age: 69
End: 2024-04-17

## 2024-04-17 NOTE — TELEPHONE ENCOUNTER
Patient  walked in to the [place] office he states medication for sugar JARDIANCE 10 MG Oral Tab is to expensive they want to  charge him 180 dollars for this he want to know if there is something else you can prescribe

## 2024-04-18 RX ORDER — GLIPIZIDE 5 MG/1
5 TABLET, FILM COATED, EXTENDED RELEASE ORAL DAILY
Qty: 30 TABLET | Refills: 2 | Status: SHIPPED | OUTPATIENT
Start: 2024-04-18

## 2024-04-18 NOTE — TELEPHONE ENCOUNTER
Okay we can send glipizide will send medication to pharmacy needs to check sugars at least twice a day

## 2024-04-24 ENCOUNTER — NURSE ONLY (OUTPATIENT)
Dept: HEMATOLOGY/ONCOLOGY | Facility: HOSPITAL | Age: 69
End: 2024-04-24
Attending: INTERNAL MEDICINE
Payer: MEDICARE

## 2024-04-24 VITALS
BODY MASS INDEX: 33.12 KG/M2 | DIASTOLIC BLOOD PRESSURE: 70 MMHG | HEIGHT: 67 IN | RESPIRATION RATE: 18 BRPM | OXYGEN SATURATION: 99 % | HEART RATE: 69 BPM | WEIGHT: 211 LBS | SYSTOLIC BLOOD PRESSURE: 150 MMHG | TEMPERATURE: 98 F

## 2024-04-24 DIAGNOSIS — C34.90 MALIGNANT NEOPLASM OF LUNG, UNSPECIFIED LATERALITY, UNSPECIFIED PART OF LUNG (HCC): Primary | ICD-10-CM

## 2024-04-24 DIAGNOSIS — Z51.11 ENCOUNTER FOR ANTINEOPLASTIC CHEMOTHERAPY AND IMMUNOTHERAPY: ICD-10-CM

## 2024-04-24 DIAGNOSIS — Z51.12 ENCOUNTER FOR ANTINEOPLASTIC IMMUNOTHERAPY: ICD-10-CM

## 2024-04-24 DIAGNOSIS — Z51.12 ENCOUNTER FOR ANTINEOPLASTIC CHEMOTHERAPY AND IMMUNOTHERAPY: ICD-10-CM

## 2024-04-24 DIAGNOSIS — C79.51 METASTASIS TO BONE (HCC): ICD-10-CM

## 2024-04-24 LAB
ALBUMIN SERPL-MCNC: 4.4 G/DL (ref 3.2–4.8)
ALBUMIN/GLOB SERPL: 1.4 {RATIO} (ref 1–2)
ALP LIVER SERPL-CCNC: 88 U/L
ALT SERPL-CCNC: 25 U/L
ANION GAP SERPL CALC-SCNC: 2 MMOL/L (ref 0–18)
AST SERPL-CCNC: 21 U/L (ref ?–34)
BASOPHILS # BLD AUTO: 0.02 X10(3) UL (ref 0–0.2)
BASOPHILS NFR BLD AUTO: 0.3 %
BILIRUB SERPL-MCNC: 0.4 MG/DL (ref 0.2–1.1)
BUN BLD-MCNC: 8 MG/DL (ref 9–23)
BUN/CREAT SERPL: 8.9 (ref 10–20)
CALCIUM BLD-MCNC: 9.8 MG/DL (ref 8.7–10.4)
CHLORIDE SERPL-SCNC: 108 MMOL/L (ref 98–112)
CO2 SERPL-SCNC: 28 MMOL/L (ref 21–32)
CREAT BLD-MCNC: 0.9 MG/DL
DEPRECATED RDW RBC AUTO: 53.2 FL (ref 35.1–46.3)
EGFRCR SERPLBLD CKD-EPI 2021: 92 ML/MIN/1.73M2 (ref 60–?)
EOSINOPHIL # BLD AUTO: 0.03 X10(3) UL (ref 0–0.7)
EOSINOPHIL NFR BLD AUTO: 0.5 %
ERYTHROCYTE [DISTWIDTH] IN BLOOD BY AUTOMATED COUNT: 16.7 % (ref 11–15)
GLOBULIN PLAS-MCNC: 3.2 G/DL (ref 2.8–4.4)
GLUCOSE BLD-MCNC: 136 MG/DL (ref 70–99)
HCT VFR BLD AUTO: 37.6 %
HGB BLD-MCNC: 12.6 G/DL
IMM GRANULOCYTES # BLD AUTO: 0.02 X10(3) UL (ref 0–1)
IMM GRANULOCYTES NFR BLD: 0.3 %
LYMPHOCYTES # BLD AUTO: 1.43 X10(3) UL (ref 1–4)
LYMPHOCYTES NFR BLD AUTO: 24.9 %
MCH RBC QN AUTO: 29.7 PG (ref 26–34)
MCHC RBC AUTO-ENTMCNC: 33.5 G/DL (ref 31–37)
MCV RBC AUTO: 88.7 FL
MONOCYTES # BLD AUTO: 0.58 X10(3) UL (ref 0.1–1)
MONOCYTES NFR BLD AUTO: 10.1 %
NEUTROPHILS # BLD AUTO: 3.67 X10 (3) UL (ref 1.5–7.7)
NEUTROPHILS # BLD AUTO: 3.67 X10(3) UL (ref 1.5–7.7)
NEUTROPHILS NFR BLD AUTO: 63.9 %
OSMOLALITY SERPL CALC.SUM OF ELEC: 286 MOSM/KG (ref 275–295)
PLATELET # BLD AUTO: 203 10(3)UL (ref 150–450)
POTASSIUM SERPL-SCNC: 5.1 MMOL/L (ref 3.5–5.1)
PROT SERPL-MCNC: 7.6 G/DL (ref 5.7–8.2)
RBC # BLD AUTO: 4.24 X10(6)UL
SODIUM SERPL-SCNC: 138 MMOL/L (ref 136–145)
T4 FREE SERPL-MCNC: 1 NG/DL (ref 0.8–1.7)
TSI SER-ACNC: 3.33 MIU/ML (ref 0.55–4.78)
WBC # BLD AUTO: 5.8 X10(3) UL (ref 4–11)

## 2024-04-24 PROCEDURE — G2211 COMPLEX E/M VISIT ADD ON: HCPCS | Performed by: INTERNAL MEDICINE

## 2024-04-24 PROCEDURE — 80053 COMPREHEN METABOLIC PANEL: CPT

## 2024-04-24 PROCEDURE — 99215 OFFICE O/P EST HI 40 MIN: CPT | Performed by: INTERNAL MEDICINE

## 2024-04-24 PROCEDURE — 84439 ASSAY OF FREE THYROXINE: CPT

## 2024-04-24 PROCEDURE — 85025 COMPLETE CBC W/AUTO DIFF WBC: CPT

## 2024-04-24 PROCEDURE — 84443 ASSAY THYROID STIM HORMONE: CPT

## 2024-04-24 PROCEDURE — 36415 COLL VENOUS BLD VENIPUNCTURE: CPT

## 2024-04-24 RX ORDER — TRAMADOL HYDROCHLORIDE 50 MG/1
TABLET ORAL EVERY 6 HOURS PRN
Qty: 30 TABLET | Refills: 0 | Status: SHIPPED | OUTPATIENT
Start: 2024-04-24

## 2024-04-24 NOTE — PROGRESS NOTES
Oncology Follow up        Requesting Dr. 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  PDL1 0%    Diagnosis 1/3/2024 via transbronchial biopsy bronchoscopy with Dr. Carrillo.    On CT 23 4.2 x 3.3 cm left upper lobe mass enlarged AP reflection lymph node left hilar adenopathy.  PET/CT showed bone metastasis    Initiated Carboplatin Paclitaxel Pembrolizumab 24        Interval History:  Follow up for consideration of next treatment. Feels well. No pain today.  No fever, chills or changes in breathing. No changes in bowel or bladder habits. No skin rash or active bleeding. No new focal neurological deficits.    Gr  CIPN to feet ongoing, being managed by PCP.        Past Medical History:    Bell's palsy    COPD (chronic obstructive pulmonary disease) (HCC)    Degenerative joint disease (DJD) of lumbar spine    Diabetes (HCC)    Diabetes mellitus, type II (HCC)    Heart attack (HCC)    High blood pressure    High cholesterol    Hyperlipidemia    Hypertension    Osteoarthritis    Sleep apnea    no CPAP     Social History     Socioeconomic History    Marital status:     Number of children: 3   Occupational History    Occupation: [employer]   Tobacco Use    Smoking status: Former     Current packs/day: 0.00     Types: Cigarettes     Quit date: 3/19/2020     Years since quittin.1    Smokeless tobacco: Never    Tobacco comments:     Occasionally, not in last 6 months   Vaping Use    Vaping status: Never Used   Substance and Sexual Activity    Alcohol use: Not Currently    Drug use: Never     Family History   Problem Relation Age of Onset    No Known Problems Mother     Heart Disorder Father     Diabetes Neg     Glaucoma Neg     Macular degeneration Neg      No Known Allergies  Current Outpatient Medications on File Prior to Visit   Medication Sig Dispense Refill    glipiZIDE ER 5 MG Oral Tablet 24 Hr Take 1 tablet (5 mg  total) by mouth daily. 30 tablet 2    atorvastatin 20 MG Oral Tab Take 1 tablet (20 mg total) by mouth nightly. 90 tablet 3    gabapentin 100 MG Oral Cap Take 1 capsule (100 mg total) by mouth nightly. 30 capsule 2    lisinopril 30 MG Oral Tab Take 1 tablet (30 mg total) by mouth daily. 30 tablet 2    traMADol 50 MG Oral Tab Take 1-2 tablets ( mg total) by mouth every 6 (six) hours as needed for Pain. 30 tablet 0    metoprolol succinate  MG Oral Tablet 24 Hr Take 1 tablet (100 mg total) by mouth daily. 90 tablet 1    prochlorperazine (COMPAZINE) 10 mg tablet Take 1 tablet (10 mg total) by mouth every 6 (six) hours as needed for Nausea. 30 tablet 3    ondansetron (ZOFRAN) 8 MG tablet Take 1 tablet (8 mg total) by mouth every 8 (eight) hours as needed for Nausea. 30 tablet 3    aspirin 81 MG Oral Tab EC Take 1 tablet (81 mg total) by mouth daily.      metFORMIN 850 MG Oral Tab Take 1 tablet (850 mg total) by mouth 2 (two) times daily with meals. 180 tablet 3     Current Facility-Administered Medications on File Prior to Visit   Medication Dose Route Frequency Provider Last Rate Last Admin    [COMPLETED] iopamidol 76% (ISOVUE-370) injection for power injector  80 mL Intravenous ONCE PRN Ranjana Jean APRN   80 mL at 04/16/24 0844    [COMPLETED] ondansetron (Zofran) 16 mg, dexAMETHasone (Decadron) 20 mg in sodium chloride 0.9% 110 mL IVPB   Intravenous Once Ranjana Jean APRN   Stopped at 04/04/24 1232    [COMPLETED] diphenhydrAMINE (Benadryl) 50 mg/mL  injection 50 mg  50 mg Intravenous Once Ranjana Jean APRN   50 mg at 04/04/24 1211    [COMPLETED] famotidine (Pepcid) 20 mg/2mL injection 20 mg  20 mg Intravenous Once Ranjana Jean APRN   20 mg at 04/04/24 1215    [COMPLETED] pembrolizumab (Keytruda) 200 mg in sodium chloride 0.9% 108 mL IVPB  200 mg Intravenous Once Ranjana Jean APRN   Stopped at 04/04/24 1151    [COMPLETED] PACLitaxel (Taxol) 330 mg in sodium chloride  0.9% 555 mL infusion  160 mg/m2 (Treatment Plan Recorded) Intravenous Once Ranjana Jean APRN   Stopped at 04/04/24 1538    [COMPLETED] CARBOplatin (Paraplatin) 620 mg in sodium chloride 0.9% 312 mL infusion (by AUC)  620 mg Intravenous Once Ranjana Jean APRN   Stopped at 04/04/24 1608     Vitals:    04/24/24 1028   BP: 150/70   Pulse: 69   Resp: 18   Temp: 97.7 °F (36.5 °C)     NAD, RR, nd, no edema, padilla, no deformity, nl skin, nl mood    A/P:  68 year old with former  smoker with 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  PDL1 0%.  Bone metastasis  --carboplatin paclitaxel pembrolizuamb-initiated 2/1/24.  ER visit 2/4 for myalgias/arthralgias with normal CK and unremarkable eval.   S/p 4 cycles Carboplatin Paclitaxel Pembrolizumab; dose reduce carbo to AUC 5 and paclitaxel by 20% due CIPN to feet.   Imaging after C4 4/2024 with favorable tx response. Ok to treat with Cycle #5 Pembrolizumab alone.    -CIPN gr 1/2. Dose reduction of chemo above. Done with chemo. Supportive care, med mgmt per PCP with Gabapentin, encouraged to titrate up per his recs. Monitor.    -Chemo induced anemia near resolve. Monitor.    -Arthralgias/myalgias immediately post C1, resolved after day 3. Likely related to paclixael. CK in ER negative, likely not immune mediated without ongoing symptoms or weakness. Improved/resolved post C2.  Discussed supportive care with antihistamines and prn pain meds at home. Monitor. Call if worsens.      we will continue to be focal point of care in setting of malignancy undergoing ongoing treatment.     Management plan for cancer treatment leading high risk MDM was formulated by physician.     MANJINDER Villegas

## 2024-04-24 NOTE — PROGRESS NOTES
Seen exam with APN Ted agree APN note metastatic squamous cell carcinoma of the left lung with bone metastasis proceed with cycle #5 of  pembrolizumab on exam comfortable unlabored respirations anemia mild monitor.  Received carboplatin and paclitaxel with first 4 cycles.  Responded radiographically        Management plan for cancer treatment leading high risk MDM was formulated by physician.   we will continue to be focal point of care in setting of malignancy undergoing ongoing treatment.

## 2024-04-25 ENCOUNTER — OFFICE VISIT (OUTPATIENT)
Dept: HEMATOLOGY/ONCOLOGY | Facility: HOSPITAL | Age: 69
End: 2024-04-25
Attending: INTERNAL MEDICINE
Payer: MEDICARE

## 2024-04-25 VITALS
OXYGEN SATURATION: 97 % | SYSTOLIC BLOOD PRESSURE: 162 MMHG | DIASTOLIC BLOOD PRESSURE: 70 MMHG | TEMPERATURE: 97 F | RESPIRATION RATE: 18 BRPM | HEART RATE: 70 BPM

## 2024-04-25 DIAGNOSIS — C34.90 MALIGNANT NEOPLASM OF LUNG, UNSPECIFIED LATERALITY, UNSPECIFIED PART OF LUNG (HCC): Primary | ICD-10-CM

## 2024-04-25 PROCEDURE — 96413 CHEMO IV INFUSION 1 HR: CPT

## 2024-04-25 NOTE — PROGRESS NOTES
Pt here for C5 keytruda    Patient was evaluated today by Treatment Nurse.  Had labs and exam with Perlita DUNBAR on 4/24. Tx approved by Perlita.  Oral medications included in this regimen:  no    Patient confirms comprehension of cancer treatment schedule:  yes    Pregnancy screening:  Not applicable    Modifications in dose or schedule:  No    Medications appearance and physical integrity checked by RN: yes.    Chemotherapy IV pump settings verified by 2 RNs:  No due to targeted therapy IV administration.  Frequency of blood return and site check throughout administration: Prior to administration and At completion of therapy     Infusion/treatment outcome:  patient tolerated treatment without incident    Education Record    Learner:  Patient  Barriers / Limitations:  None  Method:  Discussion  Education / instructions given:  plan of care  Outcome:  Shows understanding    Discharged Other stable from infusion ,     Patient/family verbalized understanding of future appointments: by printed AVS

## 2024-05-16 ENCOUNTER — APPOINTMENT (OUTPATIENT)
Dept: HEMATOLOGY/ONCOLOGY | Facility: HOSPITAL | Age: 69
End: 2024-05-16
Attending: INTERNAL MEDICINE
Payer: MEDICARE

## 2024-05-16 ENCOUNTER — OFFICE VISIT (OUTPATIENT)
Dept: INTERNAL MEDICINE CLINIC | Facility: CLINIC | Age: 69
End: 2024-05-16

## 2024-05-16 VITALS
BODY MASS INDEX: 33.43 KG/M2 | OXYGEN SATURATION: 97 % | DIASTOLIC BLOOD PRESSURE: 66 MMHG | TEMPERATURE: 99 F | HEART RATE: 76 BPM | RESPIRATION RATE: 18 BRPM | HEIGHT: 67 IN | SYSTOLIC BLOOD PRESSURE: 122 MMHG | WEIGHT: 213 LBS

## 2024-05-16 DIAGNOSIS — R05.2 SUBACUTE COUGH: ICD-10-CM

## 2024-05-16 DIAGNOSIS — R09.89 CHEST CONGESTION: ICD-10-CM

## 2024-05-16 DIAGNOSIS — G62.9 NEUROPATHY: Primary | ICD-10-CM

## 2024-05-16 PROCEDURE — 1160F RVW MEDS BY RX/DR IN RCRD: CPT | Performed by: INTERNAL MEDICINE

## 2024-05-16 PROCEDURE — 3008F BODY MASS INDEX DOCD: CPT | Performed by: INTERNAL MEDICINE

## 2024-05-16 PROCEDURE — 99214 OFFICE O/P EST MOD 30 MIN: CPT | Performed by: INTERNAL MEDICINE

## 2024-05-16 PROCEDURE — 1159F MED LIST DOCD IN RCRD: CPT | Performed by: INTERNAL MEDICINE

## 2024-05-16 PROCEDURE — 3074F SYST BP LT 130 MM HG: CPT | Performed by: INTERNAL MEDICINE

## 2024-05-16 PROCEDURE — 1125F AMNT PAIN NOTED PAIN PRSNT: CPT | Performed by: INTERNAL MEDICINE

## 2024-05-16 PROCEDURE — 3078F DIAST BP <80 MM HG: CPT | Performed by: INTERNAL MEDICINE

## 2024-05-16 RX ORDER — BENZONATATE 100 MG/1
100 CAPSULE ORAL 3 TIMES DAILY PRN
Qty: 20 CAPSULE | Refills: 0 | Status: SHIPPED | OUTPATIENT
Start: 2024-05-16 | End: 2024-05-23

## 2024-05-16 RX ORDER — AZITHROMYCIN 250 MG/1
TABLET, FILM COATED ORAL
Qty: 6 TABLET | Refills: 0 | Status: SHIPPED | OUTPATIENT
Start: 2024-05-16 | End: 2024-05-20

## 2024-05-16 RX ORDER — GABAPENTIN 300 MG/1
300 CAPSULE ORAL 2 TIMES DAILY PRN
Qty: 60 CAPSULE | Refills: 2 | Status: SHIPPED | OUTPATIENT
Start: 2024-05-16

## 2024-05-20 ENCOUNTER — NURSE ONLY (OUTPATIENT)
Dept: HEMATOLOGY/ONCOLOGY | Facility: HOSPITAL | Age: 69
End: 2024-05-20
Attending: INTERNAL MEDICINE
Payer: MEDICARE

## 2024-05-20 VITALS
SYSTOLIC BLOOD PRESSURE: 137 MMHG | OXYGEN SATURATION: 96 % | WEIGHT: 212 LBS | HEART RATE: 75 BPM | DIASTOLIC BLOOD PRESSURE: 62 MMHG | HEIGHT: 67 IN | TEMPERATURE: 98 F | BODY MASS INDEX: 33.27 KG/M2 | RESPIRATION RATE: 18 BRPM

## 2024-05-20 DIAGNOSIS — C34.90 MALIGNANT NEOPLASM OF LUNG, UNSPECIFIED LATERALITY, UNSPECIFIED PART OF LUNG (HCC): Primary | ICD-10-CM

## 2024-05-20 DIAGNOSIS — Z51.12 ENCOUNTER FOR ANTINEOPLASTIC CHEMOTHERAPY AND IMMUNOTHERAPY: ICD-10-CM

## 2024-05-20 DIAGNOSIS — Z51.12 ENCOUNTER FOR ANTINEOPLASTIC IMMUNOTHERAPY: ICD-10-CM

## 2024-05-20 DIAGNOSIS — T45.1X5A CHEMOTHERAPY-INDUCED PERIPHERAL NEUROPATHY (HCC): ICD-10-CM

## 2024-05-20 DIAGNOSIS — Z51.11 ENCOUNTER FOR ANTINEOPLASTIC CHEMOTHERAPY AND IMMUNOTHERAPY: ICD-10-CM

## 2024-05-20 DIAGNOSIS — C79.51 METASTASIS TO BONE (HCC): ICD-10-CM

## 2024-05-20 DIAGNOSIS — G62.0 CHEMOTHERAPY-INDUCED PERIPHERAL NEUROPATHY (HCC): ICD-10-CM

## 2024-05-20 LAB
ALBUMIN SERPL-MCNC: 4.3 G/DL (ref 3.2–4.8)
ALBUMIN/GLOB SERPL: 1.2 {RATIO} (ref 1–2)
ALP LIVER SERPL-CCNC: 81 U/L
ALT SERPL-CCNC: 13 U/L
ANION GAP SERPL CALC-SCNC: 4 MMOL/L (ref 0–18)
AST SERPL-CCNC: 16 U/L (ref ?–34)
BASOPHILS # BLD AUTO: 0.04 X10(3) UL (ref 0–0.2)
BASOPHILS NFR BLD AUTO: 0.5 %
BILIRUB SERPL-MCNC: 0.4 MG/DL (ref 0.2–1.1)
BUN BLD-MCNC: 8 MG/DL (ref 9–23)
BUN/CREAT SERPL: 7.7 (ref 10–20)
CALCIUM BLD-MCNC: 9.9 MG/DL (ref 8.7–10.4)
CHLORIDE SERPL-SCNC: 106 MMOL/L (ref 98–112)
CO2 SERPL-SCNC: 27 MMOL/L (ref 21–32)
CREAT BLD-MCNC: 1.04 MG/DL
DEPRECATED RDW RBC AUTO: 52.6 FL (ref 35.1–46.3)
EGFRCR SERPLBLD CKD-EPI 2021: 78 ML/MIN/1.73M2 (ref 60–?)
EOSINOPHIL # BLD AUTO: 0.19 X10(3) UL (ref 0–0.7)
EOSINOPHIL NFR BLD AUTO: 2.5 %
ERYTHROCYTE [DISTWIDTH] IN BLOOD BY AUTOMATED COUNT: 15.9 % (ref 11–15)
GLOBULIN PLAS-MCNC: 3.6 G/DL (ref 2–3.5)
GLUCOSE BLD-MCNC: 118 MG/DL (ref 70–99)
HCT VFR BLD AUTO: 37.1 %
HGB BLD-MCNC: 12.6 G/DL
IMM GRANULOCYTES # BLD AUTO: 0.04 X10(3) UL (ref 0–1)
IMM GRANULOCYTES NFR BLD: 0.5 %
LYMPHOCYTES # BLD AUTO: 1.86 X10(3) UL (ref 1–4)
LYMPHOCYTES NFR BLD AUTO: 24.6 %
MCH RBC QN AUTO: 30.6 PG (ref 26–34)
MCHC RBC AUTO-ENTMCNC: 34 G/DL (ref 31–37)
MCV RBC AUTO: 90 FL
MONOCYTES # BLD AUTO: 0.73 X10(3) UL (ref 0.1–1)
MONOCYTES NFR BLD AUTO: 9.7 %
NEUTROPHILS # BLD AUTO: 4.7 X10 (3) UL (ref 1.5–7.7)
NEUTROPHILS # BLD AUTO: 4.7 X10(3) UL (ref 1.5–7.7)
NEUTROPHILS NFR BLD AUTO: 62.2 %
OSMOLALITY SERPL CALC.SUM OF ELEC: 283 MOSM/KG (ref 275–295)
PLATELET # BLD AUTO: 351 10(3)UL (ref 150–450)
POTASSIUM SERPL-SCNC: 4.7 MMOL/L (ref 3.5–5.1)
PROT SERPL-MCNC: 7.9 G/DL (ref 5.7–8.2)
RBC # BLD AUTO: 4.12 X10(6)UL
SODIUM SERPL-SCNC: 137 MMOL/L (ref 136–145)
T4 FREE SERPL-MCNC: 1.1 NG/DL (ref 0.8–1.7)
TSI SER-ACNC: 3.37 MIU/ML (ref 0.55–4.78)
WBC # BLD AUTO: 7.6 X10(3) UL (ref 4–11)

## 2024-05-20 PROCEDURE — 84443 ASSAY THYROID STIM HORMONE: CPT

## 2024-05-20 PROCEDURE — 80053 COMPREHEN METABOLIC PANEL: CPT

## 2024-05-20 PROCEDURE — 85025 COMPLETE CBC W/AUTO DIFF WBC: CPT

## 2024-05-20 PROCEDURE — 96413 CHEMO IV INFUSION 1 HR: CPT

## 2024-05-20 PROCEDURE — 99215 OFFICE O/P EST HI 40 MIN: CPT | Performed by: NURSE PRACTITIONER

## 2024-05-20 PROCEDURE — 84439 ASSAY OF FREE THYROXINE: CPT

## 2024-05-20 NOTE — PROGRESS NOTES
Tarik to infusion today for C6 D1 Keytruda.  Arrives Ambulating independently, accompanied by Spouse. Doing well, no complaints.    Patient was evaluated today by ASHIA and Treatment Nurse.    Oral medications included in this regimen:  no    Patient confirms comprehension of cancer treatment schedule:  no    Pregnancy screening:  Not applicable    Lab Results   Component Value Date    WBC 7.6 05/20/2024    HGB 12.6 (L) 05/20/2024    HCT 37.1 (L) 05/20/2024    .0 05/20/2024    NE 4.70 05/20/2024     Note: for a comprehensive list of the patient's lab results, access the Results Review.     Modifications in dose or schedule:  No    Medications appearance and physical integrity checked by RN: yes.    Chemotherapy IV pump settings verified by 2 RNs:  No due to targeted therapy IV administration.  PIV already established from lab appointment- flushes easily and has positive blood return. Frequency of blood return and site check throughout administration: Prior to administration and At completion of therapy.     Infusion/treatment outcome:  patient tolerated treatment without incident. PIV flushed and removed, applied 2x2 gauze and coban to site.    Education Record    Learner:  Patient and Spouse  Barriers / Limitations:  Language  Method:  Discussion, Printed material, and Reinforcement  Education / instructions given:  POC  Outcome:  Shows understanding    Discharged Home, Ambulating independently, accompanied by:Spouse    Patient/family verbalized understanding of future appointments: by printed AVS

## 2024-05-20 NOTE — PROGRESS NOTES
Oncology Follow up        Requesting Dr. 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  PDL1 0%    Diagnosis 1/3/2024 via transbronchial biopsy bronchoscopy with Dr. Carrillo.    On CT 23 4.2 x 3.3 cm left upper lobe mass enlarged AP reflection lymph node left hilar adenopathy.  PET/CT showed bone metastasis    Initiated Carboplatin Paclitaxel Pembrolizumab 24        Interval History:  Follow up for consideration of next treatment. Feels well. Recovering from upper URI, PCP gave him abx. No acute CP, SOB or severe cough.  No pain today.  No fever, chills or changes in breathing. No changes in bowel or bladder habits. No skin rash or active bleeding. No new focal neurological deficits.    Gr 1/2 CIPN to feet from chemo, being managed by PCP.        Past Medical History:    Bell's palsy    COPD (chronic obstructive pulmonary disease) (HCC)    Degenerative joint disease (DJD) of lumbar spine    Diabetes (HCC)    Diabetes mellitus, type II (HCC)    Heart attack (HCC)    High blood pressure    High cholesterol    Hyperlipidemia    Hypertension    Osteoarthritis    Sleep apnea    no CPAP     Social History     Socioeconomic History    Marital status:     Number of children: 3   Occupational History    Occupation:    Tobacco Use    Smoking status: Former     Current packs/day: 0.00     Types: Cigarettes     Quit date: 3/19/2020     Years since quittin.1    Smokeless tobacco: Never    Tobacco comments:     Occasionally, not in last 6 months   Vaping Use    Vaping status: Never Used   Substance and Sexual Activity    Alcohol use: Not Currently    Drug use: Never     Family History   Problem Relation Age of Onset    No Known Problems Mother     Heart Disorder Father     Diabetes Neg     Glaucoma Neg     Macular degeneration Neg      No Known Allergies  Current Outpatient Medications on File Prior to Visit    Medication Sig Dispense Refill    azithromycin (ZITHROMAX Z-BEATRICE) 250 MG Oral Tab Take 2 tablets (500 mg total) by mouth daily for 1 day, THEN 1 tablet (250 mg total) daily for 4 days. 6 tablet 0    benzonatate 100 MG Oral Cap Take 1 capsule (100 mg total) by mouth 3 (three) times daily as needed for cough. 20 capsule 0    gabapentin 300 MG Oral Cap Take 1 capsule (300 mg total) by mouth 2 (two) times daily as needed. 60 capsule 2    traMADol 50 MG Oral Tab Take 1-2 tablets ( mg total) by mouth every 6 (six) hours as needed for Pain. 30 tablet 0    glipiZIDE ER 5 MG Oral Tablet 24 Hr Take 1 tablet (5 mg total) by mouth daily. 30 tablet 2    atorvastatin 20 MG Oral Tab Take 1 tablet (20 mg total) by mouth nightly. 90 tablet 3    lisinopril 30 MG Oral Tab Take 1 tablet (30 mg total) by mouth daily. 30 tablet 2    metoprolol succinate  MG Oral Tablet 24 Hr Take 1 tablet (100 mg total) by mouth daily. 90 tablet 1    prochlorperazine (COMPAZINE) 10 mg tablet Take 1 tablet (10 mg total) by mouth every 6 (six) hours as needed for Nausea. 30 tablet 3    ondansetron (ZOFRAN) 8 MG tablet Take 1 tablet (8 mg total) by mouth every 8 (eight) hours as needed for Nausea. 30 tablet 3    aspirin 81 MG Oral Tab EC Take 1 tablet (81 mg total) by mouth daily.      metFORMIN 850 MG Oral Tab Take 1 tablet (850 mg total) by mouth 2 (two) times daily with meals. 180 tablet 3     Current Facility-Administered Medications on File Prior to Visit   Medication Dose Route Frequency Provider Last Rate Last Admin    [COMPLETED] pembrolizumab (Keytruda) 200 mg in sodium chloride 0.9% 108 mL IVPB  200 mg Intravenous Once Ranjana Jean APRN   Stopped at 04/25/24 1014     Vitals:    05/20/24 1432   BP: 137/62   Pulse: 75   Resp: 18   Temp: 98.3 °F (36.8 °C)     NAD, RR, nd, no edema, padilla, no deformity, nl skin, nl mood    A/P:  68 year old with former  smoker with metastatic squamous cell carcinoma of the left upper lobe of the  lung.  PDL1 0%.  Bone metastasis  --carboplatin paclitaxel pembrolizuamb-initiated 2/1/24.  ER visit 2/4 for myalgias/arthralgias with normal CK and unremarkable eval.   S/p 4 cycles Carboplatin Paclitaxel Pembrolizumab; dose reduce carbo to AUC 5 and paclitaxel by 20% due CIPN to feet.   Imaging after C4 4/2024 with favorable tx response. Ok to treat with Cycle #6 Pembrolizumab alone.    -CIPN gr 1/2. Dose reduction of chemo above. Done with chemo. Supportive care, med mgmt per PCP with Gabapentin, encouraged to titrate up per his recs. Monitor.    -Chemo induced anemia near resolve. Monitor.    -Arthralgias/myalgias immediately post C1, resolved after day 3. Resolved. Likely related to paclixael. CK in ER negative, likely not immune mediated without ongoing symptoms or weakness. Improved/resolved post C2.  Discussed supportive care with antihistamines and prn pain meds at home. Monitor. Call if worsens.    Uri near resolve today from recent travel. Supportive care reviewed.       RTC with MDV labs and consideration of next cycle in 3 weeks or prn    we will continue to be focal point of care in setting of malignancy undergoing ongoing treatment.     Management plan for cancer treatment leading high risk MDM was formulated by physician.     MANJINDER Villegas

## 2024-05-28 ENCOUNTER — OFFICE VISIT (OUTPATIENT)
Dept: INTERNAL MEDICINE CLINIC | Facility: CLINIC | Age: 69
End: 2024-05-28

## 2024-05-28 VITALS
WEIGHT: 209 LBS | HEART RATE: 78 BPM | TEMPERATURE: 98 F | HEIGHT: 67 IN | OXYGEN SATURATION: 98 % | RESPIRATION RATE: 17 BRPM | SYSTOLIC BLOOD PRESSURE: 130 MMHG | BODY MASS INDEX: 32.8 KG/M2 | DIASTOLIC BLOOD PRESSURE: 64 MMHG

## 2024-05-28 DIAGNOSIS — R20.2 NUMBNESS AND TINGLING OF BOTH FEET: Primary | ICD-10-CM

## 2024-05-28 DIAGNOSIS — R05.3 CHRONIC COUGH: ICD-10-CM

## 2024-05-28 DIAGNOSIS — R20.0 NUMBNESS AND TINGLING OF BOTH FEET: Primary | ICD-10-CM

## 2024-05-28 PROCEDURE — 1159F MED LIST DOCD IN RCRD: CPT | Performed by: INTERNAL MEDICINE

## 2024-05-28 PROCEDURE — 3075F SYST BP GE 130 - 139MM HG: CPT | Performed by: INTERNAL MEDICINE

## 2024-05-28 PROCEDURE — 1160F RVW MEDS BY RX/DR IN RCRD: CPT | Performed by: INTERNAL MEDICINE

## 2024-05-28 PROCEDURE — 3008F BODY MASS INDEX DOCD: CPT | Performed by: INTERNAL MEDICINE

## 2024-05-28 PROCEDURE — 3078F DIAST BP <80 MM HG: CPT | Performed by: INTERNAL MEDICINE

## 2024-05-28 PROCEDURE — 99213 OFFICE O/P EST LOW 20 MIN: CPT | Performed by: INTERNAL MEDICINE

## 2024-05-28 PROCEDURE — 1125F AMNT PAIN NOTED PAIN PRSNT: CPT | Performed by: INTERNAL MEDICINE

## 2024-05-28 RX ORDER — PREGABALIN 50 MG/1
50 CAPSULE ORAL 3 TIMES DAILY
Qty: 90 CAPSULE | Refills: 1 | Status: SHIPPED | OUTPATIENT
Start: 2024-05-28

## 2024-05-28 RX ORDER — BENZONATATE 100 MG/1
100 CAPSULE ORAL 3 TIMES DAILY PRN
Qty: 20 CAPSULE | Refills: 0 | Status: SHIPPED | OUTPATIENT
Start: 2024-05-28 | End: 2024-06-04

## 2024-05-28 NOTE — PROGRESS NOTES
Subjective:     Patient ID: Tarik Camara is a 68 year old male.    HPI  Patient comes in today with complaint of ongoing bilateral feet pain numbness tried gabapentin 300 mg but he says it has not helped at all he is feet always feel numb feels like he is having wrapped with some plastic they feel cold at times patient has diabetes also treated with chemoimmunotherapy for lung cancer    Patient also with lingering cough was treated recently feels better overall no shortness of breath no fever like refill on the cough medicine  History/Other:   Review of Systems   Constitutional: Negative.  Negative for fatigue and fever.   HENT: Negative.  Negative for congestion.    Eyes: Negative.    Respiratory:  Positive for cough. Negative for shortness of breath and wheezing.    Cardiovascular: Negative.  Negative for chest pain, palpitations and leg swelling.   Gastrointestinal: Negative.    Endocrine: Negative for cold intolerance and heat intolerance.   Genitourinary: Negative.  Negative for dysuria, flank pain and hematuria.   Musculoskeletal: Negative.  Negative for arthralgias, back pain and myalgias.        Bl feet pain    Skin: Negative.    Neurological:  Positive for numbness. Negative for dizziness, tremors, syncope, weakness and headaches.   Psychiatric/Behavioral: Negative.  Negative for agitation, behavioral problems and suicidal ideas. The patient is not nervous/anxious.      Current Outpatient Medications   Medication Sig Dispense Refill    pregabalin (LYRICA) 50 MG Oral Cap Take 1 capsule (50 mg total) by mouth 3 (three) times daily. 90 capsule 1    benzonatate 100 MG Oral Cap Take 1 capsule (100 mg total) by mouth 3 (three) times daily as needed for cough. 20 capsule 0    traMADol 50 MG Oral Tab Take 1-2 tablets ( mg total) by mouth every 6 (six) hours as needed for Pain. 30 tablet 0    glipiZIDE ER 5 MG Oral Tablet 24 Hr Take 1 tablet (5 mg total) by mouth daily. 30 tablet 2    atorvastatin 20 MG Oral Tab  Take 1 tablet (20 mg total) by mouth nightly. 90 tablet 3    lisinopril 30 MG Oral Tab Take 1 tablet (30 mg total) by mouth daily. 30 tablet 2    metoprolol succinate  MG Oral Tablet 24 Hr Take 1 tablet (100 mg total) by mouth daily. 90 tablet 1    prochlorperazine (COMPAZINE) 10 mg tablet Take 1 tablet (10 mg total) by mouth every 6 (six) hours as needed for Nausea. 30 tablet 3    ondansetron (ZOFRAN) 8 MG tablet Take 1 tablet (8 mg total) by mouth every 8 (eight) hours as needed for Nausea. 30 tablet 3    aspirin 81 MG Oral Tab EC Take 1 tablet (81 mg total) by mouth daily.      metFORMIN 850 MG Oral Tab Take 1 tablet (850 mg total) by mouth 2 (two) times daily with meals. 180 tablet 3     Allergies:No Known Allergies    Past Medical History:    Bell's palsy    COPD (chronic obstructive pulmonary disease) ([facility])    Degenerative joint disease (DJD) of lumbar spine    Diabetes ([facility])    Diabetes mellitus, type II ([facility])    Heart attack ([facility])    High blood pressure    High cholesterol    Hyperlipidemia    Hypertension    Osteoarthritis    Sleep apnea    no CPAP      Past Surgical History:   Procedure Laterality Date    Appendectomy      Coronary stent placement      Palate/uvula surgery unlisted      Ventral hernia repair  2021    primary repair, ventral and umbilical hernias      Family History   Problem Relation Age of Onset    No Known Problems Mother     Heart Disorder Father     Diabetes Neg     Glaucoma Neg     Macular degeneration Neg       Social History:   Social History     Socioeconomic History    Marital status:     Number of children: 3   Occupational History    Occupation:    Tobacco Use    Smoking status: Former     Current packs/day: 0.00     Types: Cigarettes     Quit date: 3/19/2020     Years since quittin.1    Smokeless tobacco: Never    Tobacco comments:     Occasionally, not in last 6 months   Vaping Use    Vaping status: Never Used   Substance and Sexual  Activity    Alcohol use: Not Currently    Drug use: Never     Social Determinants of Health     Financial Resource Strain: Low Risk  (11/7/2023)    Financial Resource Strain     Difficulty of Paying Living Expenses: Not hard at all     Med Affordability: No   Transportation Needs: No Transportation Needs (11/7/2023)    Transportation Needs     Lack of Transportation: No        Objective:   Physical Exam  Vitals and nursing note reviewed.   Constitutional:       Appearance: He is well-developed.   HENT:      Head: Normocephalic and atraumatic.      Right Ear: External ear normal.      Left Ear: External ear normal.      Nose: Nose normal.   Eyes:      Conjunctiva/sclera: Conjunctivae normal.      Pupils: Pupils are equal, round, and reactive to light.   Cardiovascular:      Rate and Rhythm: Normal rate and regular rhythm.      Heart sounds: Normal heart sounds.   Pulmonary:      Effort: Pulmonary effort is normal.      Breath sounds: Normal breath sounds.   Abdominal:      General: Bowel sounds are normal.      Palpations: Abdomen is soft.   Genitourinary:     Penis: Normal.       Prostate: Normal.      Rectum: Normal.   Musculoskeletal:         General: Tenderness present. Normal range of motion.      Cervical back: Normal range of motion and neck supple.      Comments: Bl feet numbness and pain    Skin:     General: Skin is warm and dry.   Neurological:      Mental Status: He is alert and oriented to person, place, and time.      Deep Tendon Reflexes: Reflexes are normal and symmetric.         Assessment & Plan:   1. Numbness and tingling of both feet will change to Lyrica will DC gabapentin follow-up with podiatry   2. Chronic cough we will refill the cough medicine let us know if not better       No orders of the defined types were placed in this encounter.      Meds This Visit:  Requested Prescriptions     Signed Prescriptions Disp Refills    pregabalin (LYRICA) 50 MG Oral Cap 90 capsule 1     Sig: Take 1  capsule (50 mg total) by mouth 3 (three) times daily.    benzonatate 100 MG Oral Cap 20 capsule 0     Sig: Take 1 capsule (100 mg total) by mouth 3 (three) times daily as needed for cough.       Imaging & Referrals:  PODIATRY - INTERNAL

## 2024-06-01 ENCOUNTER — APPOINTMENT (OUTPATIENT)
Dept: RADIATION ONCOLOGY | Facility: HOSPITAL | Age: 69
End: 2024-06-01
Attending: RADIOLOGY
Payer: MEDICARE

## 2024-06-12 ENCOUNTER — OFFICE VISIT (OUTPATIENT)
Dept: INTERNAL MEDICINE CLINIC | Facility: CLINIC | Age: 69
End: 2024-06-12

## 2024-06-12 ENCOUNTER — HOSPITAL ENCOUNTER (OUTPATIENT)
Dept: GENERAL RADIOLOGY | Age: 69
Discharge: HOME | End: 2024-06-12
Attending: INTERNAL MEDICINE
Payer: MEDICARE

## 2024-06-12 VITALS
SYSTOLIC BLOOD PRESSURE: 134 MMHG | TEMPERATURE: 98 F | DIASTOLIC BLOOD PRESSURE: 86 MMHG | WEIGHT: 211 LBS | HEIGHT: 67 IN | RESPIRATION RATE: 17 BRPM | OXYGEN SATURATION: 98 % | BODY MASS INDEX: 33.12 KG/M2 | HEART RATE: 73 BPM

## 2024-06-12 DIAGNOSIS — E11.40 TYPE 2 DIABETES MELLITUS WITH DIABETIC NEUROPATHY, UNSPECIFIED WHETHER LONG TERM INSULIN USE (HCC): ICD-10-CM

## 2024-06-12 DIAGNOSIS — C34.90 LUNG CANCER METASTATIC TO BONE (HCC): ICD-10-CM

## 2024-06-12 DIAGNOSIS — R07.81 RIB PAIN ON RIGHT SIDE: ICD-10-CM

## 2024-06-12 DIAGNOSIS — I10 ESSENTIAL HYPERTENSION: ICD-10-CM

## 2024-06-12 DIAGNOSIS — C79.51 LUNG CANCER METASTATIC TO BONE (HCC): ICD-10-CM

## 2024-06-12 DIAGNOSIS — R07.81 RIB PAIN ON RIGHT SIDE: Primary | ICD-10-CM

## 2024-06-12 PROCEDURE — 3079F DIAST BP 80-89 MM HG: CPT | Performed by: INTERNAL MEDICINE

## 2024-06-12 PROCEDURE — 3075F SYST BP GE 130 - 139MM HG: CPT | Performed by: INTERNAL MEDICINE

## 2024-06-12 PROCEDURE — 1126F AMNT PAIN NOTED NONE PRSNT: CPT | Performed by: INTERNAL MEDICINE

## 2024-06-12 PROCEDURE — 99214 OFFICE O/P EST MOD 30 MIN: CPT | Performed by: INTERNAL MEDICINE

## 2024-06-12 PROCEDURE — 71101 X-RAY EXAM UNILAT RIBS/CHEST: CPT | Performed by: INTERNAL MEDICINE

## 2024-06-12 PROCEDURE — 1160F RVW MEDS BY RX/DR IN RCRD: CPT | Performed by: INTERNAL MEDICINE

## 2024-06-12 PROCEDURE — 3008F BODY MASS INDEX DOCD: CPT | Performed by: INTERNAL MEDICINE

## 2024-06-12 PROCEDURE — 1159F MED LIST DOCD IN RCRD: CPT | Performed by: INTERNAL MEDICINE

## 2024-06-12 RX ORDER — GLIPIZIDE 5 MG/1
5 TABLET, FILM COATED, EXTENDED RELEASE ORAL DAILY
Qty: 30 TABLET | Refills: 2 | Status: SHIPPED | OUTPATIENT
Start: 2024-06-12

## 2024-06-12 RX ORDER — LISINOPRIL 30 MG/1
30 TABLET ORAL DAILY
Qty: 90 TABLET | Refills: 0 | OUTPATIENT
Start: 2024-06-12

## 2024-06-12 RX ORDER — LISINOPRIL 30 MG/1
30 TABLET ORAL DAILY
Qty: 30 TABLET | Refills: 2 | Status: SHIPPED | OUTPATIENT
Start: 2024-06-12

## 2024-06-12 NOTE — PROGRESS NOTES
Subjective:     Patient ID: Tarik Camara is a 68 year old male.    HPI    Patient comes in today with complaint of right side rib pain when he coughs if he takes deep breath with certain movement this started about a week ago or so not feeling sick not coughing too much scan done to check on his patient recently had a CT scan done to check on his send treatment for cancer and CAT scan looked better he does have eighth rib metastatic disease which could be explain the pain that he is describing today  Otherwise no other complaints today  History/Other:   Review of Systems   Constitutional: Negative.    HENT: Negative.     Eyes: Negative.    Respiratory: Negative.          Rt side rib pain   Cardiovascular: Negative.    Gastrointestinal: Negative.    Genitourinary: Negative.    Musculoskeletal: Negative.    Skin: Negative.    Neurological: Negative.    Psychiatric/Behavioral: Negative.       Current Outpatient Medications   Medication Sig Dispense Refill    glipiZIDE ER 5 MG Oral Tablet 24 Hr Take 1 tablet (5 mg total) by mouth daily. 30 tablet 2    lisinopril 30 MG Oral Tab Take 1 tablet (30 mg total) by mouth daily. 30 tablet 2    pregabalin (LYRICA) 50 MG Oral Cap Take 1 capsule (50 mg total) by mouth 3 (three) times daily. 90 capsule 1    traMADol 50 MG Oral Tab Take 1-2 tablets ( mg total) by mouth every 6 (six) hours as needed for Pain. 30 tablet 0    atorvastatin 20 MG Oral Tab Take 1 tablet (20 mg total) by mouth nightly. 90 tablet 3    metoprolol succinate  MG Oral Tablet 24 Hr Take 1 tablet (100 mg total) by mouth daily. 90 tablet 1    prochlorperazine (COMPAZINE) 10 mg tablet Take 1 tablet (10 mg total) by mouth every 6 (six) hours as needed for Nausea. 30 tablet 3    ondansetron (ZOFRAN) 8 MG tablet Take 1 tablet (8 mg total) by mouth every 8 (eight) hours as needed for Nausea. 30 tablet 3    aspirin 81 MG Oral Tab EC Take 1 tablet (81 mg total) by mouth daily.      metFORMIN 850 MG Oral Tab Take  1 tablet (850 mg total) by mouth 2 (two) times daily with meals. 180 tablet 3     Allergies:No Known Allergies    Past Medical History:    Bell's palsy    COPD (chronic obstructive pulmonary disease) ([facility])    Degenerative joint disease (DJD) of lumbar spine    Diabetes ([facility])    Diabetes mellitus, type II ([facility])    Heart attack ([facility])    High blood pressure    High cholesterol    Hyperlipidemia    Hypertension    Osteoarthritis    Sleep apnea    no CPAP      Past Surgical History:   Procedure Laterality Date    Appendectomy      Coronary stent placement      Palate/uvula surgery unlisted      Ventral hernia repair  2021    primary repair, ventral and umbilical hernias      Family History   Problem Relation Age of Onset    No Known Problems Mother     Heart Disorder Father     Diabetes Neg     Glaucoma Neg     Macular degeneration Neg       Social History:   Social History     Socioeconomic History    Marital status:     Number of children: 3   Occupational History    Occupation: [employer]   Tobacco Use    Smoking status: Former     Current packs/day: 0.00     Types: Cigarettes     Quit date: 3/19/2020     Years since quittin.2    Smokeless tobacco: Never    Tobacco comments:     Occasionally, not in last 6 months   Vaping Use    Vaping status: Never Used   Substance and Sexual Activity    Alcohol use: Not Currently    Drug use: Never     Social Determinants of Health     Financial Resource Strain: Low Risk  (2023)    Financial Resource Strain     Difficulty of Paying Living Expenses: Not hard at all     Med Affordability: No   Transportation Needs: No Transportation Needs (2023)    Transportation Needs     Lack of Transportation: No        Objective:   Physical Exam  Vitals and nursing note reviewed.   Constitutional:       Appearance: He is well-developed.   HENT:      Head: Normocephalic and atraumatic.      Right Ear: External ear normal.      Left Ear: External ear normal.       Nose: Nose normal.   Eyes:      Conjunctiva/sclera: Conjunctivae normal.      Pupils: Pupils are equal, round, and reactive to light.   Cardiovascular:      Rate and Rhythm: Normal rate and regular rhythm.      Heart sounds: Normal heart sounds.   Pulmonary:      Effort: Pulmonary effort is normal.      Breath sounds: Normal breath sounds.      Comments: Lungs clear  Abdominal:      General: Bowel sounds are normal.      Palpations: Abdomen is soft.   Genitourinary:     Penis: Normal.       Prostate: Normal.      Rectum: Normal.   Musculoskeletal:         General: Normal range of motion.      Cervical back: Normal range of motion and neck supple.   Skin:     General: Skin is warm and dry.   Neurological:      Mental Status: He is alert and oriented to person, place, and time.      Deep Tendon Reflexes: Reflexes are normal and symmetric.         Assessment & Plan:   1. Rib pain on right side could be due to metastatic disease we will order an x-ray of the ribs and lungs will follow take medication as needed for pain   2. 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) overall treatment responding well follows with oncology   3. Type 2 diabetes mellitus with diabetic neuropathy, unspecified whether long term insulin use (HCC) continue current treatment we will refill medication    4. Essential hypertension well-controlled continue current treatment       No orders of the defined types were placed in this encounter.      Meds This Visit:  Requested Prescriptions     Signed Prescriptions Disp Refills    glipiZIDE ER 5 MG Oral Tablet 24 Hr 30 tablet 2     Sig: Take 1 tablet (5 mg total) by mouth daily.    lisinopril 30 MG Oral Tab 30 tablet 2     Sig: Take 1 tablet (30 mg total) by mouth daily.       Imaging & Referrals:  XR RIBS WITH CHEST (3 VIEWS), RIGHT  (CPT=71101)

## 2024-06-13 ENCOUNTER — OFFICE VISIT (OUTPATIENT)
Dept: HEMATOLOGY/ONCOLOGY | Facility: HOSPITAL | Age: 69
End: 2024-06-13
Attending: INTERNAL MEDICINE
Payer: MEDICARE

## 2024-06-13 VITALS
WEIGHT: 214 LBS | SYSTOLIC BLOOD PRESSURE: 160 MMHG | DIASTOLIC BLOOD PRESSURE: 67 MMHG | BODY MASS INDEX: 33.59 KG/M2 | TEMPERATURE: 98 F | OXYGEN SATURATION: 99 % | HEIGHT: 67 IN | RESPIRATION RATE: 18 BRPM | HEART RATE: 69 BPM

## 2024-06-13 DIAGNOSIS — Z92.21 HISTORY OF CHEMOTHERAPY: ICD-10-CM

## 2024-06-13 DIAGNOSIS — C34.90 MALIGNANT NEOPLASM OF LUNG, UNSPECIFIED LATERALITY, UNSPECIFIED PART OF LUNG (HCC): Primary | ICD-10-CM

## 2024-06-13 DIAGNOSIS — C34.92 STAGE IV SQUAMOUS CELL CARCINOMA OF LEFT LUNG (HCC): Primary | ICD-10-CM

## 2024-06-13 DIAGNOSIS — G89.3 PAIN FROM BONE METASTASES (HCC): ICD-10-CM

## 2024-06-13 DIAGNOSIS — Z51.11 ENCOUNTER FOR ANTINEOPLASTIC CHEMOTHERAPY AND IMMUNOTHERAPY: ICD-10-CM

## 2024-06-13 DIAGNOSIS — C79.51 LUNG CANCER METASTATIC TO BONE (HCC): ICD-10-CM

## 2024-06-13 DIAGNOSIS — Z51.12 ENCOUNTER FOR ANTINEOPLASTIC IMMUNOTHERAPY: ICD-10-CM

## 2024-06-13 DIAGNOSIS — C79.51 PAIN FROM BONE METASTASES (HCC): ICD-10-CM

## 2024-06-13 DIAGNOSIS — Z51.12 ENCOUNTER FOR ANTINEOPLASTIC CHEMOTHERAPY AND IMMUNOTHERAPY: ICD-10-CM

## 2024-06-13 DIAGNOSIS — G62.0 CHEMOTHERAPY-INDUCED PERIPHERAL NEUROPATHY (HCC): ICD-10-CM

## 2024-06-13 DIAGNOSIS — T45.1X5A CHEMOTHERAPY-INDUCED PERIPHERAL NEUROPATHY (HCC): ICD-10-CM

## 2024-06-13 DIAGNOSIS — C34.90 LUNG CANCER METASTATIC TO BONE (HCC): ICD-10-CM

## 2024-06-13 LAB
ALBUMIN SERPL-MCNC: 4.4 G/DL (ref 3.2–4.8)
ALBUMIN/GLOB SERPL: 1.3 {RATIO} (ref 1–2)
ALP LIVER SERPL-CCNC: 77 U/L
ALT SERPL-CCNC: 16 U/L
ANION GAP SERPL CALC-SCNC: 5 MMOL/L (ref 0–18)
AST SERPL-CCNC: 18 U/L (ref ?–34)
BASOPHILS # BLD AUTO: 0.03 X10(3) UL (ref 0–0.2)
BASOPHILS NFR BLD AUTO: 0.4 %
BILIRUB SERPL-MCNC: 0.4 MG/DL (ref 0.2–1.1)
BUN BLD-MCNC: 12 MG/DL (ref 9–23)
BUN/CREAT SERPL: 12.8 (ref 10–20)
CALCIUM BLD-MCNC: 9.9 MG/DL (ref 8.7–10.4)
CHLORIDE SERPL-SCNC: 106 MMOL/L (ref 98–112)
CO2 SERPL-SCNC: 27 MMOL/L (ref 21–32)
CREAT BLD-MCNC: 0.94 MG/DL
DEPRECATED RDW RBC AUTO: 49.2 FL (ref 35.1–46.3)
EGFRCR SERPLBLD CKD-EPI 2021: 88 ML/MIN/1.73M2 (ref 60–?)
EOSINOPHIL # BLD AUTO: 0.22 X10(3) UL (ref 0–0.7)
EOSINOPHIL NFR BLD AUTO: 3 %
ERYTHROCYTE [DISTWIDTH] IN BLOOD BY AUTOMATED COUNT: 14.8 % (ref 11–15)
FASTING STATUS PATIENT QL REPORTED: NO
GLOBULIN PLAS-MCNC: 3.5 G/DL (ref 2–3.5)
GLUCOSE BLD-MCNC: 165 MG/DL (ref 70–99)
HCT VFR BLD AUTO: 35.4 %
HGB BLD-MCNC: 11.7 G/DL
IMM GRANULOCYTES # BLD AUTO: 0.02 X10(3) UL (ref 0–1)
IMM GRANULOCYTES NFR BLD: 0.3 %
LYMPHOCYTES # BLD AUTO: 2.1 X10(3) UL (ref 1–4)
LYMPHOCYTES NFR BLD AUTO: 28.9 %
MCH RBC QN AUTO: 30.1 PG (ref 26–34)
MCHC RBC AUTO-ENTMCNC: 33.1 G/DL (ref 31–37)
MCV RBC AUTO: 91 FL
MONOCYTES # BLD AUTO: 0.7 X10(3) UL (ref 0.1–1)
MONOCYTES NFR BLD AUTO: 9.6 %
NEUTROPHILS # BLD AUTO: 4.19 X10 (3) UL (ref 1.5–7.7)
NEUTROPHILS # BLD AUTO: 4.19 X10(3) UL (ref 1.5–7.7)
NEUTROPHILS NFR BLD AUTO: 57.8 %
OSMOLALITY SERPL CALC.SUM OF ELEC: 289 MOSM/KG (ref 275–295)
PLATELET # BLD AUTO: 265 10(3)UL (ref 150–450)
POTASSIUM SERPL-SCNC: 4.2 MMOL/L (ref 3.5–5.1)
PROT SERPL-MCNC: 7.9 G/DL (ref 5.7–8.2)
RBC # BLD AUTO: 3.89 X10(6)UL
SODIUM SERPL-SCNC: 138 MMOL/L (ref 136–145)
T4 FREE SERPL-MCNC: 1.2 NG/DL (ref 0.8–1.7)
TSI SER-ACNC: 2.71 MIU/ML (ref 0.55–4.78)
WBC # BLD AUTO: 7.3 X10(3) UL (ref 4–11)

## 2024-06-13 PROCEDURE — G2211 COMPLEX E/M VISIT ADD ON: HCPCS | Performed by: INTERNAL MEDICINE

## 2024-06-13 PROCEDURE — 80053 COMPREHEN METABOLIC PANEL: CPT

## 2024-06-13 PROCEDURE — 99215 OFFICE O/P EST HI 40 MIN: CPT | Performed by: INTERNAL MEDICINE

## 2024-06-13 PROCEDURE — 84439 ASSAY OF FREE THYROXINE: CPT

## 2024-06-13 PROCEDURE — 96413 CHEMO IV INFUSION 1 HR: CPT

## 2024-06-13 PROCEDURE — 85025 COMPLETE CBC W/AUTO DIFF WBC: CPT

## 2024-06-13 PROCEDURE — 84443 ASSAY THYROID STIM HORMONE: CPT

## 2024-06-13 NOTE — PROGRESS NOTES
Pt here for C7D1 KEYTRUDA.  Arrives Ambulating independently, accompanied by Family member   Patient was evaluated today by MD.  Oral medications included in this regimen:  no  Patient confirms comprehension of cancer treatment schedule:  yes. Pt would prefer morning appointments whenever possible going forward.  Pregnancy screening:  Not applicable    Modifications in dose or schedule:  No  Medications appearance and physical integrity checked by RN: yes.    Chemotherapy IV pump settings verified by 2 RNs:  No due to targeted therapy IV administration.  Frequency of blood return and site check throughout administration: Prior to administration, Prior to each drug, and At completion of therapy     Infusion/treatment outcome:  patient tolerated treatment without incident    Education Record    Learner:  Patient  Barriers / Limitations:  None  Method:  Reinforcement  Education / instructions given:  Plan of care reviewed  Outcome:  Shows understanding    Discharged Home, Ambulating independently, accompanied by:Family member    Patient/family verbalized understanding of future appointments: by printed AVS

## 2024-06-13 NOTE — PROGRESS NOTES
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  PDL1 0%     Diagnosis 1/3/2024 via transbronchial biopsy bronchoscopy with Dr. Carrillo.     On CT 12/22/23 4.2 x 3.3 cm left upper lobe mass enlarged AP reflection lymph node left hilar adenopathy.  PET/CT showed bone metastasis     Initiated Carboplatin Paclitaxel Pembrolizumab 2/1/24       Interval History:  Here for follow up and consideration of next pembrolizumab cycle.   Reports progressive right side rib pain, worse with coughing and sneezing   Had similar pain at his initial presentation, resolved with chemo, but now back again.   Saw Dr. Erazo yesterday and had x ray that showed R 8th rib destructive lesion enlarging in size.   He is tolerating pembro well with no issues. Denies fever, cough, shortness of breath.   No changes in bowel or bladder habits. No skin rash or active bleeding. No new focal neurological deficits.  Gr 1/2 CIPN to feet from chemo, being managed by PCP.  He wants to go back to work.     Performance Status: ECOG 0    Current Medications:    Current Outpatient Medications:     glipiZIDE ER 5 MG Oral Tablet 24 Hr, Take 1 tablet (5 mg total) by mouth daily., Disp: 30 tablet, Rfl: 2    lisinopril 30 MG Oral Tab, Take 1 tablet (30 mg total) by mouth daily., Disp: 30 tablet, Rfl: 2    pregabalin (LYRICA) 50 MG Oral Cap, Take 1 capsule (50 mg total) by mouth 3 (three) times daily., Disp: 90 capsule, Rfl: 1    traMADol 50 MG Oral Tab, Take 1-2 tablets ( mg total) by mouth every 6 (six) hours as needed for Pain., Disp: 30 tablet, Rfl: 0    atorvastatin 20 MG Oral Tab, Take 1 tablet (20 mg total) by mouth nightly., Disp: 90  tablet, Rfl: 3    metoprolol succinate  MG Oral Tablet 24 Hr, Take 1 tablet (100 mg total) by mouth daily., Disp: 90 tablet, Rfl: 1    prochlorperazine (COMPAZINE) 10 mg tablet, Take 1 tablet (10 mg total) by mouth every 6 (six) hours as needed for Nausea., Disp: 30 tablet, Rfl: 3    ondansetron (ZOFRAN) 8 MG tablet, Take 1 tablet (8 mg total) by mouth every 8 (eight) hours as needed for Nausea., Disp: 30 tablet, Rfl: 3    aspirin 81 MG Oral Tab EC, Take 1 tablet (81 mg total) by mouth daily., Disp: , Rfl:     metFORMIN 850 MG Oral Tab, Take 1 tablet (850 mg total) by mouth 2 (two) times daily with meals., Disp: 180 tablet, Rfl: 3    Allergies:  No Known Allergies     Review of Systems:  10 -point systems reviewed, all negative except as mentioned in the HPI/interval history.     Vital Signs:  /67 (BP Location: Left arm, Patient Position: Sitting, Cuff Size: large)   Pulse 69   Temp 97.7 °F (36.5 °C) (Oral)   Resp 18   Ht 1.702 m (5' 7\")   Wt 97.1 kg (214 lb)   SpO2 99%   BMI 33.52 kg/m²     Physical Examination:  General: Aert and oriented x 3, not in acute distress.  Psych:  Mood and affect appropriate  HEENT: Non-icteric sclera. Oropharynx is clear.   Neck: No palpable lymphadenopathy. Neck is supple.  Lymphatics: No palpable lymphadenopathy in the cervical, supraclavicular, axillary, or inguinal regions.  Chest: Clear to auscultation. Right lower ribs tenderness.   Heart: Regular rate and rhythm. S1S2 normal.  Abdomen: Soft, non tender with good bowel sounds.   Extremities: No peripheral edema. No clubbing.   Neurological: Grossly intact.     Laboratory:  Lab Results   Component Value Date    WBC 7.3 06/13/2024    RBC 3.89 06/13/2024    HGB 11.7 (L) 06/13/2024    HCT 35.4 (L) 06/13/2024    MCV 91.0 06/13/2024    MCH 30.1 06/13/2024    MCHC 33.1 06/13/2024    RDW 14.8 06/13/2024    .0 06/13/2024     Lab Results   Component Value Date     06/13/2024    K 4.2 06/13/2024      06/13/2024    CO2 27.0 06/13/2024    BUN 12 06/13/2024    CREATSERUM 0.94 06/13/2024     (H) 06/13/2024    CA 9.9 06/13/2024    ALKPHO 77 06/13/2024    ALT 16 06/13/2024    AST 18 06/13/2024    BILT 0.4 06/13/2024    ALB 4.4 06/13/2024    TP 7.9 06/13/2024     Lab Results   Component Value Date/Time    TSH 2.706 06/13/2024 12:35 PM    TSH 3.369 05/20/2024 12:56 PM    TSH 3.325 04/24/2024 10:15 AM    TSH 2.917 04/03/2024 08:39 AM    TSH 3.050 03/13/2024 08:17 AM    TSH 3.668 02/21/2024 08:11 AM     Radiology:  XR RIBS WITH CHEST (3 VIEWS), RIGHT  (CPT=71101)    Result Date: 6/12/2024  CONCLUSION:  1. Osteolytic destructive lesion lateral margin right 8th rib measuring 5.7 cm length.  This corresponds to findings on recent chest CT from 4/16/2024 with interval progression of involvement. 2. No acute fracture dislocation. 3. Dextroscoliosis dorsal spine and multilevel spondylosis. 4. Pulmonary interstitial fibrosis.  Heterogeneous pulmonary lesion left mid chest measuring 3.0 x 2.0 cm with slight progression consistent with known lung carcinoma.    Dictated by (CST): Alec Cm MD on 6/12/2024 at 4:27 PM     Finalized by (CST): Alec Cm MD on 6/12/2024 at 4:35 PM          CT CHEST+ABDOMEN+PELVIS(ALL CNTRST ONLY)(CPT=71260/09285)    Result Date: 4/16/2024  CONCLUSION:  1. Favorable treatment response with decreasing size of the primary left upper lobe malignancy.  2. Osseous metastatic involvement of the right lateral 8th rib.  3. Decreasing lymphadenopathy.  4. Severe paraseptal emphysema and fibrosis.  5. Scattered colonic diverticulosis without CT evidence acute complication.  6. Hepatomegaly with underlying hepatic steatosis.  7. Prostatomegaly.  8. Status post appendectomy.  9. Lesser incidental findings as above.    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  PDL1 0%. Bone metastases  --carboplatin paclitaxel pembrolizuamb-initiated 2/1/24.  --ER visit 2/4 for myalgias/arthralgias with normal CK and unremarkable eval. Likely taxol acute pain syndrome   --S/p 4 cycles Carboplatin Paclitaxel Pembrolizumab completed 4/4/24; dose reduce carbo to AUC 5 and paclitaxel by 20% due CIPN to feet.   --Imaging after C4 4/2024 with favorable tx response.   --Proceed with cycle #7 pembrolizumab today. No sings of autoimmune toxicity. Repeat scans mid July.     Rib pain   --acutely worsening right sided rib pain from enalrging 8th rib bone metastasis  --xray showed enlarging destructive bone lesions measuring 5.7 cm from 4.6 in 4/2024.   --refer to rad/onc for consideration of palliative XRT. Continue tramadol as needed for pain.       CIPN gr 1/2. Dose reduction of chemo above. Done with chemo. Supportive care, med mgmt per PCP with Gabapentin, encouraged to titrate up per his recs. Monitor.     Chemo induced anemia near resolve. Monitor.    RTC 3 weeks       Мария Spears MD   Hematology Oncology

## 2024-06-14 ENCOUNTER — TELEPHONE (OUTPATIENT)
Dept: RADIATION ONCOLOGY | Facility: HOSPITAL | Age: 69
End: 2024-06-14

## 2024-06-14 ENCOUNTER — TELEPHONE (OUTPATIENT)
Dept: CASE MANAGEMENT | Age: 69
End: 2024-06-14

## 2024-06-14 DIAGNOSIS — C79.51 METASTASIS TO BONE (HCC): ICD-10-CM

## 2024-06-14 DIAGNOSIS — C34.90 MALIGNANT NEOPLASM OF LUNG, UNSPECIFIED LATERALITY, UNSPECIFIED PART OF LUNG (HCC): Primary | ICD-10-CM

## 2024-06-14 NOTE — TELEPHONE ENCOUNTER
Dr. Erazo,     Cancer Center is requesting URGENT referral for appointment 6/17/24 with Dr. Morin.    Pended referral please review diagnosis and sign off if you agree.    Thank you.  Shannon Carey  [facility]

## 2024-06-14 NOTE — TELEPHONE ENCOUNTER
Unable to lvm and pt does not have mychart set up to [facility] consult.

## 2024-06-16 PROBLEM — Z51.12 ENCOUNTER FOR ANTINEOPLASTIC IMMUNOTHERAPY: Status: ACTIVE | Noted: 2024-06-16

## 2024-06-16 PROBLEM — C34.92 STAGE IV SQUAMOUS CELL CARCINOMA OF LEFT LUNG (HCC): Status: ACTIVE | Noted: 2024-06-16

## 2024-06-16 PROBLEM — C34.90 LUNG CANCER METASTATIC TO BONE (HCC): Status: ACTIVE | Noted: 2024-06-16

## 2024-06-16 PROBLEM — G89.3 PAIN FROM BONE METASTASES (HCC): Status: ACTIVE | Noted: 2024-06-16

## 2024-06-16 PROBLEM — C79.51 PAIN FROM BONE METASTASES (HCC): Status: ACTIVE | Noted: 2024-06-16

## 2024-06-16 PROBLEM — C79.51 LUNG CANCER METASTATIC TO BONE (HCC): Status: ACTIVE | Noted: 2024-06-16

## 2024-06-17 ENCOUNTER — OFFICE VISIT (OUTPATIENT)
Dept: RADIATION ONCOLOGY | Facility: HOSPITAL | Age: 69
End: 2024-06-17
Attending: RADIOLOGY
Payer: MEDICARE

## 2024-06-17 VITALS
HEART RATE: 71 BPM | WEIGHT: 215.38 LBS | RESPIRATION RATE: 18 BRPM | OXYGEN SATURATION: 97 % | TEMPERATURE: 98 F | BODY MASS INDEX: 34 KG/M2 | DIASTOLIC BLOOD PRESSURE: 71 MMHG | SYSTOLIC BLOOD PRESSURE: 160 MMHG

## 2024-06-17 DIAGNOSIS — C34.90 MALIGNANT NEOPLASM OF LUNG, UNSPECIFIED LATERALITY, UNSPECIFIED PART OF LUNG (HCC): Primary | ICD-10-CM

## 2024-06-17 DIAGNOSIS — C79.51 METASTASIS TO BONE (HCC): ICD-10-CM

## 2024-06-17 PROCEDURE — 99212 OFFICE O/P EST SF 10 MIN: CPT

## 2024-06-17 RX ORDER — POLYETHYLENE GLYCOL 3350 17 G/17G
17 POWDER, FOR SOLUTION ORAL DAILY
COMMUNITY

## 2024-06-17 NOTE — PATIENT INSTRUCTIONS
We will call you to schedule your CT simulation. This will be at [address] [place] [address] in [place] ([facility]).    Please call [phone] with any radiation questions.     Start tramadol and tylenol as needed for pain.

## 2024-06-17 NOTE — CONSULTS
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  Pt is s/p transbronchial biopsy bronchoscopy.  Pt on CT scan revealed 12/22/23 4.2 x 3.3 cm left upper lobe mass enlarged AP reflection lymph node left hilar adenopathy.  PET/CT showed bone metastasis  Pt started Carboplatin Paclitaxel Pembrolizumab 2/1/24 but has progressive right side rib pain, especially with coughing and sneezing     x ray that showed R 8th rib destructive lesion enlarging in size.   Pt has tramadol but reluctant to take due to side effects.         Oncology Chemo Meds          Cycle 3; Day 1    3/14/2024 Cycle 4; Day 1    4/4/2024 Cycle 5; Day 1    4/25/2024 09:44 Cycle 6; Day 1    5/20/2024 15:16 Cycle 7; Day 1    6/13/2024 15:38 Cycle 8; [ Day 1 ]    7/4/2024 00:00   Oncology Flowsheet   Day, Cycle Day 1, Cycle 3 Day 1, Cycle 4 Day 1, Cycle 5 Day 1, Cycle 6 Day 1, Cycle 7 [ Day 1, Cycle 8 ]   CARBOplatin 450 mg/45mL (Paraplatin)  mg 620 mg       dexAMETHasone 100 MG/10ML (Decadron) IV New Bag (unknown dose, 20 mg ordered) New Bag (unknown dose, 20 mg ordered)       diphenhydrAMINE 50 mg/mL (Benadryl) IV 50 mg 50 mg       ondansetron 4 MG/2ML (Zofran) IV New Bag (unknown dose, 16 mg ordered) New Bag (unknown dose, 16 mg ordered)       PACLitaxel 300 mg/50mL (Taxol)  mg/m2 = 414 mg 160 mg/m2 = 330 mg       pembrolizumab 100 mg/4mL (Keytruda)  mg 200 mg       +filter +blood 200 mg       +blood +filter 200 mg       +blood; +filter 200 mg [ 200 mg ]   sodium chloride 0.9%  mL    500 mL    New Bag (unknown dose, 100 mL ordered)    100 mL 250 mL    500 mL    New Bag (unknown dose, 100 mL ordered)    100 mL       +filter  +blood 100 mL       +blood +filter 100 mL       +blood; +filter 100 mL [ 100 mL ]      Details          [ Planned dose ]    Administered dose cannot be determined                Recent Labs   Lab 06/13/24  1235   WBC 7.3   HGB 11.7*   .0   NE 4.19       Recent Results (from the past 360963 hour(s))   PSA Screen    Collection Time: 12/19/22 11:57 AM   Result Value Ref Range    Prostate Specific Antigen Screen 1.40 <=4.00 ng/mL     *Note: Due to a large number of results and/or encounters for the requested time period, some results have not been displayed. A complete set of results can be found in Results Review.     PSA Screen:    Lab Results   Component Value Date    PSAS 1.40 12/19/2022    PSAS 0.98 03/24/2021         XR RIBS WITH CHEST (3 VIEWS), RIGHT  (CPT=71101)    Result Date: 6/12/2024  CONCLUSION:  1. Osteolytic destructive lesion lateral margin right 8th rib measuring 5.7 cm length.  This corresponds to findings on recent chest CT from 4/16/2024 with interval progression of involvement. 2. No acute fracture dislocation. 3. Dextroscoliosis dorsal spine and multilevel spondylosis. 4. Pulmonary interstitial fibrosis.  Heterogeneous pulmonary lesion left mid chest measuring 3.0 x 2.0 cm with slight progression consistent with known lung carcinoma.    Dictated by (CST): Alec Cm MD on 6/12/2024 at 4:27 PM     Finalized by (CST): Alec Cm MD on 6/12/2024 at 4:35 PM               ROS    A 12 Point review of system was obtained and is as above and per HPI and nursing note.    Review of Systems   Constitutional: Negative.    HENT: Negative.     Eyes: Negative.    Respiratory:  Positive for shortness of breath.         With activity.   Cardiovascular: Negative.    Gastrointestinal:  Positive for constipation and nausea.        Using miralax  Taking antiemetics   Endocrine: Negative.    Genitourinary: Negative.    Musculoskeletal:  Positive for back pain.        Right rib pain- not taking  tramadol much. 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    Allergic/Immunologic: Negative.    Neurological: Negative.    Hematological: Negative.    Psychiatric/Behavioral:  Positive for sleep disturbance.         Due to rib pain. Not taking anything for sleep           Current Outpatient Medications   Medication Sig Dispense Refill    polyethylene glycol, PEG 3350, 17 g Oral Powd Pack Take 17 g by mouth daily.      glipiZIDE ER 5 MG Oral Tablet 24 Hr Take 1 tablet (5 mg total) by mouth daily. 30 tablet 2    lisinopril 30 MG Oral Tab Take 1 tablet (30 mg total) by mouth daily. 30 tablet 2    pregabalin (LYRICA) 50 MG Oral Cap Take 1 capsule (50 mg total) by mouth 3 (three) times daily. 90 capsule 1    atorvastatin 20 MG Oral Tab Take 1 tablet (20 mg total) by mouth nightly. 90 tablet 3    metoprolol succinate  MG Oral Tablet 24 Hr Take 1 tablet (100 mg total) by mouth daily. 90 tablet 1    prochlorperazine (COMPAZINE) 10 mg tablet Take 1 tablet (10 mg total) by mouth every 6 (six) hours as needed for Nausea. 30 tablet 3    ondansetron (ZOFRAN) 8 MG tablet Take 1 tablet (8 mg total) by mouth every 8 (eight) hours as needed for Nausea. 30 tablet 3    aspirin 81 MG Oral Tab EC Take 1 tablet (81 mg total) by mouth daily.      metFORMIN 850 MG Oral Tab Take 1 tablet (850 mg total) by mouth 2 (two) times daily with meals. 180 tablet 3    traMADol 50 MG Oral Tab Take 1-2 tablets ( mg total) by mouth every 6 (six) hours as needed for Pain. 30 tablet 0       PAIN:  Pain Loc: Rib Cage (right ribs), Pain Score: 10 (when patient coughs, is a 10. with rest, is a 6.), Pain Frequency 2: Constant/continuous   ,  ,     ,  ,      ALLERGIES :   No Known Allergies    PAST MEDICAL HISTORY:   has a past medical history of Bell's palsy, COPD (chronic obstructive pulmonary disease) (HCC), Degenerative joint disease (DJD) of lumbar spine, Diabetes  (HCC), Diabetes mellitus, type II (HCC), Heart attack (HCC) (01/01/2010), High blood pressure, High cholesterol, Hyperlipidemia, Hypertension, Osteoarthritis, and Sleep apnea.    He has no past medical history of Anesthesia complication, Arrhythmia, Asthma (HCC), Deep vein thrombosis (HCC), Difficult intubation, Disorder of liver, Disorder of thyroid, Malignant hyperthermia, PONV (postoperative nausea and vomiting), Pseudocholinesterase deficiency, Pulmonary embolism (HCC), Renal disorder, Seizure disorder (HCC), or Stroke (HCC).    PAST SURGICAL HISTORY:   has a past surgical history that includes appendectomy (1975); palate/uvula surgery unlisted (2010); coronary stent placement (2010); and Ventral hernia repair (06/29/2021) (primary repair, ventral and umbilical hernias).    PAST SOCIAL HISTORY   reports that he quit smoking about 4 years ago. His smoking use included cigarettes. He has never used smokeless tobacco. He reports that he does not currently use alcohol. He reports that he does not use drugs.    PAST FAMILY HISTORY   family history includes Heart Disorder in his father; No Known Problems in his mother.    Wt Readings from Last 6 Encounters:   06/17/24 97.7 kg (215 lb 6.4 oz)   06/13/24 97.1 kg (214 lb)   06/12/24 95.7 kg (211 lb)   05/28/24 94.8 kg (209 lb)   05/20/24 96.2 kg (212 lb)   05/16/24 96.6 kg (213 lb)        PHYSICAL EXAM  Blood pressure 160/71, pulse 71, temperature 97.5 °F (36.4 °C), temperature source Temporal, resp. rate 18, weight 97.7 kg (215 lb 6.4 oz), SpO2 97%.  PERFORMANCE STATUS 0-1 , 7/10 PAIN  GENERAL:  Pt is alert and oriented times three in no acute distress.    HEENT:  PERRLA, EOMI,   NECK:  Supple with no  lymphadenopathy.   CHEST:  Clear  R chest lat CW pain.  ABDOMEN:  Soft with no masses.   EXTREMITIES:  There is no upper or lower extremity edema.    NEUROLOGIC:  Cranial nerves II-XII are intact.  Neuro exam is nonfocal.    COMPLETED TESTS:  I have reviewed the patient's  clinical, radiographic, pathologic and laboratory studies.    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       Pt was instructed to start using tramadol and tylenol and I reviewed potential side effects.       I have explained the diagnosis, stage, natural history of the disease and the goals of treatment.   The rational, alternatives and all risk were discussed and all questions were answered.    At this time, the patient would like to proceed with a course of treatment.  Therefore, I will plan a XRT mapping session shortly and will keep you updated.      Thank you for allowing me to take care of your patient.  Please do not hesitate to contact me directly.    Jeff Morin MD, FACRO  Radiation Oncology  [email or web address]  [phone]    6/17/2024

## 2024-06-17 NOTE — PROGRESS NOTES
Nursing Consultation Note  Patient: Tarik Camara  YOB: 1955  Age: 68 year old  Radiation Oncologist: Dr. Jeff Morin  Referring Physician: Jacob Erazo  Diagnosis:[unfilled]  Consult Date: 6/17/2024      Chemotherapy: none  Labs: Reviewed  Imaging: Reviewed  Is the patient of child-bearing age?         No  Has the patient received radiation therapy in the past? no  Does the patient have an implantable device?No   Patient has/has had:     1. Assistive Devices: N/A    2. Flu Vaccination: yes    3. Pneumonia Vaccination:  no--referral to ask PCP    Vital Signs:   Vitals:    06/17/24 1415   BP: 160/71   Pulse: 71   Resp: 18   Temp: 97.5 °F (36.4 °C)   ,   Wt Readings from Last 6 Encounters:   06/17/24 97.7 kg (215 lb 6.4 oz)   06/13/24 97.1 kg (214 lb)   06/12/24 95.7 kg (211 lb)   05/28/24 94.8 kg (209 lb)   05/20/24 96.2 kg (212 lb)   05/16/24 96.6 kg (213 lb)       Nursing Note:      Review of Systems   Constitutional: Negative.    HENT: Negative.     Eyes: Negative.    Respiratory:  Positive for shortness of breath.         With activity.   Cardiovascular: Negative.    Gastrointestinal:  Positive for constipation and nausea.        Using miralax  Taking antiemetics   Endocrine: Negative.    Genitourinary: Negative.    Musculoskeletal:  Positive for back pain.        Right rib pain- not taking tramadol much. 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    Allergic/Immunologic: Negative.    Neurological: Negative.    Hematological: Negative.    Psychiatric/Behavioral:  Positive for sleep disturbance.         Due to rib pain. Not taking anything for sleep            Allergies:  No Known Allergies    Current Outpatient Medications   Medication Sig Dispense Refill    polyethylene glycol, PEG 3350, 17 g Oral Powd Pack Take 17 g by mouth daily.      glipiZIDE ER 5 MG Oral Tablet 24 Hr Take 1 tablet (5 mg total) by mouth daily. 30  tablet 2    lisinopril 30 MG Oral Tab Take 1 tablet (30 mg total) by mouth daily. 30 tablet 2    pregabalin (LYRICA) 50 MG Oral Cap Take 1 capsule (50 mg total) by mouth 3 (three) times daily. 90 capsule 1    atorvastatin 20 MG Oral Tab Take 1 tablet (20 mg total) by mouth nightly. 90 tablet 3    metoprolol succinate  MG Oral Tablet 24 Hr Take 1 tablet (100 mg total) by mouth daily. 90 tablet 1    prochlorperazine (COMPAZINE) 10 mg tablet Take 1 tablet (10 mg total) by mouth every 6 (six) hours as needed for Nausea. 30 tablet 3    ondansetron (ZOFRAN) 8 MG tablet Take 1 tablet (8 mg total) by mouth every 8 (eight) hours as needed for Nausea. 30 tablet 3    aspirin 81 MG Oral Tab EC Take 1 tablet (81 mg total) by mouth daily.      metFORMIN 850 MG Oral Tab Take 1 tablet (850 mg total) by mouth 2 (two) times daily with meals. 180 tablet 3    traMADol 50 MG Oral Tab Take 1-2 tablets ( mg total) by mouth every 6 (six) hours as needed for Pain. 30 tablet 0       Preferred Pharmacy:    [facility]/pharmacy #[id number] - [place], [place] - [address] [phone], [phone]  [address] [address]  Phone: [phone] Fax: [phone]    [id number] - [place], [place] - [address] AT [facility], [phone], [phone]  [address]  [facility] [address]  Phone: [phone] Fax: [phone]      Past Medical History:    Bell's palsy    COPD (chronic obstructive pulmonary disease) ([facility])    Degenerative joint disease (DJD) of lumbar spine    Diabetes (HCC)    Diabetes mellitus, type II (HCC)    Heart attack (HCC)    High blood pressure    High cholesterol    Hyperlipidemia    Hypertension    Osteoarthritis    Sleep apnea    no CPAP       Past Surgical History:   Procedure Laterality Date    Appendectomy  1975    Coronary stent placement  2010    Palate/uvula surgery unlisted  2010    Ventral hernia repair  06/29/2021    primary repair, ventral and  umbilical hernias       Social History     Socioeconomic History    Marital status:      Spouse name: Not on file    Number of children: 3    Years of education: Not on file    Highest education level: Not on file   Occupational History    Occupation:    Tobacco Use    Smoking status: Former     Current packs/day: 0.00     Types: Cigarettes     Quit date: 3/19/2020     Years since quittin.2    Smokeless tobacco: Never    Tobacco comments:     Occasionally, not in last 6 months   Vaping Use    Vaping status: Never Used   Substance and Sexual Activity    Alcohol use: Not Currently    Drug use: Never    Sexual activity: Not on file   Other Topics Concern    Not on file   Social History Narrative    Not on file     Social Determinants of Health     Financial Resource Strain: Low Risk  (2023)    Financial Resource Strain     Difficulty of Paying Living Expenses: Not hard at all     Med Affordability: No   Food Insecurity: Not on file   Transportation Needs: No Transportation Needs (2023)    Transportation Needs     Lack of Transportation: No   Physical Activity: Not on file   Stress: Not on file   Social Connections: Not on file   Housing Stability: Not on file       ECOG:  Grade 1 - No physically strenuous activity, but ambulatory and able to carry out light and sedentary work (e.g. office work, light house work).    Education:  Yes    Are ADL's met?  Yes  Does patient feel safe in their environment?  Yes  Care decisions:  Patient and/or surrogate IS involved in care decisions.  Advanced directives:  Patient DOES NOT have advanced directives.  Transportation:  Adequate transportation available for expected visits    Pain:  Pain Loc: Rib Cage (right ribs);Pain Score: 10 (when patient coughs, is a 10. with rest, is a 6.)   ;    ;       Primary language:  English  Language line required?  no patient declined   Comprehension Ability:  good  Able to read?  yes  Able to write?   yes  Communication tools:   none  Patient's ability to learn:  good  Readiness to learn:  Motivated  Learning preferences:  Discussion and Handout  Barriers to learning:  None  Interventions to reduce barriers:  Consult, Face patient when speaking, Provide support/encouragement, Provide printed materials, and Patient to express feelings  Visual aids:  yes  Hearing disability:  no  Dentures:  yes  full plate- upper and lower    Knowledge Deficit Plan Of Care:    Problem:  Knowledge Deficit    Problems related to:    Radiation therapy    Interventions:  Assess patient knowledge level  Assess knowledge needs  Instruct on treatment planning  Instruct on radiation therapy appointment scheduling  Instruct on purpose of radiation therapy  Instruct on side effects of radiation therapy    Expected Outcomes:  Knowledge of diagnosis  Knowledge of disease process  Knowledge of care plan  Knowledge of radiation therapy  Knowledge of side effects of radiation therapy and management  Comfortable with knowledge level    Progress Toward Outcome:  Making progress    Pamphlets/Handouts Given to Patient:  Radiation process overview     Pain Plan Of Care:    Related to:    Malignant metastases    Pain Problem Interventions:  Assess pain  Provide alternate relief measures  Modify activities  Instruct patient/family to take pain meds prior to radiation therapy  Asses response to pain management  Instruct patient on pain medication  Reposition as needed    Expected Outcomes:  Pain reduction/control  Knowledge of disease process  Knowledge of care plan  Satisfied with pain control    Progress Toward Outcome:  Making progress    Pamphlets/Handouts Given to Patient:  Radiation therapy symptom management for bone  Site specific side effects bone    Patient seen for consultation with Dr. Morin. Patient accompanied by his wife. Patient offered , but declined. I explained to the patient that today he would meet Dr. Morin but while being  treated there was a possibility that he might also encounter the physicians who cover for Dr. Morin which are Dr. Sainz, Dr. Munoz, and Dr. Efrain Clarke. Patient works as a , but has not been working recently. Anxious to get back to work. Patient having severe rib pain, worsened with coughing, but remains at about a 6/10 at rest. Patient has tramadol prescribed but does not take it much, as he doesn't like taking too many medications. Also occasionally takes tylenol. Patient has bilateral foot neuropathy, is taking to his PCP about a referral for this. Educated on radiation process and side effects. Patient states understanding. CT sim task sent, consent signed. Patient provided with nursing number in case of additional questions.

## 2024-06-21 ENCOUNTER — HOSPITAL ENCOUNTER (OUTPATIENT)
Dept: RADIATION ONCOLOGY | Facility: HOSPITAL | Age: 69
Discharge: HOME | End: 2024-06-21
Attending: RADIOLOGY
Payer: MEDICARE

## 2024-06-21 PROCEDURE — 77290 THER RAD SIMULAJ FIELD CPLX: CPT | Performed by: RADIOLOGY

## 2024-06-21 PROCEDURE — 77399 UNLISTED PX MED RADJ PHYSICS: CPT | Performed by: RADIOLOGY

## 2024-06-21 PROCEDURE — 77332 RADIATION TREATMENT AID(S): CPT | Performed by: RADIOLOGY

## 2024-06-24 PROCEDURE — 77300 RADIATION THERAPY DOSE PLAN: CPT | Performed by: RADIOLOGY

## 2024-06-24 PROCEDURE — 77334 RADIATION TREATMENT AID(S): CPT | Performed by: RADIOLOGY

## 2024-06-24 PROCEDURE — 77295 3-D RADIOTHERAPY PLAN: CPT | Performed by: RADIOLOGY

## 2024-07-01 ENCOUNTER — APPOINTMENT (OUTPATIENT)
Dept: RADIATION ONCOLOGY | Facility: HOSPITAL | Age: 69
End: 2024-07-01
Attending: RADIOLOGY
Payer: MEDICARE

## 2024-07-02 ENCOUNTER — APPOINTMENT (OUTPATIENT)
Dept: RADIATION ONCOLOGY | Facility: HOSPITAL | Age: 69
End: 2024-07-02
Attending: RADIOLOGY
Payer: MEDICARE

## 2024-07-02 PROCEDURE — 77412 RADIATION TX DELIVERY LVL 3: CPT | Performed by: RADIOLOGY

## 2024-07-02 PROCEDURE — 77280 THER RAD SIMULAJ FIELD SMPL: CPT | Performed by: RADIOLOGY

## 2024-07-03 PROCEDURE — 77387 GUIDANCE FOR RADJ TX DLVR: CPT | Performed by: RADIOLOGY

## 2024-07-03 PROCEDURE — 77412 RADIATION TX DELIVERY LVL 3: CPT | Performed by: RADIOLOGY

## 2024-07-05 ENCOUNTER — NURSE ONLY (OUTPATIENT)
Dept: HEMATOLOGY/ONCOLOGY | Facility: HOSPITAL | Age: 69
End: 2024-07-05
Attending: INTERNAL MEDICINE
Payer: MEDICARE

## 2024-07-05 VITALS
BODY MASS INDEX: 32.99 KG/M2 | DIASTOLIC BLOOD PRESSURE: 68 MMHG | OXYGEN SATURATION: 96 % | SYSTOLIC BLOOD PRESSURE: 135 MMHG | HEART RATE: 69 BPM | HEIGHT: 67 IN | WEIGHT: 210.19 LBS | TEMPERATURE: 98 F | RESPIRATION RATE: 18 BRPM

## 2024-07-05 DIAGNOSIS — C79.51 PAIN FROM BONE METASTASES (HCC): ICD-10-CM

## 2024-07-05 DIAGNOSIS — Z51.12 ENCOUNTER FOR ANTINEOPLASTIC IMMUNOTHERAPY: Primary | ICD-10-CM

## 2024-07-05 DIAGNOSIS — C79.51 METASTASIS TO BONE (HCC): ICD-10-CM

## 2024-07-05 DIAGNOSIS — C34.92 STAGE IV SQUAMOUS CELL CARCINOMA OF LEFT LUNG (HCC): Primary | ICD-10-CM

## 2024-07-05 DIAGNOSIS — Z51.12 ENCOUNTER FOR ANTINEOPLASTIC IMMUNOTHERAPY: ICD-10-CM

## 2024-07-05 DIAGNOSIS — G62.0 CHEMOTHERAPY-INDUCED PERIPHERAL NEUROPATHY (HCC): ICD-10-CM

## 2024-07-05 DIAGNOSIS — T45.1X5A CHEMOTHERAPY-INDUCED PERIPHERAL NEUROPATHY (HCC): ICD-10-CM

## 2024-07-05 DIAGNOSIS — Z92.21 HISTORY OF CHEMOTHERAPY: ICD-10-CM

## 2024-07-05 DIAGNOSIS — C34.90 MALIGNANT NEOPLASM OF LUNG, UNSPECIFIED LATERALITY, UNSPECIFIED PART OF LUNG (HCC): ICD-10-CM

## 2024-07-05 DIAGNOSIS — C34.90 MALIGNANT NEOPLASM OF LUNG, UNSPECIFIED LATERALITY, UNSPECIFIED PART OF LUNG (HCC): Primary | ICD-10-CM

## 2024-07-05 DIAGNOSIS — G89.3 PAIN FROM BONE METASTASES (HCC): ICD-10-CM

## 2024-07-05 LAB
ALBUMIN SERPL-MCNC: 4.4 G/DL (ref 3.2–4.8)
ALBUMIN/GLOB SERPL: 1.3 {RATIO} (ref 1–2)
ALP LIVER SERPL-CCNC: 84 U/L
ALT SERPL-CCNC: 12 U/L
ANION GAP SERPL CALC-SCNC: 3 MMOL/L (ref 0–18)
AST SERPL-CCNC: 19 U/L (ref ?–34)
BASOPHILS # BLD AUTO: 0.03 X10(3) UL (ref 0–0.2)
BASOPHILS NFR BLD AUTO: 0.5 %
BILIRUB SERPL-MCNC: 0.6 MG/DL (ref 0.2–1.1)
BUN BLD-MCNC: 13 MG/DL (ref 9–23)
BUN/CREAT SERPL: 12.1 (ref 10–20)
CALCIUM BLD-MCNC: 9.9 MG/DL (ref 8.7–10.4)
CHLORIDE SERPL-SCNC: 106 MMOL/L (ref 98–112)
CO2 SERPL-SCNC: 28 MMOL/L (ref 21–32)
CREAT BLD-MCNC: 1.07 MG/DL
DEPRECATED RDW RBC AUTO: 46.5 FL (ref 35.1–46.3)
EGFRCR SERPLBLD CKD-EPI 2021: 75 ML/MIN/1.73M2 (ref 60–?)
EOSINOPHIL # BLD AUTO: 0.18 X10(3) UL (ref 0–0.7)
EOSINOPHIL NFR BLD AUTO: 2.7 %
ERYTHROCYTE [DISTWIDTH] IN BLOOD BY AUTOMATED COUNT: 13.8 % (ref 11–15)
GLOBULIN PLAS-MCNC: 3.4 G/DL (ref 2–3.5)
GLUCOSE BLD-MCNC: 167 MG/DL (ref 70–99)
HCT VFR BLD AUTO: 37.7 %
HGB BLD-MCNC: 12.1 G/DL
IMM GRANULOCYTES # BLD AUTO: 0.01 X10(3) UL (ref 0–1)
IMM GRANULOCYTES NFR BLD: 0.2 %
LYMPHOCYTES # BLD AUTO: 1.42 X10(3) UL (ref 1–4)
LYMPHOCYTES NFR BLD AUTO: 21.4 %
MCH RBC QN AUTO: 29.7 PG (ref 26–34)
MCHC RBC AUTO-ENTMCNC: 32.1 G/DL (ref 31–37)
MCV RBC AUTO: 92.4 FL
MONOCYTES # BLD AUTO: 0.55 X10(3) UL (ref 0.1–1)
MONOCYTES NFR BLD AUTO: 8.3 %
NEUTROPHILS # BLD AUTO: 4.46 X10 (3) UL (ref 1.5–7.7)
NEUTROPHILS # BLD AUTO: 4.46 X10(3) UL (ref 1.5–7.7)
NEUTROPHILS NFR BLD AUTO: 66.9 %
OSMOLALITY SERPL CALC.SUM OF ELEC: 288 MOSM/KG (ref 275–295)
PLATELET # BLD AUTO: 274 10(3)UL (ref 150–450)
POTASSIUM SERPL-SCNC: 4.9 MMOL/L (ref 3.5–5.1)
PROT SERPL-MCNC: 7.8 G/DL (ref 5.7–8.2)
RBC # BLD AUTO: 4.08 X10(6)UL
SODIUM SERPL-SCNC: 137 MMOL/L (ref 136–145)
T4 FREE SERPL-MCNC: 1.1 NG/DL (ref 0.8–1.7)
TSI SER-ACNC: 2.57 MIU/ML (ref 0.55–4.78)
WBC # BLD AUTO: 6.7 X10(3) UL (ref 4–11)

## 2024-07-05 PROCEDURE — 77387 GUIDANCE FOR RADJ TX DLVR: CPT | Performed by: RADIOLOGY

## 2024-07-05 PROCEDURE — 80053 COMPREHEN METABOLIC PANEL: CPT

## 2024-07-05 PROCEDURE — G2211 COMPLEX E/M VISIT ADD ON: HCPCS | Performed by: INTERNAL MEDICINE

## 2024-07-05 PROCEDURE — 77412 RADIATION TX DELIVERY LVL 3: CPT | Performed by: RADIOLOGY

## 2024-07-05 PROCEDURE — 85025 COMPLETE CBC W/AUTO DIFF WBC: CPT

## 2024-07-05 PROCEDURE — 99215 OFFICE O/P EST HI 40 MIN: CPT | Performed by: INTERNAL MEDICINE

## 2024-07-05 PROCEDURE — 96413 CHEMO IV INFUSION 1 HR: CPT

## 2024-07-05 PROCEDURE — 84443 ASSAY THYROID STIM HORMONE: CPT

## 2024-07-05 PROCEDURE — 84439 ASSAY OF FREE THYROXINE: CPT

## 2024-07-05 RX ORDER — TRAMADOL HYDROCHLORIDE 50 MG/1
TABLET ORAL EVERY 6 HOURS PRN
Qty: 30 TABLET | Refills: 0 | Status: SHIPPED | OUTPATIENT
Start: 2024-07-05

## 2024-07-05 NOTE — PROGRESS NOTES
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  PDL1 0%     Diagnosis 1/3/2024 via transbronchial biopsy bronchoscopy with Dr. Carrillo.     On CT 12/22/23 4.2 x 3.3 cm left upper lobe mass enlarged AP reflection lymph node left hilar adenopathy.  PET/CT showed bone metastasis     Initiated Carboplatin Paclitaxel Pembrolizumab 2/1/24  Maintenance pembrolizumab 4/25/24- present    Palliative RT to R 8th rib lesion 7/2-7/3/24       Interval History:  Here for follow up and consideration of next pembrolizumab cycle.   Completed RT to the right 8th rib lesion. Still having a lot of pain.  Taking tramadol which reduces the pain from 10-->4-6  He is tolerating pembro well with no issues. Denies fever, cough, shortness of breath.   Persistent neuropathy in the feet, overall stable on lyrica.   Occasional diarrhea, manageable with imodium   No skin rash or active bleeding. No new focal neurological deficits.    Performance Status: ECOG 0    Current Medications:    Current Outpatient Medications:     polyethylene glycol, PEG 3350, 17 g Oral Powd Pack, Take 17 g by mouth daily., Disp: , Rfl:     glipiZIDE ER 5 MG Oral Tablet 24 Hr, Take 1 tablet (5 mg total) by mouth daily., Disp: 30 tablet, Rfl: 2    lisinopril 30 MG Oral Tab, Take 1 tablet (30 mg total) by mouth daily., Disp: 30 tablet, Rfl: 2    pregabalin (LYRICA) 50 MG Oral Cap, Take 1 capsule (50 mg total) by mouth 3 (three) times daily., Disp: 90 capsule, Rfl: 1    traMADol 50 MG Oral Tab, Take 1-2 tablets ( mg total) by mouth every 6 (six) hours as needed for Pain., Disp: 30 tablet, Rfl: 0    atorvastatin 20 MG  Oral Tab, Take 1 tablet (20 mg total) by mouth nightly., Disp: 90 tablet, Rfl: 3    metoprolol succinate  MG Oral Tablet 24 Hr, Take 1 tablet (100 mg total) by mouth daily., Disp: 90 tablet, Rfl: 1    prochlorperazine (COMPAZINE) 10 mg tablet, Take 1 tablet (10 mg total) by mouth every 6 (six) hours as needed for Nausea., Disp: 30 tablet, Rfl: 3    ondansetron (ZOFRAN) 8 MG tablet, Take 1 tablet (8 mg total) by mouth every 8 (eight) hours as needed for Nausea., Disp: 30 tablet, Rfl: 3    aspirin 81 MG Oral Tab EC, Take 1 tablet (81 mg total) by mouth daily., Disp: , Rfl:     metFORMIN 850 MG Oral Tab, Take 1 tablet (850 mg total) by mouth 2 (two) times daily with meals., Disp: 180 tablet, Rfl: 3       Review of Systems:  10 -point systems reviewed, all negative except as mentioned in the HPI/interval history.     Vital Signs:  /68 (BP Location: Left arm, Patient Position: Sitting, Cuff Size: adult)   Pulse 69   Temp 98.1 °F (36.7 °C) (Oral)   Resp 18   Ht 1.702 m (5' 7\")   Wt 95.3 kg (210 lb 3.2 oz)   SpO2 96%   BMI 32.92 kg/m²     Physical Examination:  General: Aert and oriented x 3, not in acute distress.  Psych:  Mood and affect appropriate  HEENT: Non-icteric sclera. Oropharynx is clear.   Neck: No palpable lymphadenopathy. Neck is supple.  Chest: Clear to auscultation. Right lower ribs tenderness.   Heart: Regular rate and rhythm.  Abdomen: Soft, non tender with good bowel sounds.   Extremities: No peripheral edema. No clubbing.   Neurological: Grossly intact.     Laboratory:  Lab Results   Component Value Date    WBC 6.7 07/05/2024    RBC 4.08 07/05/2024    HGB 12.1 (L) 07/05/2024    HCT 37.7 (L) 07/05/2024    MCV 92.4 07/05/2024    MCH 29.7 07/05/2024    MCHC 32.1 07/05/2024    RDW 13.8 07/05/2024    .0 07/05/2024     Lab Results   Component Value Date     07/05/2024    K 4.9 07/05/2024     07/05/2024    CO2 28.0 07/05/2024    BUN 13 07/05/2024    CREATSERUM 1.07  07/05/2024     (H) 07/05/2024    CA 9.9 07/05/2024    ALKPHO 84 07/05/2024    ALT 12 07/05/2024    AST 19 07/05/2024    BILT 0.6 07/05/2024    ALB 4.4 07/05/2024    TP 7.8 07/05/2024     Lab Results   Component Value Date/Time    TSH 2.573 07/05/2024 10:18 AM    TSH 2.706 06/13/2024 12:35 PM    TSH 3.369 05/20/2024 12:56 PM    TSH 3.325 04/24/2024 10:15 AM    TSH 2.917 04/03/2024 08:39 AM    TSH 3.050 03/13/2024 08:17 AM     Radiology:  XR RIBS WITH CHEST (3 VIEWS), RIGHT  (CPT=71101)    Result Date: 6/12/2024  CONCLUSION:  1. Osteolytic destructive lesion lateral margin right 8th rib measuring 5.7 cm length.  This corresponds to findings on recent chest CT from 4/16/2024 with interval progression of involvement. 2. No acute fracture dislocation. 3. Dextroscoliosis dorsal spine and multilevel spondylosis. 4. Pulmonary interstitial fibrosis.  Heterogeneous pulmonary lesion left mid chest measuring 3.0 x 2.0 cm with slight progression consistent with known lung carcinoma.    Dictated by (CST): Alec Cm MD on 6/12/2024 at 4:27 PM     Finalized by (CST): Alec Cm MD on 6/12/2024 at 4:35 PM          CT CHEST+ABDOMEN+PELVIS(ALL CNTRST ONLY)(CPT=71260/38769)    Result Date: 4/16/2024  CONCLUSION:  1. Favorable treatment response with decreasing size of the primary left upper lobe malignancy.  2. Osseous metastatic involvement of the right lateral 8th rib.  3. Decreasing lymphadenopathy.  4. Severe paraseptal emphysema and fibrosis.  5. Scattered colonic diverticulosis without CT evidence acute complication.  6. Hepatomegaly with underlying hepatic steatosis.  7. Prostatomegaly.  8. Status post appendectomy.  9. Lesser incidental findings as above.    Dictated by (CST): Luis E Desir MD on 4/16/2024 at 1:20 PM     Finalized by (CST): Luis E Desir MD on 4/16/2024 at 1:39 PM            Impression and Plan:    Metastatic squamous cell carcinoma of the left upper lung, PDL1 0%  --Carboplatin  paclitaxel pembrolizuamb-initiated 2/1/24.  --ER visit 2/4 for myalgias/arthralgias with normal CK and unremarkable eval. Likely taxol acute pain syndrome   --S/p 4 cycles Carboplatin Paclitaxel Pembrolizumab completed 4/4/24; dose reduce carbo to AUC 5 and paclitaxel by 20% due CIPN to feet.   --Imaging 4/2024 after C4 with favorable tx response.   --Proceed with cycle #8 pembrolizumab today. No sings of autoimmune toxicity.  --Restaging CT scans in mid July- ordered today      Bone metastases  Cancer related pain  --Worsening right sided rib pain from growing 8th rib bone metastasis  --Xray showed enlarging destructive bone lesions measuring 5.7 cm from 4.6 in 4/2024.   --Completed palliative XRT 7/3/24  --Continue tramadol as needed for pain.       CIPN gr 1/2. Dose reduction of chemo above. Done with chemo. Supportive care, med mgmt per PCP with lyrica     Chemo induced anemia near resolve. Monitor.      RTC 3 weeks       Мария Spears MD   Hematology Oncology

## 2024-07-05 NOTE — PROGRESS NOTES
Pt here for C8D1 Drug(s)Keytruda.  Arrives Ambulating independently, accompanied by Self and Spouse     Patient was evaluated today by MD.    Oral medications included in this regimen:  no    Patient confirms comprehension of cancer treatment schedule:  yes    Pregnancy screening:  Not applicable    Modifications in dose or schedule:  No    Medications appearance and physical integrity checked by RN: yes.    Chemotherapy IV pump settings verified by 2 RNs:  No due to targeted therapy IV administration.  Frequency of blood return and site check throughout administration: Prior to administration and At completion of therapy     Infusion/treatment outcome:  patient tolerated treatment without incident    Education Record    Learner:  Patient and Spouse  Barriers / Limitations:  None  Method:  Brief focused and Discussion  Education / instructions given:  Plan of care for treatment today  Outcome:  Shows understanding    Discharged Home, Ambulating independently, accompanied by:Self and Spouse    Patient/family verbalized understanding of future appointments: by MyChart messaging

## 2024-07-05 NOTE — PROGRESS NOTES
[facility] Radiation Treatment Management Note 1-5    Patient:  Tarik Camara  Age:  68 year old  Visit Diagnosis:  No diagnosis found.  Primary Rad/Onc:  Dr. Jeff Morin    Site Delivered Dose (cGy) Prescribed Dose (cGy) Fraction #   R Chest Wall 2000 2500 4/5           First treatment date:  7/2/2024  Concurrent chemotherapy:  none        6/12/2024    11:00 AM 6/13/2024     2:10 PM 6/17/2024     2:15 PM   Oncology Vitals   Height 5' 7\" 5' 7\"    Height 170 cm 170 cm    Weight 211 lb 214 lb 215 lb 6.4 oz   Weight 95.709 kg 97.07 kg 97.705 kg   BSA (m2) 2.07 m2 2.08 m2 2.09 m2   BMI 33.05 kg/m2 33.52 kg/m2 33.74 kg/m2   /86 160/67 160/71   Pulse 73 69 71   Resp 17 18 18   Temp 97.9 °F (36.6 °C) 97.7 °F (36.5 °C) 97.5 °F (36.4 °C)   SpO2 98 % 99 % 97 %   Pain Score  2 10        Toxicities:  Fatigue Grade 0= None  Bone pain Grade 0= None  Gait disturbance Grade 0= None  Muscle weakness Grade 0= None    Nursing Note:  Pt seen for OTV with Dr. Morin.  Pt reports dizziness today after RT, VSS.  Takes 1 tablet of Tylenol and 1 tablet of Tramadol daily for pain.   AVS given to patient.     Wt Readings from Last 6 Encounters:   07/08/24 95 kg (209 lb 6.4 oz)   07/05/24 95.3 kg (210 lb 3.2 oz)   06/17/24 97.7 kg (215 lb 6.4 oz)   06/13/24 97.1 kg (214 lb)   06/12/24 95.7 kg (211 lb)   05/28/24 94.8 kg (209 lb)       Jo-Ann LINN, RN    Physician Note:  Subjective:    Pain controlled   Objective:  NAD      Treatment setup imaging have been reviewed:  Yes    Assessment/Plan:    Palliative XRT Right chest wall    Continue radiotherapy per plan    Next visit:  1 week    Dr. Jeff Morin

## 2024-07-08 ENCOUNTER — OFFICE VISIT (OUTPATIENT)
Dept: RADIATION ONCOLOGY | Facility: HOSPITAL | Age: 69
End: 2024-07-08
Attending: RADIOLOGY
Payer: MEDICARE

## 2024-07-08 VITALS
SYSTOLIC BLOOD PRESSURE: 138 MMHG | WEIGHT: 209.38 LBS | RESPIRATION RATE: 18 BRPM | HEART RATE: 73 BPM | OXYGEN SATURATION: 96 % | DIASTOLIC BLOOD PRESSURE: 60 MMHG | BODY MASS INDEX: 33 KG/M2 | TEMPERATURE: 97 F

## 2024-07-08 DIAGNOSIS — C79.51 METASTASIS TO BONE (HCC): Primary | ICD-10-CM

## 2024-07-08 PROCEDURE — 77412 RADIATION TX DELIVERY LVL 3: CPT | Performed by: RADIOLOGY

## 2024-07-08 PROCEDURE — 77387 GUIDANCE FOR RADJ TX DLVR: CPT | Performed by: RADIOLOGY

## 2024-07-08 NOTE — PATIENT INSTRUCTIONS
Follow up with Dr. Morin as needed.  Please call [phone] with any radiation questions.

## 2024-07-09 PROCEDURE — 77387 GUIDANCE FOR RADJ TX DLVR: CPT | Performed by: RADIOLOGY

## 2024-07-09 PROCEDURE — 77412 RADIATION TX DELIVERY LVL 3: CPT | Performed by: RADIOLOGY

## 2024-07-10 PROCEDURE — 77336 RADIATION PHYSICS CONSULT: CPT | Performed by: RADIOLOGY

## 2024-07-24 ENCOUNTER — HOSPITAL ENCOUNTER (OUTPATIENT)
Dept: CT IMAGING | Facility: HOSPITAL | Age: 69
Discharge: HOME | End: 2024-07-24
Attending: INTERNAL MEDICINE
Payer: MEDICARE

## 2024-07-24 DIAGNOSIS — C79.51 METASTASIS TO BONE (HCC): ICD-10-CM

## 2024-07-24 DIAGNOSIS — C34.92 STAGE IV SQUAMOUS CELL CARCINOMA OF LEFT LUNG (HCC): ICD-10-CM

## 2024-07-24 PROCEDURE — 74177 CT ABD & PELVIS W/CONTRAST: CPT | Performed by: INTERNAL MEDICINE

## 2024-07-24 PROCEDURE — 71260 CT THORAX DX C+: CPT | Performed by: INTERNAL MEDICINE

## 2024-07-25 ENCOUNTER — APPOINTMENT (OUTPATIENT)
Dept: HEMATOLOGY/ONCOLOGY | Facility: HOSPITAL | Age: 69
End: 2024-07-25
Attending: INTERNAL MEDICINE
Payer: MEDICARE

## 2024-07-25 VITALS
DIASTOLIC BLOOD PRESSURE: 65 MMHG | BODY MASS INDEX: 32.46 KG/M2 | RESPIRATION RATE: 18 BRPM | OXYGEN SATURATION: 98 % | HEIGHT: 67 IN | TEMPERATURE: 98 F | WEIGHT: 206.81 LBS | SYSTOLIC BLOOD PRESSURE: 150 MMHG | HEART RATE: 61 BPM

## 2024-07-25 DIAGNOSIS — C79.51 METASTASIS TO BONE (HCC): ICD-10-CM

## 2024-07-25 DIAGNOSIS — Z51.12 ENCOUNTER FOR ANTINEOPLASTIC IMMUNOTHERAPY: ICD-10-CM

## 2024-07-25 DIAGNOSIS — Z51.12 ENCOUNTER FOR ANTINEOPLASTIC CHEMOTHERAPY AND IMMUNOTHERAPY: ICD-10-CM

## 2024-07-25 DIAGNOSIS — T45.1X5A CHEMOTHERAPY-INDUCED PERIPHERAL NEUROPATHY (HCC): ICD-10-CM

## 2024-07-25 DIAGNOSIS — G62.0 CHEMOTHERAPY-INDUCED PERIPHERAL NEUROPATHY (HCC): ICD-10-CM

## 2024-07-25 DIAGNOSIS — C34.92 STAGE IV SQUAMOUS CELL CARCINOMA OF LEFT LUNG (HCC): Primary | ICD-10-CM

## 2024-07-25 DIAGNOSIS — C34.90 MALIGNANT NEOPLASM OF LUNG, UNSPECIFIED LATERALITY, UNSPECIFIED PART OF LUNG (HCC): Primary | ICD-10-CM

## 2024-07-25 DIAGNOSIS — Z51.11 ENCOUNTER FOR ANTINEOPLASTIC CHEMOTHERAPY AND IMMUNOTHERAPY: ICD-10-CM

## 2024-07-25 LAB
ALBUMIN SERPL-MCNC: 4.4 G/DL (ref 3.2–4.8)
ALBUMIN/GLOB SERPL: 1.3 {RATIO} (ref 1–2)
ALP LIVER SERPL-CCNC: 86 U/L
ALT SERPL-CCNC: 23 U/L
ANION GAP SERPL CALC-SCNC: 5 MMOL/L (ref 0–18)
AST SERPL-CCNC: 26 U/L (ref ?–34)
BASOPHILS # BLD AUTO: 0.03 X10(3) UL (ref 0–0.2)
BASOPHILS NFR BLD AUTO: 0.5 %
BILIRUB SERPL-MCNC: 0.3 MG/DL (ref 0.2–1.1)
BUN BLD-MCNC: 12 MG/DL (ref 9–23)
BUN/CREAT SERPL: 12.6 (ref 10–20)
CALCIUM BLD-MCNC: 9.8 MG/DL (ref 8.7–10.4)
CHLORIDE SERPL-SCNC: 104 MMOL/L (ref 98–112)
CO2 SERPL-SCNC: 29 MMOL/L (ref 21–32)
CREAT BLD-MCNC: 0.95 MG/DL
DEPRECATED RDW RBC AUTO: 42.5 FL (ref 35.1–46.3)
EGFRCR SERPLBLD CKD-EPI 2021: 87 ML/MIN/1.73M2 (ref 60–?)
EOSINOPHIL # BLD AUTO: 0.23 X10(3) UL (ref 0–0.7)
EOSINOPHIL NFR BLD AUTO: 3.7 %
ERYTHROCYTE [DISTWIDTH] IN BLOOD BY AUTOMATED COUNT: 13.3 % (ref 11–15)
GLOBULIN PLAS-MCNC: 3.3 G/DL (ref 2–3.5)
GLUCOSE BLD-MCNC: 141 MG/DL (ref 70–99)
HCT VFR BLD AUTO: 34.8 %
HGB BLD-MCNC: 11.8 G/DL
IMM GRANULOCYTES # BLD AUTO: 0.02 X10(3) UL (ref 0–1)
IMM GRANULOCYTES NFR BLD: 0.3 %
LYMPHOCYTES # BLD AUTO: 1.1 X10(3) UL (ref 1–4)
LYMPHOCYTES NFR BLD AUTO: 17.5 %
MCH RBC QN AUTO: 29.9 PG (ref 26–34)
MCHC RBC AUTO-ENTMCNC: 33.9 G/DL (ref 31–37)
MCV RBC AUTO: 88.1 FL
MONOCYTES # BLD AUTO: 0.55 X10(3) UL (ref 0.1–1)
MONOCYTES NFR BLD AUTO: 8.8 %
NEUTROPHILS # BLD AUTO: 4.34 X10 (3) UL (ref 1.5–7.7)
NEUTROPHILS # BLD AUTO: 4.34 X10(3) UL (ref 1.5–7.7)
NEUTROPHILS NFR BLD AUTO: 69.2 %
OSMOLALITY SERPL CALC.SUM OF ELEC: 288 MOSM/KG (ref 275–295)
PLATELET # BLD AUTO: 244 10(3)UL (ref 150–450)
POTASSIUM SERPL-SCNC: 4.4 MMOL/L (ref 3.5–5.1)
PROT SERPL-MCNC: 7.7 G/DL (ref 5.7–8.2)
RBC # BLD AUTO: 3.95 X10(6)UL
SODIUM SERPL-SCNC: 138 MMOL/L (ref 136–145)
T4 FREE SERPL-MCNC: 1.1 NG/DL (ref 0.8–1.7)
TSI SER-ACNC: 3.33 MIU/ML (ref 0.55–4.78)
WBC # BLD AUTO: 6.3 X10(3) UL (ref 4–11)

## 2024-07-25 PROCEDURE — 84439 ASSAY OF FREE THYROXINE: CPT

## 2024-07-25 PROCEDURE — 99215 OFFICE O/P EST HI 40 MIN: CPT | Performed by: INTERNAL MEDICINE

## 2024-07-25 PROCEDURE — G2211 COMPLEX E/M VISIT ADD ON: HCPCS | Performed by: INTERNAL MEDICINE

## 2024-07-25 PROCEDURE — 84443 ASSAY THYROID STIM HORMONE: CPT

## 2024-07-25 PROCEDURE — 96413 CHEMO IV INFUSION 1 HR: CPT

## 2024-07-25 PROCEDURE — 85025 COMPLETE CBC W/AUTO DIFF WBC: CPT

## 2024-07-25 PROCEDURE — 80053 COMPREHEN METABOLIC PANEL: CPT

## 2024-07-25 NOTE — PROGRESS NOTES
Pt here for C9D1 Drug(s)KEYTRUDA.  Arrives Ambulating independently, accompanied by Spouse     Patient was evaluated today by MD.    Oral medications included in this regimen:  no    Patient confirms comprehension of cancer treatment schedule:  yes    Pregnancy screening:  Not applicable    Modifications in dose or schedule:  No    Medications appearance and physical integrity checked by RN: yes.    Chemotherapy IV pump settings verified by 2 RNs:  n/a.  Frequency of blood return and site check throughout administration: Prior to administration and At completion of therapy     Infusion/treatment outcome:  patient tolerated treatment without incident    Education Record    Learner:  Patient and Spouse  Barriers / Limitations:  None  Method:  Discussion  Education / instructions given:  POC  Outcome:  Shows understanding    Discharged Home, Ambulating independently, accompanied by:Spouse    Patient/family verbalized understanding of future appointments: by printed AVS

## 2024-07-25 NOTE — PROGRESS NOTES
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  PDL1 0%     Diagnosis 1/3/2024 via transbronchial biopsy bronchoscopy with Dr. Carrillo.     On CT 12/22/23 4.2 x 3.3 cm left upper lobe mass enlarged AP reflection lymph node left hilar adenopathy.  PET/CT showed bone metastasis     Initiated Carboplatin Paclitaxel Pembrolizumab 2/1/24  Maintenance pembrolizumab 4/25/24- present    Palliative RT to R 8th rib lesion 7/2-7/3/24       Interval History:  Here for follow up and consideration of next pembrolizumab cycle.   Right rib pain resolved. Takes tramadol and tylenol as needed.   Overall he is doing well and tolerating pembro with no issues. Denies fever, cough, shortness of breath.   Persistent numbness in the feet stable on lyrica.   Occasional diarrhea, manageable with imodium   Energy is good. Appetite normal but lost 8 lb in the last month back to baseline weight.   No skin rash or active bleeding. No new focal neurological deficits.    Performance Status: ECOG 0    Current Medications:    Current Outpatient Medications:     traMADol 50 MG Oral Tab, Take 1-2 tablets ( mg total) by mouth every 6 (six) hours as needed for Pain., Disp: 30 tablet, Rfl: 0    polyethylene glycol, PEG 3350, 17 g Oral Powd Pack, Take 17 g by mouth daily., Disp: , Rfl:     lisinopril 30 MG Oral Tab, Take 1 tablet (30 mg total) by mouth daily., Disp: 30 tablet, Rfl: 2    pregabalin (LYRICA) 50 MG Oral Cap, Take 1 capsule (50 mg total) by mouth 3 (three) times daily., Disp: 90 capsule, Rfl: 1    atorvastatin 20 MG Oral Tab, Take 1 tablet (20 mg total) by mouth nightly., Disp: 90  tablet, Rfl: 3    metoprolol succinate  MG Oral Tablet 24 Hr, Take 1 tablet (100 mg total) by mouth daily., Disp: 90 tablet, Rfl: 1    prochlorperazine (COMPAZINE) 10 mg tablet, Take 1 tablet (10 mg total) by mouth every 6 (six) hours as needed for Nausea., Disp: 30 tablet, Rfl: 3    ondansetron (ZOFRAN) 8 MG tablet, Take 1 tablet (8 mg total) by mouth every 8 (eight) hours as needed for Nausea., Disp: 30 tablet, Rfl: 3    aspirin 81 MG Oral Tab EC, Take 1 tablet (81 mg total) by mouth daily., Disp: , Rfl:     metFORMIN 850 MG Oral Tab, Take 1 tablet (850 mg total) by mouth 2 (two) times daily with meals., Disp: 180 tablet, Rfl: 3    glipiZIDE ER 5 MG Oral Tablet 24 Hr, Take 1 tablet (5 mg total) by mouth daily. (Patient not taking: Reported on 7/25/2024), Disp: 30 tablet, Rfl: 2       Review of Systems:  10 -point systems reviewed, all negative except as mentioned in the HPI/interval history.     Vital Signs:  /65 (BP Location: Left arm, Patient Position: Sitting, Cuff Size: adult)   Pulse 61   Temp 97.8 °F (36.6 °C) (Oral)   Resp 18   Ht 1.702 m (5' 7\")   Wt 93.8 kg (206 lb 12.8 oz)   SpO2 98%   BMI 32.39 kg/m²     Wt Readings from Last 6 Encounters:   07/25/24 93.8 kg (206 lb 12.8 oz)   07/08/24 95 kg (209 lb 6.4 oz)   07/05/24 95.3 kg (210 lb 3.2 oz)   06/17/24 97.7 kg (215 lb 6.4 oz)   06/13/24 97.1 kg (214 lb)   06/12/24 95.7 kg (211 lb)      Physical Examination:  General: Aert and oriented x 3, not in acute distress.  Psych:  Mood and affect appropriate  HEENT: Non-icteric sclera. Oropharynx is clear.   Neck: No palpable lymphadenopathy. Neck is supple.  Chest: Clear to auscultation. No chest wall tenderness.   Heart: Regular rate and rhythm.  Abdomen: Soft, non tender with good bowel sounds.   Extremities: No peripheral edema. No clubbing.   Neurological: Grossly intact.     Laboratory:  Lab Results   Component Value Date    WBC 6.3 07/25/2024    RBC 3.95 07/25/2024    HGB 11.8 (L)  07/25/2024    HCT 34.8 (L) 07/25/2024    MCV 88.1 07/25/2024    MCH 29.9 07/25/2024    MCHC 33.9 07/25/2024    RDW 13.3 07/25/2024    .0 07/25/2024     Lab Results   Component Value Date     07/25/2024    K 4.4 07/25/2024     07/25/2024    CO2 29.0 07/25/2024    BUN 12 07/25/2024    CREATSERUM 0.95 07/25/2024     (H) 07/25/2024    CA 9.8 07/25/2024    ALKPHO 86 07/25/2024    ALT 23 07/25/2024    AST 26 07/25/2024    BILT 0.3 07/25/2024    ALB 4.4 07/25/2024    TP 7.7 07/25/2024     Lab Results   Component Value Date/Time    TSH 3.333 07/25/2024 08:28 AM    TSH 2.573 07/05/2024 10:18 AM    TSH 2.706 06/13/2024 12:35 PM    TSH 3.369 05/20/2024 12:56 PM    TSH 3.325 04/24/2024 10:15 AM    TSH 2.917 04/03/2024 08:39 AM     Radiology:  XR RIBS WITH CHEST (3 VIEWS), RIGHT  (CPT=71101)    Result Date: 6/12/2024  CONCLUSION:  1. Osteolytic destructive lesion lateral margin right 8th rib measuring 5.7 cm length.  This corresponds to findings on recent chest CT from 4/16/2024 with interval progression of involvement. 2. No acute fracture dislocation. 3. Dextroscoliosis dorsal spine and multilevel spondylosis. 4. Pulmonary interstitial fibrosis.  Heterogeneous pulmonary lesion left mid chest measuring 3.0 x 2.0 cm with slight progression consistent with known lung carcinoma.    Dictated by (CST): Alec Cm MD on 6/12/2024 at 4:27 PM     Finalized by (CST): Alec Cm MD on 6/12/2024 at 4:35 PM            Impression and Plan:    Metastatic squamous cell carcinoma of the left upper lung, PD-L1 0%  --Carboplatin paclitaxel pembrolizuamb-initiated 2/1/24.  --ER visit 2/4 for myalgias/arthralgias with normal CK and unremarkable eval. Likely taxol acute pain syndrome   --S/p 4 cycles Carboplatin Paclitaxel Pembrolizumab completed 4/4/24; dose reduce carbo to AUC 5 and paclitaxel by 20% due CIPN to feet.   --Imaging 4/2024 after C4 with favorable tx response.   --Proceed with cycle #9  pembrolizumab today. Clinically stable. No sings of autoimmune toxicity.  --Restaging CT scan done  7/24/24 not read yet. Will follow up on results and contact the patient.       Bone metastases  Cancer related pain  --Completed palliative XRT 7/3/24 to enlarging right 8th rib destructive bone lesion with good response.   --pain resolved. Continue tramadol as needed for pain.     CIPN gr 1/2. Dose reduction of chemo above. Done with chemo. Supportive care, med mgmt per PCP with lyrica     Chemo induced anemia near resolve. Monitor.      RTC 3 weeks or sooner if necessary      Мария Spears MD   Hematology Oncology

## 2024-08-01 DIAGNOSIS — C34.92 STAGE IV SQUAMOUS CELL CARCINOMA OF LEFT LUNG (HCC): Primary | ICD-10-CM

## 2024-08-01 DIAGNOSIS — C34.82 MALIGNANT NEOPLASM OF OVERLAPPING SITES OF LEFT BRONCHUS AND LUNG (HCC): ICD-10-CM

## 2024-08-07 ENCOUNTER — TELEPHONE (OUTPATIENT)
Dept: HEMATOLOGY/ONCOLOGY | Facility: HOSPITAL | Age: 69
End: 2024-08-07

## 2024-08-07 ENCOUNTER — HOSPITAL ENCOUNTER (OUTPATIENT)
Dept: NUCLEAR MEDICINE | Facility: HOSPITAL | Age: 69
Discharge: HOME | End: 2024-08-07
Attending: INTERNAL MEDICINE
Payer: MEDICARE

## 2024-08-07 DIAGNOSIS — C34.82 MALIGNANT NEOPLASM OF OVERLAPPING SITES OF LEFT BRONCHUS AND LUNG (HCC): ICD-10-CM

## 2024-08-07 DIAGNOSIS — C34.92 STAGE IV SQUAMOUS CELL CARCINOMA OF LEFT LUNG (HCC): ICD-10-CM

## 2024-08-07 LAB — GLUCOSE BLDC GLUCOMTR-MCNC: 114 MG/DL (ref 70–99)

## 2024-08-07 PROCEDURE — 82962 GLUCOSE BLOOD TEST: CPT

## 2024-08-07 PROCEDURE — 78815 PET IMAGE W/CT SKULL-THIGH: CPT | Performed by: INTERNAL MEDICINE

## 2024-08-09 ENCOUNTER — OFFICE VISIT (OUTPATIENT)
Dept: HEMATOLOGY/ONCOLOGY | Facility: HOSPITAL | Age: 69
End: 2024-08-09
Attending: INTERNAL MEDICINE
Payer: MEDICARE

## 2024-08-09 ENCOUNTER — DOCUMENTATION ONLY (OUTPATIENT)
Dept: HEMATOLOGY/ONCOLOGY | Facility: HOSPITAL | Age: 69
End: 2024-08-09

## 2024-08-09 VITALS
OXYGEN SATURATION: 98 % | HEIGHT: 67 IN | TEMPERATURE: 98 F | BODY MASS INDEX: 32.08 KG/M2 | WEIGHT: 204.38 LBS | DIASTOLIC BLOOD PRESSURE: 60 MMHG | SYSTOLIC BLOOD PRESSURE: 124 MMHG | HEART RATE: 65 BPM | RESPIRATION RATE: 18 BRPM

## 2024-08-09 DIAGNOSIS — C34.90 NON-SMALL CELL LUNG CANCER METASTATIC TO INTRATHORACIC LYMPH NODE (HCC): ICD-10-CM

## 2024-08-09 DIAGNOSIS — C34.90 LUNG CANCER METASTATIC TO BONE (HCC): ICD-10-CM

## 2024-08-09 DIAGNOSIS — C79.51 LUNG CANCER METASTATIC TO BONE (HCC): ICD-10-CM

## 2024-08-09 DIAGNOSIS — G89.3 PAIN FROM BONE METASTASES (HCC): ICD-10-CM

## 2024-08-09 DIAGNOSIS — C77.1 NON-SMALL CELL LUNG CANCER METASTATIC TO INTRATHORACIC LYMPH NODE (HCC): ICD-10-CM

## 2024-08-09 DIAGNOSIS — T45.1X5A CHEMOTHERAPY-INDUCED PERIPHERAL NEUROPATHY (HCC): ICD-10-CM

## 2024-08-09 DIAGNOSIS — C79.51 PAIN FROM BONE METASTASES (HCC): ICD-10-CM

## 2024-08-09 DIAGNOSIS — Z51.12 ENCOUNTER FOR ANTINEOPLASTIC IMMUNOTHERAPY: ICD-10-CM

## 2024-08-09 DIAGNOSIS — C34.92 STAGE IV SQUAMOUS CELL CARCINOMA OF LEFT LUNG (HCC): Primary | ICD-10-CM

## 2024-08-09 DIAGNOSIS — G62.0 CHEMOTHERAPY-INDUCED PERIPHERAL NEUROPATHY (HCC): ICD-10-CM

## 2024-08-09 DIAGNOSIS — C79.51 METASTASIS TO BONE (HCC): ICD-10-CM

## 2024-08-09 DIAGNOSIS — C34.90 MALIGNANT NEOPLASM OF LUNG, UNSPECIFIED LATERALITY, UNSPECIFIED PART OF LUNG (HCC): ICD-10-CM

## 2024-08-09 PROCEDURE — 36415 COLL VENOUS BLD VENIPUNCTURE: CPT

## 2024-08-09 PROCEDURE — 99211 OFF/OP EST MAY X REQ PHY/QHP: CPT

## 2024-08-09 RX ORDER — TRAMADOL HYDROCHLORIDE 50 MG/1
TABLET ORAL EVERY 6 HOURS PRN
Qty: 30 TABLET | Refills: 0 | OUTPATIENT
Start: 2024-08-09

## 2024-08-09 RX ORDER — HYDROCODONE BITARTRATE AND ACETAMINOPHEN 5; 325 MG/1; MG/1
1-2 TABLET ORAL EVERY 6 HOURS PRN
Qty: 30 TABLET | Refills: 0 | Status: SHIPPED | OUTPATIENT
Start: 2024-08-09

## 2024-08-09 NOTE — PROGRESS NOTES
Hematology Oncology Follow-up Note    Patient Name: Tarik Camara   YOB: 1955   Medical Record Number: [id number]   CSN: [id number]   Attending Physician: Мария Spears MD     Date of Visit: 8/9/2024      Chief Complaint:  Follow up  Lung cancer on immunotherapy    Oncologic History:  68 year old male former smoker with 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  PDL1 0%     Diagnosis 1/3/2024 via transbronchial biopsy bronchoscopy with Dr. Carrillo.     On CT 12/22/23 4.2 x 3.3 cm left upper lobe mass enlarged AP reflection lymph node left hilar adenopathy.  PET/CT showed bone metastasis     Initiated Carboplatin Paclitaxel Pembrolizumab 2/1/24  Maintenance pembrolizumab 4/25/24- present    Palliative RT to R 8th rib lesion 7/2-7/3/24       Interval History:  Here for follow up and review of imaging results. He is accompanied by his wife.   Video  used during encounter for translation.   Overall he feels worse. Reports progressive fatigue, weight loss and increased right rib pain.   No breathing changes. No dyspnea, cough, chest pain. No focal neurological deficit.     Performance Status: ECOG 0    Current Medications:    Current Outpatient Medications:     traMADol 50 MG Oral Tab, Take 1-2 tablets ( mg total) by mouth every 6 (six) hours as needed for Pain., Disp: 30 tablet, Rfl: 0    polyethylene glycol, PEG 3350, 17 g Oral Powd Pack, Take 17 g by mouth daily., Disp: , Rfl:     lisinopril 30 MG Oral Tab, Take 1 tablet (30 mg total) by mouth daily., Disp: 30 tablet, Rfl: 2    pregabalin (LYRICA) 50 MG Oral Cap, Take 1 capsule (50 mg total) by mouth 3 (three) times daily., Disp: 90 capsule, Rfl: 1    atorvastatin 20 MG Oral Tab, Take 1 tablet (20 mg total) by mouth nightly., Disp: 90 tablet, Rfl: 3    metoprolol succinate  MG Oral Tablet 24 Hr, Take 1 tablet (100 mg total) by mouth daily., Disp: 90 tablet, Rfl: 1    prochlorperazine (COMPAZINE) 10 mg  tablet, Take 1 tablet (10 mg total) by mouth every 6 (six) hours as needed for Nausea., Disp: 30 tablet, Rfl: 3    ondansetron (ZOFRAN) 8 MG tablet, Take 1 tablet (8 mg total) by mouth every 8 (eight) hours as needed for Nausea., Disp: 30 tablet, Rfl: 3    aspirin 81 MG Oral Tab EC, Take 1 tablet (81 mg total) by mouth daily., Disp: , Rfl:     metFORMIN 850 MG Oral Tab, Take 1 tablet (850 mg total) by mouth 2 (two) times daily with meals., Disp: 180 tablet, Rfl: 3    glipiZIDE ER 5 MG Oral Tablet 24 Hr, Take 1 tablet (5 mg total) by mouth daily. (Patient not taking: Reported on 7/25/2024), Disp: 30 tablet, Rfl: 2       Review of Systems:  10 -point systems reviewed, all negative except as mentioned in the HPI/interval history.     Vital Signs:  /60 (BP Location: Left arm, Patient Position: Sitting, Cuff Size: large)   Pulse 65   Temp 98.2 °F (36.8 °C) (Oral)   Resp 18   Ht 1.702 m (5' 7\")   Wt 92.7 kg (204 lb 6.4 oz)   SpO2 98%   BMI 32.01 kg/m²     Wt Readings from Last 6 Encounters:   08/09/24 92.7 kg (204 lb 6.4 oz)   07/25/24 93.8 kg (206 lb 12.8 oz)   07/08/24 95 kg (209 lb 6.4 oz)   07/05/24 95.3 kg (210 lb 3.2 oz)   06/17/24 97.7 kg (215 lb 6.4 oz)   06/13/24 97.1 kg (214 lb)      Physical Examination:  General: Aert and oriented x 3, not in acute distress.  Psych:  Mood and affect appropriate  HEENT: Non-icteric sclera. Oropharynx is clear.   Neck: No palpable lymphadenopathy. Neck is supple.  Chest: Clear to auscultation. No chest wall tenderness.   Heart: Regular rate and rhythm.  Abdomen: Soft, non tender with good bowel sounds.   Extremities: No peripheral edema. No clubbing.   Neurological: Grossly intact.     Laboratory:  Lab Results   Component Value Date    WBC 6.3 07/25/2024    RBC 3.95 07/25/2024    HGB 11.8 (L) 07/25/2024    HCT 34.8 (L) 07/25/2024    MCV 88.1 07/25/2024    MCH 29.9 07/25/2024    MCHC 33.9 07/25/2024    RDW 13.3 07/25/2024    .0 07/25/2024     Lab Results    Component Value Date     07/25/2024    K 4.4 07/25/2024     07/25/2024    CO2 29.0 07/25/2024    BUN 12 07/25/2024    CREATSERUM 0.95 07/25/2024     (H) 07/25/2024    CA 9.8 07/25/2024    ALKPHO 86 07/25/2024    ALT 23 07/25/2024    AST 26 07/25/2024    BILT 0.3 07/25/2024    ALB 4.4 07/25/2024    TP 7.7 07/25/2024     Lab Results   Component Value Date/Time    TSH 3.333 07/25/2024 08:28 AM    TSH 2.573 07/05/2024 10:18 AM    TSH 2.706 06/13/2024 12:35 PM    TSH 3.369 05/20/2024 12:56 PM    TSH 3.325 04/24/2024 10:15 AM    TSH 2.917 04/03/2024 08:39 AM     Radiology:  PET STANDARD BODY SCAN (ONCOLOGY) (CPT=78815)    Result Date: 8/7/2024  CONCLUSION:  1. Enlargement of the left upper lobe hypermetabolic malignancy, concerning for progression of disease.  2. Hypermetabolic mediastinal and hilar lymphadenopathy, presumably metastatic.   3. A markedly hypermetabolic expansile destructive osseous lesion of the right lateral 8th rib is again evident.  4. There is a small hypermetabolic nodule in the left gluteal region, concerning for potential soft tissue metastasis.  5. Severe paraseptal emphysema.  6. Uncomplicated distal colonic diverticulosis.  7. Probable physiologic excretion of radiotracer in the distal colon.  8. Prostatomegaly with probable chronic outlet obstruction.  9. Low-density appearance of the intracardiac blood pool raises the possibility of underlying anemia. Correlate with hematologic parameters.  10. Lesser incidental findings as above.    Dictated by (CST): Luis E Desir MD on 8/07/2024 at 12:24 PM     Finalized by (CST): Luis E Desir MD on 8/07/2024 at 12:44 PM          CT CHEST+ABDOMEN+PELVIS(ALL [facility] ONLY)(CPT=71260/94810)    Result Date: 7/25/2024  CONCLUSION:  1.  There is history of metastatic non-small cell lung cancer.  There is a dominant mass within the lingular segment of the left upper lobe, which has increased in size since previous exam.  New indeterminate  10 mm nodule in the left lower lobe which could represent nonspecific pneumonitis/atelectasis or tumor.  There is redemonstration of lymphadenopathy in both pulmonary rolo which is also larger/worse since previous exam.  There is also a lytic lesion involving the lateral right 8th rib which has increased in size since prior exam. 2.  Pulmonary emphysema. 3.  Coronary atherosclerosis.  Cardiomegaly.   Dictated by (CST): Eb Oliveira MD on 7/25/2024 at 2:36 PM     Finalized by (CST): Eb Oliveira MD on 7/25/2024 at 2:47 PM          XR RIBS WITH CHEST (3 VIEWS), RIGHT  (CPT=71101)    Result Date: 6/12/2024  CONCLUSION:  1. Osteolytic destructive lesion lateral margin right 8th rib measuring 5.7 cm length.  This corresponds to findings on recent chest CT from 4/16/2024 with interval progression of involvement. 2. No acute fracture dislocation. 3. Dextroscoliosis dorsal spine and multilevel spondylosis. 4. Pulmonary interstitial fibrosis.  Heterogeneous pulmonary lesion left mid chest measuring 3.0 x 2.0 cm with slight progression consistent with known lung carcinoma.    Dictated by (CST): Alec Cm MD on 6/12/2024 at 4:27 PM     Finalized by (CST): Alec Cm MD on 6/12/2024 at 4:35 PM            Impression and Plan:    Metastatic squamous cell carcinoma of the left upper lung, PD-L1 0%  --Carboplatin paclitaxel pembrolizuamb-initiated 2/1/24 and 4 cycles completed 4/4/24; dose reduce carbo to AUC 5 and paclitaxel by 20% due CIPN to feet.   --Imaging 4/2024 after C4 with favorable tx response.   --He was on maintenance pembrolizumab q3 wks, completed 9 cycles as of  7/25/24 with no autoimmune toxicity.  --Restaging CT scans and PET scans consistent with progression of disease as above. Results reviewed with patient and his wife in details.   --Will send for ctDNA for molecular profiling for possible targeted therapy or clinical trial, although rare in this disease.   --Discussed second line options  including referral to a tertiary center for clinical trials vs targeted therapy if eligible depending on Guardant results (unlikely) vs standard chemo with single agent (docetaxel or gemcitabine) day 1 and 8 every 21 days.    --He is not interested in trials and wants to continue treatment here regardless. He will return to clinic in 1 week to review the results and decide on treatment plan.   --Discontinue pembrolizumab      Bone metastases  Cancer related pain  --Completed palliative XRT 7/3/24 to enlarging right 8th rib destructive bone lesion with good response.   --No with worsening pain due to progressive disease. Tramadol is not enough.   --Start Norco 5/325 mg 1-2 tab every 6 hours as needed.     Chemo induced neuropathy- stable. Continue lyrica      Chemo induced anemia near resolve. Monitor.            Мария Spears MD   Hematology Oncology

## 2024-08-09 NOTE — PROGRESS NOTES
Liquid specimen drawn and [facility] to [facility] for Guardant 360 testing. Sent via [employer]

## 2024-08-10 ENCOUNTER — OFFICE VISIT (OUTPATIENT)
Dept: INTERNAL MEDICINE CLINIC | Facility: CLINIC | Age: 69
End: 2024-08-10

## 2024-08-10 VITALS
SYSTOLIC BLOOD PRESSURE: 134 MMHG | HEIGHT: 67 IN | BODY MASS INDEX: 32.02 KG/M2 | TEMPERATURE: 98 F | DIASTOLIC BLOOD PRESSURE: 76 MMHG | OXYGEN SATURATION: 98 % | RESPIRATION RATE: 17 BRPM | WEIGHT: 204 LBS | HEART RATE: 67 BPM

## 2024-08-10 DIAGNOSIS — E11.40 TYPE 2 DIABETES MELLITUS WITH DIABETIC NEUROPATHY, UNSPECIFIED WHETHER LONG TERM INSULIN USE (HCC): Primary | ICD-10-CM

## 2024-08-10 DIAGNOSIS — C79.51 METASTASIS TO BONE (HCC): ICD-10-CM

## 2024-08-10 DIAGNOSIS — C34.90 MALIGNANT NEOPLASM OF LUNG, UNSPECIFIED LATERALITY, UNSPECIFIED PART OF LUNG (HCC): ICD-10-CM

## 2024-08-10 PROBLEM — C77.1 NON-SMALL CELL LUNG CANCER METASTATIC TO INTRATHORACIC LYMPH NODE (HCC): Status: ACTIVE | Noted: 2024-08-10

## 2024-08-10 PROCEDURE — 1160F RVW MEDS BY RX/DR IN RCRD: CPT | Performed by: INTERNAL MEDICINE

## 2024-08-10 PROCEDURE — 1126F AMNT PAIN NOTED NONE PRSNT: CPT | Performed by: INTERNAL MEDICINE

## 2024-08-10 PROCEDURE — 1159F MED LIST DOCD IN RCRD: CPT | Performed by: INTERNAL MEDICINE

## 2024-08-10 PROCEDURE — 3075F SYST BP GE 130 - 139MM HG: CPT | Performed by: INTERNAL MEDICINE

## 2024-08-10 PROCEDURE — 3008F BODY MASS INDEX DOCD: CPT | Performed by: INTERNAL MEDICINE

## 2024-08-10 PROCEDURE — 3078F DIAST BP <80 MM HG: CPT | Performed by: INTERNAL MEDICINE

## 2024-08-10 PROCEDURE — 99213 OFFICE O/P EST LOW 20 MIN: CPT | Performed by: INTERNAL MEDICINE

## 2024-08-10 RX ORDER — METOPROLOL SUCCINATE 100 MG/1
100 TABLET, EXTENDED RELEASE ORAL DAILY
Qty: 90 TABLET | Refills: 1 | Status: SHIPPED | OUTPATIENT
Start: 2024-08-10

## 2024-08-10 RX ORDER — METOPROLOL SUCCINATE 100 MG/1
100 TABLET, EXTENDED RELEASE ORAL DAILY
Qty: 90 TABLET | Refills: 1 | OUTPATIENT
Start: 2024-08-10

## 2024-08-10 RX ORDER — TRAMADOL HYDROCHLORIDE 50 MG/1
TABLET ORAL EVERY 6 HOURS PRN
Qty: 30 TABLET | Refills: 0 | Status: SHIPPED | OUTPATIENT
Start: 2024-08-10

## 2024-08-10 RX ORDER — GLIPIZIDE 5 MG/1
5 TABLET, FILM COATED, EXTENDED RELEASE ORAL DAILY
Qty: 90 TABLET | Refills: 1 | Status: SHIPPED | OUTPATIENT
Start: 2024-08-10

## 2024-08-10 NOTE — PROGRESS NOTES
Subjective:     Patient ID: Tarik Camara is a 68 year old male.    HPI  Patient comes in for follow-up needs refills of the medications patient recently saw the oncologist had a CT and PET scan which showed unfavorable changes with increased size of the tumor and some new meds the contemplating about changing treatment he is due to see specialist again in 2 weeks time.  Denies any complaints today the rib pain that he had before from the metastatic lesion to the rib after treatment with radiation therapy has resolved no shortness of breath  History/Other:   Review of Systems   Constitutional: Negative.  Negative for fatigue and fever.   HENT: Negative.  Negative for congestion.    Eyes: Negative.    Respiratory: Negative.  Negative for cough, shortness of breath and wheezing.    Cardiovascular: Negative.  Negative for chest pain, palpitations and leg swelling.   Gastrointestinal: Negative.    Endocrine: Negative for cold intolerance and heat intolerance.   Genitourinary: Negative.  Negative for dysuria, flank pain and hematuria.   Musculoskeletal: Negative.  Negative for arthralgias, back pain and myalgias.   Skin: Negative.    Neurological: Negative.  Negative for dizziness, tremors, syncope, weakness and headaches.   Psychiatric/Behavioral: Negative.  Negative for agitation, behavioral problems and suicidal ideas. The patient is not nervous/anxious.      Current Outpatient Medications   Medication Sig Dispense Refill    traMADol 50 MG Oral Tab Take 1-2 tablets ( mg total) by mouth every 6 (six) hours as needed for Pain. 30 tablet 0    metoprolol succinate  MG Oral Tablet 24 Hr Take 1 tablet (100 mg total) by mouth daily. 90 tablet 1    glipiZIDE ER 5 MG Oral Tablet 24 Hr Take 1 tablet (5 mg total) by mouth daily. 90 tablet 1    HYDROcodone-acetaminophen 5-325 MG Oral Tab Take 1-2 tablets by mouth every 6 (six) hours as needed for Pain. 30 tablet 0    polyethylene glycol, PEG 3350, 17 g Oral Powd Pack  Take 17 g by mouth daily.      lisinopril 30 MG Oral Tab Take 1 tablet (30 mg total) by mouth daily. 30 tablet 2    pregabalin (LYRICA) 50 MG Oral Cap Take 1 capsule (50 mg total) by mouth 3 (three) times daily. 90 capsule 1    atorvastatin 20 MG Oral Tab Take 1 tablet (20 mg total) by mouth nightly. 90 tablet 3    prochlorperazine (COMPAZINE) 10 mg tablet Take 1 tablet (10 mg total) by mouth every 6 (six) hours as needed for Nausea. 30 tablet 3    ondansetron (ZOFRAN) 8 MG tablet Take 1 tablet (8 mg total) by mouth every 8 (eight) hours as needed for Nausea. 30 tablet 3    aspirin 81 MG Oral Tab EC Take 1 tablet (81 mg total) by mouth daily.      metFORMIN 850 MG Oral Tab Take 1 tablet (850 mg total) by mouth 2 (two) times daily with meals. 180 tablet 3     Allergies:No Known Allergies    Past Medical History:    Bell's palsy    COPD (chronic obstructive pulmonary disease) (Formerly [facility])    Degenerative joint disease (DJD) of lumbar spine    Diabetes (Formerly [facility])    Diabetes mellitus, type II (Formerly [facility])    Heart attack (Formerly [facility])    High blood pressure    High cholesterol    Hyperlipidemia    Hypertension    Osteoarthritis    Sleep apnea    no CPAP      Past Surgical History:   Procedure Laterality Date    Appendectomy      Coronary stent placement      Palate/uvula surgery unlisted      Ventral hernia repair  2021    primary repair, ventral and umbilical hernias      Family History   Problem Relation Age of Onset    No Known Problems Mother     Heart Disorder Father     Diabetes Neg     Glaucoma Neg     Macular degeneration Neg       Social History:   Social History     Socioeconomic History    Marital status:     Number of children: 3   Occupational History    Occupation:    Tobacco Use    Smoking status: Former     Current packs/day: 0.00     Types: Cigarettes     Quit date: 3/19/2020     Years since quittin.3    Smokeless tobacco: Never    Tobacco comments:     Occasionally, not in last 6 months    Vaping Use    Vaping status: Never Used   Substance and Sexual Activity    Alcohol use: Not Currently    Drug use: Never     Social Determinants of Health     Financial Resource Strain: Low Risk  (11/7/2023)    Financial Resource Strain     Difficulty of Paying Living Expenses: Not hard at all     Med Affordability: No   Transportation Needs: No Transportation Needs (11/7/2023)    Transportation Needs     Lack of Transportation: No        Objective:   Physical Exam  Vitals and nursing note reviewed.   Constitutional:       Appearance: He is well-developed.   HENT:      Head: Normocephalic and atraumatic.      Right Ear: External ear normal.      Left Ear: External ear normal.      Nose: Nose normal.   Eyes:      Conjunctiva/sclera: Conjunctivae normal.      Pupils: Pupils are equal, round, and reactive to light.   Cardiovascular:      Rate and Rhythm: Normal rate and regular rhythm.      Heart sounds: Normal heart sounds.   Pulmonary:      Effort: Pulmonary effort is normal.      Breath sounds: Normal breath sounds.   Abdominal:      General: Bowel sounds are normal.      Palpations: Abdomen is soft.   Genitourinary:     Penis: Normal.       Prostate: Normal.      Rectum: Normal.   Musculoskeletal:         General: Normal range of motion.      Cervical back: Normal range of motion and neck supple.   Skin:     General: Skin is warm and dry.   Neurological:      Mental Status: He is alert and oriented to person, place, and time.      Deep Tendon Reflexes: Reflexes are normal and symmetric.         Assessment & Plan:   1. Type 2 diabetes mellitus with diabetic neuropathy, unspecified whether long term insulin use (HCC) sugars have been stable continue current treatment watch diet take medication   2. Malignant neoplasm of lung, unspecified laterality, unspecified part of lung (HCC) follows with oncology recent CT PET scan with unfavorable changes   3. Metastasis to bone (HCC) as above rib pain resolved after  radiation treatment       No orders of the defined types were placed in this encounter.      Meds This Visit:  Requested Prescriptions     Signed Prescriptions Disp Refills    traMADol 50 MG Oral Tab 30 tablet 0     Sig: Take 1-2 tablets ( mg total) by mouth every 6 (six) hours as needed for Pain.    metoprolol succinate  MG Oral Tablet 24 Hr 90 tablet 1     Sig: Take 1 tablet (100 mg total) by mouth daily.    glipiZIDE ER 5 MG Oral Tablet 24 Hr 90 tablet 1     Sig: Take 1 tablet (5 mg total) by mouth daily.       Imaging & Referrals:  None

## 2024-08-13 DIAGNOSIS — C79.51 PAIN FROM BONE METASTASES (HCC): ICD-10-CM

## 2024-08-13 DIAGNOSIS — G89.3 PAIN FROM BONE METASTASES (HCC): ICD-10-CM

## 2024-08-13 RX ORDER — HYDROCODONE BITARTRATE AND ACETAMINOPHEN 5; 325 MG/1; MG/1
1-2 TABLET ORAL EVERY 6 HOURS PRN
Qty: 30 TABLET | Refills: 0 | Status: SHIPPED | OUTPATIENT
Start: 2024-08-13

## 2024-08-14 ENCOUNTER — TELEPHONE (OUTPATIENT)
Dept: HEMATOLOGY/ONCOLOGY | Facility: HOSPITAL | Age: 69
End: 2024-08-14

## 2024-08-14 NOTE — TELEPHONE ENCOUNTER
Pt called stated he's sweating, pt states he's all wet all over    Pt states he's not having no other symptoms

## 2024-08-14 NOTE — TELEPHONE ENCOUNTER
Toxicities: C9 D1 Pembrolizumab on 7/25/2024    Sweating    Tarik reports he has been sweating \"a lot this morning.\" His has taken his glipizide ER earlier did not eat anything. His he ate a few grapes and his wife checked his blood sugar and it is now 81. He reports the sweating has stopped. He is not shaking or trembling. No chills, shakes, fever, cold or flu symptoms. No nausea, vomiting or diarrhea. He denies chest pain, chest pressure, chest tightness. He is not having dyspnea at rest or with exertion.    I told Tarik I believe his blood sugar was low. He needs to eat breakfast and drink some fluids. If he feels sweaty again to check his blood sugar and call Dr Erazo's office. He agreed with the plan.

## 2024-08-15 ENCOUNTER — APPOINTMENT (OUTPATIENT)
Dept: HEMATOLOGY/ONCOLOGY | Facility: HOSPITAL | Age: 69
End: 2024-08-15
Attending: INTERNAL MEDICINE
Payer: MEDICARE

## 2024-08-22 ENCOUNTER — OFFICE VISIT (OUTPATIENT)
Dept: HEMATOLOGY/ONCOLOGY | Facility: HOSPITAL | Age: 69
End: 2024-08-22
Attending: INTERNAL MEDICINE
Payer: MEDICARE

## 2024-08-22 VITALS
TEMPERATURE: 98 F | HEART RATE: 64 BPM | BODY MASS INDEX: 31.92 KG/M2 | RESPIRATION RATE: 16 BRPM | DIASTOLIC BLOOD PRESSURE: 68 MMHG | WEIGHT: 203.38 LBS | HEIGHT: 67 IN | OXYGEN SATURATION: 99 % | SYSTOLIC BLOOD PRESSURE: 133 MMHG

## 2024-08-22 DIAGNOSIS — G89.3 PAIN FROM BONE METASTASES (HCC): ICD-10-CM

## 2024-08-22 DIAGNOSIS — C34.92 STAGE IV SQUAMOUS CELL CARCINOMA OF LEFT LUNG (HCC): Primary | ICD-10-CM

## 2024-08-22 DIAGNOSIS — C79.51 LUNG CANCER METASTATIC TO BONE (HCC): ICD-10-CM

## 2024-08-22 DIAGNOSIS — C79.51 PAIN FROM BONE METASTASES (HCC): ICD-10-CM

## 2024-08-22 DIAGNOSIS — T45.1X5A CHEMOTHERAPY-INDUCED PERIPHERAL NEUROPATHY (HCC): ICD-10-CM

## 2024-08-22 DIAGNOSIS — G62.0 CHEMOTHERAPY-INDUCED PERIPHERAL NEUROPATHY (HCC): ICD-10-CM

## 2024-08-22 DIAGNOSIS — C34.90 LUNG CANCER METASTATIC TO BONE (HCC): ICD-10-CM

## 2024-08-22 DIAGNOSIS — Z51.11 CHEMOTHERAPY MANAGEMENT, ENCOUNTER FOR: ICD-10-CM

## 2024-08-22 PROCEDURE — G2211 COMPLEX E/M VISIT ADD ON: HCPCS | Performed by: INTERNAL MEDICINE

## 2024-08-22 PROCEDURE — 99215 OFFICE O/P EST HI 40 MIN: CPT | Performed by: INTERNAL MEDICINE

## 2024-08-22 RX ORDER — HYDROCODONE BITARTRATE AND ACETAMINOPHEN 5; 325 MG/1; MG/1
1-2 TABLET ORAL EVERY 6 HOURS PRN
Qty: 30 TABLET | Refills: 0 | Status: SHIPPED | OUTPATIENT
Start: 2024-08-22 | End: 2024-08-22

## 2024-08-23 RX ORDER — HYDROCODONE BITARTRATE AND ACETAMINOPHEN 5; 325 MG/1; MG/1
1-2 TABLET ORAL EVERY 6 HOURS PRN
Qty: 30 TABLET | Refills: 0 | Status: SHIPPED | OUTPATIENT
Start: 2024-08-23

## 2024-08-24 NOTE — PROGRESS NOTES
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  PDL1 0%     Diagnosis 1/3/2024 via transbronchial biopsy bronchoscopy with Dr. Carrillo.     On CT 12/22/23 4.2 x 3.3 cm left upper lobe mass enlarged AP reflection lymph node left hilar adenopathy.  PET/CT showed bone metastasis     Initiated Carboplatin Paclitaxel Pembrolizumab 2/1/24  Maintenance pembrolizumab 4/25/24-7/25/24     Palliative RT to R 8th rib lesion 7/2-7/3/24     PET scan 8/7/24 showed progressive disease.     Guardant 360CDx 8/15/24 all VUS. No actionable alteration.       Interval History:  Here for follow up and discussion of next treatment.  Feels more tired  poor appetite. Losing weight loss.   increased right rib pain. Has not received norco yet.   No breathing changes. No dyspnea, cough, chest pain. No focal neurological deficit.     Performance Status: ECOG 0    Current Medications:    Current Outpatient Medications:     HYDROcodone-acetaminophen 5-325 MG Oral Tab, Take 1-2 tablets by mouth every 6 (six) hours as needed for Pain., Disp: 30 tablet, Rfl: 0    traMADol 50 MG Oral Tab, Take 1-2 tablets ( mg total) by mouth every 6 (six) hours as needed for Pain., Disp: 30 tablet, Rfl: 0    metoprolol succinate  MG Oral Tablet 24 Hr, Take 1 tablet (100 mg total) by mouth daily., Disp: 90 tablet, Rfl: 1    glipiZIDE ER 5 MG Oral Tablet 24 Hr, Take 1 tablet (5 mg total) by mouth daily., Disp: 90 tablet, Rfl: 1    lisinopril 30 MG Oral Tab, Take 1 tablet (30 mg total) by mouth daily., Disp: 30 tablet, Rfl: 2    pregabalin (LYRICA) 50 MG Oral Cap, Take 1 capsule (50 mg total) by mouth 3  (three) times daily., Disp: 90 capsule, Rfl: 1    atorvastatin 20 MG Oral Tab, Take 1 tablet (20 mg total) by mouth nightly., Disp: 90 tablet, Rfl: 3    prochlorperazine (COMPAZINE) 10 mg tablet, Take 1 tablet (10 mg total) by mouth every 6 (six) hours as needed for Nausea., Disp: 30 tablet, Rfl: 3    ondansetron (ZOFRAN) 8 MG tablet, Take 1 tablet (8 mg total) by mouth every 8 (eight) hours as needed for Nausea., Disp: 30 tablet, Rfl: 3    aspirin 81 MG Oral Tab EC, Take 1 tablet (81 mg total) by mouth daily., Disp: , Rfl:     metFORMIN 850 MG Oral Tab, Take 1 tablet (850 mg total) by mouth 2 (two) times daily with meals., Disp: 180 tablet, Rfl: 3    polyethylene glycol, PEG 3350, 17 g Oral Powd Pack, Take 17 g by mouth daily., Disp: , Rfl:     Review of Systems:  10 -point systems reviewed, all negative except as mentioned in the HPI/interval history.     Vital Signs:  /68 (BP Location: Left arm, Patient Position: Sitting, Cuff Size: adult)   Pulse 64   Temp 97.7 °F (36.5 °C) (Oral)   Resp 16   Ht 1.702 m (5' 7\")   Wt 92.3 kg (203 lb 6.4 oz)   SpO2 99%   BMI 31.86 kg/m²     Wt Readings from Last 6 Encounters:   08/22/24 92.3 kg (203 lb 6.4 oz)   08/10/24 92.5 kg (204 lb)   08/09/24 92.7 kg (204 lb 6.4 oz)   07/25/24 93.8 kg (206 lb 12.8 oz)   07/08/24 95 kg (209 lb 6.4 oz)   07/05/24 95.3 kg (210 lb 3.2 oz)      Physical Examination:  General: Aert and oriented x 3, not in acute distress.  Psych:  Mood and affect appropriate  HEENT: Non-icteric sclera. Oropharynx is clear.   Neck: No palpable lymphadenopathy. Neck is supple.  Chest: Clear to auscultation. No chest wall tenderness.   Heart: Regular rate and rhythm.  Abdomen: Soft, non tender with good bowel sounds.   Extremities: No peripheral edema. No clubbing.   Neurological: Grossly intact.     Laboratory:  Lab Results   Component Value Date    WBC 6.3 07/25/2024    RBC 3.95 07/25/2024    HGB 11.8 (L) 07/25/2024    HCT 34.8 (L) 07/25/2024    MCV  88.1 07/25/2024    MCH 29.9 07/25/2024    MCHC 33.9 07/25/2024    RDW 13.3 07/25/2024    .0 07/25/2024     Lab Results   Component Value Date     07/25/2024    K 4.4 07/25/2024     07/25/2024    CO2 29.0 07/25/2024    BUN 12 07/25/2024    CREATSERUM 0.95 07/25/2024     (H) 07/25/2024    CA 9.8 07/25/2024    ALKPHO 86 07/25/2024    ALT 23 07/25/2024    AST 26 07/25/2024    BILT 0.3 07/25/2024    ALB 4.4 07/25/2024    TP 7.7 07/25/2024     Lab Results   Component Value Date/Time    TSH 3.333 07/25/2024 08:28 AM    TSH 2.573 07/05/2024 10:18 AM    TSH 2.706 06/13/2024 12:35 PM    TSH 3.369 05/20/2024 12:56 PM    TSH 3.325 04/24/2024 10:15 AM    TSH 2.917 04/03/2024 08:39 AM     Radiology:  PET STANDARD BODY SCAN (ONCOLOGY) (CPT=78815)    Result Date: 8/7/2024  CONCLUSION:  1. Enlargement of the left upper lobe hypermetabolic malignancy, concerning for progression of disease.  2. Hypermetabolic mediastinal and hilar lymphadenopathy, presumably metastatic.   3. A markedly hypermetabolic expansile destructive osseous lesion of the right lateral 8th rib is again evident.  4. There is a small hypermetabolic nodule in the left gluteal region, concerning for potential soft tissue metastasis.  5. Severe paraseptal emphysema.  6. Uncomplicated distal colonic diverticulosis.  7. Probable physiologic excretion of radiotracer in the distal colon.  8. Prostatomegaly with probable chronic outlet obstruction.  9. Low-density appearance of the intracardiac blood pool raises the possibility of underlying anemia. Correlate with hematologic parameters.  10. Lesser incidental findings as above.    Dictated by (CST): Luis E Desir MD on 8/07/2024 at 12:24 PM     Finalized by (CST): Luis E Desir MD on 8/07/2024 at 12:44 PM          CT CHEST+ABDOMEN+PELVIS(ALL [facility]ST ONLY)(CPT=71260/14427)    Result Date: 7/25/2024  CONCLUSION:  1.  There is history of metastatic non-small cell lung cancer.  There is a dominant  mass within the lingular segment of the left upper lobe, which has increased in size since previous exam.  New indeterminate 10 mm nodule in the left lower lobe which could represent nonspecific pneumonitis/atelectasis or tumor.  There is redemonstration of lymphadenopathy in both pulmonary rolo which is also larger/worse since previous exam.  There is also a lytic lesion involving the lateral right 8th rib which has increased in size since prior exam. 2.  Pulmonary emphysema. 3.  Coronary atherosclerosis.  Cardiomegaly.   Dictated by (CST): Eb Oliveira MD on 7/25/2024 at 2:36 PM     Finalized by (CST): Eb Oliveira MD on 7/25/2024 at 2:47 PM          XR RIBS WITH CHEST (3 VIEWS), RIGHT  (CPT=71101)    Result Date: 6/12/2024  CONCLUSION:  1. Osteolytic destructive lesion lateral margin right 8th rib measuring 5.7 cm length.  This corresponds to findings on recent chest CT from 4/16/2024 with interval progression of involvement. 2. No acute fracture dislocation. 3. Dextroscoliosis dorsal spine and multilevel spondylosis. 4. Pulmonary interstitial fibrosis.  Heterogeneous pulmonary lesion left mid chest measuring 3.0 x 2.0 cm with slight progression consistent with known lung carcinoma.    Dictated by (CST): Alec Cm MD on 6/12/2024 at 4:27 PM     Finalized by (CST): Alec Cm MD on 6/12/2024 at 4:35 PM            Impression and Plan:    Metastatic squamous cell carcinoma of the left upper lung, PD-L1 0%  --Carboplatin paclitaxel pembrolizuamb-initiated 2/1/24 and completed 4 cycles on 4/4/24; dose reduce carbo to AUC 5 and paclitaxel by 20% due CIPN to feet.   --Imaging 4/2024 after C4 with favorable tx response.   --Continued on maintenance pembrolizumab q3 wks, completed 9 cycles as of  7/25/24 with no autoimmune toxicity.  --Restaging CT scans and PET scans 8/2024 consistent with progression of disease as above. Peripheral blood sequencing with no targetable alteration.   --Patient not  interested in clinical trials and declined referral to a tertiary center.  --Discussed single agent gemcitabine as next line option. Reviewed schedule D1 and D8 q21 days and potential toxicities. He is agreeable to proceed. Will plan for chemo education prior to C1 next week with AP[facility].       Bone metastases  Cancer related pain  --Completed palliative XRT 7/3/24 to enlarging right 8th rib destructive bone lesion with good response.   --No with worsening pain due to progressive disease. Tramadol not helping.  --Start Norco 5/325 mg 1-2 tab Q6H PRN. Out of stock at his pharmacy. New rx sent to a different [facility]'s.     Chemo induced neuropathy- stable. Continue lyrica      Chemo induced anemia near resolve. Monitor.      Мария Spears MD   Hematology Oncology

## 2024-08-27 ENCOUNTER — NURSE ONLY (OUTPATIENT)
Dept: HEMATOLOGY/ONCOLOGY | Facility: HOSPITAL | Age: 69
End: 2024-08-27
Attending: INTERNAL MEDICINE
Payer: MEDICARE

## 2024-08-27 DIAGNOSIS — C79.51 LUNG CANCER METASTATIC TO BONE (HCC): ICD-10-CM

## 2024-08-27 DIAGNOSIS — C34.92 STAGE IV SQUAMOUS CELL CARCINOMA OF LEFT LUNG (HCC): Primary | ICD-10-CM

## 2024-08-27 DIAGNOSIS — C34.90 LUNG CANCER METASTATIC TO BONE (HCC): ICD-10-CM

## 2024-08-27 LAB
ALBUMIN SERPL-MCNC: 4.5 G/DL (ref 3.2–4.8)
ALBUMIN/GLOB SERPL: 1.3 {RATIO} (ref 1–2)
ALP LIVER SERPL-CCNC: 95 U/L
ALT SERPL-CCNC: 16 U/L
ANION GAP SERPL CALC-SCNC: 5 MMOL/L (ref 0–18)
AST SERPL-CCNC: 21 U/L (ref ?–34)
BASOPHILS # BLD AUTO: 0.05 X10(3) UL (ref 0–0.2)
BASOPHILS NFR BLD AUTO: 0.6 %
BILIRUB SERPL-MCNC: 0.3 MG/DL (ref 0.2–1.1)
BUN BLD-MCNC: 12 MG/DL (ref 9–23)
BUN/CREAT SERPL: 13.2 (ref 10–20)
CALCIUM BLD-MCNC: 10.4 MG/DL (ref 8.7–10.4)
CHLORIDE SERPL-SCNC: 106 MMOL/L (ref 98–112)
CO2 SERPL-SCNC: 28 MMOL/L (ref 21–32)
CREAT BLD-MCNC: 0.91 MG/DL
DEPRECATED RDW RBC AUTO: 41.4 FL (ref 35.1–46.3)
EGFRCR SERPLBLD CKD-EPI 2021: 91 ML/MIN/1.73M2 (ref 60–?)
EOSINOPHIL # BLD AUTO: 0.21 X10(3) UL (ref 0–0.7)
EOSINOPHIL NFR BLD AUTO: 2.6 %
ERYTHROCYTE [DISTWIDTH] IN BLOOD BY AUTOMATED COUNT: 13.2 % (ref 11–15)
GLOBULIN PLAS-MCNC: 3.6 G/DL (ref 2–3.5)
GLUCOSE BLD-MCNC: 83 MG/DL (ref 70–99)
HCT VFR BLD AUTO: 36.3 %
HGB BLD-MCNC: 11.9 G/DL
IMM GRANULOCYTES # BLD AUTO: 0.03 X10(3) UL (ref 0–1)
IMM GRANULOCYTES NFR BLD: 0.4 %
LYMPHOCYTES # BLD AUTO: 1.46 X10(3) UL (ref 1–4)
LYMPHOCYTES NFR BLD AUTO: 18.2 %
MCH RBC QN AUTO: 28.3 PG (ref 26–34)
MCHC RBC AUTO-ENTMCNC: 32.8 G/DL (ref 31–37)
MCV RBC AUTO: 86.4 FL
MONOCYTES # BLD AUTO: 0.78 X10(3) UL (ref 0.1–1)
MONOCYTES NFR BLD AUTO: 9.7 %
NEUTROPHILS # BLD AUTO: 5.48 X10 (3) UL (ref 1.5–7.7)
NEUTROPHILS # BLD AUTO: 5.48 X10(3) UL (ref 1.5–7.7)
NEUTROPHILS NFR BLD AUTO: 68.5 %
OSMOLALITY SERPL CALC.SUM OF ELEC: 287 MOSM/KG (ref 275–295)
PLATELET # BLD AUTO: 335 10(3)UL (ref 150–450)
POTASSIUM SERPL-SCNC: 5 MMOL/L (ref 3.5–5.1)
PROT SERPL-MCNC: 8.1 G/DL (ref 5.7–8.2)
RBC # BLD AUTO: 4.2 X10(6)UL
SODIUM SERPL-SCNC: 139 MMOL/L (ref 136–145)
WBC # BLD AUTO: 8 X10(3) UL (ref 4–11)

## 2024-08-27 PROCEDURE — 36415 COLL VENOUS BLD VENIPUNCTURE: CPT

## 2024-08-27 PROCEDURE — 99214 OFFICE O/P EST MOD 30 MIN: CPT | Performed by: NURSE PRACTITIONER

## 2024-08-27 PROCEDURE — 85025 COMPLETE CBC W/AUTO DIFF WBC: CPT

## 2024-08-27 PROCEDURE — 80053 COMPREHEN METABOLIC PANEL: CPT

## 2024-08-27 NOTE — PATIENT INSTRUCTIONS
Medication Education Record: IV Therapy    Learner:  Patient and Spouse    Barriers / Limitations:  None    Psychosocial Assessment:  patient psychosocial response appropriate    Diagnosis:   Lung Squamous cell carcinoma     IV Cancer Treatment Name(s): Gemcitabine (Gemzar)  IV Cancer Treatment Frequency Day 1 and Day 8 of a 21 day cycle    Number of cycles planned Based on tolerance and response   Plan for appointments and lab testing Labs prior to each cycle  Verified Consent to Chemotherapy/Biotherapy Cancer Treatment form signed by patient and provider:  Yes    Confirm patient informs his/her Cancer Care team of any treatment received in a setting other than at the [facility] (such as inpatient or outpatient at another hospital or clinic, locally or out of state) so that this medical information can accurately be reflected in his/her medical record.  This vital information will provide an accurate picture for the physician prescribing the current cancer treatment.Yes  Cancer Treatment Side Effects (refer to Chemo Care Handouts for further information):  Allergic reactions  Constipation  Diarrhea  Eye disorders  Fatigue  Hair loss  Heart effects  Kidney / Bladder effects  Liver effects  Loss of appetite  Low red blood cell count / Anemia  Low White Blood Cell Count/Risk of infection  Low Platelet Count/Risk of Bleeding  Lung Effects  Mouth or Throat Sores  Muscle / Bone Effects  Nausea / Vomiting  Nerve Effects  Sexual Effects  Skin Effects  Taste Changes    IV administration risks:  Potential leaking of drug outside of vein during administration   Signs/symptoms include redness, swelling, pain, burning at the site of administration  Notify Infusion Nurse immediately if any of these symptoms occur during or after the infusion  Allergic reaction: there is a chance for allergic reaction with some medications.  If your prescribed therapy has a higher risk for this, steps will be taken to prevent  and minimize this from occurring.    Recommended Anti-nausea medications (as directed by your provider):  Prochloperazine (Compazine) 10 mg every 6 hours and Ondansetron (Zofran) 8 mg every 8 hours  Take as needed    Helpful hints during cancer treatment:    Diet:  Avoid greasy or spicy foods on days surrounding chemotherapy  Eat small frequent meals per day (6-7 meals) rather than 3 large meals  Choose high calorie/high protein foods (chicken, hard cooked eggs, peanut butter, cheese)  If nauseated, try dry foods, such as toast, crackers or pretzels; light or bland foods, such as applesauce or oatmeal.    Fluid intake:  Drink 8-10 cups of liquid a day and take a water bottle wherever you go.  Any fluid is acceptable, but caffeinated products do not count towards your intake and should be limited to 1-2 drinks/day.  Physical Activity    If your doctor approves, be as physically active as you can, but start out slowly, and increase your activity over time as you feel stronger.  Listen to your body and rest when you need to.  Do what you can when you feel up to it.      Oral Care  Keep your mouth clean.  Rinse your mouth before and after meals with plain water or with a mild mouth rinse (made with 1 quart water, 1 teaspoon salt, and 1 teaspoon baking soda, shake before using)   Avoid commercial mouthwashes that contain alcohol, alcoholic or acidic drinks or tobacco  An acceptable mouthwash is Biotene®   If soreness or sores develop, contact the office.    Diarrhea or Constipation    Imodium A-D use for diarrhea:  Take 2 tabs (4mg) at the first sign of diarrhea; then take 1 tab (2mg) every 2 hours until you have had no diarrhea for at least 12 hours; during the night take 2 tabs (4mg) every 4 hours as needed.  Maximum of 8 tablets in 24 hours.                           Constipation: Over-the-counter recommendations include: Senokot, Ducolax, Milk of Magnesia or Miralax (generics are ok)    If you have persistent  diarrhea or constipation, please contact the triage nurse for further instructions.  Skin Care  Avoid direct sunlight.  Wear a broad-spectrum sunscreen with an SPF of 30 or higher on any skin exposed to the sun.  Re-apply every 2 hours if in the sun and after bathing or sweating.  For dry skin, use an alcohol-free lotion twice per day, especially after baths.  If scalp hair loss has occurred, protect the scalp from the sun by wearing a hat and use sunscreen.  Apply alcohol-free moisturizer as needed.    When to call the doctor:  Fever of 100.4 or greater or shaking chills  Nausea/vomiting not controlled with anti-nausea medications: Unable to drink for 24 hours or have signs of dehydration: tiredness, thirst, dry mouth, dark and decreased amount of urine  Diarrhea - not controlled with Imodium AD or more than 6 episodes in 24 hours  Constipation -no bowel movement x 3 days, no response to stool softeners or laxatives  Mouth sores, sore throat or blisters on the lips affecting oral intake  Difficulty breathing, chest pain, or new cough  Excessive tiredness or weakness, confusion or loss of balance  New rash  Tingling or burning, redness, swelling of the palms of hands or soles of feet  Sudden new unexpected symptom -such as change in vision, swelling in arm or leg  Increase in numbness and tingling of hands or feet  Unusual bleeding (nose bleeds, blood in urine, stool or phlegm)  Pain with urination  Persistent mood changes, depression, nervousness, difficulty sleeping   Pain, redness, swelling or blistering at the IV site  If you go to Emergency Room for any reason or seek medical attention elsewhere  If you should need to cancel or reschedule any visit, it is important that you contact the office    What Phone Number to Call:  Cancer Center [phone] / Triage Nurse    Teaching Materials Provided:   Chemo Care chemotherapy information sheets     Please refer to the following link if you are interested in  additional information about chemotherapy for yourself or family members: [email or web address]        Safety and Handling of Chemotherapy  While you or your family member is receiving chemotherapy, whether in the clinic or at home, the following precautions must be taken to lessen any exposure to the medication.     Handling Body Waste:   Caregivers must wear gloves if exposed to the patient’s blood, urine, stool or vomit.  Dispose of the used gloves after each use and wash hands with soap and water.  Any sheets or clothes soiled with the bodily fluids should be machine washed twice in hot water with regular laundry detergent.  Do not wash soiled garments with hands.  If the soiled garments cannot be washed right away, place them in a sealed plastic bag until they can be washed.   Absorbable undergarments, or any other items contaminated with chemotherapy, should be placed in a sealed plastic bag for disposal, separate from other trash.  Toilets should be flushed twice with the lid closed while taking this medication and for 48 hours after the last dose.  Wash your hands after using the toilet.  Wash any skin area that has come in contact with urine or stool.      Safety for my family and friends:  Due to safety concerns and the nature of this treatment environment, children are not permitted in the infusion center.  Please make appropriate arrangements.   Hugging and kissing is safe for you and your partner or family members.  You can visit, sit with, hug and kiss the children in your life.    You can be around pregnant women, though (if possible) they should not clean up any of your body fluids after you have treatment.   Sexual activity is safe while throughout treatment.  It is possible that traces of the oral chemotherapy may be present in vaginal fluid or semen for 48 hours after taking.  A condom should be used during this time.  Effective birth control should be used throughout  treatment to prevent pregnancy while on these medications and for several months or years after therapy.  Chemotherapy can have harmful side effects to the fetus, especially in the first trimester.  In addition, menstrual cycles can become irregular during and after treatment, so you may not know if you are at a time in your cycle when you could become pregnant or if you are actually pregnant.

## 2024-08-27 NOTE — PROGRESS NOTES
Medication Education Record: IV Therapy    Learner:  Patient and Spouse    Barriers / Limitations:  None    Psychosocial Assessment:  patient psychosocial response appropriate    Diagnosis:   Lung Squamous cell carcinoma     IV Cancer Treatment Name(s): Gemcitabine (Gemzar)  IV Cancer Treatment Frequency Day 1 and Day 8 of a 21 day cycle    Number of cycles planned Based on tolerance and response   Plan for appointments and lab testing Labs prior to each cycle  Verified Consent to Chemotherapy/Biotherapy Cancer Treatment form signed by patient and provider:  Yes    Confirm patient informs his/her Cancer Care team of any treatment received in a setting other than at the [facility] (such as inpatient or outpatient at another hospital or clinic, locally or out of state) so that this medical information can accurately be reflected in his/her medical record.  This vital information will provide an accurate picture for the physician prescribing the current cancer treatment.Yes  Cancer Treatment Side Effects (refer to Chemo Care Handouts for further information):  Allergic reactions  Constipation  Diarrhea  Eye disorders  Fatigue  Hair loss  Heart effects  Kidney / Bladder effects  Liver effects  Loss of appetite  Low red blood cell count / Anemia  Low White Blood Cell Count/Risk of infection  Low Platelet Count/Risk of Bleeding  Lung Effects  Mouth or Throat Sores  Muscle / Bone Effects  Nausea / Vomiting  Nerve Effects  Sexual Effects  Skin Effects  Taste Changes    IV administration risks:  Potential leaking of drug outside of vein during administration   Signs/symptoms include redness, swelling, pain, burning at the site of administration  Notify Infusion Nurse immediately if any of these symptoms occur during or after the infusion  Allergic reaction: there is a chance for allergic reaction with some medications.  If your prescribed therapy has a higher risk for this, steps will be taken to prevent  and minimize this from occurring.    Recommended Anti-nausea medications (as directed by your provider):  Prochloperazine (Compazine) 10 mg every 6 hours and Ondansetron (Zofran) 8 mg every 8 hours  Take as needed    Helpful hints during cancer treatment:    Diet:  Avoid greasy or spicy foods on days surrounding chemotherapy  Eat small frequent meals per day (6-7 meals) rather than 3 large meals  Choose high calorie/high protein foods (chicken, hard cooked eggs, peanut butter, cheese)  If nauseated, try dry foods, such as toast, crackers or pretzels; light or bland foods, such as applesauce or oatmeal.    Fluid intake:  Drink 8-10 cups of liquid a day and take a water bottle wherever you go.  Any fluid is acceptable, but caffeinated products do not count towards your intake and should be limited to 1-2 drinks/day.  Physical Activity    If your doctor approves, be as physically active as you can, but start out slowly, and increase your activity over time as you feel stronger.  Listen to your body and rest when you need to.  Do what you can when you feel up to it.      Oral Care  Keep your mouth clean.  Rinse your mouth before and after meals with plain water or with a mild mouth rinse (made with 1 quart water, 1 teaspoon salt, and 1 teaspoon baking soda, shake before using)   Avoid commercial mouthwashes that contain alcohol, alcoholic or acidic drinks or tobacco  An acceptable mouthwash is Biotene®   If soreness or sores develop, contact the office.    Diarrhea or Constipation    Imodium A-D use for diarrhea:  Take 2 tabs (4mg) at the first sign of diarrhea; then take 1 tab (2mg) every 2 hours until you have had no diarrhea for at least 12 hours; during the night take 2 tabs (4mg) every 4 hours as needed.  Maximum of 8 tablets in 24 hours.                           Constipation: Over-the-counter recommendations include: Senokot, Ducolax, Milk of Magnesia or Miralax (generics are ok)    If you have persistent  diarrhea or constipation, please contact the triage nurse for further instructions.  Skin Care  Avoid direct sunlight.  Wear a broad-spectrum sunscreen with an SPF of 30 or higher on any skin exposed to the sun.  Re-apply every 2 hours if in the sun and after bathing or sweating.  For dry skin, use an alcohol-free lotion twice per day, especially after baths.  If scalp hair loss has occurred, protect the scalp from the sun by wearing a hat and use sunscreen.  Apply alcohol-free moisturizer as needed.    When to call the doctor:  Fever of 100.4 or greater or shaking chills  Nausea/vomiting not controlled with anti-nausea medications: Unable to drink for 24 hours or have signs of dehydration: tiredness, thirst, dry mouth, dark and decreased amount of urine  Diarrhea - not controlled with Imodium AD or more than 6 episodes in 24 hours  Constipation -no bowel movement x 3 days, no response to stool softeners or laxatives  Mouth sores, sore throat or blisters on the lips affecting oral intake  Difficulty breathing, chest pain, or new cough  Excessive tiredness or weakness, confusion or loss of balance  New rash  Tingling or burning, redness, swelling of the palms of hands or soles of feet  Sudden new unexpected symptom -such as change in vision, swelling in arm or leg  Increase in numbness and tingling of hands or feet  Unusual bleeding (nose bleeds, blood in urine, stool or phlegm)  Pain with urination  Persistent mood changes, depression, nervousness, difficulty sleeping   Pain, redness, swelling or blistering at the IV site  If you go to Emergency Room for any reason or seek medical attention elsewhere  If you should need to cancel or reschedule any visit, it is important that you contact the office    What Phone Number to Call:  Cancer Center [phone] / Triage Nurse    Teaching Materials Provided:   Chemo Care chemotherapy information sheets     Please refer to the following link if you are interested in  additional information about chemotherapy for yourself or family members: [email or web address]        Safety and Handling of Chemotherapy  While you or your family member is receiving chemotherapy, whether in the clinic or at home, the following precautions must be taken to lessen any exposure to the medication.     Handling Body Waste:   Caregivers must wear gloves if exposed to the patient’s blood, urine, stool or vomit.  Dispose of the used gloves after each use and wash hands with soap and water.  Any sheets or clothes soiled with the bodily fluids should be machine washed twice in hot water with regular laundry detergent.  Do not wash soiled garments with hands.  If the soiled garments cannot be washed right away, place them in a sealed plastic bag until they can be washed.   Absorbable undergarments, or any other items contaminated with chemotherapy, should be placed in a sealed plastic bag for disposal, separate from other trash.  Toilets should be flushed twice with the lid closed while taking this medication and for 48 hours after the last dose.  Wash your hands after using the toilet.  Wash any skin area that has come in contact with urine or stool.      Safety for my family and friends:  Due to safety concerns and the nature of this treatment environment, children are not permitted in the infusion center.  Please make appropriate arrangements.   Hugging and kissing is safe for you and your partner or family members.  You can visit, sit with, hug and kiss the children in your life.    You can be around pregnant women, though (if possible) they should not clean up any of your body fluids after you have treatment.   Sexual activity is safe while throughout treatment.  It is possible that traces of the oral chemotherapy may be present in vaginal fluid or semen for 48 hours after taking.  A condom should be used during this time.  Effective birth control should be used throughout  treatment to prevent pregnancy while on these medications and for several months or years after therapy.  Chemotherapy can have harmful side effects to the fetus, especially in the first trimester.  In addition, menstrual cycles can become irregular during and after treatment, so you may not know if you are at a time in your cycle when you could become pregnant or if you are actually pregnant.    30 minute appointment spent on education and counseling on above plan along with supportive care

## 2024-08-28 ENCOUNTER — TELEPHONE (OUTPATIENT)
Dept: HEMATOLOGY/ONCOLOGY | Facility: HOSPITAL | Age: 69
End: 2024-08-28

## 2024-08-28 ENCOUNTER — SOCIAL WORK SERVICES (OUTPATIENT)
Dept: HEMATOLOGY/ONCOLOGY | Facility: HOSPITAL | Age: 69
End: 2024-08-28

## 2024-08-28 NOTE — PROGRESS NOTES
HEAVENLY assisted patient with letter for immigration for his spouses citizenship. HEAVENLY called and left a voicemail for patient informing him that the letter is available for  at the cancer center .

## 2024-08-28 NOTE — TELEPHONE ENCOUNTER
Patient calling says that the forms that where filled out have the wife's name spelled wrong and  is wrong. He is asking to email to [email or web address]. thank you annabelle

## 2024-08-29 ENCOUNTER — NURSE ONLY (OUTPATIENT)
Dept: HEMATOLOGY/ONCOLOGY | Facility: HOSPITAL | Age: 69
End: 2024-08-29
Attending: INTERNAL MEDICINE
Payer: MEDICARE

## 2024-08-29 VITALS
WEIGHT: 203.31 LBS | BODY MASS INDEX: 31.91 KG/M2 | RESPIRATION RATE: 18 BRPM | TEMPERATURE: 98 F | DIASTOLIC BLOOD PRESSURE: 63 MMHG | HEIGHT: 67 IN | OXYGEN SATURATION: 96 % | SYSTOLIC BLOOD PRESSURE: 137 MMHG | HEART RATE: 66 BPM

## 2024-08-29 DIAGNOSIS — C34.92 STAGE IV SQUAMOUS CELL CARCINOMA OF LEFT LUNG (HCC): Primary | ICD-10-CM

## 2024-08-29 PROCEDURE — 96375 TX/PRO/DX INJ NEW DRUG ADDON: CPT

## 2024-08-29 PROCEDURE — 96413 CHEMO IV INFUSION 1 HR: CPT

## 2024-08-29 NOTE — PROGRESS NOTES
Pt here for C1D1 GEMZAR.  Arrives Ambulating independently, accompanied by Family member     Patient was evaluated today by TREATMENT rn    Oral medications included in this regimen:  no    Patient confirms comprehension of cancer treatment schedule:  yes    Pregnancy screening:  Not applicable    Modifications in dose or schedule:  No    Medications appearance and physical integrity checked by RN: yes.    Chemotherapy IV pump settings verified by 2 RNs:  Yes.  Frequency of blood return and site check throughout administration: Prior to administration and At completion of therapy     Infusion/treatment outcome:  patient tolerated treatment without incident    Education Record    Learner:  Patient  Barriers / Limitations:  None  Method:  Discussion  Education / instructions given:  reinforced chemo ed  Outcome:  Shows understanding    Discharged Home, Ambulating independently, accompanied by:Family member    Patient/family verbalized understanding of future appointments: discussed with pt

## 2024-08-29 NOTE — PATIENT INSTRUCTIONS
Recommended Anti-nausea medications (as directed by your provider):  Prochloperazine (Compazine) 10 mg every 6 hours and Ondansetron (Zofran) 8 mg every 8 hours  Take as needed     Helpful hints during cancer treatment:                  Diet:  Avoid greasy or spicy foods on days surrounding chemotherapy  Eat small frequent meals per day (6-7 meals) rather than 3 large meals  Choose high calorie/high protein foods (chicken, hard cooked eggs, peanut butter, cheese)  If nauseated, try dry foods, such as toast, crackers or pretzels; light or bland foods, such as applesauce or oatmeal.     Fluid intake:  Drink 8-10 cups of liquid a day and take a water bottle wherever you go.  Any fluid is acceptable, but caffeinated products do not count towards your intake and should be limited to 1-2 drinks/day.  Physical Activity     If your doctor approves, be as physically active as you can, but start out slowly, and increase your activity over time as you feel stronger.  Listen to your body and rest when you need to.  Do what you can when you feel up to it.       Oral Care  Keep your mouth clean.  Rinse your mouth before and after meals with plain water or with a mild mouth rinse (made with 1 quart water, 1 teaspoon salt, and 1 teaspoon baking soda, shake before using)   Avoid commercial mouthwashes that contain alcohol, alcoholic or acidic drinks or tobacco  An acceptable mouthwash is Biotene®   If soreness or sores develop, contact the office.     Diarrhea or Constipation     Imodium A-D use for diarrhea:  Take 2 tabs (4mg) at the first sign of diarrhea; then take 1 tab (2mg) every 2 hours until you have had no diarrhea for at least 12 hours; during the night take 2 tabs (4mg) every 4 hours as needed.  Maximum of 8 tablets in 24 hours.                           Constipation: Over-the-counter recommendations include: Senokot, Ducolax, Milk of Magnesia or Miralax (generics are ok)    If you have persistent diarrhea or  constipation, please contact the triage nurse for further instructions.  Skin Care  Avoid direct sunlight.  Wear a broad-spectrum sunscreen with an SPF of 30 or higher on any skin exposed to the sun.  Re-apply every 2 hours if in the sun and after bathing or sweating.  For dry skin, use an alcohol-free lotion twice per day, especially after baths.  If scalp hair loss has occurred, protect the scalp from the sun by wearing a hat and use sunscreen.  Apply alcohol-free moisturizer as needed.     When to call the doctor:  Fever of 100.4 or greater or shaking chills  Nausea/vomiting not controlled with anti-nausea medications: Unable to drink for 24 hours or have signs of dehydration: tiredness, thirst, dry mouth, dark and decreased amount of urine  Diarrhea - not controlled with Imodium AD or more than 6 episodes in 24 hours  Constipation -no bowel movement x 3 days, no response to stool softeners or laxatives  Mouth sores, sore throat or blisters on the lips affecting oral intake  Difficulty breathing, chest pain, or new cough  Excessive tiredness or weakness, confusion or loss of balance  New rash  Tingling or burning, redness, swelling of the palms of hands or soles of feet  Sudden new unexpected symptom -such as change in vision, swelling in arm or leg  Increase in numbness and tingling of hands or feet  Unusual bleeding (nose bleeds, blood in urine, stool or phlegm)  Pain with urination  Persistent mood changes, depression, nervousness, difficulty sleeping   Pain, redness, swelling or blistering at the IV site  If you go to Emergency Room for any reason or seek medical attention elsewhere  If you should need to cancel or reschedule any visit, it is important that you contact the office     What Phone Number to Call:  Cancer Center [phone] / Triage Nurse     Teaching Materials Provided:   Chemo Care chemotherapy information sheets      Please refer to the following link if you are interested in additional  information about chemotherapy for yourself or family members: [email or web address]           Safety and Handling of Chemotherapy  While you or your family member is receiving chemotherapy, whether in the clinic or at home, the following precautions must be taken to lessen any exposure to the medication.     Handling Body Waste:   Caregivers must wear gloves if exposed to the patient’s blood, urine, stool or vomit.  Dispose of the used gloves after each use and wash hands with soap and water.  Any sheets or clothes soiled with the bodily fluids should be machine washed twice in hot water with regular laundry detergent.  Do not wash soiled garments with hands.  If the soiled garments cannot be washed right away, place them in a sealed plastic bag until they can be washed.   Absorbable undergarments, or any other items contaminated with chemotherapy, should be placed in a sealed plastic bag for disposal, separate from other trash.  Toilets should be flushed twice with the lid closed while taking this medication and for 48 hours after the last dose.  Wash your hands after using the toilet.  Wash any skin area that has come in contact with urine or stool.       Safety for my family and friends:  Due to safety concerns and the nature of this treatment environment, children are not permitted in the infusion center.  Please make appropriate arrangements.   Hugging and kissing is safe for you and your partner or family members.  You can visit, sit with, hug and kiss the children in your life.    You can be around pregnant women, though (if possible) they should not clean up any of your body fluids after you have treatment.   Sexual activity is safe while throughout treatment.  It is possible that traces of the oral chemotherapy may be present in vaginal fluid or semen for 48 hours after taking.  A condom should be used during this time.  Effective birth control should be used throughout  treatment to prevent pregnancy while on these medications and for several months or years after therapy.  Chemotherapy can have harmful side effects to the fetus, especially in the first trimester.  In addition, menstrual cycles can become irregular during and after treatment, so you may not know if you are at a time in your cycle when you could become pregnant or if you are actually pregnant.

## 2024-08-30 ENCOUNTER — TELEPHONE (OUTPATIENT)
Dept: HEMATOLOGY/ONCOLOGY | Facility: HOSPITAL | Age: 69
End: 2024-08-30

## 2024-08-30 NOTE — TELEPHONE ENCOUNTER
Toxicities: C1 D1 Gemcitabine on 8/29/2024    Tarik reports that he feels \"good\" He had a little trouble sleeping last night. I explained that we gave his some steroids before his treatment. It will take about 24 to 48 hrs to leave his body. His sleep should return to normal. No other side effects at this time. I encouraged him to please call the office if he is not feeling well or he has any questions or concerns. He agreed and thanked me for checking on him.

## 2024-09-04 ENCOUNTER — TELEPHONE (OUTPATIENT)
Dept: HEMATOLOGY/ONCOLOGY | Facility: HOSPITAL | Age: 69
End: 2024-09-04

## 2024-09-04 ENCOUNTER — OFFICE VISIT (OUTPATIENT)
Dept: HEMATOLOGY/ONCOLOGY | Facility: HOSPITAL | Age: 69
End: 2024-09-04
Attending: INTERNAL MEDICINE
Payer: MEDICARE

## 2024-09-04 VITALS
SYSTOLIC BLOOD PRESSURE: 134 MMHG | RESPIRATION RATE: 18 BRPM | BODY MASS INDEX: 31.92 KG/M2 | WEIGHT: 203.38 LBS | HEIGHT: 67 IN | DIASTOLIC BLOOD PRESSURE: 72 MMHG | TEMPERATURE: 99 F | OXYGEN SATURATION: 98 % | HEART RATE: 73 BPM

## 2024-09-04 DIAGNOSIS — G89.3 PAIN FROM BONE METASTASES (HCC): ICD-10-CM

## 2024-09-04 DIAGNOSIS — T45.1X5A CHEMOTHERAPY-INDUCED PERIPHERAL NEUROPATHY (HCC): ICD-10-CM

## 2024-09-04 DIAGNOSIS — C34.92 STAGE IV SQUAMOUS CELL CARCINOMA OF LEFT LUNG (HCC): Primary | ICD-10-CM

## 2024-09-04 DIAGNOSIS — C79.51 LUNG CANCER METASTATIC TO BONE (HCC): ICD-10-CM

## 2024-09-04 DIAGNOSIS — C79.51 PAIN FROM BONE METASTASES (HCC): ICD-10-CM

## 2024-09-04 DIAGNOSIS — D64.81 ANTINEOPLASTIC CHEMOTHERAPY INDUCED ANEMIA: ICD-10-CM

## 2024-09-04 DIAGNOSIS — C34.90 NON-SMALL CELL LUNG CANCER METASTATIC TO INTRATHORACIC LYMPH NODE (HCC): ICD-10-CM

## 2024-09-04 DIAGNOSIS — G62.0 CHEMOTHERAPY-INDUCED PERIPHERAL NEUROPATHY (HCC): ICD-10-CM

## 2024-09-04 DIAGNOSIS — C34.90 LUNG CANCER METASTATIC TO BONE (HCC): ICD-10-CM

## 2024-09-04 DIAGNOSIS — Z51.11 CHEMOTHERAPY MANAGEMENT, ENCOUNTER FOR: ICD-10-CM

## 2024-09-04 DIAGNOSIS — T45.1X5A ANTINEOPLASTIC CHEMOTHERAPY INDUCED ANEMIA: ICD-10-CM

## 2024-09-04 DIAGNOSIS — C77.1 NON-SMALL CELL LUNG CANCER METASTATIC TO INTRATHORACIC LYMPH NODE (HCC): ICD-10-CM

## 2024-09-04 PROCEDURE — 99215 OFFICE O/P EST HI 40 MIN: CPT | Performed by: INTERNAL MEDICINE

## 2024-09-04 PROCEDURE — G2211 COMPLEX E/M VISIT ADD ON: HCPCS | Performed by: INTERNAL MEDICINE

## 2024-09-04 NOTE — PROGRESS NOTES
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  PDL1 0%     Diagnosis 1/3/2024 via transbronchial biopsy bronchoscopy with Dr. Carrillo.     On CT 12/22/23 4.2 x 3.3 cm left upper lobe mass enlarged AP reflection lymph node left hilar adenopathy.  PET/CT showed bone metastasis     Initiated Carboplatin Paclitaxel Pembrolizumab 2/1/24  Maintenance pembrolizumab 4/25/24-7/25/24     Palliative RT to R 8th rib lesion 7/2-7/3/24     PET scan 8/7/24 showed progressive disease.     Guardant 360CDx 8/15/24 all VUS. No actionable alteration.     8/29/24 initiated gemcitabine     Interval History:  Here for chemotherapy follow up and pain management.   Patient called earlier complaining of severe right rib pain since the morning, causing difficulty breathing and moving.   He took Norco x1 around 9:30 am with no relief. Took another dose an hour ago which helped.  states that the pain got better on his way here, and feels more comfortable now.   Otherwise no back pain or respiratory changes. He remains fatigued with poor appetite. He tolerated first of gemcitabine well last week.       Performance Status: ECOG 0    Current Medications:    Current Outpatient Medications:     HYDROcodone-acetaminophen 5-325 MG Oral Tab, Take 1-2 tablets by mouth every 6 (six) hours as needed for Pain., Disp: 30 tablet, Rfl: 0    traMADol 50 MG Oral Tab, Take 1-2 tablets ( mg total) by mouth every 6 (six) hours as needed for Pain., Disp: 30 tablet, Rfl: 0    metoprolol succinate  MG Oral Tablet 24 Hr, Take 1 tablet (100 mg total) by mouth daily., Disp: 90 tablet, Rfl:  1    glipiZIDE ER 5 MG Oral Tablet 24 Hr, Take 1 tablet (5 mg total) by mouth daily., Disp: 90 tablet, Rfl: 1    polyethylene glycol, PEG 3350, 17 g Oral Powd Pack, Take 17 g by mouth daily., Disp: , Rfl:     lisinopril 30 MG Oral Tab, Take 1 tablet (30 mg total) by mouth daily., Disp: 30 tablet, Rfl: 2    pregabalin (LYRICA) 50 MG Oral Cap, Take 1 capsule (50 mg total) by mouth 3 (three) times daily., Disp: 90 capsule, Rfl: 1    atorvastatin 20 MG Oral Tab, Take 1 tablet (20 mg total) by mouth nightly., Disp: 90 tablet, Rfl: 3    prochlorperazine (COMPAZINE) 10 mg tablet, Take 1 tablet (10 mg total) by mouth every 6 (six) hours as needed for Nausea., Disp: 30 tablet, Rfl: 3    ondansetron (ZOFRAN) 8 MG tablet, Take 1 tablet (8 mg total) by mouth every 8 (eight) hours as needed for Nausea., Disp: 30 tablet, Rfl: 3    aspirin 81 MG Oral Tab EC, Take 1 tablet (81 mg total) by mouth daily., Disp: , Rfl:     metFORMIN 850 MG Oral Tab, Take 1 tablet (850 mg total) by mouth 2 (two) times daily with meals., Disp: 180 tablet, Rfl: 3    Review of Systems:  10 -point systems reviewed, all negative except as mentioned in the HPI/interval history.     Vital Signs:  /72 (BP Location: Left arm, Patient Position: Sitting, Cuff Size: adult)   Pulse 73   Temp 98.6 °F (37 °C) (Oral)   Resp 18   Ht 1.702 m (5' 7\")   Wt 92.3 kg (203 lb 6.4 oz)   SpO2 98%   BMI 31.86 kg/m²     Wt Readings from Last 6 Encounters:   09/04/24 92.3 kg (203 lb 6.4 oz)   08/29/24 92.2 kg (203 lb 4.8 oz)   08/22/24 92.3 kg (203 lb 6.4 oz)   08/10/24 92.5 kg (204 lb)   08/09/24 92.7 kg (204 lb 6.4 oz)   07/25/24 93.8 kg (206 lb 12.8 oz)      Physical Examination:  General: Aert and oriented x 3, not in acute distress.  Psych:  Mood and affect appropriate  HEENT: Non-icteric sclera. Oropharynx is clear.   Neck: No palpable lymphadenopathy. Neck is supple.  Chest: Clear to auscultation. No chest wall tenderness.   Heart: Regular rate and  rhythm.  Abdomen: Soft, non tender with good bowel sounds.   Extremities: No peripheral edema. No clubbing.   Neurological: Grossly intact.     Laboratory:  Lab Results   Component Value Date    WBC 8.0 08/27/2024    RBC 4.20 08/27/2024    HGB 11.9 (L) 08/27/2024    HCT 36.3 (L) 08/27/2024    MCV 86.4 08/27/2024    MCH 28.3 08/27/2024    MCHC 32.8 08/27/2024    RDW 13.2 08/27/2024    .0 08/27/2024     Lab Results   Component Value Date     08/27/2024    K 5.0 08/27/2024     08/27/2024    CO2 28.0 08/27/2024    BUN 12 08/27/2024    CREATSERUM 0.91 08/27/2024    GLU 83 08/27/2024    CA 10.4 08/27/2024    ALKPHO 95 08/27/2024    ALT 16 08/27/2024    AST 21 08/27/2024    BILT 0.3 08/27/2024    ALB 4.5 08/27/2024    TP 8.1 08/27/2024     Lab Results   Component Value Date/Time    TSH 3.333 07/25/2024 08:28 AM    TSH 2.573 07/05/2024 10:18 AM    TSH 2.706 06/13/2024 12:35 PM    TSH 3.369 05/20/2024 12:56 PM    TSH 3.325 04/24/2024 10:15 AM    TSH 2.917 04/03/2024 08:39 AM     Radiology:  PET STANDARD BODY SCAN (ONCOLOGY) (CPT=78815)    Result Date: 8/7/2024  CONCLUSION:  1. Enlargement of the left upper lobe hypermetabolic malignancy, concerning for progression of disease.  2. Hypermetabolic mediastinal and hilar lymphadenopathy, presumably metastatic.   3. A markedly hypermetabolic expansile destructive osseous lesion of the right lateral 8th rib is again evident.  4. There is a small hypermetabolic nodule in the left gluteal region, concerning for potential soft tissue metastasis.  5. Severe paraseptal emphysema.  6. Uncomplicated distal colonic diverticulosis.  7. Probable physiologic excretion of radiotracer in the distal colon.  8. Prostatomegaly with probable chronic outlet obstruction.  9. Low-density appearance of the intracardiac blood pool raises the possibility of underlying anemia. Correlate with hematologic parameters.  10. Lesser incidental findings as above.    Dictated by (CST): Thang  MD Luis E on 8/07/2024 at 12:24 PM     Finalized by (CST): Luis E Desir MD on 8/07/2024 at 12:44 PM          CT CHEST+ABDOMEN+PELVIS(ALL CNTRST ONLY)(CPT=71260/54976)    Result Date: 7/25/2024  CONCLUSION:  1.  There is history of metastatic non-small cell lung cancer.  There is a dominant mass within the lingular segment of the left upper lobe, which has increased in size since previous exam.  New indeterminate 10 mm nodule in the left lower lobe which could represent nonspecific pneumonitis/atelectasis or tumor.  There is redemonstration of lymphadenopathy in both pulmonary rolo which is also larger/worse since previous exam.  There is also a lytic lesion involving the lateral right 8th rib which has increased in size since prior exam. 2.  Pulmonary emphysema. 3.  Coronary atherosclerosis.  Cardiomegaly.   Dictated by (CST): Eb Oliveira MD on 7/25/2024 at 2:36 PM     Finalized by (CST): Eb Oliveira MD on 7/25/2024 at 2:47 PM          XR RIBS WITH CHEST (3 VIEWS), RIGHT  (CPT=71101)    Result Date: 6/12/2024  CONCLUSION:  1. Osteolytic destructive lesion lateral margin right 8th rib measuring 5.7 cm length.  This corresponds to findings on recent chest CT from 4/16/2024 with interval progression of involvement. 2. No acute fracture dislocation. 3. Dextroscoliosis dorsal spine and multilevel spondylosis. 4. Pulmonary interstitial fibrosis.  Heterogeneous pulmonary lesion left mid chest measuring 3.0 x 2.0 cm with slight progression consistent with known lung carcinoma.    Dictated by (CST): Alec Cm MD on 6/12/2024 at 4:27 PM     Finalized by (CST): Alec Cm MD on 6/12/2024 at 4:35 PM            Impression and Plan:    Metastatic squamous cell carcinoma of the left upper lung, PD-L1 0%  --Carboplatin paclitaxel pembrolizuamb-initiated 2/1/24 and completed 4 cycles on 4/4/24; dose reduce carbo to AUC 5 and paclitaxel by 20% due CIPN to feet.   --Imaging 4/2024 after C4 with favorable tx  response.   --Continued on maintenance pembrolizumab q3 wks, completed 9 cycles as of  7/25/24 with no autoimmune toxicity.  --Restaging CT scans and PET scans 8/2024 consistent with progression of disease. Guardant (ctDNA sequencing) with no targetable alteration.   --He was not interested in clinical trials or referral to a tertiary center.  --Single agent gemcitabine initiated 8/29/24 and tolerated first dose well. Proceed with C1D8 tomorrow if labs remain stable.       Bone metastases  Cancer related pain  --S/p palliative XRT 7/3/24 to enlarging right 8th rib destructive bone lesion  --No with worsening pain due to progressive disease. Tramadol did not work. Currently on norco only.   --Continue Norco 5/325 mg 1-2 tab Q6H PRN. Advised to 2 tabs if pain is severe and monitor his daily use closely.  --Will consider starting MS Contin and referral to palliative care for further management if his pain remain poorly controlled.     Chemo induced neuropathy- stable. Continue lyrica      Chemo induced anemia- mild. Monitor    RTC 2 weeks for C2       Мария Spears MD   Hematology Oncology

## 2024-09-04 NOTE — TELEPHONE ENCOUNTER
I spoke with RICHI Garcia. She ok'd me offering Mr Camara a 3:40 pm [facility] appointment for today. I called and updated Mr Camara. He agreed to the appointment.

## 2024-09-04 NOTE — TELEPHONE ENCOUNTER
Toxicities: C1 D1 Gemcitabine on 8/29/2024    Right Rib Pain    Tarik reports he has been taking Norco 5-325 for pain management of pain in his right side of his rib cage.  It has been helping. Today he woke up and his pain is \"terrible\" today. He is rating his pain \"9-9.5/10.\" This morning and now. It is a constant sharp stabbing pain that gets worse when he takes a deep breath. He denies dyspnea at rest or with exertion. No chills, shakes or fever. He took one Norco 5-325 at 9 am and another tablet at 12:50 pm. It has not brought his pain down at all. He is asking for a stronger pain medication.    I told Aydeamelia I would forward this message to Dr Spears and MANJINDER Powell now. I will call him back.

## 2024-09-05 ENCOUNTER — APPOINTMENT (OUTPATIENT)
Dept: HEMATOLOGY/ONCOLOGY | Facility: HOSPITAL | Age: 69
End: 2024-09-05
Attending: INTERNAL MEDICINE
Payer: MEDICARE

## 2024-09-05 VITALS
DIASTOLIC BLOOD PRESSURE: 69 MMHG | RESPIRATION RATE: 18 BRPM | OXYGEN SATURATION: 97 % | SYSTOLIC BLOOD PRESSURE: 140 MMHG | TEMPERATURE: 98 F | HEART RATE: 85 BPM

## 2024-09-05 DIAGNOSIS — C34.92 STAGE IV SQUAMOUS CELL CARCINOMA OF LEFT LUNG (HCC): Primary | ICD-10-CM

## 2024-09-05 DIAGNOSIS — G89.3 CANCER ASSOCIATED PAIN: Primary | ICD-10-CM

## 2024-09-05 LAB
BASOPHILS # BLD AUTO: 0.01 X10(3) UL (ref 0–0.2)
BASOPHILS NFR BLD AUTO: 0.2 %
DEPRECATED RDW RBC AUTO: 43.3 FL (ref 35.1–46.3)
EOSINOPHIL # BLD AUTO: 0.05 X10(3) UL (ref 0–0.7)
EOSINOPHIL NFR BLD AUTO: 0.9 %
ERYTHROCYTE [DISTWIDTH] IN BLOOD BY AUTOMATED COUNT: 13.6 % (ref 11–15)
HCT VFR BLD AUTO: 34 %
HGB BLD-MCNC: 11.1 G/DL
IMM GRANULOCYTES # BLD AUTO: 0.03 X10(3) UL (ref 0–1)
IMM GRANULOCYTES NFR BLD: 0.6 %
LYMPHOCYTES # BLD AUTO: 1.09 X10(3) UL (ref 1–4)
LYMPHOCYTES NFR BLD AUTO: 20.5 %
MCH RBC QN AUTO: 28.5 PG (ref 26–34)
MCHC RBC AUTO-ENTMCNC: 32.6 G/DL (ref 31–37)
MCV RBC AUTO: 87.4 FL
MONOCYTES # BLD AUTO: 0.46 X10(3) UL (ref 0.1–1)
MONOCYTES NFR BLD AUTO: 8.6 %
NEUTROPHILS # BLD AUTO: 3.68 X10 (3) UL (ref 1.5–7.7)
NEUTROPHILS # BLD AUTO: 3.68 X10(3) UL (ref 1.5–7.7)
NEUTROPHILS NFR BLD AUTO: 69.2 %
PLATELET # BLD AUTO: 182 10(3)UL (ref 150–450)
RBC # BLD AUTO: 3.89 X10(6)UL
WBC # BLD AUTO: 5.3 X10(3) UL (ref 4–11)

## 2024-09-05 PROCEDURE — 96413 CHEMO IV INFUSION 1 HR: CPT

## 2024-09-05 PROCEDURE — 96367 TX/PROPH/DG ADDL SEQ IV INF: CPT

## 2024-09-05 PROCEDURE — 85025 COMPLETE CBC W/AUTO DIFF WBC: CPT

## 2024-09-05 NOTE — PROGRESS NOTES
Patient here for C1D8 Gemzar.  Arrives Ambulating independently, accompanied by Spouse. Patient c/o pain to right anterior/lateral rib region, seen by Dr. Spears yesterday. Patient reports requiring Norco more frequently, worried about running out of medication. Reinforced importance of adequate pain control and how to take Norco as prescribed. ALICIA DUNBAR notified. Patient set up to see Marlin Vinnie tomorrow at 9am.    Patient was evaluated today by Treatment Nurse.    Oral medications included in this regimen:  no    Patient confirms comprehension of cancer treatment schedule:  yes    Pregnancy screening:  Not applicable    Modifications in dose or schedule:  No    Medications appearance and physical integrity checked by RN: yes.    Chemotherapy IV pump settings verified by 2 RNs:  Yes.  Frequency of blood return and site check throughout administration: Prior to administration and At completion of therapy     Infusion/treatment outcome:  patient tolerated treatment without incident    Education Record    Learner:  Patient  Barriers / Limitations:  None  Method:  Reinforcement  Education / instructions given: Reviewed plan of care and infusion process. Discussed pain control and Norco use at home.   Outcome:  Shows understanding    Discharged Home, Ambulating independently, accompanied by:Spouse    Patient/family verbalized understanding of future appointments: by printed AVS

## 2024-09-06 ENCOUNTER — TELEPHONE (OUTPATIENT)
Dept: INTERNAL MEDICINE CLINIC | Facility: CLINIC | Age: 69
End: 2024-09-06

## 2024-09-06 ENCOUNTER — OFFICE VISIT (OUTPATIENT)
Dept: HEMATOLOGY/ONCOLOGY | Facility: HOSPITAL | Age: 69
End: 2024-09-06
Attending: INTERNAL MEDICINE
Payer: MEDICARE

## 2024-09-06 VITALS
BODY MASS INDEX: 31.94 KG/M2 | RESPIRATION RATE: 18 BRPM | TEMPERATURE: 97 F | SYSTOLIC BLOOD PRESSURE: 146 MMHG | HEART RATE: 76 BPM | WEIGHT: 203.5 LBS | OXYGEN SATURATION: 97 % | DIASTOLIC BLOOD PRESSURE: 71 MMHG | HEIGHT: 67 IN

## 2024-09-06 DIAGNOSIS — Z71.89 ADVANCE CARE PLANNING: ICD-10-CM

## 2024-09-06 DIAGNOSIS — C34.92 STAGE IV SQUAMOUS CELL CARCINOMA OF LEFT LUNG (HCC): ICD-10-CM

## 2024-09-06 DIAGNOSIS — C79.51 LUNG CANCER METASTATIC TO BONE (HCC): ICD-10-CM

## 2024-09-06 DIAGNOSIS — K59.03 THERAPEUTIC OPIOID INDUCED CONSTIPATION: ICD-10-CM

## 2024-09-06 DIAGNOSIS — C34.90 LUNG CANCER METASTATIC TO BONE (HCC): ICD-10-CM

## 2024-09-06 DIAGNOSIS — Z71.89 GOALS OF CARE, COUNSELING/DISCUSSION: ICD-10-CM

## 2024-09-06 DIAGNOSIS — T40.2X5A THERAPEUTIC OPIOID INDUCED CONSTIPATION: ICD-10-CM

## 2024-09-06 DIAGNOSIS — G89.3 CANCER RELATED PAIN: ICD-10-CM

## 2024-09-06 DIAGNOSIS — Z51.5 PALLIATIVE CARE ENCOUNTER: Primary | ICD-10-CM

## 2024-09-06 PROCEDURE — 99215 OFFICE O/P EST HI 40 MIN: CPT | Performed by: NURSE PRACTITIONER

## 2024-09-06 RX ORDER — LISINOPRIL 30 MG/1
30 TABLET ORAL DAILY
Qty: 90 TABLET | Refills: 2 | Status: SHIPPED | OUTPATIENT
Start: 2024-09-06

## 2024-09-06 RX ORDER — LISINOPRIL 30 MG/1
30 TABLET ORAL DAILY
Qty: 90 TABLET | Refills: 0 | OUTPATIENT
Start: 2024-09-06

## 2024-09-06 RX ORDER — HYDROCODONE BITARTRATE AND ACETAMINOPHEN 10; 325 MG/1; MG/1
1 TABLET ORAL EVERY 4 HOURS PRN
Qty: 60 TABLET | Refills: 0 | Status: SHIPPED | OUTPATIENT
Start: 2024-09-06

## 2024-09-06 NOTE — TELEPHONE ENCOUNTER
Lisinopril 30 mg tablet. Patient would like another refill. He did not take it yesterday.  please advise

## 2024-09-06 NOTE — PATIENT INSTRUCTIONS
For pain:  -INCREASE Hydrocodone/Acetaminophen (Norco) to 10/325mg - take 1 tablet every 4 hours as needed for pain; MAX daily dose is 6 tablets of Norco per day      For constipation:  -Norco will cause constipation  -START Senna-S - take 2 tablets daily  -If you experience loose stool or diarrhea, reduce Senna-S to 1 tablet per day or hold this medication for 1-2 days until constipation resolves  -INCREASE water intake

## 2024-09-06 NOTE — CONSULTS
Palliative Care Consult Note     Patient Name: Tarik Camara   YOB: 1955   Medical Record Number: [id number]   Date of visit: 9/6/2024     The 21st Century Cures Act makes medical notes like these available to patients in the interest of transparency. Please be advised this is a medical document. Medical documents are intended to carry relevant information, facts as evident, and the clinical opinion of the practitioner. The medical note is intended as peer to peer communication and may appear blunt or direct. It is written in medical language and may contain abbreviations or verbiage that are unfamiliar.     Chief Complaint/Reason for Visit:  Chief Complaint   Patient presents with    Palliative Care       Reason for Consultation:   I was asked by Sherry Lane to evaluate patient. Consult requested for evaluation of palliative care needs and Uncontrolled symptoms.    Past Medical History/Past Surgical History:   History obtained from [facility] In addition to the patient, PMH/PSH is significant as shown below.    HPI:      Currently, there is no code status listed in Epic and there are no advance directives on file (Living Will, Healthcare Power of  [HCPOA] or Practitioner Order for Life-Sustaining Treatment [POLST] documentation).     Hospital admissions in past year: 0  ER visits in past year: 0    Patient seen and examined along with his wife present today. Tarik is awake, alert, oriented x 4, answers questions appropriately, is a reliable historian, and is in NAD today.    Problem List:  Patient Active Problem List   Diagnosis    Coronary artery disease involving native coronary artery of native heart with angina pectoris (HCC)    Hyperlipidemia    Essential hypertension    Erectile dysfunction    Microalbuminuria    History of Bell's palsy    Paraseptal emphysema (HCC)    Type 2 diabetes mellitus without retinopathy (HCC)    Presbyopia of both eyes    Age-related nuclear cataract of both  eyes    Type 2 diabetes mellitus with diabetic neuropathy (HCC)    Conjunctivochalasis of both eyes    Mass of left lung    Lung cancer (HCC)    Abdominal bloating    Chronic idiopathic constipation    Encounter for antineoplastic chemotherapy and immunotherapy    Neuropathy of both feet    Pain from bone metastases (HCC)    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)    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)    Encounter for antineoplastic immunotherapy    Chemotherapy-induced peripheral neuropathy (HCC)    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)        Medical History:  Past Medical History:    Bell's palsy    COPD (chronic obstructive pulmonary disease) (HCC)    Degenerative joint disease (DJD) of lumbar spine    Diabetes (HCC)    Diabetes mellitus, type II (HCC)    Heart attack (HCC)    High blood pressure    High cholesterol    Hyperlipidemia    Hypertension    Osteoarthritis    Sleep apnea    no CPAP       Surgical History:  Past Surgical History:   Procedure Laterality Date    Appendectomy      Coronary stent placement      Palate/uvula surgery unlisted      Ventral hernia repair  2021    primary repair, ventral and umbilical hernias       Allergies:  No Known Allergies    Family History:  Family History   Problem Relation Age of Onset    No Known Problems Mother     Heart Disorder Father     Diabetes Neg     Glaucoma Neg     Macular degeneration Neg        Social History:  Social History     Socioeconomic History    Marital status:     Number of children: 3   Occupational History    Occupation: [employer]   Tobacco Use    Smoking status: Former     Current packs/day: 0.00     Types: Cigarettes     Quit date: 3/19/2020     Years since quittin.4    Smokeless tobacco: Never    Tobacco comments:     Occasionally, not in last 6 months   Vaping Use    Vaping status: Never Used   Substance and Sexual Activity    Alcohol use: Not Currently    Drug use:  Never       Palliative Care Social History:    Marital Status:   Children: 3 kids (all live in [place])  Living Situation: Lives with wife  Does patient live alone: No  Occupational History: Retired     Functional History:    ADLs: Independent  Driving: Yes  Assistive Devices: None  Caregiver/caregiver support at home: Not needed    Substance History:    Smoking Status: Former cigarette smoker  Hx of ETOH Abuse: No  Hx of Illicit Drug Use: No  Hx of Medical Cannabis Use: No    Anabaptist/Cultural Information:    Anabaptist Affiliation: Judaism, but is not strict  Ethnicity: [facility]     Goals of care counseling/advance care planning discussion:   I discussed reason for palliative care consultation. I discussed the benefits of palliative care to include help with symptom management needs, provide extra layer of support to patient/family, conduct GOC/wishes discussions, and assistance with advance care planning needs. I discussed the differences between palliative care and hospice. I provided education about the Medicare hospice benefit and philosophy (for future reference). Palliative care brochure provided.     We discussed patient's current clinical condition. I provided education about the typical disease trajectory of metastatic lung cancer with associated symptoms and decline over time.     I discussed the importance of advance care planning prior to crisis with Tarik.     HCPOA/Health Surrogate:    Tarik has not completed saul HCPOA documentation.    I discussed [place]rogate [place]    [place] Surrogate: Socrates Camara (wife)    Code Status/POLST Documentation:    Tarik wishes to remain FULL CODE at this time.        Medications:  Current Outpatient Medications   Medication Sig Dispense Refill    HYDROcodone-acetaminophen  MG Oral Tab Take 1 tablet by mouth every 4 (four) hours as needed for Pain (for cancer related pain; MAX dose is 6 tabs per day). 60 tablet 0    lisinopril 30 MG Oral Tab  Take 1 tablet (30 mg total) by mouth daily. 90 tablet 2    metoprolol succinate  MG Oral Tablet 24 Hr Take 1 tablet (100 mg total) by mouth daily. 90 tablet 1    glipiZIDE ER 5 MG Oral Tablet 24 Hr Take 1 tablet (5 mg total) by mouth daily. 90 tablet 1    polyethylene glycol, PEG 3350, 17 g Oral Powd Pack Take 17 g by mouth daily.      pregabalin (LYRICA) 50 MG Oral Cap Take 1 capsule (50 mg total) by mouth 3 (three) times daily. 90 capsule 1    atorvastatin 20 MG Oral Tab Take 1 tablet (20 mg total) by mouth nightly. 90 tablet 3    prochlorperazine (COMPAZINE) 10 mg tablet Take 1 tablet (10 mg total) by mouth every 6 (six) hours as needed for Nausea. 30 tablet 3    ondansetron (ZOFRAN) 8 MG tablet Take 1 tablet (8 mg total) by mouth every 8 (eight) hours as needed for Nausea. 30 tablet 3    aspirin 81 MG Oral Tab EC Take 1 tablet (81 mg total) by mouth daily.      metFORMIN 850 MG Oral Tab Take 1 tablet (850 mg total) by mouth 2 (two) times daily with meals. 180 tablet 3       Review of Systems:  Review of Systems   Constitutional:  Positive for malaise/fatigue.   HENT: Negative.     Eyes: Negative.    Respiratory: Negative.  Negative for cough, hemoptysis and shortness of breath.    Cardiovascular: Negative.  Negative for chest pain, palpitations and leg swelling.   Gastrointestinal:  Negative for abdominal pain, nausea and vomiting.   Musculoskeletal:         R anterior rib pain - cancer related  -Does not radiate  -Sharp/pinching in character  -Intermittent  -Taking 1-2 tabs Norco 5/325mg 1-2 times daily reduces pain  -Pressing/rubbing area increases pain  -Pain has been worsening over the past month     Skin: Negative.    Neurological:  Positive for tingling (On Lyrica for peripheral neuropathy). Negative for dizziness, weakness and headaches.   Psychiatric/Behavioral: Negative.  Negative for depression, hallucinations, memory loss, substance abuse and suicidal ideas. The patient is not nervous/anxious  and does not have insomnia.        Physical Examination:  Physical Exam  Constitutional:       Appearance: Normal appearance. He is obese. He is ill-appearing.   HENT:      Head: Normocephalic and atraumatic.      Right Ear: External ear normal.      Left Ear: External ear normal.      Nose: Nose normal.      Mouth/Throat:      Mouth: Mucous membranes are moist.      Pharynx: Oropharynx is clear.   Eyes:      General: No scleral icterus.     Conjunctiva/sclera: Conjunctivae normal.   Cardiovascular:      Rate and Rhythm: Normal rate and regular rhythm.   Pulmonary:      Effort: Pulmonary effort is normal. No respiratory distress.   Abdominal:      General: Bowel sounds are normal. There is no distension.      Palpations: Abdomen is soft.   Musculoskeletal:         General: Normal range of motion.      Cervical back: Normal range of motion and neck supple.      Right lower leg: No edema.      Left lower leg: No edema.   Skin:     General: Skin is warm and dry.      Coloration: Skin is pale.   Neurological:      General: No focal deficit present.      Mental Status: He is alert and oriented to person, place, and time. Mental status is at baseline.      Motor: No weakness.      Gait: Gait normal.   Psychiatric:         Mood and Affect: Mood normal.         Behavior: Behavior normal.         Thought Content: Thought content normal.         Judgment: Judgment normal.       Palliative Care Goals of Care:  Discussed with patient/family today: Yes  Patient's preference about sharing medical information: Patient and family may receive information  Patient's decision making preferences: Fully involved and speak with family  Code status: FULL CODE  Have advanced directives been discussed with patient or healthcare power of : Yes        Healthcare Agent Appointed: Yes  Healthcare Agent's Name: Socrates Camara             Palliative Care Assessment/Plan:  1. Palliative care encounter    2. Cancer related pain  -  HYDROcodone-acetaminophen  MG Oral Tab; Take 1 tablet by mouth every 4 (four) hours as needed for Pain (for cancer related pain; MAX dose is 6 tabs per day).  Dispense: 60 tablet; Refill: 0    3. Therapeutic opioid induced constipation    4. Goals of care, counseling/discussion  - Full code    5. Advance care planning  - Full code    6. 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)        Cancer Related Pain  -Discussed addiction vs tolerance  -[place]ed [place]   -Discussed MOA and explained side effects of pain medications  -Max daily Tylenol limit is 3,000mg  -INCREASE Hydrocodone/Acetaminophen (Norco) to 10/325mg - take 1 tablet every 4 hours as needed for pain; MAX daily dose is 6 tablets of Norco per day  -AVOID ALL NSAIDS - due to HTN  -Discussed indication for opioid medications for cancer related pain per NCCN guidelines  -Discussed common SE of opioid meds which include, but are not limited to: drowsiness, n/v, stomach upset, constipation  -Excessive or misuse of opioid medications may cause respiratory depression, CNS depression, and possibly death  -Discussed to NEVER self adjust pain med/opioid doses or stop these meds abruptly as this may lead to opioid withdrawal  -Discussed signs and symptoms of withdrawal which include, but are not limited to: rhinorrhea, diarrhea, irritability, chills, sweating, insomnia, mood swings, anxiety, increased pain      Therapeutic opioid induced constipation  -Norco will cause constipation  -START Senna-S - take 2 tablets daily  -If you experience loose stool or diarrhea, reduce Senna-S to 1 tablet per day or hold this medication for 1-2 days until constipation resolves  -INCREASE water intake       Goals of care counseling/discussion  -Pt is FULL CODE  -Continue supportive medical treatments; pt plans to continue pursuing cancer treatment  -Provided emotional support to pt/family who appear to be coping  adequately  -Patient is agreeable to hospitalization, if indicated  -Patient is agreeable to following up with outpatient palliative care  -See above narrative for further details      Advance care planning counseling/discussion  -Pt is FULL CODE  Discussed [place]  -Health Surrogate is Socrates Camara (wife)  -See above narrative for further details    Palliative Performance Scale 80%    Emotional support was provided to patient and family today: Yes    Palliative Care Follow-up:  I spent a total of  60 minutes with the patient today, which included all of the following:direct face to face contact, history taking, physical examination, and >50% was spent counseling and coordinating care.    Discussed today's visit with Sherry Lane.    Thank you for allowing the Palliative Care Team to participate in the care of your patient. I will continue to follow clinically.    MANJINDER STARKS DNP, FNP-BC, [facility]  [phone]  9/6/2024

## 2024-09-19 ENCOUNTER — OFFICE VISIT (OUTPATIENT)
Dept: HEMATOLOGY/ONCOLOGY | Facility: HOSPITAL | Age: 69
End: 2024-09-19
Attending: INTERNAL MEDICINE
Payer: MEDICARE

## 2024-09-19 VITALS
WEIGHT: 199.81 LBS | HEIGHT: 67 IN | OXYGEN SATURATION: 97 % | RESPIRATION RATE: 18 BRPM | SYSTOLIC BLOOD PRESSURE: 141 MMHG | DIASTOLIC BLOOD PRESSURE: 74 MMHG | TEMPERATURE: 98 F | HEART RATE: 95 BPM | BODY MASS INDEX: 31.36 KG/M2

## 2024-09-19 VITALS
TEMPERATURE: 98 F | SYSTOLIC BLOOD PRESSURE: 141 MMHG | HEART RATE: 95 BPM | RESPIRATION RATE: 18 BRPM | OXYGEN SATURATION: 97 % | HEIGHT: 67 IN | BODY MASS INDEX: 31.23 KG/M2 | WEIGHT: 199 LBS | DIASTOLIC BLOOD PRESSURE: 74 MMHG

## 2024-09-19 DIAGNOSIS — Z51.12 ENCOUNTER FOR ANTINEOPLASTIC CHEMOTHERAPY AND IMMUNOTHERAPY: Primary | ICD-10-CM

## 2024-09-19 DIAGNOSIS — C34.92 STAGE IV SQUAMOUS CELL CARCINOMA OF LEFT LUNG (HCC): ICD-10-CM

## 2024-09-19 DIAGNOSIS — G62.0 CHEMOTHERAPY-INDUCED PERIPHERAL NEUROPATHY (HCC): ICD-10-CM

## 2024-09-19 DIAGNOSIS — C34.92 STAGE IV SQUAMOUS CELL CARCINOMA OF LEFT LUNG (HCC): Primary | ICD-10-CM

## 2024-09-19 DIAGNOSIS — C79.51 LUNG CANCER METASTATIC TO BONE (HCC): ICD-10-CM

## 2024-09-19 DIAGNOSIS — R63.4 WEIGHT LOSS: ICD-10-CM

## 2024-09-19 DIAGNOSIS — Z71.89 GOALS OF CARE, COUNSELING/DISCUSSION: ICD-10-CM

## 2024-09-19 DIAGNOSIS — Z51.5 PALLIATIVE CARE ENCOUNTER: Primary | ICD-10-CM

## 2024-09-19 DIAGNOSIS — G89.3 CANCER RELATED PAIN: ICD-10-CM

## 2024-09-19 DIAGNOSIS — C34.90 LUNG CANCER METASTATIC TO BONE (HCC): ICD-10-CM

## 2024-09-19 DIAGNOSIS — K59.03 THERAPEUTIC OPIOID INDUCED CONSTIPATION: ICD-10-CM

## 2024-09-19 DIAGNOSIS — T45.1X5A CHEMOTHERAPY-INDUCED PERIPHERAL NEUROPATHY (HCC): ICD-10-CM

## 2024-09-19 DIAGNOSIS — Z51.11 ENCOUNTER FOR ANTINEOPLASTIC CHEMOTHERAPY AND IMMUNOTHERAPY: Primary | ICD-10-CM

## 2024-09-19 DIAGNOSIS — T40.2X5A THERAPEUTIC OPIOID INDUCED CONSTIPATION: ICD-10-CM

## 2024-09-19 DIAGNOSIS — Z51.11 CHEMOTHERAPY MANAGEMENT, ENCOUNTER FOR: ICD-10-CM

## 2024-09-19 LAB
ALBUMIN SERPL-MCNC: 4.1 G/DL (ref 3.2–4.8)
ALBUMIN/GLOB SERPL: 1.1 {RATIO} (ref 1–2)
ALP LIVER SERPL-CCNC: 91 U/L
ALT SERPL-CCNC: 27 U/L
ANION GAP SERPL CALC-SCNC: 6 MMOL/L (ref 0–18)
AST SERPL-CCNC: 17 U/L (ref ?–34)
BASOPHILS # BLD AUTO: 0.03 X10(3) UL (ref 0–0.2)
BASOPHILS NFR BLD AUTO: 0.5 %
BILIRUB SERPL-MCNC: 0.3 MG/DL (ref 0.2–1.1)
BUN BLD-MCNC: 15 MG/DL (ref 9–23)
BUN/CREAT SERPL: 17 (ref 10–20)
CALCIUM BLD-MCNC: 9.8 MG/DL (ref 8.7–10.4)
CHLORIDE SERPL-SCNC: 103 MMOL/L (ref 98–112)
CO2 SERPL-SCNC: 27 MMOL/L (ref 21–32)
CREAT BLD-MCNC: 0.88 MG/DL
DEPRECATED RDW RBC AUTO: 44.5 FL (ref 35.1–46.3)
EGFRCR SERPLBLD CKD-EPI 2021: 93 ML/MIN/1.73M2 (ref 60–?)
EOSINOPHIL # BLD AUTO: 0.12 X10(3) UL (ref 0–0.7)
EOSINOPHIL NFR BLD AUTO: 2.2 %
ERYTHROCYTE [DISTWIDTH] IN BLOOD BY AUTOMATED COUNT: 14.2 % (ref 11–15)
FASTING STATUS PATIENT QL REPORTED: NO
GLOBULIN PLAS-MCNC: 3.6 G/DL (ref 2–3.5)
GLUCOSE BLD-MCNC: 128 MG/DL (ref 70–99)
HCT VFR BLD AUTO: 33.7 %
HGB BLD-MCNC: 10.6 G/DL
IMM GRANULOCYTES # BLD AUTO: 0.04 X10(3) UL (ref 0–1)
IMM GRANULOCYTES NFR BLD: 0.7 %
LYMPHOCYTES # BLD AUTO: 1.01 X10(3) UL (ref 1–4)
LYMPHOCYTES NFR BLD AUTO: 18.3 %
MCH RBC QN AUTO: 27.4 PG (ref 26–34)
MCHC RBC AUTO-ENTMCNC: 31.5 G/DL (ref 31–37)
MCV RBC AUTO: 87.1 FL
MONOCYTES # BLD AUTO: 0.83 X10(3) UL (ref 0.1–1)
MONOCYTES NFR BLD AUTO: 15 %
NEUTROPHILS # BLD AUTO: 3.49 X10 (3) UL (ref 1.5–7.7)
NEUTROPHILS # BLD AUTO: 3.49 X10(3) UL (ref 1.5–7.7)
NEUTROPHILS NFR BLD AUTO: 63.3 %
OSMOLALITY SERPL CALC.SUM OF ELEC: 284 MOSM/KG (ref 275–295)
PLATELET # BLD AUTO: 430 10(3)UL (ref 150–450)
POTASSIUM SERPL-SCNC: 4.5 MMOL/L (ref 3.5–5.1)
PROT SERPL-MCNC: 7.7 G/DL (ref 5.7–8.2)
RBC # BLD AUTO: 3.87 X10(6)UL
SODIUM SERPL-SCNC: 136 MMOL/L (ref 136–145)
WBC # BLD AUTO: 5.5 X10(3) UL (ref 4–11)

## 2024-09-19 PROCEDURE — 85025 COMPLETE CBC W/AUTO DIFF WBC: CPT

## 2024-09-19 PROCEDURE — 99215 OFFICE O/P EST HI 40 MIN: CPT | Performed by: NURSE PRACTITIONER

## 2024-09-19 PROCEDURE — 96375 TX/PRO/DX INJ NEW DRUG ADDON: CPT

## 2024-09-19 PROCEDURE — 99214 OFFICE O/P EST MOD 30 MIN: CPT | Performed by: NURSE PRACTITIONER

## 2024-09-19 PROCEDURE — 80053 COMPREHEN METABOLIC PANEL: CPT

## 2024-09-19 PROCEDURE — 96413 CHEMO IV INFUSION 1 HR: CPT

## 2024-09-19 NOTE — PROGRESS NOTES
Pt here for C2D1 Drug(s)GEMZAR.  Arrives Ambulating independently, accompanied by Spouse     Patient was evaluated today by ASHIA and Treatment Nurse.    R FA PIV started in lab C/D/I, good blood return noted.    Oral medications included in this regimen:  no    Patient confirms comprehension of cancer treatment schedule:  yes    Pregnancy screening:  Not applicable    Modifications in dose or schedule:  No    Medications appearance and physical integrity checked by RN: yes.    Chemotherapy IV pump settings verified by 2 RNs:  Yes.  Frequency of blood return and site check throughout administration: Prior to administration, Prior to each drug, and At completion of therapy     Infusion/treatment outcome:  patient tolerated treatment without incident    PIV removed.    Education Record    Learner:  Patient and Spouse  Barriers / Limitations:  None  Method:  Discussion and Printed material  Education / instructions given:  Medication, plan of care, schedule  Outcome:  Shows understanding    Discharged Home, Ambulating independently, accompanied by:Spouse    Patient/family verbalized understanding of future appointments: by printed AVS

## 2024-09-19 NOTE — PROGRESS NOTES
Hematology Oncology Progress Note    Patient Name: Tarik Camara   YOB: 1955   Medical Record Number: [id number]   CSN: [id number]   Attending Physician: Мария Spears MD     Date of Visit: 9/19/2024     Chief Complaint:  Lung cancer follow up  Chemotherapy   Cancer related pain    Oncologic History:  68 year old male former smoker with 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  PDL1 0%     Diagnosis 1/3/2024 via transbronchial biopsy bronchoscopy with Dr. Carrillo.     On CT 12/22/23 4.2 x 3.3 cm left upper lobe mass enlarged AP reflection lymph node left hilar adenopathy.  PET/CT showed bone metastasis     Initiated Carboplatin Paclitaxel Pembrolizumab 2/1/24  Maintenance pembrolizumab 4/25/24-7/25/24     Palliative RT to R 8th rib lesion 7/2-7/3/24     PET scan 8/7/24 showed progressive disease.     Guardant 360CDx 8/15/24 all VUS. No actionable alteration.     8/29/24 initiated gemcitabine     Interval History:  Patient is here for consideration of Cycle 2 gemcitabine; presents with his wife.  Palliative appointment today as well.    Currently pain is controlled but has times where it becomes more severe near right side of ribs that can radiate to epigastric region.  He does not feel he needs any additional pain medication at this time.  Patient has had 3-4 pound weight loss but reports good appetite.  No other acute complaints.      Denies headache, fever/chills, vision change, sore throat, worsening SOB/ROBISON, chest pain, cough, N/V/D/C, and swelling.        Performance Status: ECOG 0    Current Medications:    Current Outpatient Medications:     HYDROcodone-acetaminophen  MG Oral Tab, Take 1 tablet by mouth every 4 (four) hours as needed for Pain (for cancer related pain; MAX dose is 6 tabs per day)., Disp: 60 tablet, Rfl: 0    lisinopril 30 MG Oral Tab, Take 1 tablet (30 mg total) by mouth daily., Disp: 90 tablet, Rfl: 2    metoprolol succinate  MG Oral  Tablet 24 Hr, Take 1 tablet (100 mg total) by mouth daily., Disp: 90 tablet, Rfl: 1    glipiZIDE ER 5 MG Oral Tablet 24 Hr, Take 1 tablet (5 mg total) by mouth daily., Disp: 90 tablet, Rfl: 1    polyethylene glycol, PEG 3350, 17 g Oral Powd Pack, Take 17 g by mouth daily., Disp: , Rfl:     pregabalin (LYRICA) 50 MG Oral Cap, Take 1 capsule (50 mg total) by mouth 3 (three) times daily., Disp: 90 capsule, Rfl: 1    atorvastatin 20 MG Oral Tab, Take 1 tablet (20 mg total) by mouth nightly., Disp: 90 tablet, Rfl: 3    prochlorperazine (COMPAZINE) 10 mg tablet, Take 1 tablet (10 mg total) by mouth every 6 (six) hours as needed for Nausea., Disp: 30 tablet, Rfl: 3    ondansetron (ZOFRAN) 8 MG tablet, Take 1 tablet (8 mg total) by mouth every 8 (eight) hours as needed for Nausea., Disp: 30 tablet, Rfl: 3    aspirin 81 MG Oral Tab EC, Take 1 tablet (81 mg total) by mouth daily., Disp: , Rfl:     metFORMIN 850 MG Oral Tab, Take 1 tablet (850 mg total) by mouth 2 (two) times daily with meals., Disp: 180 tablet, Rfl: 3    Review of Systems:  10 -point systems reviewed, all negative except as mentioned in the HPI/interval history.     Vital Signs:  /74 (BP Location: Left arm, Patient Position: Sitting, Cuff Size: adult)   Pulse 95   Temp 98.2 °F (36.8 °C) (Oral)   Resp 18   Ht 1.702 m (5' 7\")   Wt 90.3 kg (199 lb)   SpO2 97%   BMI 31.17 kg/m²     Wt Readings from Last 6 Encounters:   09/19/24 90.3 kg (199 lb)   09/06/24 92.3 kg (203 lb 8 oz)   09/04/24 92.3 kg (203 lb 6.4 oz)   08/29/24 92.2 kg (203 lb 4.8 oz)   08/22/24 92.3 kg (203 lb 6.4 oz)   08/10/24 92.5 kg (204 lb)      Physical Examination:  General: Aert and oriented x 3, not in acute distress.  Psych:  Mood and affect appropriate  HEENT: Non-icteric sclera. Oropharynx is clear.   Neck: No palpable lymphadenopathy. Neck is supple.  Chest: Clear to auscultation.   Heart: Regular rate and rhythm.  Abdomen: Soft, non tender with good bowel sounds.    Extremities: No peripheral edema. No clubbing.   Neurological: Grossly intact.     Laboratory:  Lab Results   Component Value Date    WBC 5.5 09/19/2024    RBC 3.87 09/19/2024    HGB 10.6 (L) 09/19/2024    HCT 33.7 (L) 09/19/2024    MCV 87.1 09/19/2024    MCH 27.4 09/19/2024    MCHC 31.5 09/19/2024    RDW 14.2 09/19/2024    .0 09/19/2024     Lab Results   Component Value Date     09/19/2024    K 4.5 09/19/2024     09/19/2024    CO2 27.0 09/19/2024    BUN 15 09/19/2024    CREATSERUM 0.88 09/19/2024     (H) 09/19/2024    CA 9.8 09/19/2024    ALKPHO 91 09/19/2024    ALT 27 09/19/2024    AST 17 09/19/2024    BILT 0.3 09/19/2024    ALB 4.1 09/19/2024    TP 7.7 09/19/2024     Lab Results   Component Value Date/Time    TSH 3.333 07/25/2024 08:28 AM    TSH 2.573 07/05/2024 10:18 AM    TSH 2.706 06/13/2024 12:35 PM    TSH 3.369 05/20/2024 12:56 PM    TSH 3.325 04/24/2024 10:15 AM    TSH 2.917 04/03/2024 08:39 AM     Radiology:  PET STANDARD BODY SCAN (ONCOLOGY) (CPT=78815)    Result Date: 8/7/2024  CONCLUSION:  1. Enlargement of the left upper lobe hypermetabolic malignancy, concerning for progression of disease.  2. Hypermetabolic mediastinal and hilar lymphadenopathy, presumably metastatic.   3. A markedly hypermetabolic expansile destructive osseous lesion of the right lateral 8th rib is again evident.  4. There is a small hypermetabolic nodule in the left gluteal region, concerning for potential soft tissue metastasis.  5. Severe paraseptal emphysema.  6. Uncomplicated distal colonic diverticulosis.  7. Probable physiologic excretion of radiotracer in the distal colon.  8. Prostatomegaly with probable chronic outlet obstruction.  9. Low-density appearance of the intracardiac blood pool raises the possibility of underlying anemia. Correlate with hematologic parameters.  10. Lesser incidental findings as above.    Dictated by (CST): Luis E Desir MD on 8/07/2024 at 12:24 PM     Finalized by  (CST): Luis E Desir MD on 8/07/2024 at 12:44 PM          CT CHEST+ABDOMEN+PELVIS(ALL CNTRST ONLY)(CPT=71260/30691)    Result Date: 7/25/2024  CONCLUSION:  1.  There is history of metastatic non-small cell lung cancer.  There is a dominant mass within the lingular segment of the left upper lobe, which has increased in size since previous exam.  New indeterminate 10 mm nodule in the left lower lobe which could represent nonspecific pneumonitis/atelectasis or tumor.  There is redemonstration of lymphadenopathy in both pulmonary rolo which is also larger/worse since previous exam.  There is also a lytic lesion involving the lateral right 8th rib which has increased in size since prior exam. 2.  Pulmonary emphysema. 3.  Coronary atherosclerosis.  Cardiomegaly.   Dictated by (CST): Eb Oliveira MD on 7/25/2024 at 2:36 PM     Finalized by (CST): Eb Oliveira MD on 7/25/2024 at 2:47 PM            Impression and Plan:    Metastatic squamous cell carcinoma of the left upper lung, PD-L1 0%  --Carboplatin paclitaxel pembrolizuamb-initiated 2/1/24 and completed 4 cycles on 4/4/24; dose reduce carbo to AUC 5 and paclitaxel by 20% due CIPN to feet.   --Imaging 4/2024 after C4 with favorable tx response.   --Continued on maintenance pembrolizumab q3 wks, completed 9 cycles as of  7/25/24 with no autoimmune toxicity.  --Restaging CT scans and PET scans 8/2024 consistent with progression of disease. Guardant (ctDNA sequencing) with no targetable alteration.   --He was not interested in clinical trials or referral to a tertiary center.  --Single agent gemcitabine initiated 8/29/24 and tolerated first dose well.  - Proceed with C2D1 today      Bone metastases  Cancer related pain  --S/p palliative XRT 7/3/24 to enlarging right 8th rib destructive bone lesion  --No with worsening pain due to progressive disease. Tramadol did not work. Currently on norco only.   --Continue Norco 10/325 mg Q4H PRN. Patient following closely with  palliative care for pain management    Chemo induced neuropathy- stable. Continue lyrica      Chemo induced anemia- mild. Monitor    RTC as scheduled on 10/10/24.  Patient aware to call our clinic sooner with any questions or concerns      DENA Cai  Hematology/Oncology

## 2024-09-19 NOTE — PROGRESS NOTES
Palliative Care Follow Up Note     Patient Name: Tarik Camara   YOB: 1955   Medical Record Number: [id number]   Date of visit: 9/19/2024     The 21st Century Cures Act makes medical notes like these available to patients in the interest of transparency. Please be advised this is a medical document. Medical documents are intended to carry relevant information, facts as evident, and the clinical opinion of the practitioner. The medical note is intended as peer to peer communication and may appear blunt or direct. It is written in medical language and may contain abbreviations or verbiage that are unfamiliar.     Chief Complaint/Reason for Visit:  Chief Complaint   Patient presents with    Palliative Care       Past Medical History/Past Surgical History:   History obtained from [facility] In addition to the patient, PMH/PSH is significant as shown below.    HPI:      Hospital admissions in past year: 0  ER visits in past year: 0    Patient seen and examined along with his wife present today. Tarik is awake, alert, oriented x 4, answers questions appropriately, is a reliable historian, and is in NAD today.    See ROS.    Problem List:  Patient Active Problem List   Diagnosis    Coronary artery disease involving native coronary artery of native heart with angina pectoris (HCC)    Hyperlipidemia    Essential hypertension    Erectile dysfunction    Microalbuminuria    History of Bell's palsy    Paraseptal emphysema (HCC)    Type 2 diabetes mellitus without retinopathy (HCC)    Presbyopia of both eyes    Age-related nuclear cataract of both eyes    Type 2 diabetes mellitus with diabetic neuropathy (HCC)    Conjunctivochalasis of both eyes    Mass of left lung    Lung cancer (HCC)    Abdominal bloating    Chronic idiopathic constipation    Encounter for antineoplastic chemotherapy and immunotherapy    Neuropathy of both feet    Pain from bone metastases (HCC)    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)     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)    Encounter for antineoplastic immunotherapy    Chemotherapy-induced peripheral neuropathy (HCC)    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)        Medical History:  Past Medical History:    Bell's palsy    COPD (chronic obstructive pulmonary disease) (HCC)    Degenerative joint disease (DJD) of lumbar spine    Diabetes (HCC)    Diabetes mellitus, type II (HCC)    Heart attack (HCC)    High blood pressure    High cholesterol    Hyperlipidemia    Hypertension    Osteoarthritis    Sleep apnea    no CPAP       Surgical History:  Past Surgical History:   Procedure Laterality Date    Appendectomy      Coronary stent placement      Palate/uvula surgery unlisted      Ventral hernia repair  2021    primary repair, ventral and umbilical hernias       Allergies:  No Known Allergies    Family History:  Family History   Problem Relation Age of Onset    No Known Problems Mother     Heart Disorder Father     Diabetes Neg     Glaucoma Neg     Macular degeneration Neg        Social History:  Social History     Socioeconomic History    Marital status:     Number of children: 3   Occupational History    Occupation: [employer]   Tobacco Use    Smoking status: Former     Current packs/day: 0.00     Types: Cigarettes     Quit date: 3/19/2020     Years since quittin.5    Smokeless tobacco: Never    Tobacco comments:     Occasionally, not in last 6 months   Vaping Use    Vaping status: Never Used   Substance and Sexual Activity    Alcohol use: Not Currently    Drug use: Never       Goals of care counseling/advance care planning discussion:     We discussed patient's current clinical condition. I provided education about the typical disease trajectory of metastatic lung cancer with associated symptoms and decline over time.     I discussed the importance of advance care planning prior to crisis with Tarik.     HCPOA/Health Surrogate:    Tarik has  not completed saul HCPOA documentation.    I discussed [place]    [place] Surrogate: Socrates Camara (wife)    Code Status/POLST Documentation:    Tarik wishes to remain FULL CODE at this time.        Medications:  Current Outpatient Medications   Medication Sig Dispense Refill    HYDROcodone-acetaminophen  MG Oral Tab Take 1 tablet by mouth every 4 (four) hours as needed for Pain (for cancer related pain; MAX dose is 6 tabs per day). 60 tablet 0    lisinopril 30 MG Oral Tab Take 1 tablet (30 mg total) by mouth daily. 90 tablet 2    metoprolol succinate  MG Oral Tablet 24 Hr Take 1 tablet (100 mg total) by mouth daily. 90 tablet 1    glipiZIDE ER 5 MG Oral Tablet 24 Hr Take 1 tablet (5 mg total) by mouth daily. 90 tablet 1    polyethylene glycol, PEG 3350, 17 g Oral Powd Pack Take 17 g by mouth daily.      pregabalin (LYRICA) 50 MG Oral Cap Take 1 capsule (50 mg total) by mouth 3 (three) times daily. 90 capsule 1    atorvastatin 20 MG Oral Tab Take 1 tablet (20 mg total) by mouth nightly. 90 tablet 3    prochlorperazine (COMPAZINE) 10 mg tablet Take 1 tablet (10 mg total) by mouth every 6 (six) hours as needed for Nausea. 30 tablet 3    ondansetron (ZOFRAN) 8 MG tablet Take 1 tablet (8 mg total) by mouth every 8 (eight) hours as needed for Nausea. 30 tablet 3    aspirin 81 MG Oral Tab EC Take 1 tablet (81 mg total) by mouth daily.      metFORMIN 850 MG Oral Tab Take 1 tablet (850 mg total) by mouth 2 (two) times daily with meals. 180 tablet 3       Review of Systems:  Review of Systems   Constitutional:  Positive for malaise/fatigue.   HENT: Negative.     Eyes: Negative.    Respiratory: Negative.  Negative for cough, hemoptysis and shortness of breath.    Cardiovascular: Negative.  Negative for chest pain, palpitations and leg swelling.   Gastrointestinal:  Negative for abdominal pain, nausea and vomiting.   Musculoskeletal:         R anterior rib pain - cancer related  -Does not  radiate  -Sharp/pinching in character  -Intermittent; some episodes are more severe than others  -Taking 2 tabs Norco 10/325mg 1-2 times daily reduces pain  -Pressing/rubbing area increases pain  -Pain has been worsening over the past month     Skin: Negative.    Neurological:  Positive for tingling (On Lyrica for peripheral neuropathy). Negative for dizziness, weakness and headaches.   Psychiatric/Behavioral: Negative.  Negative for depression, hallucinations, memory loss, substance abuse and suicidal ideas. The patient is not nervous/anxious and does not have insomnia.        Physical Examination:  Physical Exam  Constitutional:       Appearance: Normal appearance. He is obese. He is ill-appearing.   HENT:      Head: Normocephalic and atraumatic.      Right Ear: External ear normal.      Left Ear: External ear normal.      Nose: Nose normal.      Mouth/Throat:      Mouth: Mucous membranes are moist.      Pharynx: Oropharynx is clear.   Eyes:      General: No scleral icterus.     Conjunctiva/sclera: Conjunctivae normal.   Cardiovascular:      Rate and Rhythm: Normal rate and regular rhythm.   Pulmonary:      Effort: Pulmonary effort is normal. No respiratory distress.   Abdominal:      General: Bowel sounds are normal. There is no distension.      Palpations: Abdomen is soft.   Musculoskeletal:         General: Normal range of motion.      Cervical back: Normal range of motion and neck supple.      Right lower leg: No edema.      Left lower leg: No edema.   Skin:     General: Skin is warm and dry.      Coloration: Skin is pale.   Neurological:      General: No focal deficit present.      Mental Status: He is alert and oriented to person, place, and time. Mental status is at baseline.      Motor: No weakness.      Gait: Gait normal.   Psychiatric:         Mood and Affect: Mood normal.         Behavior: Behavior normal.         Thought Content: Thought content normal.         Judgment: Judgment normal.        Palliative Care Goals of Care:  Discussed with patient/family today: Yes  Patient's preference about sharing medical information: Patient and family may receive information  Patient's decision making preferences: Fully involved and speak with family  Code status: FULL CODE  Have advanced directives been discussed with patient or healthcare power of : Yes        Healthcare Agent Appointed: Yes  Healthcare Agent's Name: Socrates Camara (wife)             Palliative Care Assessment/Plan:  1. Palliative care encounter    2. Cancer related pain    3. Therapeutic opioid induced constipation    4. Weight loss    5. Goals of care, counseling/discussion    6. 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)        Cancer Related Pain  -Discussed addiction vs tolerance  -Reviewed [place]   -Discussed MOA and explained side effects of pain medications  -Max daily Tylenol limit is 3,000mg  -CONTINUE Hydrocodone/Acetaminophen (Norco) to 10/325mg - take 1 tablet every 4 hours as needed for pain; MAX daily dose is 6 tablets of Norco per day  -I offered to add MS Contin at bedtime for better pain control, Tarik would like to hold off on this at this time  -AVOID ALL NSAIDS - due to HTN  -Discussed indication for opioid medications for cancer related pain per NCCN guidelines  -Discussed common SE of opioid meds which include, but are not limited to: drowsiness, n/v, stomach upset, constipation  -Excessive or misuse of opioid medications may cause respiratory depression, CNS depression, and possibly death  -Discussed to NEVER self adjust pain med/opioid doses or stop these meds abruptly as this may lead to opioid withdrawal  -Discussed signs and symptoms of withdrawal which include, but are not limited to: rhinorrhea, diarrhea, irritability, chills, sweating, insomnia, mood swings, anxiety, increased pain      Therapeutic opioid induced constipation  -Norco will cause constipation  -START Senna-S - take 2 tablets daily  -If you  experience loose stool or diarrhea, reduce Senna-S to 1 tablet per day or hold this medication for 1-2 days until constipation resolves  -INCREASE water intake       Goals of care counseling/discussion  -Pt is FULL CODE  -Continue supportive medical treatments; pt plans to continue pursuing cancer treatment  -Provided emotional support to pt/family who appear to be coping adequately  -Patient is agreeable to hospitalization, if indicated  -Patient is agreeable to following up with outpatient palliative care  -See above narrative for further details      Advance care planning counseling/discussion  -Pt is FULL CODE  Discussed [place]  -Health Surrogate is Socrates Camara (wife)  -See above narrative for further details    Palliative Performance Scale 80%    Emotional support was provided to patient and family today: Yes    Palliative Care Follow-up:  I spent a total of  30 minutes with the patient today, which included all of the following:direct face to face contact, history taking, physical examination, and >50% was spent counseling and coordinating care.    Discussed today's visit with Sherry Lane.    Thank you for allowing the Palliative Care Team to participate in the care of your patient. I will continue to follow clinically.    MANJINDER STARKS DNP, FNP-BC, [facility]  [phone]  9/19/2024

## 2024-09-26 ENCOUNTER — APPOINTMENT (OUTPATIENT)
Dept: HEMATOLOGY/ONCOLOGY | Facility: HOSPITAL | Age: 69
End: 2024-09-26
Attending: INTERNAL MEDICINE
Payer: MEDICARE

## 2024-09-26 VITALS
DIASTOLIC BLOOD PRESSURE: 62 MMHG | SYSTOLIC BLOOD PRESSURE: 131 MMHG | HEART RATE: 70 BPM | RESPIRATION RATE: 16 BRPM | TEMPERATURE: 98 F

## 2024-09-26 DIAGNOSIS — C34.92 STAGE IV SQUAMOUS CELL CARCINOMA OF LEFT LUNG (HCC): ICD-10-CM

## 2024-09-26 DIAGNOSIS — Z51.12 ENCOUNTER FOR ANTINEOPLASTIC CHEMOTHERAPY AND IMMUNOTHERAPY: Primary | ICD-10-CM

## 2024-09-26 DIAGNOSIS — Z51.11 ENCOUNTER FOR ANTINEOPLASTIC CHEMOTHERAPY AND IMMUNOTHERAPY: Primary | ICD-10-CM

## 2024-09-26 DIAGNOSIS — C34.92 STAGE IV SQUAMOUS CELL CARCINOMA OF LEFT LUNG (HCC): Primary | ICD-10-CM

## 2024-09-26 LAB
BASOPHILS # BLD AUTO: 0.05 X10(3) UL (ref 0–0.2)
BASOPHILS NFR BLD AUTO: 1 %
DEPRECATED RDW RBC AUTO: 45.2 FL (ref 35.1–46.3)
EOSINOPHIL # BLD AUTO: 0.02 X10(3) UL (ref 0–0.7)
EOSINOPHIL NFR BLD AUTO: 0.4 %
ERYTHROCYTE [DISTWIDTH] IN BLOOD BY AUTOMATED COUNT: 14.6 % (ref 11–15)
HCT VFR BLD AUTO: 29.9 %
HGB BLD-MCNC: 9.7 G/DL
IMM GRANULOCYTES # BLD AUTO: 0.17 X10(3) UL (ref 0–1)
IMM GRANULOCYTES NFR BLD: 3.3 %
LYMPHOCYTES # BLD AUTO: 1.09 X10(3) UL (ref 1–4)
LYMPHOCYTES NFR BLD AUTO: 20.8 %
MCH RBC QN AUTO: 28.1 PG (ref 26–34)
MCHC RBC AUTO-ENTMCNC: 32.4 G/DL (ref 31–37)
MCV RBC AUTO: 86.7 FL
MONOCYTES # BLD AUTO: 0.86 X10(3) UL (ref 0.1–1)
MONOCYTES NFR BLD AUTO: 16.4 %
NEUTROPHILS # BLD AUTO: 3.04 X10 (3) UL (ref 1.5–7.7)
NEUTROPHILS # BLD AUTO: 3.04 X10(3) UL (ref 1.5–7.7)
NEUTROPHILS NFR BLD AUTO: 58.1 %
PLATELET # BLD AUTO: 335 10(3)UL (ref 150–450)
RBC # BLD AUTO: 3.45 X10(6)UL
WBC # BLD AUTO: 5.2 X10(3) UL (ref 4–11)

## 2024-09-26 PROCEDURE — 85025 COMPLETE CBC W/AUTO DIFF WBC: CPT

## 2024-09-26 PROCEDURE — 96413 CHEMO IV INFUSION 1 HR: CPT

## 2024-09-26 PROCEDURE — 96375 TX/PRO/DX INJ NEW DRUG ADDON: CPT

## 2024-09-26 NOTE — TELEPHONE ENCOUNTER
Refill passed per [facility] protocol.    Requested Prescriptions   Pending Prescriptions Disp Refills    METFORMIN 850 MG Oral Tab [Pharmacy Med Name: METFORMIN  MG TABLET] 180 tablet 3     Sig: TAKE 1 TABLET BY MOUTH 2 TIMES DAILY WITH MEALS.       Diabetes Medication Protocol Passed - 9/22/2024  7:05 AM        Passed - Last A1C < 7.5 and within past 6 months     Lab Results   Component Value Date    A1C 7.4 (H) 04/12/2024             Passed - In person appointment or virtual visit in the past 6 mos or appointment in next 3 mos     Recent Outpatient Visits              6 days ago Palliative care encounter    [facility] Hematology Oncology Geetha Birmingham APRN    Office Visit    6 days ago Encounter for antineoplastic chemotherapy and immunotherapy    [facility] - Infusion    Nurse Only    6 days ago 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)    [facility] - Infusion    Office Visit    6 days ago 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)    [facility] Hematology Oncology Sherry Lane APRN    Office Visit    2 weeks ago Palliative care encounter    [facility] Hematology Oncology Geetha [place] APRN    Office Visit          Future Appointments         Provider Department Appt Notes    Tomorrow EM CC LAB1 [facility] - Infusion SD CBC-PIV-SL    Tomorrow EM CC INFRN 2 [facility] - Infusion SD-C2 D8-GEMZAR-PIV-SL  *pt req to leave by 1pm, due to wife's work schedule*    In 2 weeks EM CC LAB1 [facility] - Infusion SD [id number]    In 2 weeks Мария Spears MD [facility] Hematology Oncology PRE CHEMO VISIT    In 2 weeks EM CC INFRN 2 [facility] - Infusion SD-C3 D1-GEMZAR-PIV-SL  *pt req to leave by 1pm, due to wife's work schedule*    In 3 weeks EM CC LAB1 [facility] - Infusion SD CBC-PIV-SL    In 3  weeks EM CC INFRN 5 [facility] - Infusion SD-C3 D8-GEMZAR-PIV-SL  *pt req to leave by 1pm, due to wife's work schedule*                    Passed - Microalbumin procedure in past 12 months or taking ACE/ARB        Passed - EGFRCR or GFRNAA > 50     GFR Evaluation  EGFRCR: 93 , resulted on 9/19/2024          Passed - GFR in the past 12 months           Future Appointments         Provider Department Appt Notes    Tomorrow EM CC LAB1 [facility] - Infusion SD CBC-PIV-SL    Tomorrow EM CC INFRN 2 [facility] - Infusion SD-C2 D8-GEMZAR-PIV-SL  *pt req to leave by 1pm, due to wife's work schedule*    In 2 weeks EM CC LAB1 [facility] - Infusion SD [id number]    In 2 weeks Мария Spears MD [facility] Hematology Oncology PRE CHEMO VISIT    In 2 weeks EM CC INFRN 2 [facility] - Infusion SD-C3 D1-GEMZAR-PIV-SL  *pt req to leave by 1pm, due to wife's work schedule*    In 3 weeks EM CC LAB1 [facility] - Infusion SD CBC-PIV-SL    In 3 weeks EM CC INFRN 5 [facility] - Infusion SD-C3 D8-GEMZAR-PIV-SL  *pt req to leave by 1pm, due to wife's work schedule*          Recent Outpatient Visits              6 days ago Palliative care encounter    [facility] Hematology Oncology Geetha Birmingham APRN    Office Visit    6 days ago Encounter for antineoplastic chemotherapy and immunotherapy    [facility] - Infusion    Nurse Only    6 days ago 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)    [facility]fusion    Office Visit    6 days ago 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)    [facility] Hematology Oncology Sherry Lane APRN    Office Visit    2 weeks ago Palliative care encounter    [facility] Hematology Oncology Geetha Birmingham APRN    Office Visit

## 2024-09-26 NOTE — PROGRESS NOTES
Pt here for C2D8 Drug(s)Gemzar.  Arrives Ambulating independently, accompanied by Self and Spouse     Patient was evaluated today by Treatment Nurse.    Oral medications included in this regimen:  no    Patient confirms comprehension of cancer treatment schedule:  yes    Pregnancy screening:  Not applicable    Modifications in dose or schedule:  No    Medications appearance and physical integrity checked by RN: yes.    Chemotherapy IV pump settings verified by 2 RNs:  Yes.  Frequency of blood return and site check throughout administration: Prior to administration and At completion of therapy     Infusion/treatment outcome:  patient tolerated treatment without incident    Education Record    Learner:  Patient and Spouse  Barriers / Limitations:  None  Method:  Brief focused and Discussion  Education / instructions given:  Plan of care for treatment today  Outcome:  Shows understanding    Discharged Home, Ambulating independently, accompanied by:Self and Spouse    Patient/family verbalized understanding of future appointments: by printed AVS     Tremfya Pregnancy And Lactation Text: The risk during pregnancy and breastfeeding is uncertain with this medication.

## 2024-10-03 ENCOUNTER — APPOINTMENT (OUTPATIENT)
Dept: GENERAL RADIOLOGY | Facility: HOSPITAL | Age: 69
End: 2024-10-03
Payer: MEDICARE

## 2024-10-03 ENCOUNTER — APPOINTMENT (OUTPATIENT)
Dept: CT IMAGING | Facility: HOSPITAL | Age: 69
End: 2024-10-03
Attending: EMERGENCY MEDICINE
Payer: MEDICARE

## 2024-10-03 ENCOUNTER — HOSPITAL ENCOUNTER (EMERGENCY)
Facility: HOSPITAL | Age: 69
Discharge: HOME | End: 2024-10-03
Attending: EMERGENCY MEDICINE
Payer: MEDICARE

## 2024-10-03 VITALS
OXYGEN SATURATION: 96 % | HEART RATE: 83 BPM | DIASTOLIC BLOOD PRESSURE: 83 MMHG | TEMPERATURE: 98 F | SYSTOLIC BLOOD PRESSURE: 187 MMHG | RESPIRATION RATE: 20 BRPM

## 2024-10-03 DIAGNOSIS — C78.89 METASTASIS TO SPLEEN (HCC): Primary | ICD-10-CM

## 2024-10-03 DIAGNOSIS — G89.3 CANCER RELATED PAIN: ICD-10-CM

## 2024-10-03 LAB
ALBUMIN SERPL-MCNC: 4 G/DL (ref 3.2–4.8)
ALBUMIN/GLOB SERPL: 1.2 {RATIO} (ref 1–2)
ALP LIVER SERPL-CCNC: 108 U/L
ALT SERPL-CCNC: 46 U/L
ANION GAP SERPL CALC-SCNC: 6 MMOL/L (ref 0–18)
AST SERPL-CCNC: 18 U/L (ref ?–34)
BASOPHILS # BLD AUTO: 0.01 X10(3) UL (ref 0–0.2)
BASOPHILS NFR BLD AUTO: 0.2 %
BILIRUB SERPL-MCNC: 0.3 MG/DL (ref 0.2–1.1)
BILIRUB UR QL: NEGATIVE
BUN BLD-MCNC: 10 MG/DL (ref 9–23)
BUN/CREAT SERPL: 13.9 (ref 10–20)
CALCIUM BLD-MCNC: 9.4 MG/DL (ref 8.7–10.4)
CHLORIDE SERPL-SCNC: 105 MMOL/L (ref 98–112)
CLARITY UR: CLEAR
CO2 SERPL-SCNC: 28 MMOL/L (ref 21–32)
COLOR UR: YELLOW
CREAT BLD-MCNC: 0.72 MG/DL
D DIMER PPP FEU-MCNC: 1.84 UG/ML FEU (ref ?–0.69)
DEPRECATED RDW RBC AUTO: 45.3 FL (ref 35.1–46.3)
EGFRCR SERPLBLD CKD-EPI 2021: 99 ML/MIN/1.73M2 (ref 60–?)
EOSINOPHIL # BLD AUTO: 0.01 X10(3) UL (ref 0–0.7)
EOSINOPHIL NFR BLD AUTO: 0.2 %
ERYTHROCYTE [DISTWIDTH] IN BLOOD BY AUTOMATED COUNT: 14.6 % (ref 11–15)
GLOBULIN PLAS-MCNC: 3.3 G/DL (ref 2–3.5)
GLUCOSE BLD-MCNC: 99 MG/DL (ref 70–99)
GLUCOSE UR-MCNC: NEGATIVE MG/DL
HCT VFR BLD AUTO: 30.6 %
HGB BLD-MCNC: 9.6 G/DL
HGB UR QL STRIP.AUTO: NEGATIVE
IMM GRANULOCYTES # BLD AUTO: 0.05 X10(3) UL (ref 0–1)
IMM GRANULOCYTES NFR BLD: 1.1 %
LEUKOCYTE ESTERASE UR QL STRIP.AUTO: NEGATIVE
LIPASE SERPL-CCNC: 21 U/L (ref 12–53)
LYMPHOCYTES # BLD AUTO: 1.23 X10(3) UL (ref 1–4)
LYMPHOCYTES NFR BLD AUTO: 26.5 %
MCH RBC QN AUTO: 27.5 PG (ref 26–34)
MCHC RBC AUTO-ENTMCNC: 31.4 G/DL (ref 31–37)
MCV RBC AUTO: 87.7 FL
MONOCYTES # BLD AUTO: 0.84 X10(3) UL (ref 0.1–1)
MONOCYTES NFR BLD AUTO: 18.1 %
NEUTROPHILS # BLD AUTO: 2.51 X10 (3) UL (ref 1.5–7.7)
NEUTROPHILS # BLD AUTO: 2.51 X10(3) UL (ref 1.5–7.7)
NEUTROPHILS NFR BLD AUTO: 53.9 %
NITRITE UR QL STRIP.AUTO: NEGATIVE
OSMOLALITY SERPL CALC.SUM OF ELEC: 287 MOSM/KG (ref 275–295)
PH UR: 6.5 [PH] (ref 5–8)
PLATELET # BLD AUTO: 124 10(3)UL (ref 150–450)
POTASSIUM SERPL-SCNC: 4.8 MMOL/L (ref 3.5–5.1)
PROT SERPL-MCNC: 7.3 G/DL (ref 5.7–8.2)
RBC # BLD AUTO: 3.49 X10(6)UL
SODIUM SERPL-SCNC: 139 MMOL/L (ref 136–145)
SP GR UR STRIP: 1.02 (ref 1–1.03)
UROBILINOGEN UR STRIP-ACNC: 1
WBC # BLD AUTO: 4.7 X10(3) UL (ref 4–11)

## 2024-10-03 PROCEDURE — 81015 MICROSCOPIC EXAM OF URINE: CPT | Performed by: EMERGENCY MEDICINE

## 2024-10-03 PROCEDURE — 96374 THER/PROPH/DIAG INJ IV PUSH: CPT

## 2024-10-03 PROCEDURE — 85025 COMPLETE CBC W/AUTO DIFF WBC: CPT | Performed by: EMERGENCY MEDICINE

## 2024-10-03 PROCEDURE — 80053 COMPREHEN METABOLIC PANEL: CPT | Performed by: EMERGENCY MEDICINE

## 2024-10-03 PROCEDURE — 85379 FIBRIN DEGRADATION QUANT: CPT | Performed by: EMERGENCY MEDICINE

## 2024-10-03 PROCEDURE — 73080 X-RAY EXAM OF ELBOW: CPT

## 2024-10-03 PROCEDURE — 71260 CT THORAX DX C+: CPT | Performed by: EMERGENCY MEDICINE

## 2024-10-03 PROCEDURE — 81001 URINALYSIS AUTO W/SCOPE: CPT | Performed by: EMERGENCY MEDICINE

## 2024-10-03 PROCEDURE — 99284 EMERGENCY DEPT VISIT MOD MDM: CPT

## 2024-10-03 PROCEDURE — 83690 ASSAY OF LIPASE: CPT | Performed by: EMERGENCY MEDICINE

## 2024-10-03 PROCEDURE — 74177 CT ABD & PELVIS W/CONTRAST: CPT | Performed by: EMERGENCY MEDICINE

## 2024-10-03 RX ORDER — MORPHINE SULFATE 4 MG/ML
4 INJECTION, SOLUTION INTRAMUSCULAR; INTRAVENOUS ONCE
Status: COMPLETED | OUTPATIENT
Start: 2024-10-03 | End: 2024-10-03

## 2024-10-03 NOTE — ED INITIAL ASSESSMENT (HPI)
Pt c/o of LUQ pain that started today. Denies fevers. Denies n/v/d. Pt on chemo. Hx of lung cancer Pt also c/o of R elbow pain.

## 2024-10-03 NOTE — ED QUICK NOTES
Patient and wife provided discharge instructions. Verbalized understanding for plan of care at home and follow up. All questions/concerns addressed prior to discharge.

## 2024-10-03 NOTE — ED PROVIDER NOTES
Patient Seen in: [facility] Emergency Department      History     Chief Complaint   Patient presents with    Abdomen/Flank Pain     Stated Complaint: Abd pain    Subjective:   HPI    68-year-old male with history of hypertension, hypercholesterolemia, diabetes, coronary artery disease post myocardial infarction, sleep apnea, COPD, and squamous cell carcinoma of the left lung presently receiving chemotherapy presents with complaints of abdominal pain.  The patient reports aching pain across his left upper quadrant beginning around noon today.  He states he is recently been having pain to the right side of his abdomen but states this pain is new today to the left upper quadrant.  He reports some mild associated dyspnea.  He denies any cough.  He denies any fever or chills.  He denies nausea or vomiting.  No diarrhea.  He denies dysuria or hematuria.  The patient also complains of pain to his right elbow.  He reports striking his elbow against a window as he was sleeping 2 nights ago.  He has had pain to the elbow since the injury.  He denies other injuries or areas of pain.  He denies focal weakness or numbness.    Objective:     Past Medical History:    Bell's palsy    COPD (chronic obstructive pulmonary disease) ([facility])    Degenerative joint disease (DJD) of lumbar spine    Diabetes ([facility])    Diabetes mellitus, type II ([facility])    Heart attack ([facility])    High blood pressure    High cholesterol    Hyperlipidemia    Hypertension    Osteoarthritis    Sleep apnea    no CPAP              Past Surgical History:   Procedure Laterality Date    Appendectomy  1975    Coronary stent placement  2010    Palate/uvula surgery unlisted  2010    Ventral hernia repair  06/29/2021    primary repair, ventral and umbilical hernias                Social History     Socioeconomic History    Marital status:     Number of children: 3   Occupational History    Occupation:    Tobacco Use    Smoking status: Former     Current  packs/day: 0.00     Types: Cigarettes     Quit date: 3/19/2020     Years since quittin.5    Smokeless tobacco: Never    Tobacco comments:     Occasionally, not in last 6 months   Vaping Use    Vaping status: Never Used   Substance and Sexual Activity    Alcohol use: Not Currently    Drug use: Never     Social Determinants of Health     Financial Resource Strain: Low Risk  (2023)    Financial Resource Strain     Difficulty of Paying Living Expenses: Not hard at all     Med Affordability: No   Transportation Needs: No Transportation Needs (2023)    Transportation Needs     Lack of Transportation: No                  Physical Exam     ED Triage Vitals [10/03/24 1344]   /85   Pulse 75   Resp 18   Temp 97.8 °F (36.6 °C)   Temp src    SpO2 98 %   O2 Device None (Room air)       Current Vitals:   Vital Signs  BP: 145/77  Pulse: 75  Resp: 20  Temp: 97.8 °F (36.6 °C)  MAP (mmHg): 96    Oxygen Therapy  SpO2: 99 %  O2 Device: None (Room air)        Physical Exam    General Appearance: alert, no distress  Eyes: pupils equal and round no pallor or injection  ENT, Mouth: mucous membranes moist  Respiratory: there are no retractions, lungs are clear to auscultation  Cardiovascular: regular rate and rhythm  Gastrointestinal:  abdomen is soft with minimal tenderness to the left upper quadrant, no masses, bowel sounds normal  Neurological: Speech normal.  Moving extremities x 4.  Skin: warm and dry, no rashes.  Musculoskeletal: neck is supple non tender        Extremities are symmetrical, full range of motion.  Mild tenderness to palpation to the olecranon of the right elbow.  The patient has full range of motion of the elbow.  No shoulder, wrist, or hand tenderness noted.  No leg edema or tenderness noted.  Psychiatric: patient is oriented X 3, there is no agitation    DIFFERENTIAL DIAGNOSIS: After history and physical exam differential diagnosis was considered for pulmonary embolism, gastritis, diverticulitis,  ureteral stone, or other        ED Course     Labs Reviewed   CBC WITH DIFFERENTIAL WITH PLATELET - Abnormal; Notable for the following components:       Result Value    RBC 3.49 (*)     HGB 9.6 (*)     HCT 30.6 (*)     .0 (*)     All other components within normal limits   URINALYSIS WITH CULTURE REFLEX - Abnormal; Notable for the following components:    Ketones Urine Trace (*)     Protein Urine 100 mg/dL (*)     Urobilinogen Urine 1.0 (*)     All other components within normal limits   D-DIMER - Abnormal; Notable for the following components:    D-Dimer 1.84 (*)     All other components within normal limits   COMP METABOLIC PANEL (14) - Normal   LIPASE - Normal   UA MICROSCOPIC ONLY, URINE - Normal   RAINBOW DRAW LAVENDER   RAINBOW DRAW LIGHT GREEN   RAINBOW DRAW BLUE                 MDM      Lab and CT results noted.  CT shows new metastasis to the spleen likely accounting for the patient's pain.  No other acute findings noted.  The patient has pain medication at home which she can take for the pain.  He is advised to follow-up with his oncologist.  He is advised to return if pain is not controlled with medication at home.                    Medical Decision Making      Disposition and Plan     Clinical Impression:  1. 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 Cancer related pain         Disposition:  Discharge  10/3/2024  5:08 pm    Follow-up:  Мария Spears MD  [address] [place], [place] [id number] [phone]    Follow up      We recommend that you schedule follow up care with a primary care provider within the next three months to obtain basic health screening including reassessment of your blood pressure.      Medications Prescribed:  Current Discharge Medication List              Supplementary Documentation:

## 2024-10-03 NOTE — DISCHARGE INSTRUCTIONS
Continue pain medication as previously prescribed, as needed.  Follow-up with your oncologist.  Return to the emergency department if pain is not controlled with medications at home or if other new symptoms develop.

## 2024-10-04 ENCOUNTER — PATIENT OUTREACH (OUTPATIENT)
Dept: CASE MANAGEMENT | Age: 69
End: 2024-10-04

## 2024-10-10 ENCOUNTER — NURSE ONLY (OUTPATIENT)
Dept: HEMATOLOGY/ONCOLOGY | Facility: HOSPITAL | Age: 69
End: 2024-10-10
Attending: INTERNAL MEDICINE
Payer: MEDICARE

## 2024-10-10 VITALS
RESPIRATION RATE: 18 BRPM | HEART RATE: 68 BPM | DIASTOLIC BLOOD PRESSURE: 63 MMHG | HEIGHT: 67 IN | OXYGEN SATURATION: 97 % | SYSTOLIC BLOOD PRESSURE: 140 MMHG | WEIGHT: 203 LBS | TEMPERATURE: 98 F | BODY MASS INDEX: 31.86 KG/M2

## 2024-10-10 DIAGNOSIS — T45.1X5A CHEMOTHERAPY-INDUCED PERIPHERAL NEUROPATHY (HCC): ICD-10-CM

## 2024-10-10 DIAGNOSIS — C34.92 STAGE IV SQUAMOUS CELL CARCINOMA OF LEFT LUNG (HCC): ICD-10-CM

## 2024-10-10 DIAGNOSIS — Z51.11 ENCOUNTER FOR ANTINEOPLASTIC CHEMOTHERAPY AND IMMUNOTHERAPY: Primary | ICD-10-CM

## 2024-10-10 DIAGNOSIS — C34.90 LUNG CANCER METASTATIC TO BONE (HCC): ICD-10-CM

## 2024-10-10 DIAGNOSIS — G62.0 CHEMOTHERAPY-INDUCED PERIPHERAL NEUROPATHY (HCC): ICD-10-CM

## 2024-10-10 DIAGNOSIS — C79.51 PAIN FROM BONE METASTASES (HCC): ICD-10-CM

## 2024-10-10 DIAGNOSIS — C79.51 LUNG CANCER METASTATIC TO BONE (HCC): ICD-10-CM

## 2024-10-10 DIAGNOSIS — C34.92 STAGE IV SQUAMOUS CELL CARCINOMA OF LEFT LUNG (HCC): Primary | ICD-10-CM

## 2024-10-10 DIAGNOSIS — R63.4 WEIGHT LOSS: ICD-10-CM

## 2024-10-10 DIAGNOSIS — G89.3 CANCER RELATED PAIN: ICD-10-CM

## 2024-10-10 DIAGNOSIS — C78.89 METASTASIS TO SPLEEN (HCC): ICD-10-CM

## 2024-10-10 DIAGNOSIS — Z51.12 ENCOUNTER FOR ANTINEOPLASTIC CHEMOTHERAPY AND IMMUNOTHERAPY: Primary | ICD-10-CM

## 2024-10-10 DIAGNOSIS — L02.222 BOIL, BACK: ICD-10-CM

## 2024-10-10 DIAGNOSIS — G89.3 PAIN FROM BONE METASTASES (HCC): ICD-10-CM

## 2024-10-10 DIAGNOSIS — Z51.11 CHEMOTHERAPY MANAGEMENT, ENCOUNTER FOR: ICD-10-CM

## 2024-10-10 LAB
ALBUMIN SERPL-MCNC: 4.1 G/DL (ref 3.2–4.8)
ALBUMIN/GLOB SERPL: 1.3 {RATIO} (ref 1–2)
ALP LIVER SERPL-CCNC: 110 U/L
ALT SERPL-CCNC: 24 U/L
ANION GAP SERPL CALC-SCNC: 6 MMOL/L (ref 0–18)
AST SERPL-CCNC: 20 U/L (ref ?–34)
BASOPHILS # BLD AUTO: 0.03 X10(3) UL (ref 0–0.2)
BASOPHILS NFR BLD AUTO: 0.5 %
BILIRUB SERPL-MCNC: 0.3 MG/DL (ref 0.2–1.1)
BUN BLD-MCNC: 8 MG/DL (ref 9–23)
BUN/CREAT SERPL: 10 (ref 10–20)
CALCIUM BLD-MCNC: 9.6 MG/DL (ref 8.7–10.4)
CHLORIDE SERPL-SCNC: 107 MMOL/L (ref 98–112)
CO2 SERPL-SCNC: 28 MMOL/L (ref 21–32)
CREAT BLD-MCNC: 0.8 MG/DL
DEPRECATED RDW RBC AUTO: 47.1 FL (ref 35.1–46.3)
EGFRCR SERPLBLD CKD-EPI 2021: 96 ML/MIN/1.73M2 (ref 60–?)
EOSINOPHIL # BLD AUTO: 0.22 X10(3) UL (ref 0–0.7)
EOSINOPHIL NFR BLD AUTO: 3.4 %
ERYTHROCYTE [DISTWIDTH] IN BLOOD BY AUTOMATED COUNT: 15.4 % (ref 11–15)
GLOBULIN PLAS-MCNC: 3.2 G/DL (ref 2–3.5)
GLUCOSE BLD-MCNC: 118 MG/DL (ref 70–99)
HCT VFR BLD AUTO: 29.6 %
HGB BLD-MCNC: 9.5 G/DL
IMM GRANULOCYTES # BLD AUTO: 0.05 X10(3) UL (ref 0–1)
IMM GRANULOCYTES NFR BLD: 0.8 %
LYMPHOCYTES # BLD AUTO: 1.06 X10(3) UL (ref 1–4)
LYMPHOCYTES NFR BLD AUTO: 16.2 %
MCH RBC QN AUTO: 27.9 PG (ref 26–34)
MCHC RBC AUTO-ENTMCNC: 32.1 G/DL (ref 31–37)
MCV RBC AUTO: 86.8 FL
MONOCYTES # BLD AUTO: 0.79 X10(3) UL (ref 0.1–1)
MONOCYTES NFR BLD AUTO: 12.1 %
NEUTROPHILS # BLD AUTO: 4.39 X10 (3) UL (ref 1.5–7.7)
NEUTROPHILS # BLD AUTO: 4.39 X10(3) UL (ref 1.5–7.7)
NEUTROPHILS NFR BLD AUTO: 67 %
OSMOLALITY SERPL CALC.SUM OF ELEC: 291 MOSM/KG (ref 275–295)
PLATELET # BLD AUTO: 438 10(3)UL (ref 150–450)
POTASSIUM SERPL-SCNC: 5 MMOL/L (ref 3.5–5.1)
PROT SERPL-MCNC: 7.3 G/DL (ref 5.7–8.2)
RBC # BLD AUTO: 3.41 X10(6)UL
SODIUM SERPL-SCNC: 141 MMOL/L (ref 136–145)
WBC # BLD AUTO: 6.5 X10(3) UL (ref 4–11)

## 2024-10-10 PROCEDURE — 99215 OFFICE O/P EST HI 40 MIN: CPT | Performed by: INTERNAL MEDICINE

## 2024-10-10 PROCEDURE — 36415 COLL VENOUS BLD VENIPUNCTURE: CPT

## 2024-10-10 PROCEDURE — 80053 COMPREHEN METABOLIC PANEL: CPT

## 2024-10-10 PROCEDURE — G2211 COMPLEX E/M VISIT ADD ON: HCPCS | Performed by: INTERNAL MEDICINE

## 2024-10-10 PROCEDURE — 85025 COMPLETE CBC W/AUTO DIFF WBC: CPT

## 2024-10-10 RX ORDER — CEPHALEXIN 500 MG/1
500 CAPSULE ORAL 3 TIMES DAILY
Qty: 15 CAPSULE | Refills: 0 | Status: SHIPPED | OUTPATIENT
Start: 2024-10-10 | End: 2024-10-15

## 2024-10-10 NOTE — PROGRESS NOTES
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  PDL1 0%     Diagnosis 1/3/2024 via transbronchial biopsy bronchoscopy with Dr. Carrillo.     On CT 12/22/23 4.2 x 3.3 cm left upper lobe mass enlarged AP reflection lymph node left hilar adenopathy.  PET/CT showed bone metastasis     Initiated Carboplatin Paclitaxel Pembrolizumab 2/1/24  Maintenance pembrolizumab 4/25/24-7/25/24     Palliative RT to R 8th rib lesion 7/2-7/3/24     PET scan 8/7/24 showed progressive disease.     Guardant 360CDx 8/15/24 all VUS. No actionable alteration.     8/29/24 initiated C1 gemcitabine   9/19/24: C2 gemcitabine    Interval History:  Here for follow up and consideration of C3 gemcitabine. He went to ER on 10/3/24 for LUQ abdominal pain. CT abd/pelvis revealed new 2.5 cm metastasis in the spleen, stable right chest wall 8th rib and left hip subcutaneous lesion. CTA of the chest showed LUCINDA has increased in size 3.7 x 4.4, stable intrathoracic adenopathy.   His pain is currently adequately controlled with norco. Chronic fatigue stable. Noted enlarging skin lesion on his right flank, tender and red. Denies any breathing changes, chest pain, rib pain, cough, hemoptysis. Denies fever, night sweats, headache, focal neurological deficit.     Performance Status: ECOG 0    Current Medications:    Current Outpatient Medications:     HYDROcodone-acetaminophen  MG Oral Tab, Take 1 tablet by mouth every 4 (four) hours as needed for Pain (for cancer related pain; MAX dose is 6 tabs per day)., Disp: 60 tablet, Rfl: 0    metFORMIN 850 MG Oral Tab, Take 1 tablet (850 mg total)  by mouth 2 (two) times daily with meals., Disp: 180 tablet, Rfl: 3    lisinopril 30 MG Oral Tab, Take 1 tablet (30 mg total) by mouth daily., Disp: 90 tablet, Rfl: 2    metoprolol succinate  MG Oral Tablet 24 Hr, Take 1 tablet (100 mg total) by mouth daily., Disp: 90 tablet, Rfl: 1    glipiZIDE ER 5 MG Oral Tablet 24 Hr, Take 1 tablet (5 mg total) by mouth daily., Disp: 90 tablet, Rfl: 1    polyethylene glycol, PEG 3350, 17 g Oral Powd Pack, Take 17 g by mouth daily., Disp: , Rfl:     pregabalin (LYRICA) 50 MG Oral Cap, Take 1 capsule (50 mg total) by mouth 3 (three) times daily., Disp: 90 capsule, Rfl: 1    atorvastatin 20 MG Oral Tab, Take 1 tablet (20 mg total) by mouth nightly., Disp: 90 tablet, Rfl: 3    prochlorperazine (COMPAZINE) 10 mg tablet, Take 1 tablet (10 mg total) by mouth every 6 (six) hours as needed for Nausea., Disp: 30 tablet, Rfl: 3    ondansetron (ZOFRAN) 8 MG tablet, Take 1 tablet (8 mg total) by mouth every 8 (eight) hours as needed for Nausea., Disp: 30 tablet, Rfl: 3    aspirin 81 MG Oral Tab EC, Take 1 tablet (81 mg total) by mouth daily., Disp: , Rfl:     Review of Systems:  10 -point systems reviewed, all negative except as mentioned in the HPI/interval history.     Vital Signs:  There were no vitals taken for this visit.    Wt Readings from Last 6 Encounters:   09/19/24 90.6 kg (199 lb 12.8 oz)   09/19/24 90.3 kg (199 lb)   09/06/24 92.3 kg (203 lb 8 oz)   09/04/24 92.3 kg (203 lb 6.4 oz)   08/29/24 92.2 kg (203 lb 4.8 oz)   08/22/24 92.3 kg (203 lb 6.4 oz)      Physical Examination:  General: Aert and oriented x 3, not in acute distress.  Psych:  Mood and affect appropriate  HEENT: Non-icteric sclera. Oropharynx is clear.   Neck: No palpable lymphadenopathy. Neck is supple.  Chest: Clear to auscultation.   Heart: Regular rate and rhythm.  Abdomen: Soft, non tender with good bowel sounds.   Extremities: No peripheral edema. No clubbing.   Neurological: Grossly intact.      Laboratory:  Lab Results   Component Value Date    WBC 4.7 10/03/2024    RBC 3.49 (L) 10/03/2024    HGB 9.6 (L) 10/03/2024    HCT 30.6 (L) 10/03/2024    MCV 87.7 10/03/2024    MCH 27.5 10/03/2024    MCHC 31.4 10/03/2024    RDW 14.6 10/03/2024    .0 (L) 10/03/2024     Lab Results   Component Value Date     10/10/2024    K 5.0 10/10/2024     10/10/2024    CO2 28.0 10/10/2024    BUN 8 (L) 10/10/2024    CREATSERUM 0.80 10/10/2024     (H) 10/10/2024    CA 9.6 10/10/2024    ALKPHO 110 10/10/2024    ALT 24 10/10/2024    AST 20 10/10/2024    BILT 0.3 10/10/2024    ALB 4.1 10/10/2024    TP 7.3 10/10/2024     Lab Results   Component Value Date/Time    TSH 3.333 07/25/2024 08:28 AM    TSH 2.573 07/05/2024 10:18 AM    TSH 2.706 06/13/2024 12:35 PM    TSH 3.369 05/20/2024 12:56 PM    TSH 3.325 04/24/2024 10:15 AM    TSH 2.917 04/03/2024 08:39 AM     Radiology:  CT CHEST PE AORTA (IV ONLY) (CPT=71260)    Result Date: 10/3/2024  CONCLUSION: No PE.  Left upper lobe mass has increased in size    Dictated by (CST): Chai Maguire MD on 10/03/2024 at 4:52 PM     Finalized by (CST): Chai Maguire MD on 10/03/2024 at 4:56 PM          XR ELBOW, COMPLETE (MIN 3 VIEWS), RIGHT (CPT=73080)    Result Date: 10/3/2024  CONCLUSION:  1. No acute appearing fracture or dislocation.  Mild mineralization/ossification adjacent to the lateral and medial distal humeral condyles may suggest sequelae of previous enthesopathy/epicondylitis.  Correlate clinically.  See above.    Dictated by (CST): Alex Brambila MD on 10/03/2024 at 2:31 PM     Finalized by (CST): Alex Brambila MD on 10/03/2024 at 2:32 PM          PET STANDARD BODY SCAN (ONCOLOGY) (CPT=78815)    Result Date: 8/7/2024  CONCLUSION:  1. Enlargement of the left upper lobe hypermetabolic malignancy, concerning for progression of disease.  2. Hypermetabolic mediastinal and hilar lymphadenopathy, presumably metastatic.   3. A markedly hypermetabolic expansile  destructive osseous lesion of the right lateral 8th rib is again evident.  4. There is a small hypermetabolic nodule in the left gluteal region, concerning for potential soft tissue metastasis.  5. Severe paraseptal emphysema.  6. Uncomplicated distal colonic diverticulosis.  7. Probable physiologic excretion of radiotracer in the distal colon.  8. Prostatomegaly with probable chronic outlet obstruction.  9. Low-density appearance of the intracardiac blood pool raises the possibility of underlying anemia. Correlate with hematologic parameters.  10. Lesser incidental findings as above.    Dictated by (CST): Luis E Desir MD on 8/07/2024 at 12:24 PM     Finalized by (CST): Luis E Desir MD on 8/07/2024 at 12:44 PM          CT CHEST+ABDOMEN+PELVIS(ALL CNTRST ONLY)(CPT=71260/22929)    Result Date: 7/25/2024  CONCLUSION:  1.  There is history of metastatic non-small cell lung cancer.  There is a dominant mass within the lingular segment of the left upper lobe, which has increased in size since previous exam.  New indeterminate 10 mm nodule in the left lower lobe which could represent nonspecific pneumonitis/atelectasis or tumor.  There is redemonstration of lymphadenopathy in both pulmonary rolo which is also larger/worse since previous exam.  There is also a lytic lesion involving the lateral right 8th rib which has increased in size since prior exam. 2.  Pulmonary emphysema. 3.  Coronary atherosclerosis.  Cardiomegaly.   Dictated by (CST): Eb Oliveira MD on 7/25/2024 at 2:36 PM     Finalized by (CST): Eb Oliveira MD on 7/25/2024 at 2:47 PM            Impression and Plan:    Metastatic squamous cell carcinoma of the left upper lung, PD-L1 0%  --Carboplatin paclitaxel pembrolizuamb-initiated 2/1/24 and completed 4 cycles on 4/4/24; dose reduce carbo to AUC 5 and paclitaxel by 20% due CIPN to feet.   --Imaging 4/2024 after C4 with favorable tx response.   --Continued on maintenance pembrolizumab q3 wks, completed 9  cycles as of  7/25/24 with no autoimmune toxicity.  --Restaging CT scans and PET scans 8/2024 showed progression of disease. Guardant (ctDNA sequencing) with no targetable alteration.   --He was not interested in clinical trials or referral to a tertiary center.  --Single agent gemcitabine initiated 8/29/24, completed 2 cycles as of 9/26/24   --CT scans in ER on 10/3/24 for LUQ showed progression of disease with new splenic metastasis (2.5 cm) and increasing LUCINDA mass.   --Defer treatment today. Will review his scans at tumor board and consider switching chemo to docetaxel if POD is confirmed.     Bone metastases  Cancer related pain  --S/p palliative XRT 7/3/24 to enlarging right 8th rib destructive bone lesion  --No with worsening pain due to progressive disease. Tramadol did not work. Currently on norco only.   --On Norco 10/325 mg Q4H PRN. Patient following closely with palliative care for pain management    Chemo induced neuropathy- stable. Continue lyrica      Chemo induced anemia- mild. Monitor    R flank boil- start keflex x5 days.       RTC 1 week.       Мария Spears MD   Hematology/Oncology

## 2024-10-10 NOTE — PROGRESS NOTES
Patient not getting treated today, per MD office.  PIV removed, gauze and coban applied.  Appointments adjusted, updated copy of AVS given to patient.  Discharged from clinic ambulatory, accompanied by wife.

## 2024-10-17 ENCOUNTER — NURSE ONLY (OUTPATIENT)
Dept: HEMATOLOGY/ONCOLOGY | Facility: HOSPITAL | Age: 69
End: 2024-10-17
Attending: INTERNAL MEDICINE
Payer: MEDICARE

## 2024-10-17 VITALS
OXYGEN SATURATION: 98 % | BODY MASS INDEX: 31.23 KG/M2 | HEIGHT: 67 IN | WEIGHT: 199 LBS | HEART RATE: 70 BPM | DIASTOLIC BLOOD PRESSURE: 72 MMHG | TEMPERATURE: 98 F | RESPIRATION RATE: 18 BRPM | SYSTOLIC BLOOD PRESSURE: 150 MMHG

## 2024-10-17 DIAGNOSIS — C79.51 PAIN FROM BONE METASTASES (HCC): ICD-10-CM

## 2024-10-17 DIAGNOSIS — L02.211 ABSCESS OF SKIN OF ABDOMEN: ICD-10-CM

## 2024-10-17 DIAGNOSIS — J43.8 PARASEPTAL EMPHYSEMA (HCC): ICD-10-CM

## 2024-10-17 DIAGNOSIS — C34.92 STAGE IV SQUAMOUS CELL CARCINOMA OF LEFT LUNG (HCC): Primary | ICD-10-CM

## 2024-10-17 DIAGNOSIS — C34.92 STAGE IV SQUAMOUS CELL CARCINOMA OF LEFT LUNG (HCC): ICD-10-CM

## 2024-10-17 DIAGNOSIS — G89.3 PAIN FROM BONE METASTASES (HCC): ICD-10-CM

## 2024-10-17 DIAGNOSIS — Z51.11 ENCOUNTER FOR ANTINEOPLASTIC CHEMOTHERAPY AND IMMUNOTHERAPY: Primary | ICD-10-CM

## 2024-10-17 DIAGNOSIS — Z51.12 ENCOUNTER FOR ANTINEOPLASTIC CHEMOTHERAPY AND IMMUNOTHERAPY: Primary | ICD-10-CM

## 2024-10-17 DIAGNOSIS — G89.3 CANCER RELATED PAIN: ICD-10-CM

## 2024-10-17 LAB
ALBUMIN SERPL-MCNC: 4.1 G/DL (ref 3.2–4.8)
ALBUMIN/GLOB SERPL: 1.3 {RATIO} (ref 1–2)
ALP LIVER SERPL-CCNC: 90 U/L
ALT SERPL-CCNC: 18 U/L
ANION GAP SERPL CALC-SCNC: 4 MMOL/L (ref 0–18)
AST SERPL-CCNC: 24 U/L (ref ?–34)
BASOPHILS # BLD AUTO: 0.03 X10(3) UL (ref 0–0.2)
BASOPHILS NFR BLD AUTO: 0.5 %
BILIRUB SERPL-MCNC: 0.3 MG/DL (ref 0.2–1.1)
BUN BLD-MCNC: 11 MG/DL (ref 9–23)
BUN/CREAT SERPL: 13.4 (ref 10–20)
CALCIUM BLD-MCNC: 9.8 MG/DL (ref 8.7–10.4)
CHLORIDE SERPL-SCNC: 105 MMOL/L (ref 98–112)
CO2 SERPL-SCNC: 29 MMOL/L (ref 21–32)
CREAT BLD-MCNC: 0.82 MG/DL
DEPRECATED RDW RBC AUTO: 50.8 FL (ref 35.1–46.3)
EGFRCR SERPLBLD CKD-EPI 2021: 95 ML/MIN/1.73M2 (ref 60–?)
EOSINOPHIL # BLD AUTO: 0.18 X10(3) UL (ref 0–0.7)
EOSINOPHIL NFR BLD AUTO: 2.7 %
ERYTHROCYTE [DISTWIDTH] IN BLOOD BY AUTOMATED COUNT: 16.1 % (ref 11–15)
GLOBULIN PLAS-MCNC: 3.2 G/DL (ref 2–3.5)
GLUCOSE BLD-MCNC: 132 MG/DL (ref 70–99)
HCT VFR BLD AUTO: 29.6 %
HGB BLD-MCNC: 9.5 G/DL
IMM GRANULOCYTES # BLD AUTO: 0.05 X10(3) UL (ref 0–1)
IMM GRANULOCYTES NFR BLD: 0.8 %
LYMPHOCYTES # BLD AUTO: 0.97 X10(3) UL (ref 1–4)
LYMPHOCYTES NFR BLD AUTO: 14.6 %
MCH RBC QN AUTO: 28.1 PG (ref 26–34)
MCHC RBC AUTO-ENTMCNC: 32.1 G/DL (ref 31–37)
MCV RBC AUTO: 87.6 FL
MONOCYTES # BLD AUTO: 0.99 X10(3) UL (ref 0.1–1)
MONOCYTES NFR BLD AUTO: 14.9 %
NEUTROPHILS # BLD AUTO: 4.41 X10 (3) UL (ref 1.5–7.7)
NEUTROPHILS # BLD AUTO: 4.41 X10(3) UL (ref 1.5–7.7)
NEUTROPHILS NFR BLD AUTO: 66.5 %
OSMOLALITY SERPL CALC.SUM OF ELEC: 287 MOSM/KG (ref 275–295)
PLATELET # BLD AUTO: 464 10(3)UL (ref 150–450)
POTASSIUM SERPL-SCNC: 5.4 MMOL/L (ref 3.5–5.1)
PROT SERPL-MCNC: 7.3 G/DL (ref 5.7–8.2)
RBC # BLD AUTO: 3.38 X10(6)UL
SODIUM SERPL-SCNC: 138 MMOL/L (ref 136–145)
WBC # BLD AUTO: 6.6 X10(3) UL (ref 4–11)

## 2024-10-17 PROCEDURE — 85025 COMPLETE CBC W/AUTO DIFF WBC: CPT

## 2024-10-17 PROCEDURE — 99215 OFFICE O/P EST HI 40 MIN: CPT | Performed by: PHYSICIAN ASSISTANT

## 2024-10-17 PROCEDURE — 96413 CHEMO IV INFUSION 1 HR: CPT

## 2024-10-17 PROCEDURE — 80053 COMPREHEN METABOLIC PANEL: CPT

## 2024-10-17 PROCEDURE — 96375 TX/PRO/DX INJ NEW DRUG ADDON: CPT

## 2024-10-17 RX ORDER — SULFAMETHOXAZOLE AND TRIMETHOPRIM 800; 160 MG/1; MG/1
1 TABLET ORAL 2 TIMES DAILY
Qty: 20 TABLET | Refills: 0 | Status: SHIPPED | OUTPATIENT
Start: 2024-10-17

## 2024-10-17 RX ORDER — HYDROCODONE BITARTRATE AND ACETAMINOPHEN 10; 325 MG/1; MG/1
1 TABLET ORAL EVERY 4 HOURS PRN
Qty: 60 TABLET | Refills: 0 | Status: SHIPPED | OUTPATIENT
Start: 2024-10-17

## 2024-10-17 NOTE — PATIENT INSTRUCTIONS
Await CMP for cycle 1 Docetaxel  For future refills of inhaler, please contact Dr. Carrillo  Bactrim DS 1 tablet twice daily for 10 days  Warm compress to infection.  Heat some water in microwave, apply wash cloth to infection.  15 minutes twice daily.  Do not make water so hot that you burn yourself  Call as needed  Follow-up in 3 weeks

## 2024-10-17 NOTE — PROGRESS NOTES
Pt here for C 1 D 1 Drug(s)taxotere     Arrives Ambulating independently, accompanied by Spouse     Patient was evaluated today by ASHIA and Treatment Nurse.    Oral medications included in this regimen:  no    Patient confirms comprehension of cancer treatment schedule:  yes    Pregnancy screening:  Not applicable    Modifications in dose or schedule:  No    Medications appearance and physical integrity checked by RN: yes.    Chemotherapy IV pump settings verified by 2 RNs:  Yes.  Frequency of blood return and site check throughout administration: Prior to administration, Prior to each drug, and At completion of therapy     Infusion/treatment outcome:  patient tolerated treatment without incident    Education Record    Learner:  Patient and Family Member  Barriers / Limitations:  None  Method:  Discussion and Reinforcement  Education / instructions given:  reinforce chemo ed done today in clinic, review medication profile, possible SE , ways to prevent and mange them , when to contact MD   Outcome:  Shows understanding    Discharged Home, Ambulating independently, accompanied by:Spouse    Patient/family verbalized understanding of future appointments: by printed AVS

## 2024-10-17 NOTE — PROGRESS NOTES
Hematology Oncology Progress Note    Patient Name: Tarik Camara   YOB: 1955   Medical Record Number: [id number]   CSN: [id number]   Attending Physician: Мария Spears MD     Date of Visit: 10/17/2024     Chief Complaint:  Lung cancer follow up  Chemotherapy   Cancer related pain    Oncologic History:  68 year old male former smoker with 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  PDL1 0%     Diagnosis 1/3/2024 via transbronchial biopsy bronchoscopy with Dr. Carrillo.     On CT 12/22/23 4.2 x 3.3 cm left upper lobe mass enlarged AP reflection lymph node left hilar adenopathy.  PET/CT showed bone metastasis     Initiated Carboplatin Paclitaxel Pembrolizumab 2/1/24  Maintenance pembrolizumab 4/25/24-7/25/24     Palliative RT to R 8th rib lesion 7/2-7/3/24     PET scan 8/7/24 showed progressive disease.     Guardant 360CDx 8/15/24 all VUS. No actionable alteration.     8/29/24 initiated C1 gemcitabine   9/19/24: C2 gemcitabine  10/3/24 ER CT a/p showed disease progression    10/17/24:  initiated third line C1 docetaxel    Interval History:  Here for follow up and consideration of C1 docetaxel.  With his spouse.  Declined language line.  Endorses right lower anterior rib pain.  Using Norco 10/325 mg, 4 tablets daily with adequate control.  Using Polyethylene glycol for OIC.  Chronic fatigue stable. Noted enlarging skin lesion on his right flank, tender and red. Completed 5 days of TID Cephalexin.  Lesions persist.  Two noted on RUQ abd, smaller.  Endorses dyspnea with exertion.  Pulmonology ([facility]) offered MDI several years ago but patient did not try.      Denies headache, cough, nausea/vomiting, diarrhea, peripheral edema, worsening peripheral neuropathy and anorexia.     Performance Status: ECOG 0    Current Medications:    Current Outpatient Medications:     sulfamethoxazole-trimethoprim -160 MG Oral Tab per tablet, Take 1 tablet by mouth 2 (two) times daily., Disp: 20  tablet, Rfl: 0    Tiotropium Bromide-Olodaterol 2.5-2.5 MCG/ACT Inhalation Aero Soln, Inhale 2 puffs into the lungs every morning., Disp: 4 g, Rfl: 1    HYDROcodone-acetaminophen  MG Oral Tab, Take 1 tablet by mouth every 4 (four) hours as needed for Pain (for cancer related pain; MAX dose is 6 tabs per day)., Disp: 60 tablet, Rfl: 0    metFORMIN 850 MG Oral Tab, Take 1 tablet (850 mg total) by mouth 2 (two) times daily with meals., Disp: 180 tablet, Rfl: 3    lisinopril 30 MG Oral Tab, Take 1 tablet (30 mg total) by mouth daily., Disp: 90 tablet, Rfl: 2    metoprolol succinate  MG Oral Tablet 24 Hr, Take 1 tablet (100 mg total) by mouth daily., Disp: 90 tablet, Rfl: 1    glipiZIDE ER 5 MG Oral Tablet 24 Hr, Take 1 tablet (5 mg total) by mouth daily., Disp: 90 tablet, Rfl: 1    polyethylene glycol, PEG 3350, 17 g Oral Powd Pack, Take 17 g by mouth daily., Disp: , Rfl:     pregabalin (LYRICA) 50 MG Oral Cap, Take 1 capsule (50 mg total) by mouth 3 (three) times daily., Disp: 90 capsule, Rfl: 1    atorvastatin 20 MG Oral Tab, Take 1 tablet (20 mg total) by mouth nightly., Disp: 90 tablet, Rfl: 3    prochlorperazine (COMPAZINE) 10 mg tablet, Take 1 tablet (10 mg total) by mouth every 6 (six) hours as needed for Nausea., Disp: 30 tablet, Rfl: 3    ondansetron (ZOFRAN) 8 MG tablet, Take 1 tablet (8 mg total) by mouth every 8 (eight) hours as needed for Nausea., Disp: 30 tablet, Rfl: 3    aspirin 81 MG Oral Tab EC, Take 1 tablet (81 mg total) by mouth daily., Disp: , Rfl:     Review of Systems:  10 -point systems reviewed, all negative except as mentioned in the HPI/interval history.     Vital Signs:  /72 (BP Location: Radial, Patient Position: Sitting, Cuff Size: adult)   Pulse 70   Temp 97.8 °F (36.6 °C) (Oral)   Resp 18   Ht 1.702 m (5' 7\")   Wt 90.3 kg (199 lb)   SpO2 98%   BMI 31.17 kg/m²     Wt Readings from Last 6 Encounters:   10/17/24 90.3 kg (199 lb)   10/10/24 92.1 kg (203 lb)    09/19/24 90.6 kg (199 lb 12.8 oz)   09/19/24 90.3 kg (199 lb)   09/06/24 92.3 kg (203 lb 8 oz)   09/04/24 92.3 kg (203 lb 6.4 oz)      Physical Examination:  General: Alert and oriented x 3, not in acute distress.  Psych:  Mood and affect appropriate  HEENT: Non-icteric sclera. Oropharynx is clear.   Neck: No palpable lymphadenopathy. Neck is supple.  Chest: non labored breathing, expiratory wheezing   Heart: Regular rate and rhythm.  Abdomen: Soft, non tender with good bowel sounds.   Extremities: No peripheral edema. No clubbing.   Neurological: Grossly intact.   Derm:  RUQ abd and right flank:  abscesses x 3:  two in RUQ 1 cm each; Right flank 3 x 1.5 cm.  Firm, not fluctuant, no vesicles, open wounds or scaling    Laboratory:  Lab Results   Component Value Date    WBC 6.6 10/17/2024    RBC 3.38 (L) 10/17/2024    HGB 9.5 (L) 10/17/2024    HCT 29.6 (L) 10/17/2024    MCV 87.6 10/17/2024    MCH 28.1 10/17/2024    MCHC 32.1 10/17/2024    RDW 16.1 (H) 10/17/2024    .0 (H) 10/17/2024     Lab Results   Component Value Date     10/17/2024    K 5.4 (H) 10/17/2024     10/17/2024    CO2 29.0 10/17/2024    BUN 11 10/17/2024    CREATSERUM 0.82 10/17/2024     (H) 10/17/2024    CA 9.8 10/17/2024    ALKPHO 90 10/17/2024    ALT 18 10/17/2024    AST 24 10/17/2024    BILT 0.3 10/17/2024    ALB 4.1 10/17/2024    TP 7.3 10/17/2024     Lab Results   Component Value Date/Time    TSH 3.333 07/25/2024 08:28 AM    TSH 2.573 07/05/2024 10:18 AM    TSH 2.706 06/13/2024 12:35 PM    TSH 3.369 05/20/2024 12:56 PM    TSH 3.325 04/24/2024 10:15 AM    TSH 2.917 04/03/2024 08:39 AM     Radiology:  CT CHEST PE AORTA (IV ONLY) (CPT=71260)    Result Date: 10/3/2024  CONCLUSION: No PE.  Left upper lobe mass has increased in size    Dictated by (CST): Chai Maguire MD on 10/03/2024 at 4:52 PM     Finalized by (CST): Chai Maguire MD on 10/03/2024 at 4:56 PM          XR ELBOW, COMPLETE (MIN 3 VIEWS), RIGHT  (CPT=73080)    Result Date: 10/3/2024  CONCLUSION:  1. No acute appearing fracture or dislocation.  Mild mineralization/ossification adjacent to the lateral and medial distal humeral condyles may suggest sequelae of previous enthesopathy/epicondylitis.  Correlate clinically.  See above.    Dictated by (CST): Alex Brambila MD on 10/03/2024 at 2:31 PM     Finalized by (CST): Alex Brambila MD on 10/03/2024 at 2:32 PM          PET STANDARD BODY SCAN (ONCOLOGY) (CPT=78815)    Result Date: 8/7/2024  CONCLUSION:  1. Enlargement of the left upper lobe hypermetabolic malignancy, concerning for progression of disease.  2. Hypermetabolic mediastinal and hilar lymphadenopathy, presumably metastatic.   3. A markedly hypermetabolic expansile destructive osseous lesion of the right lateral 8th rib is again evident.  4. There is a small hypermetabolic nodule in the left gluteal region, concerning for potential soft tissue metastasis.  5. Severe paraseptal emphysema.  6. Uncomplicated distal colonic diverticulosis.  7. Probable physiologic excretion of radiotracer in the distal colon.  8. Prostatomegaly with probable chronic outlet obstruction.  9. Low-density appearance of the intracardiac blood pool raises the possibility of underlying anemia. Correlate with hematologic parameters.  10. Lesser incidental findings as above.    Dictated by (CST): Luis E Desir MD on 8/07/2024 at 12:24 PM     Finalized by (CST): Luis E Desir MD on 8/07/2024 at 12:44 PM          CT CHEST+ABDOMEN+PELVIS(ALL CNTRST ONLY)(CPT=71260/94217)    Result Date: 7/25/2024  CONCLUSION:  1.  There is history of metastatic non-small cell lung cancer.  There is a dominant mass within the lingular segment of the left upper lobe, which has increased in size since previous exam.  New indeterminate 10 mm nodule in the left lower lobe which could represent nonspecific pneumonitis/atelectasis or tumor.  There is redemonstration of lymphadenopathy in both pulmonary  rolo which is also larger/worse since previous exam.  There is also a lytic lesion involving the lateral right 8th rib which has increased in size since prior exam. 2.  Pulmonary emphysema. 3.  Coronary atherosclerosis.  Cardiomegaly.   Dictated by (CST): Eb Oliveira MD on 7/25/2024 at 2:36 PM     Finalized by (CST): Eb Oliveira MD on 7/25/2024 at 2:47 PM            Impression and Plan:    Metastatic squamous cell carcinoma of the left upper lung, PD-L1 0%  --Carboplatin paclitaxel pembrolizuamb-initiated 2/1/24 and completed 4 cycles on 4/4/24; dose reduce carbo to AUC 5 and paclitaxel by 20% due CIPN to feet.   --Imaging 4/2024 after C4 with favorable tx response.   --Continued on maintenance pembrolizumab q3 wks, completed 9 cycles as of  7/25/24 with no autoimmune toxicity.  --Restaging CT scans and PET scans 8/2024 showed progression of disease. Guardant (ctDNA sequencing) with no targetable alteration.   --He was not interested in clinical trials or referral to a tertiary center.  --Single agent gemcitabine initiated 8/29/24, completed 2 cycles as of 9/26/24   --CT scans in ER on 10/3/24 for LUQ showed progression of disease with new splenic metastasis (2.5 cm) and increasing LUCINDA mass.   --initiated C1 docetaxel on 10/17/14.  Consented.      Bone metastases  Cancer related pain  --S/p palliative XRT 7/3/24 to enlarging right 8th rib destructive bone lesion  --Now, pain right lower anterior ribs  --Tramadol ineffective, pain better controlled with Norco 10/325 mg, using 4 tablets per day  --Patient following closely with palliative care for pain management    H/o chemo induced neuropathy- stable. Continue lyrica   OIC - On polyethylene glycol prn  Chemo induced anemia- mild. Monitor    Right flank & RUQ abd abscess x 3.  2 in RUQ are 1 cm.  Right flank 3 x 1.5 cm.  S/p Cephalexin x 5 days.  Bactrim DS BID #20.  Warm compress.  Monitor    Emphysema:  Stiolto MDI refilled.  Follow-up with Dr. Carrillo for future  refills and management.     Call prn.  Follow-up prior to cycle 2      ANDRÉS John   Hematology/Oncology

## 2024-10-17 NOTE — PROGRESS NOTES
Medication Education Record: IV Therapy    Learner:  Patient and Spouse    Barriers / Limitations:  Language  Declined language line.  Spouse interpreted prn.  Psychosocial Assessment:  patient psychosocial response appropriate    Diagnosis:   Non-small cell lung cancer    IV Cancer Treatment Name(s): Docetaxel  IV Cancer Treatment Frequency Once weekly for 2 weeks, then 1 week off    Number of cycles planned Based on response and tolerability  Plan for appointments and lab testing Every 1-3 weeks  Verified Consent to Chemotherapy/Biotherapy Cancer Treatment form signed by patient and provider:  Yes    Confirm patient informs his/her Cancer Care team of any treatment received in a setting other than at the [facility] (such as inpatient or outpatient at another hospital or clinic, locally or out of state) so that this medical information can accurately be reflected in his/her medical record.  This vital information will provide an accurate picture for the physician prescribing the current cancer treatment.Yes  Cancer Treatment Side Effects (refer to Chemo Care Handouts for further information):  Allergic reactions  Constipation  Diarrhea  Eye disorders  Fatigue  Hair loss  Kidney / Bladder effects  Liver effects  Loss of appetite  Low red blood cell count / Anemia  Low White Blood Cell Count/Risk of infection  Low Platelet Count/Risk of Bleeding  Lung Effects  Mouth or Throat Sores  Muscle / Bone Effects  Nausea / Vomiting  Nerve Effects  Skin Effects  Taste Changes    IV administration risks:  Potential leaking of drug outside of vein during administration   Signs/symptoms include redness, swelling, pain, burning at the site of administration  Notify Infusion Nurse immediately if any of these symptoms occur during or after the infusion  Allergic reaction: there is a chance for allergic reaction with some medications.  If your prescribed therapy has a higher risk for this, steps will be taken to  prevent and minimize this from occurring.    Recommended Anti-nausea medications (as directed by your provider):  Prochloperazine (Compazine) 10 mg every 6 hours and Ondansetron (Zofran) 8 mg every 8 hours  Take as needed    Helpful hints during cancer treatment:    Diet:  Avoid greasy or spicy foods on days surrounding chemotherapy  Eat small frequent meals per day (6-7 meals) rather than 3 large meals  Choose high calorie/high protein foods (chicken, hard cooked eggs, peanut butter, cheese)  If nauseated, try dry foods, such as toast, crackers or pretzels; light or bland foods, such as applesauce or oatmeal.    Fluid intake:  Drink 8-10 cups of liquid a day and take a water bottle wherever you go.  Any fluid is acceptable, but caffeinated products do not count towards your intake and should be limited to 1-2 drinks/day.    Physical Activity    If your doctor approves, be as physically active as you can, but start out slowly, and increase your activity over time as you feel stronger.  Listen to your body and rest when you need to.  Do what you can when you feel up to it.      Oral Care  Keep your mouth clean.  Rinse your mouth before and after meals with plain water or with a mild mouth rinse (made with 1 quart water, 1 teaspoon salt, and 1 teaspoon baking soda, shake before using)   Avoid commercial mouthwashes that contain alcohol, alcoholic or acidic drinks or tobacco  An acceptable mouthwash is Biotene®   If soreness or sores develop, contact the office.    Diarrhea or Constipation    Imodium A-D use for diarrhea:  Take 2 tabs (4mg) at the first sign of diarrhea; then take 1 tab (2mg) every 2 hours until you have had no diarrhea for at least 12 hours; during the night take 2 tabs (4mg) every 4 hours as needed.  Maximum of 8 tablets in 24 hours.     If you have persistent diarrhea or constipation, please contact the triage nurse for further instructions.  Skin Care  Avoid direct sunlight.  Wear a broad-spectrum  sunscreen with an SPF of 30 or higher on any skin exposed to the sun.  Re-apply every 2 hours if in the sun and after bathing or sweating.  For dry skin, use an alcohol-free lotion twice per day, especially after baths.  If scalp hair loss has occurred, protect the scalp from the sun by wearing a hat and use sunscreen.  Apply alcohol-free moisturizer as needed.    When to call the doctor:  Fever of 100.5 or greater or shaking chills  Nausea/vomiting not controlled with anti-nausea medications: Unable to drink for 24 hours or have signs of dehydration: tiredness, thirst, dry mouth, dark and decreased amount of urine  Diarrhea - not controlled with Imodium AD or more than 6 episodes in 24 hours  Constipation -no bowel movement x 3 days, no response to stool softeners or laxatives  Mouth sores, sore throat or blisters on the lips affecting oral intake  Difficulty breathing, chest pain, or new cough  Excessive tiredness or weakness, confusion or loss of balance  New rash  Tingling or burning, redness, swelling of the palms of hands or soles of feet  Sudden new unexpected symptom -such as change in vision, swelling in arm or leg  Increase in numbness and tingling of hands or feet  Unusual bleeding (nose bleeds, blood in urine, stool or phlegm)  Pain with urination  Persistent mood changes, depression, nervousness, difficulty sleeping   Pain, redness, swelling or blistering at the IV site  If you go to Emergency Room for any reason or seek medical attention elsewhere  If you should need to cancel or reschedule any visit, it is important that you contact the office    What Phone Number to Call:  Cancer Center [phone] / Triage Nurse    Teaching Materials Provided:   Chemo Care chemotherapy information sheets  and Safety and Handling Sheet     Please refer to the following link if you are interested in additional information about chemotherapy for yourself or family members:  [email or web address]    Safety and Handling of Chemotherapy  While you or your family member is receiving chemotherapy, whether in the clinic or at home, the following precautions must be taken to lessen any exposure to the medication.     Handling Body Waste:   Caregivers must wear gloves if exposed to the patient’s blood, urine, stool or vomit.  Dispose of the used gloves after each use and wash hands with soap and water.  Any sheets or clothes soiled with the bodily fluids should be machine washed twice in hot water with regular laundry detergent.  Do not wash soiled garments with hands.  If the soiled garments cannot be washed right away, place them in a sealed plastic bag until they can be washed.   Absorbable undergarments, or any other items contaminated with chemotherapy, should be placed in a sealed plastic bag for disposal, separate from other trash.  Toilets should be flushed twice with the lid closed while taking this medication and for 48 hours after the last dose.  Wash your hands after using the toilet.  Wash any skin area that has come in contact with urine or stool.      Safety for my family and friends:  Due to safety concerns and the nature of this treatment environment, children are not permitted in the infusion center.  Please make appropriate arrangements.   Hugging and kissing is safe for you and your partner or family members.  You can visit, sit with, hug and kiss the children in your life.    You can be around pregnant women, though (if possible) they should not clean up any of your body fluids after you have treatment.   Sexual activity is safe while throughout treatment.  It is possible that traces of the oral chemotherapy may be present in vaginal fluid or semen for 48 hours after taking.  A condom should be used during this time.  Effective birth control should be used throughout treatment to prevent pregnancy while on these medications and for several  months or years after therapy.  Chemotherapy can have harmful side effects to the fetus, especially in the first trimester.  In addition, menstrual cycles can become irregular during and after treatment, so you may not know if you are at a time in your cycle when you could become pregnant or if you are actually pregnant.

## 2024-10-18 ENCOUNTER — TELEPHONE (OUTPATIENT)
Dept: HEMATOLOGY/ONCOLOGY | Facility: HOSPITAL | Age: 69
End: 2024-10-18

## 2024-10-18 NOTE — TELEPHONE ENCOUNTER
Toxicities: C1 D1 Docetaxel on 10/17/2024    Tarik reports that he feels \"ok.\" No side effects at this time. I encouraged him to please call the office if he is not feeling well or he has any questions or concerns. He agreed and thanked me for checking on him.

## 2024-10-24 ENCOUNTER — OFFICE VISIT (OUTPATIENT)
Dept: HEMATOLOGY/ONCOLOGY | Facility: HOSPITAL | Age: 69
End: 2024-10-24
Attending: INTERNAL MEDICINE
Payer: MEDICARE

## 2024-10-24 ENCOUNTER — OFFICE VISIT (OUTPATIENT)
Dept: HEMATOLOGY/ONCOLOGY | Facility: HOSPITAL | Age: 69
End: 2024-10-24
Attending: NURSE PRACTITIONER
Payer: MEDICARE

## 2024-10-24 VITALS
HEART RATE: 70 BPM | DIASTOLIC BLOOD PRESSURE: 56 MMHG | OXYGEN SATURATION: 96 % | SYSTOLIC BLOOD PRESSURE: 123 MMHG | TEMPERATURE: 98 F | WEIGHT: 197 LBS | HEIGHT: 67 IN | RESPIRATION RATE: 18 BRPM | BODY MASS INDEX: 30.92 KG/M2

## 2024-10-24 VITALS
WEIGHT: 197 LBS | TEMPERATURE: 98 F | HEART RATE: 70 BPM | SYSTOLIC BLOOD PRESSURE: 123 MMHG | RESPIRATION RATE: 18 BRPM | DIASTOLIC BLOOD PRESSURE: 56 MMHG | BODY MASS INDEX: 30.92 KG/M2 | HEIGHT: 67 IN | OXYGEN SATURATION: 96 %

## 2024-10-24 VITALS
WEIGHT: 197 LBS | BODY MASS INDEX: 30.92 KG/M2 | HEART RATE: 70 BPM | SYSTOLIC BLOOD PRESSURE: 123 MMHG | OXYGEN SATURATION: 96 % | TEMPERATURE: 98 F | RESPIRATION RATE: 18 BRPM | DIASTOLIC BLOOD PRESSURE: 56 MMHG | HEIGHT: 67 IN

## 2024-10-24 DIAGNOSIS — C79.51 METASTASIS TO BONE (HCC): ICD-10-CM

## 2024-10-24 DIAGNOSIS — C34.92 STAGE IV SQUAMOUS CELL CARCINOMA OF LEFT LUNG (HCC): ICD-10-CM

## 2024-10-24 DIAGNOSIS — C34.92 STAGE IV SQUAMOUS CELL CARCINOMA OF LEFT LUNG (HCC): Primary | ICD-10-CM

## 2024-10-24 DIAGNOSIS — T45.1X5A ANTINEOPLASTIC CHEMOTHERAPY INDUCED ANEMIA: ICD-10-CM

## 2024-10-24 DIAGNOSIS — D64.81 ANTINEOPLASTIC CHEMOTHERAPY INDUCED ANEMIA: ICD-10-CM

## 2024-10-24 DIAGNOSIS — T45.1X5A CHEMOTHERAPY-INDUCED PERIPHERAL NEUROPATHY (HCC): ICD-10-CM

## 2024-10-24 DIAGNOSIS — Z51.11 CHEMOTHERAPY MANAGEMENT, ENCOUNTER FOR: ICD-10-CM

## 2024-10-24 DIAGNOSIS — K59.03 THERAPEUTIC OPIOID INDUCED CONSTIPATION: ICD-10-CM

## 2024-10-24 DIAGNOSIS — Z51.5 PALLIATIVE CARE ENCOUNTER: Primary | ICD-10-CM

## 2024-10-24 DIAGNOSIS — G62.0 CHEMOTHERAPY-INDUCED PERIPHERAL NEUROPATHY (HCC): ICD-10-CM

## 2024-10-24 DIAGNOSIS — C79.51 PAIN FROM BONE METASTASES (HCC): ICD-10-CM

## 2024-10-24 DIAGNOSIS — C34.90 NON-SMALL CELL LUNG CANCER METASTATIC TO INTRATHORACIC LYMPH NODE (HCC): ICD-10-CM

## 2024-10-24 DIAGNOSIS — G89.3 CANCER RELATED PAIN: ICD-10-CM

## 2024-10-24 DIAGNOSIS — Z71.89 ADVANCE CARE PLANNING: ICD-10-CM

## 2024-10-24 DIAGNOSIS — C78.89 METASTASIS TO SPLEEN (HCC): ICD-10-CM

## 2024-10-24 DIAGNOSIS — Z71.89 GOALS OF CARE, COUNSELING/DISCUSSION: ICD-10-CM

## 2024-10-24 DIAGNOSIS — G89.3 PAIN FROM BONE METASTASES (HCC): ICD-10-CM

## 2024-10-24 DIAGNOSIS — C77.1 NON-SMALL CELL LUNG CANCER METASTATIC TO INTRATHORACIC LYMPH NODE (HCC): ICD-10-CM

## 2024-10-24 DIAGNOSIS — T40.2X5A THERAPEUTIC OPIOID INDUCED CONSTIPATION: ICD-10-CM

## 2024-10-24 LAB
BASOPHILS # BLD AUTO: 0.04 X10(3) UL (ref 0–0.2)
BASOPHILS NFR BLD AUTO: 0.4 %
DEPRECATED RDW RBC AUTO: 52 FL (ref 35.1–46.3)
EOSINOPHIL # BLD AUTO: 0.23 X10(3) UL (ref 0–0.7)
EOSINOPHIL NFR BLD AUTO: 2.6 %
ERYTHROCYTE [DISTWIDTH] IN BLOOD BY AUTOMATED COUNT: 16.6 % (ref 11–15)
HCT VFR BLD AUTO: 31.7 %
HGB BLD-MCNC: 9.6 G/DL
IMM GRANULOCYTES # BLD AUTO: 0.16 X10(3) UL (ref 0–1)
IMM GRANULOCYTES NFR BLD: 1.8 %
LYMPHOCYTES # BLD AUTO: 0.79 X10(3) UL (ref 1–4)
LYMPHOCYTES NFR BLD AUTO: 8.9 %
MCH RBC QN AUTO: 26.9 PG (ref 26–34)
MCHC RBC AUTO-ENTMCNC: 30.3 G/DL (ref 31–37)
MCV RBC AUTO: 88.8 FL
MONOCYTES # BLD AUTO: 0.98 X10(3) UL (ref 0.1–1)
MONOCYTES NFR BLD AUTO: 11 %
NEUTROPHILS # BLD AUTO: 6.7 X10 (3) UL (ref 1.5–7.7)
NEUTROPHILS # BLD AUTO: 6.7 X10(3) UL (ref 1.5–7.7)
NEUTROPHILS NFR BLD AUTO: 75.3 %
PLATELET # BLD AUTO: 396 10(3)UL (ref 150–450)
RBC # BLD AUTO: 3.57 X10(6)UL
WBC # BLD AUTO: 8.9 X10(3) UL (ref 4–11)

## 2024-10-24 PROCEDURE — 96375 TX/PRO/DX INJ NEW DRUG ADDON: CPT

## 2024-10-24 PROCEDURE — 85025 COMPLETE CBC W/AUTO DIFF WBC: CPT

## 2024-10-24 PROCEDURE — 99214 OFFICE O/P EST MOD 30 MIN: CPT | Performed by: NURSE PRACTITIONER

## 2024-10-24 PROCEDURE — 96413 CHEMO IV INFUSION 1 HR: CPT

## 2024-10-24 PROCEDURE — G2211 COMPLEX E/M VISIT ADD ON: HCPCS | Performed by: INTERNAL MEDICINE

## 2024-10-24 PROCEDURE — 99215 OFFICE O/P EST HI 40 MIN: CPT | Performed by: INTERNAL MEDICINE

## 2024-10-24 RX ORDER — PREGABALIN 50 MG/1
50 CAPSULE ORAL 3 TIMES DAILY
Qty: 90 CAPSULE | Refills: 1 | Status: SHIPPED | OUTPATIENT
Start: 2024-10-24

## 2024-10-24 NOTE — PROGRESS NOTES
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  PDL1 0%     Diagnosis 1/3/2024 via transbronchial biopsy bronchoscopy with Dr. Carrillo.     On CT 12/22/23 4.2 x 3.3 cm left upper lobe mass enlarged AP reflection lymph node left hilar adenopathy.  PET/CT showed bone metastasis     Initiated Carboplatin Paclitaxel Pembrolizumab 2/1/24  Maintenance pembrolizumab 4/25/24-7/25/24     Palliative RT to R 8th rib lesion 7/2-7/3/24     PET scan 8/7/24 showed progressive disease.     Guardant 360CDx 8/15/24 all VUS. No actionable alteration.     8/29/24 initiated C1 gemcitabine   9/19/24: C2 gemcitabine    10/3/24 POD on scans     10/17/24 C1 docetaxel       Interval History:  Here for follow up and evaluation prior to chemo. Started C1D1 docetaxel last week and here for C1D8.   Overall he tolerated first dose fairly well. Noted more fatigue and lethargy following chemo. Pain is well controlled with 2 norco tabs every 6-8 hours.   Mild peripheral neuropathy stable. No leg swelling or pain. No breathing changes. No dyspnea, cough, fever.     Performance Status: ECOG 0    Current Medications:    Current Outpatient Medications:     pregabalin (LYRICA) 50 MG Oral Cap, Take 1 capsule (50 mg total) by mouth 3 (three) times daily., Disp: 90 capsule, Rfl: 1    sulfamethoxazole-trimethoprim -160 MG Oral Tab per tablet, Take 1 tablet by mouth 2 (two) times daily., Disp: 20 tablet, Rfl: 0    Tiotropium Bromide-Olodaterol 2.5-2.5 MCG/ACT Inhalation Aero Soln, Inhale 2 puffs into the lungs every morning., Disp: 4 g, Rfl: 1    HYDROcodone-acetaminophen  MG  Oral Tab, Take 1 tablet by mouth every 4 (four) hours as needed for Pain (for cancer related pain; MAX dose is 6 tabs per day)., Disp: 60 tablet, Rfl: 0    metFORMIN 850 MG Oral Tab, Take 1 tablet (850 mg total) by mouth 2 (two) times daily with meals., Disp: 180 tablet, Rfl: 3    lisinopril 30 MG Oral Tab, Take 1 tablet (30 mg total) by mouth daily., Disp: 90 tablet, Rfl: 2    metoprolol succinate  MG Oral Tablet 24 Hr, Take 1 tablet (100 mg total) by mouth daily., Disp: 90 tablet, Rfl: 1    glipiZIDE ER 5 MG Oral Tablet 24 Hr, Take 1 tablet (5 mg total) by mouth daily., Disp: 90 tablet, Rfl: 1    polyethylene glycol, PEG 3350, 17 g Oral Powd Pack, Take 17 g by mouth daily., Disp: , Rfl:     atorvastatin 20 MG Oral Tab, Take 1 tablet (20 mg total) by mouth nightly., Disp: 90 tablet, Rfl: 3    prochlorperazine (COMPAZINE) 10 mg tablet, Take 1 tablet (10 mg total) by mouth every 6 (six) hours as needed for Nausea., Disp: 30 tablet, Rfl: 3    ondansetron (ZOFRAN) 8 MG tablet, Take 1 tablet (8 mg total) by mouth every 8 (eight) hours as needed for Nausea., Disp: 30 tablet, Rfl: 3    aspirin 81 MG Oral Tab EC, Take 1 tablet (81 mg total) by mouth daily., Disp: , Rfl:     Review of Systems:  10 -point systems reviewed, all negative except as mentioned in the HPI/interval history.     Vital Signs:  /56 (BP Location: Right arm, Patient Position: Sitting, Cuff Size: large)   Pulse 70   Temp 98.2 °F (36.8 °C) (Oral)   Resp 18   Ht 1.702 m (5' 7\")   Wt 89.4 kg (197 lb)   SpO2 96%   BMI 30.85 kg/m²     Wt Readings from Last 6 Encounters:   10/24/24 89.4 kg (197 lb)   10/24/24 89.4 kg (197 lb)   10/24/24 89.4 kg (197 lb)   10/17/24 90.3 kg (199 lb)   10/10/24 92.1 kg (203 lb)   09/19/24 90.6 kg (199 lb 12.8 oz)      Physical Examination:  General: Aert and oriented x 3, not in acute distress.  Psych:  Mood and affect appropriate  HEENT: Non-icteric sclera. Oropharynx is clear.   Neck: No palpable  lymphadenopathy. Neck is supple.  Chest: Clear to auscultation.   Heart: Regular rate and rhythm.  Abdomen: Soft, non tender with good bowel sounds.   Extremities: No peripheral edema. No clubbing.   Neurological: Grossly intact.     Laboratory:  Lab Results   Component Value Date    WBC 8.9 10/24/2024    RBC 3.57 (L) 10/24/2024    HGB 9.6 (L) 10/24/2024    HCT 31.7 (L) 10/24/2024    MCV 88.8 10/24/2024    MCH 26.9 10/24/2024    MCHC 30.3 (L) 10/24/2024    RDW 16.6 (H) 10/24/2024    .0 10/24/2024     Lab Results   Component Value Date     10/17/2024    K 5.4 (H) 10/17/2024     10/17/2024    CO2 29.0 10/17/2024    BUN 11 10/17/2024    CREATSERUM 0.82 10/17/2024     (H) 10/17/2024    CA 9.8 10/17/2024    ALKPHO 90 10/17/2024    ALT 18 10/17/2024    AST 24 10/17/2024    BILT 0.3 10/17/2024    ALB 4.1 10/17/2024    TP 7.3 10/17/2024     Lab Results   Component Value Date/Time    TSH 3.333 07/25/2024 08:28 AM    TSH 2.573 07/05/2024 10:18 AM    TSH 2.706 06/13/2024 12:35 PM    TSH 3.369 05/20/2024 12:56 PM    TSH 3.325 04/24/2024 10:15 AM    TSH 2.917 04/03/2024 08:39 AM     Radiology:  CT CHEST PE AORTA (IV ONLY) (CPT=71260)    Result Date: 10/3/2024  CONCLUSION: No PE.  Left upper lobe mass has increased in size    Dictated by (CST): Chai Maguire MD on 10/03/2024 at 4:52 PM     Finalized by (CST): Chai Maguire MD on 10/03/2024 at 4:56 PM          XR ELBOW, COMPLETE (MIN 3 VIEWS), RIGHT (CPT=73080)    Result Date: 10/3/2024  CONCLUSION:  1. No acute appearing fracture or dislocation.  Mild mineralization/ossification adjacent to the lateral and medial distal humeral condyles may suggest sequelae of previous enthesopathy/epicondylitis.  Correlate clinically.  See above.    Dictated by (CST): Alex Brambila MD on 10/03/2024 at 2:31 PM     Finalized by (CST): Alex Brambila MD on 10/03/2024 at 2:32 PM          PET STANDARD BODY SCAN (ONCOLOGY) (CPT=78815)    Result Date: 8/7/2024  CONCLUSION:   1. Enlargement of the left upper lobe hypermetabolic malignancy, concerning for progression of disease.  2. Hypermetabolic mediastinal and hilar lymphadenopathy, presumably metastatic.   3. A markedly hypermetabolic expansile destructive osseous lesion of the right lateral 8th rib is again evident.  4. There is a small hypermetabolic nodule in the left gluteal region, concerning for potential soft tissue metastasis.  5. Severe paraseptal emphysema.  6. Uncomplicated distal colonic diverticulosis.  7. Probable physiologic excretion of radiotracer in the distal colon.  8. Prostatomegaly with probable chronic outlet obstruction.  9. Low-density appearance of the intracardiac blood pool raises the possibility of underlying anemia. Correlate with hematologic parameters.  10. Lesser incidental findings as above.    Dictated by (CST): Luis E Desir MD on 8/07/2024 at 12:24 PM     Finalized by (CST): Luis E Desir MD on 8/07/2024 at 12:44 PM            Impression and Plan:    Metastatic squamous cell carcinoma of the left upper lung, PD-L1 0%  --Carboplatin paclitaxel pembrolizuamb-initiated 2/1/24 and completed 4 cycles on 4/4/24; dose reduce carbo to AUC 5 and paclitaxel by 20% due CIPN to feet.   --Imaging 4/2024 after C4 with favorable tx response.   --Continued on maintenance pembrolizumab q3 wks, completed 9 cycles as of  7/25/24 with no autoimmune toxicity.  --Restaging CT scans and PET scans 8/2024 showed progression of disease. Guardant (ctDNA sequencing) with no targetable alteration.   --He was not interested in clinical trials or referral to a tertiary center.  --Received 2 cycles of gemcitabine initiated 8/29/24- 9/26/24 with no response.   --CT scans in ER on 10/3/24 for LUQ showed progression of disease with new splenic metastasis (2.5 cm) and increasing LUCINDA mass.   --Switched chemo to docetaxel 35 mg/m2 day 1 and 8 every 21 days.   --Proceed with C1D8 today. Labs reviewed.   --Repeat scans in 3 months or  sooner depending on his symptoms, to assess response.      Bone metastases  Cancer related pain  --S/p palliative XRT 7/3/24 to enlarging right 8th rib destructive bone lesion  --Bilateral upper quadrant pain due to metastases.   --continue Norco 10/325 mg Q6-8H PRN. Patient following closely with palliative care for pain management    Chemo induced neuropathy- stable. Continue lyrica      Chemo induced anemia- mild. Monitor        RTC 2 weeks       Мария Spears MD   Hematology/Oncology

## 2024-10-24 NOTE — PROGRESS NOTES
Pt here for C1D8 Drug(s)Taxotere.  Arrives Ambulating independently, accompanied by Self and Spouse     Patient was evaluated today by Treatment Nurse.and DARYL DUNBAR    Oral medications included in this regimen:  no    Patient confirms comprehension of cancer treatment schedule:  yes    Pregnancy screening:  Not applicable    Modifications in dose or schedule:  No    Medications appearance and physical integrity checked by RN: yes.    Chemotherapy IV pump settings verified by 2 RNs:  Yes.  Frequency of blood return and site check throughout administration: Prior to administration and At completion of therapy     Infusion/treatment outcome:  patient tolerated treatment without incident    Education Record    Learner:  Patient and Spouse  Barriers / Limitations:  Language  Method:  Brief focused and Discussion  Education / instructions given:  Plan of care for treatment today  Outcome:  Shows understanding    Discharged Home, Ambulating independently, accompanied by:Self and Spouse    Patient/family verbalized understanding of future appointments: by printed AVS

## 2024-10-24 NOTE — PATIENT INSTRUCTIONS
For pain:  -CONTINUE Hydrocodone/Acetaminophen (Norco) to 10/325mg - take 1-2 tablet(s) every 4 hours as needed for pain; MAX daily dose is 6 tablets of Norco per day  -INCREASE Pregabalin (Lyrica) 50mg - take 1 tablet every 8 hours; please do not skip doses        For constipation:  -Norco will cause constipation  -Take Senna-S - take 2 tablets daily  -If you experience loose stool or diarrhea, reduce Senna-S to 1 tablet per day or hold this medication for 1-2 days until constipation resolves  -INCREASE water intake

## 2024-10-24 NOTE — H&P (VIEW-ONLY)
Palliative Care Follow Up Note     Patient Name: Tarik Camara   YOB: 1955   Medical Record Number: [id number]   Date of visit: 10/24/2024     The 21st Century Cures Act makes medical notes like these available to patients in the interest of transparency. Please be advised this is a medical document. Medical documents are intended to carry relevant information, facts as evident, and the clinical opinion of the practitioner. The medical note is intended as peer to peer communication and may appear blunt or direct. It is written in medical language and may contain abbreviations or verbiage that are unfamiliar.     Chief Complaint/Reason for Visit:  Chief Complaint   Patient presents with    Palliative Care       Past Medical History/Past Surgical History:   History obtained from [employer] In addition to the patient, PMH/PSH is significant as shown below.    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     Onc: Dr. Spears    HPI:      Hospital admissions @ [facility] in past year: 0  ER visits @ [facility] in past year: 2    Patient seen and examined along with his wife present today. Tarik is awake, alert, oriented x 4, answers questions appropriately, is a reliable historian, and is in NAD today.    Cancer related pain reports in R anterior rib cage    See ROS.    Problem List:  Patient Active Problem List   Diagnosis    Coronary artery disease involving native coronary artery of native heart with angina pectoris (HCC)    Hyperlipidemia    Essential hypertension    Erectile dysfunction    Microalbuminuria    History of Bell's palsy    Paraseptal emphysema (HCC)    Type 2 diabetes mellitus without retinopathy (HCC)    Presbyopia of both eyes    Age-related nuclear cataract of both eyes    Type 2 diabetes mellitus with diabetic neuropathy (HCC)    Conjunctivochalasis of both eyes    Mass of left lung    Lung cancer (HCC)    Abdominal bloating    Chronic idiopathic constipation    Encounter for  antineoplastic chemotherapy and immunotherapy    Neuropathy of both feet    Pain from bone metastases (HCC)    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)    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)    Encounter for antineoplastic immunotherapy    Chemotherapy-induced peripheral neuropathy (HCC)    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)    Abscess of skin of abdomen        Medical History:  Past Medical History:    Bell's palsy    COPD (chronic obstructive pulmonary disease) (HCC)    Degenerative joint disease (DJD) of lumbar spine    Diabetes (HCC)    Diabetes mellitus, type II (HCC)    Heart attack (HCC)    High blood pressure    High cholesterol    Hyperlipidemia    Hypertension    Osteoarthritis    Sleep apnea    no CPAP       Surgical History:  Past Surgical History:   Procedure Laterality Date    Appendectomy      Coronary stent placement      Palate/uvula surgery unlisted      Ventral hernia repair  2021    primary repair, ventral and umbilical hernias       Allergies:  No Known Allergies    Family History:  Family History   Problem Relation Age of Onset    No Known Problems Mother     Heart Disorder Father     Diabetes Neg     Glaucoma Neg     Macular degeneration Neg        Social History:  Social History     Socioeconomic History    Marital status:     Number of children: 3   Occupational History    Occupation:    Tobacco Use    Smoking status: Former     Current packs/day: 0.00     Types: Cigarettes     Quit date: 3/19/2020     Years since quittin.6    Smokeless tobacco: Never    Tobacco comments:     Occasionally, not in last 6 months   Vaping Use    Vaping status: Never Used   Substance and Sexual Activity    Alcohol use: Not Currently    Drug use: Never       Goals of care counseling/advance care planning discussion:     We discussed patient's current clinical condition. I provided education about the typical disease trajectory of  metastatic lung cancer with associated symptoms and decline over time.     I discussed the importance of advance care planning prior to crisis with Tarik.     HCPOA/Health Surrogate:    Tarik has not completed saul HCPOA documentation.    [place] discussed [place] Health Surrogate [place]    [place]unny Surrogate: Socrates Camara (wife)    Code Status/POLST Documentation:    Tarik wishes to remain FULL CODE at this time.        Medications:  Current Outpatient Medications   Medication Sig Dispense Refill    pregabalin (LYRICA) 50 MG Oral Cap Take 1 capsule (50 mg total) by mouth 3 (three) times daily. 90 capsule 1    HYDROcodone-acetaminophen  MG Oral Tab Take 1 tablet by mouth every 4 (four) hours as needed for Pain (for cancer related pain; MAX dose is 6 tabs per day). 60 tablet 0    sulfamethoxazole-trimethoprim -160 MG Oral Tab per tablet Take 1 tablet by mouth 2 (two) times daily. 20 tablet 0    Tiotropium Bromide-Olodaterol 2.5-2.5 MCG/ACT Inhalation Aero Soln Inhale 2 puffs into the lungs every morning. 4 g 1    metFORMIN 850 MG Oral Tab Take 1 tablet (850 mg total) by mouth 2 (two) times daily with meals. 180 tablet 3    lisinopril 30 MG Oral Tab Take 1 tablet (30 mg total) by mouth daily. 90 tablet 2    metoprolol succinate  MG Oral Tablet 24 Hr Take 1 tablet (100 mg total) by mouth daily. 90 tablet 1    glipiZIDE ER 5 MG Oral Tablet 24 Hr Take 1 tablet (5 mg total) by mouth daily. 90 tablet 1    polyethylene glycol, PEG 3350, 17 g Oral Powd Pack Take 17 g by mouth daily.      atorvastatin 20 MG Oral Tab Take 1 tablet (20 mg total) by mouth nightly. 90 tablet 3    prochlorperazine (COMPAZINE) 10 mg tablet Take 1 tablet (10 mg total) by mouth every 6 (six) hours as needed for Nausea. 30 tablet 3    ondansetron (ZOFRAN) 8 MG tablet Take 1 tablet (8 mg total) by mouth every 8 (eight) hours as needed for Nausea. 30 tablet 3    aspirin 81 MG Oral Tab EC Take 1 tablet (81 mg total) by mouth daily.         Review of  Systems:  Review of Systems   Constitutional:  Positive for malaise/fatigue.   HENT: Negative.     Eyes: Negative.    Respiratory: Negative.  Negative for cough, hemoptysis and shortness of breath.    Cardiovascular: Negative.  Negative for chest pain, palpitations and leg swelling.   Gastrointestinal:  Negative for abdominal pain, nausea and vomiting.   Musculoskeletal:         R anterior rib pain - cancer related  -Does not radiate  -Sharp/pinching in character  -Intermittent; some episodes are more severe than others  -Taking 2 tabs Norco 10/325mg 1-2 times daily reduces pain  -Pressing/rubbing area increases pain  -Pain is stable on [place], but Tarik feels there is still room for improvement for pain management     Skin: Negative.    Neurological:  Positive for tingling (On Lyrica (twice daily) for peripheral neuropathy). Negative for dizziness, weakness and headaches.   Psychiatric/Behavioral: Negative.  Negative for depression, hallucinations, memory loss, substance abuse and suicidal ideas. The patient is not nervous/anxious and does not have insomnia.        Physical Examination:  Physical Exam  Constitutional:       Appearance: Normal appearance. He is obese. He is ill-appearing.   HENT:      Head: Normocephalic and atraumatic.      Right Ear: External ear normal.      Left Ear: External ear normal.      Nose: Nose normal.      Mouth/Throat:      Mouth: Mucous membranes are moist.      Pharynx: Oropharynx is clear.   Eyes:      General: No scleral icterus.     Conjunctiva/sclera: Conjunctivae normal.   Cardiovascular:      Rate and Rhythm: Normal rate and regular rhythm.   Pulmonary:      Effort: Pulmonary effort is normal. No respiratory distress.   Abdominal:      General: Bowel sounds are normal. There is no distension.      Palpations: Abdomen is soft.   Musculoskeletal:         General: Normal range of motion.      Cervical back: Normal range of motion and neck supple.      Right lower leg: No edema.       Left lower leg: No edema.   Skin:     General: Skin is warm and dry.      Coloration: Skin is pale.   Neurological:      General: No focal deficit present.      Mental Status: He is alert and oriented to person, place, and time. Mental status is at baseline.      Motor: No weakness.      Gait: Gait normal.   Psychiatric:         Mood and Affect: Mood normal.         Behavior: Behavior normal.         Thought Content: Thought content normal.         Judgment: Judgment normal.       Palliative Care Goals of Care:  Discussed with patient/family today: Yes  Patient's preference about sharing medical information: Patient and family may receive information  Patient's decision making preferences: Fully involved and speak with family  Code status: FULL CODE  Have advanced directives been discussed with patient or healthcare power of : Yes        Healthcare Agent Appointed: Yes  Healthcare Agent's Name: Socrates Camara (wife)             Palliative Care Assessment/Plan:  1. Palliative care encounter    2. Cancer related pain    3. Therapeutic opioid induced constipation    4. Chemotherapy-induced peripheral neuropathy (HCC)    5. Goals of care, counseling/discussion    6. Advance care planning    7. 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)    8. Pain from bone metastases (HCC)        Cancer Related Pain  -Discussed addiction vs tolerance  -[place]ed [place]   -Discussed MOA and explained side effects of pain medications  -Max daily Tylenol limit is 3,000mg  -CONTINUE Hydrocodone/Acetaminophen (Norco) to 10/325mg - take 1-2 tablet(s) every 4 hours as needed for pain; MAX daily dose is 6 tablets of Norco per day  -INCREASE Pregabalin (Lyrica) 50mg - take 1 tablet every 8 hours; please do not skip doses  -I offered to add MS Contin at bedtime for better pain control, Tarik would like to hold off on this at this time  -AVOID ALL NSAIDS - due to HTN  -Discussed indication for opioid medications for cancer related  pain per NCCN guidelines  -Discussed common SE of opioid meds which include, but are not limited to: drowsiness, n/v, stomach upset, constipation  -Excessive or misuse of opioid medications may cause respiratory depression, CNS depression, and possibly death  -Discussed to NEVER self adjust pain med/opioid doses or stop these meds abruptly as this may lead to opioid withdrawal  -Discussed signs and symptoms of withdrawal which include, but are not limited to: rhinorrhea, diarrhea, irritability, chills, sweating, insomnia, mood swings, anxiety, increased pain      Therapeutic opioid induced constipation  -Norco will cause constipation  -Senna-S - take 2 tablets daily  -If you experience loose stool or diarrhea, reduce Senna-S to 1 tablet per day or hold this medication for 1-2 days until constipation resolves  -INCREASE water intake       Goals of care counseling/discussion  -Pt is FULL CODE  -Continue supportive medical treatments; pt plans to continue pursuing cancer treatment  -Provided emotional support to pt/family who appear to be coping adequately  -Patient is agreeable to hospitalization, if indicated  -Patient is agreeable to following up with outpatient palliative care  -See above narrative for further details      Advance care planning counseling/discussion  -Pt is FULL CODE  [place]  -Health Surrogate is Socrates Camara (wife)  -See above narrative for further details    Palliative Performance Scale 70%    Emotional support was provided to patient and family today: Yes    Palliative Care Follow-up:  I spent a total of  30 minutes with the patient today, which included all of the following:direct face to face contact, history taking, physical examination, and >50% was spent counseling and coordinating care.    Thank you for allowing the Palliative Care Team to participate in the care of your patient. I will continue to follow clinically.    KAYLA MESA, APRN DNP, FNP-BC,  [facility]  [phone]  10/24/2024

## 2024-10-24 NOTE — PROGRESS NOTES
Palliative Care Follow Up Note     Patient Name: Tarik Camara   YOB: 1955   Medical Record Number: [id number]   Date of visit: 10/24/2024     The 21st Century Cures Act makes medical notes like these available to patients in the interest of transparency. Please be advised this is a medical document. Medical documents are intended to carry relevant information, facts as evident, and the clinical opinion of the practitioner. The medical note is intended as peer to peer communication and may appear blunt or direct. It is written in medical language and may contain abbreviations or verbiage that are unfamiliar.     Chief Complaint/Reason for Visit:  Chief Complaint   Patient presents with    Palliative Care       Past Medical History/Past Surgical History:   History obtained from [employer] In addition to the patient, PMH/PSH is significant as shown below.    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     Onc: Dr. Spears    HPI:      Hospital admissions @ [facility] in past year: 0  ER visits @ [facility] in past year: 2    Patient seen and examined along with his wife present today. Tarik is awake, alert, oriented x 4, answers questions appropriately, is a reliable historian, and is in NAD today.    Cancer related pain reports in R anterior rib cage    See ROS.    Problem List:  Patient Active Problem List   Diagnosis    Coronary artery disease involving native coronary artery of native heart with angina pectoris (HCC)    Hyperlipidemia    Essential hypertension    Erectile dysfunction    Microalbuminuria    History of Bell's palsy    Paraseptal emphysema (HCC)    Type 2 diabetes mellitus without retinopathy (HCC)    Presbyopia of both eyes    Age-related nuclear cataract of both eyes    Type 2 diabetes mellitus with diabetic neuropathy (HCC)    Conjunctivochalasis of both eyes    Mass of left lung    Lung cancer (HCC)    Abdominal bloating    Chronic idiopathic constipation    Encounter for  antineoplastic chemotherapy and immunotherapy    Neuropathy of both feet    Pain from bone metastases (HCC)    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)    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)    Encounter for antineoplastic immunotherapy    Chemotherapy-induced peripheral neuropathy (HCC)    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)    Abscess of skin of abdomen        Medical History:  Past Medical History:    Bell's palsy    COPD (chronic obstructive pulmonary disease) (HCC)    Degenerative joint disease (DJD) of lumbar spine    Diabetes (HCC)    Diabetes mellitus, type II (HCC)    Heart attack (HCC)    High blood pressure    High cholesterol    Hyperlipidemia    Hypertension    Osteoarthritis    Sleep apnea    no CPAP       Surgical History:  Past Surgical History:   Procedure Laterality Date    Appendectomy      Coronary stent placement      Palate/uvula surgery unlisted      Ventral hernia repair  2021    primary repair, ventral and umbilical hernias       Allergies:  No Known Allergies    Family History:  Family History   Problem Relation Age of Onset    No Known Problems Mother     Heart Disorder Father     Diabetes Neg     Glaucoma Neg     Macular degeneration Neg        Social History:  Social History     Socioeconomic History    Marital status:     Number of children: 3   Occupational History    Occupation:    Tobacco Use    Smoking status: Former     Current packs/day: 0.00     Types: Cigarettes     Quit date: 3/19/2020     Years since quittin.6    Smokeless tobacco: Never    Tobacco comments:     Occasionally, not in last 6 months   Vaping Use    Vaping status: Never Used   Substance and Sexual Activity    Alcohol use: Not Currently    Drug use: Never       Goals of care counseling/advance care planning discussion:     We discussed patient's current clinical condition. I provided education about the typical disease trajectory of  metastatic lung cancer with associated symptoms and decline over time.     I discussed the importance of advance care planning prior to crisis with Tarik.     HCPOA/Health Surrogate:    Tarik has not completed saul HCPOA documentation.    [place] discussed [place]rogate [place]    [place] Surrogate: Socrates Camara (wife)    Code Status/POLST Documentation:    Tarik wishes to remain FULL CODE at this time.        Medications:  Current Outpatient Medications   Medication Sig Dispense Refill    pregabalin (LYRICA) 50 MG Oral Cap Take 1 capsule (50 mg total) by mouth 3 (three) times daily. 90 capsule 1    HYDROcodone-acetaminophen  MG Oral Tab Take 1 tablet by mouth every 4 (four) hours as needed for Pain (for cancer related pain; MAX dose is 6 tabs per day). 60 tablet 0    sulfamethoxazole-trimethoprim -160 MG Oral Tab per tablet Take 1 tablet by mouth 2 (two) times daily. 20 tablet 0    Tiotropium Bromide-Olodaterol 2.5-2.5 MCG/ACT Inhalation Aero Soln Inhale 2 puffs into the lungs every morning. 4 g 1    metFORMIN 850 MG Oral Tab Take 1 tablet (850 mg total) by mouth 2 (two) times daily with meals. 180 tablet 3    lisinopril 30 MG Oral Tab Take 1 tablet (30 mg total) by mouth daily. 90 tablet 2    metoprolol succinate  MG Oral Tablet 24 Hr Take 1 tablet (100 mg total) by mouth daily. 90 tablet 1    glipiZIDE ER 5 MG Oral Tablet 24 Hr Take 1 tablet (5 mg total) by mouth daily. 90 tablet 1    polyethylene glycol, PEG 3350, 17 g Oral Powd Pack Take 17 g by mouth daily.      atorvastatin 20 MG Oral Tab Take 1 tablet (20 mg total) by mouth nightly. 90 tablet 3    prochlorperazine (COMPAZINE) 10 mg tablet Take 1 tablet (10 mg total) by mouth every 6 (six) hours as needed for Nausea. 30 tablet 3    ondansetron (ZOFRAN) 8 MG tablet Take 1 tablet (8 mg total) by mouth every 8 (eight) hours as needed for Nausea. 30 tablet 3    aspirin 81 MG Oral Tab EC Take 1 tablet (81 mg total) by mouth daily.         Review of  Systems:  Review of Systems   Constitutional:  Positive for malaise/fatigue.   HENT: Negative.     Eyes: Negative.    Respiratory: Negative.  Negative for cough, hemoptysis and shortness of breath.    Cardiovascular: Negative.  Negative for chest pain, palpitations and leg swelling.   Gastrointestinal:  Negative for abdominal pain, nausea and vomiting.   Musculoskeletal:         R anterior rib pain - cancer related  -Does not radiate  -Sharp/pinching in character  -Intermittent; some episodes are more severe than others  -Taking 2 tabs Norco 10/325mg 1-2 times daily reduces pain  -Pressing/rubbing area increases pain  -Pain is stable on [place], but Tarik feels there is still room for improvement for pain management     Skin: Negative.    Neurological:  Positive for tingling (On Lyrica (twice daily) for peripheral neuropathy). Negative for dizziness, weakness and headaches.   Psychiatric/Behavioral: Negative.  Negative for depression, hallucinations, memory loss, substance abuse and suicidal ideas. The patient is not nervous/anxious and does not have insomnia.        Physical Examination:  Physical Exam  Constitutional:       Appearance: Normal appearance. He is obese. He is ill-appearing.   HENT:      Head: Normocephalic and atraumatic.      Right Ear: External ear normal.      Left Ear: External ear normal.      Nose: Nose normal.      Mouth/Throat:      Mouth: Mucous membranes are moist.      Pharynx: Oropharynx is clear.   Eyes:      General: No scleral icterus.     Conjunctiva/sclera: Conjunctivae normal.   Cardiovascular:      Rate and Rhythm: Normal rate and regular rhythm.   Pulmonary:      Effort: Pulmonary effort is normal. No respiratory distress.   Abdominal:      General: Bowel sounds are normal. There is no distension.      Palpations: Abdomen is soft.   Musculoskeletal:         General: Normal range of motion.      Cervical back: Normal range of motion and neck supple.      Right lower leg: No edema.       Left lower leg: No edema.   Skin:     General: Skin is warm and dry.      Coloration: Skin is pale.   Neurological:      General: No focal deficit present.      Mental Status: He is alert and oriented to person, place, and time. Mental status is at baseline.      Motor: No weakness.      Gait: Gait normal.   Psychiatric:         Mood and Affect: Mood normal.         Behavior: Behavior normal.         Thought Content: Thought content normal.         Judgment: Judgment normal.       Palliative Care Goals of Care:  Discussed with patient/family today: Yes  Patient's preference about sharing medical information: Patient and family may receive information  Patient's decision making preferences: Fully involved and speak with family  Code status: FULL CODE  Have advanced directives been discussed with patient or healthcare power of : Yes        Healthcare Agent Appointed: Yes  Healthcare Agent's Name: Socrates Camara (wife)             Palliative Care Assessment/Plan:  1. Palliative care encounter    2. Cancer related pain    3. Therapeutic opioid induced constipation    4. Chemotherapy-induced peripheral neuropathy (HCC)    5. Goals of care, counseling/discussion    6. Advance care planning    7. 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)    8. Pain from bone metastases (HCC)        Cancer Related Pain  -Discussed addiction vs tolerance  -Reviewed [place]   -Discussed MOA and explained side effects of pain medications  -Max daily Tylenol limit is 3,000mg  -CONTINUE Hydrocodone/Acetaminophen (Norco) to 10/325mg - take 1-2 tablet(s) every 4 hours as needed for pain; MAX daily dose is 6 tablets of Norco per day  -INCREASE Pregabalin (Lyrica) 50mg - take 1 tablet every 8 hours; please do not skip doses  -I offered to add MS Contin at bedtime for better pain control, Tarik would like to hold off on this at this time  -AVOID ALL NSAIDS - due to HTN  -Discussed indication for opioid medications for cancer related  pain per NCCN guidelines  -Discussed common SE of opioid meds which include, but are not limited to: drowsiness, n/v, stomach upset, constipation  -Excessive or misuse of opioid medications may cause respiratory depression, CNS depression, and possibly death  -Discussed to NEVER self adjust pain med/opioid doses or stop these meds abruptly as this may lead to opioid withdrawal  -Discussed signs and symptoms of withdrawal which include, but are not limited to: rhinorrhea, diarrhea, irritability, chills, sweating, insomnia, mood swings, anxiety, increased pain      Therapeutic opioid induced constipation  -Norco will cause constipation  -Senna-S - take 2 tablets daily  -If you experience loose stool or diarrhea, reduce Senna-S to 1 tablet per day or hold this medication for 1-2 days until constipation resolves  -INCREASE water intake       Goals of care counseling/discussion  -Pt is FULL CODE  -Continue supportive medical treatments; pt plans to continue pursuing cancer treatment  -Provided emotional support to pt/family who appear to be coping adequately  -Patient is agreeable to hospitalization, if indicated  -Patient is agreeable to following up with outpatient palliative care  -See above narrative for further details      Advance care planning counseling/discussion  -Pt is FULL CODE  [place]  -Health Surrogate is Socrates Camara (wife)  -See above narrative for further details    Palliative Performance Scale 70%    Emotional support was provided to patient and family today: Yes    Palliative Care Follow-up:  I spent a total of  30 minutes with the patient today, which included all of the following:direct face to face contact, history taking, physical examination, and >50% was spent counseling and coordinating care.    Thank you for allowing the Palliative Care Team to participate in the care of your patient. I will continue to follow clinically.    KAYLA MESA, APRN DNP, FNP-BC,  [facility]  [phone]  10/24/2024

## 2024-10-30 ENCOUNTER — TELEPHONE (OUTPATIENT)
Dept: HEMATOLOGY/ONCOLOGY | Facility: HOSPITAL | Age: 69
End: 2024-10-30

## 2024-10-30 NOTE — TELEPHONE ENCOUNTER
Cld patient to gather details on Family Medical Leave Act for Spouse     Type of Leave: Intermittent Family Medical Leave Act   Reason for Leave: to Care for patient Cancer Dx/Treatments  Start date of leave: 10/30/2024  End date of leave: 10/29/2025  How many flare ups per month/length?: 1-4 Flare Ups a month lasting 1 day in duration   How many appts per month/length?: 1-4 Chemo Appts a month lasting 1 day in duration   Was Fee and Turnaround info Given?: Yes

## 2024-10-31 ENCOUNTER — APPOINTMENT (OUTPATIENT)
Dept: HEMATOLOGY/ONCOLOGY | Facility: HOSPITAL | Age: 69
End: 2024-10-31
Attending: INTERNAL MEDICINE
Payer: MEDICARE

## 2024-11-01 NOTE — TELEPHONE ENCOUNTER
Family Medical Leave Act forms completed, called patient Left message to call back to know where forms can be picked up at, archiving forms

## 2024-11-01 NOTE — TELEPHONE ENCOUNTER
Dr. Spears,       Please sign off on form if you agree to: Family Medical Leave Act Intermittent 10/30/2024-10/29/2025 for SPOUSE   (place your signature on the first page only)    -From your Inbasket, Highlight the patient and click Chart   -Double click the 10/30/2024 Forms Completion telephone encounter  -Scroll down to the Media section   -Click the blue Hyperlink: Family Medical Leave Act Sp Dr. Мария Spears 11/01/2024  -Click Acknowledge located in the top right ribbon/menu   -Drag the mouse into the blank space of the document and a + sign will appear. Left click to   electronically sign the document.     Thank you,  Lisa CANDELARIA

## 2024-11-07 ENCOUNTER — PALLIATIVE CARE (OUTPATIENT)
Dept: HEMATOLOGY/ONCOLOGY | Facility: HOSPITAL | Age: 69
End: 2024-11-07

## 2024-11-07 ENCOUNTER — TELEPHONE (OUTPATIENT)
Dept: HEMATOLOGY/ONCOLOGY | Facility: HOSPITAL | Age: 69
End: 2024-11-07

## 2024-11-07 ENCOUNTER — NURSE ONLY (OUTPATIENT)
Dept: HEMATOLOGY/ONCOLOGY | Facility: HOSPITAL | Age: 69
End: 2024-11-07
Attending: INTERNAL MEDICINE
Payer: MEDICARE

## 2024-11-07 ENCOUNTER — HOSPITAL ENCOUNTER (OUTPATIENT)
Dept: PICC SERVICES | Facility: HOSPITAL | Age: 69
Discharge: HOME | End: 2024-11-07
Attending: INTERNAL MEDICINE
Payer: MEDICARE

## 2024-11-07 ENCOUNTER — HOSPITAL ENCOUNTER (OUTPATIENT)
Dept: ULTRASOUND IMAGING | Facility: HOSPITAL | Age: 69
Discharge: HOME | End: 2024-11-07
Attending: INTERNAL MEDICINE
Payer: MEDICARE

## 2024-11-07 VITALS
OXYGEN SATURATION: 97 % | DIASTOLIC BLOOD PRESSURE: 55 MMHG | HEIGHT: 67 IN | SYSTOLIC BLOOD PRESSURE: 97 MMHG | BODY MASS INDEX: 31.08 KG/M2 | WEIGHT: 198 LBS | RESPIRATION RATE: 18 BRPM | HEART RATE: 74 BPM | TEMPERATURE: 98 F

## 2024-11-07 DIAGNOSIS — R60.0 EDEMA OF RIGHT UPPER EXTREMITY: ICD-10-CM

## 2024-11-07 DIAGNOSIS — C34.92 STAGE IV SQUAMOUS CELL CARCINOMA OF LEFT LUNG (HCC): Primary | ICD-10-CM

## 2024-11-07 DIAGNOSIS — T45.1X5A CHEMOTHERAPY-INDUCED PERIPHERAL NEUROPATHY (HCC): ICD-10-CM

## 2024-11-07 DIAGNOSIS — Z51.11 CHEMOTHERAPY MANAGEMENT, ENCOUNTER FOR: ICD-10-CM

## 2024-11-07 DIAGNOSIS — C78.89 METASTASIS TO SPLEEN (HCC): ICD-10-CM

## 2024-11-07 DIAGNOSIS — C34.90 NON-SMALL CELL LUNG CANCER METASTATIC TO INTRATHORACIC LYMPH NODE (HCC): ICD-10-CM

## 2024-11-07 DIAGNOSIS — C77.1 NON-SMALL CELL LUNG CANCER METASTATIC TO INTRATHORACIC LYMPH NODE (HCC): ICD-10-CM

## 2024-11-07 DIAGNOSIS — I82.621 ACUTE DEEP VEIN THROMBOSIS (DVT) OF BRACHIAL VEIN OF RIGHT UPPER EXTREMITY (HCC): ICD-10-CM

## 2024-11-07 DIAGNOSIS — G62.0 CHEMOTHERAPY-INDUCED PERIPHERAL NEUROPATHY (HCC): ICD-10-CM

## 2024-11-07 DIAGNOSIS — Z51.5 PALLIATIVE CARE ENCOUNTER: Primary | ICD-10-CM

## 2024-11-07 DIAGNOSIS — D64.81 ANTINEOPLASTIC CHEMOTHERAPY INDUCED ANEMIA: ICD-10-CM

## 2024-11-07 DIAGNOSIS — C79.51 METASTASIS TO BONE (HCC): ICD-10-CM

## 2024-11-07 DIAGNOSIS — G89.3 CANCER RELATED PAIN: ICD-10-CM

## 2024-11-07 DIAGNOSIS — Z51.12 ENCOUNTER FOR ANTINEOPLASTIC CHEMOTHERAPY AND IMMUNOTHERAPY: Primary | ICD-10-CM

## 2024-11-07 DIAGNOSIS — T45.1X5A ANTINEOPLASTIC CHEMOTHERAPY INDUCED ANEMIA: ICD-10-CM

## 2024-11-07 DIAGNOSIS — C34.92 STAGE IV SQUAMOUS CELL CARCINOMA OF LEFT LUNG (HCC): ICD-10-CM

## 2024-11-07 DIAGNOSIS — Z51.11 ENCOUNTER FOR ANTINEOPLASTIC CHEMOTHERAPY AND IMMUNOTHERAPY: Primary | ICD-10-CM

## 2024-11-07 LAB
ALBUMIN SERPL-MCNC: 4.2 G/DL (ref 3.2–4.8)
ALBUMIN/GLOB SERPL: 1.6 {RATIO} (ref 1–2)
ALP LIVER SERPL-CCNC: 86 U/L
ALT SERPL-CCNC: 20 U/L
ANION GAP SERPL CALC-SCNC: 7 MMOL/L (ref 0–18)
AST SERPL-CCNC: 17 U/L (ref ?–34)
BASOPHILS # BLD AUTO: 0.07 X10(3) UL (ref 0–0.2)
BASOPHILS NFR BLD AUTO: 0.8 %
BILIRUB SERPL-MCNC: 0.2 MG/DL (ref 0.2–1.1)
BUN BLD-MCNC: 10 MG/DL (ref 9–23)
BUN/CREAT SERPL: 10.2 (ref 10–20)
CALCIUM BLD-MCNC: 10.5 MG/DL (ref 8.7–10.4)
CHLORIDE SERPL-SCNC: 107 MMOL/L (ref 98–112)
CO2 SERPL-SCNC: 27 MMOL/L (ref 21–32)
CREAT BLD-MCNC: 0.98 MG/DL
DEPRECATED RDW RBC AUTO: 61 FL (ref 35.1–46.3)
EGFRCR SERPLBLD CKD-EPI 2021: 83 ML/MIN/1.73M2 (ref 60–?)
EOSINOPHIL # BLD AUTO: 0.1 X10(3) UL (ref 0–0.7)
EOSINOPHIL NFR BLD AUTO: 1.1 %
ERYTHROCYTE [DISTWIDTH] IN BLOOD BY AUTOMATED COUNT: 18.9 % (ref 11–15)
GLOBULIN PLAS-MCNC: 2.7 G/DL (ref 2–3.5)
GLUCOSE BLD-MCNC: 71 MG/DL (ref 70–99)
HCT VFR BLD AUTO: 28.7 %
HGB BLD-MCNC: 8.9 G/DL
IMM GRANULOCYTES # BLD AUTO: 0.07 X10(3) UL (ref 0–1)
IMM GRANULOCYTES NFR BLD: 0.8 %
LYMPHOCYTES # BLD AUTO: 1.51 X10(3) UL (ref 1–4)
LYMPHOCYTES NFR BLD AUTO: 17.2 %
MCH RBC QN AUTO: 27.5 PG (ref 26–34)
MCHC RBC AUTO-ENTMCNC: 31 G/DL (ref 31–37)
MCV RBC AUTO: 88.6 FL
MONOCYTES # BLD AUTO: 1.01 X10(3) UL (ref 0.1–1)
MONOCYTES NFR BLD AUTO: 11.5 %
NEUTROPHILS # BLD AUTO: 6.04 X10 (3) UL (ref 1.5–7.7)
NEUTROPHILS # BLD AUTO: 6.04 X10(3) UL (ref 1.5–7.7)
NEUTROPHILS NFR BLD AUTO: 68.6 %
OSMOLALITY SERPL CALC.SUM OF ELEC: 290 MOSM/KG (ref 275–295)
PLATELET # BLD AUTO: 393 10(3)UL (ref 150–450)
POTASSIUM SERPL-SCNC: 5 MMOL/L (ref 3.5–5.1)
PROT SERPL-MCNC: 6.9 G/DL (ref 5.7–8.2)
RBC # BLD AUTO: 3.24 X10(6)UL
SODIUM SERPL-SCNC: 141 MMOL/L (ref 136–145)
WBC # BLD AUTO: 8.8 X10(3) UL (ref 4–11)

## 2024-11-07 PROCEDURE — 85025 COMPLETE CBC W/AUTO DIFF WBC: CPT

## 2024-11-07 PROCEDURE — 96375 TX/PRO/DX INJ NEW DRUG ADDON: CPT

## 2024-11-07 PROCEDURE — 96413 CHEMO IV INFUSION 1 HR: CPT

## 2024-11-07 PROCEDURE — 93971 EXTREMITY STUDY: CPT | Performed by: INTERNAL MEDICINE

## 2024-11-07 PROCEDURE — 80053 COMPREHEN METABOLIC PANEL: CPT

## 2024-11-07 RX ORDER — APIXABAN 5 MG (74)
KIT ORAL
Qty: 1 EACH | Refills: 0 | Status: SHIPPED | OUTPATIENT
Start: 2024-11-07 | End: 2024-12-05

## 2024-11-07 RX ORDER — HYDROCODONE BITARTRATE AND ACETAMINOPHEN 10; 325 MG/1; MG/1
1 TABLET ORAL EVERY 4 HOURS PRN
Qty: 150 TABLET | Refills: 0 | Status: SHIPPED | OUTPATIENT
Start: 2024-11-07

## 2024-11-07 NOTE — PROGRESS NOTES
Tarik is here today for pre-chemo labs and PIV placement for Taxotere cycle 2 day 1.  He's had vein irritation to the right ac area in past (now healed) but now has scabbing/ irritation to left ac area.   Generally, unable to place PIV to any other site, can take multiple sticks.   Pt has refused portacath placement, does not like chemo, he says.    He declines any labs drawn or PIV today.  He prefers to discuss with Dr Spears first.  I let the 's office and his infusion RN know of pt's wishes.    Pt in lobby with his spouse.

## 2024-11-07 NOTE — PROGRESS NOTES
Patient and wife returned from [facility] [place]- met with them  in person using Clarita  Mahogany [id number]- Discussed that there is a clot in his arm and that Dr. Spears is prescribing Eliquis for the patient confirmed that the prescription will be sent to the [facility] on [facility].  Provided patient with written information and coupon cards.  Patient states ok that the information in English as he understands it is his wife who has a difficult time.  Discussed that patient should follow instructions to take two tabs twice a day for the first seven days.  Due to his appointment with IR for port placement on 11/13 I discussed that the patient should stop the Eliquis on Monday- (Do Not take on Monday)  Patient stated understanding to stop in preparation of IR procedure.  He can start again after procedure or according to IR instructions.  He stated understanding.  We also discussed that the patient has an appointment on Monday 11/11 with Surgery PA to look at the large skin abscess or boil on his flank.  He should arrive at 2:30pm appointment at [facility] [address].  They stated understanding and in agreement with appointment.    All questions were answered - patient encouraged to call with any other questions that may come up.

## 2024-11-07 NOTE — TELEPHONE ENCOUNTER
Called patient unable to reach LM on VM to call back also left details of new appointment with Surgery on Monday, 11/11 at 2:30 pm. [address] of [facility]. Bring photo ID and insurance card.

## 2024-11-07 NOTE — PROGRESS NOTES
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  PDL1 0%     Diagnosis 1/3/2024 via transbronchial biopsy bronchoscopy with Dr. Carrillo.     On CT 12/22/23 4.2 x 3.3 cm left upper lobe mass enlarged AP reflection lymph node left hilar adenopathy.  PET/CT showed bone metastasis     Initiated Carboplatin Paclitaxel Pembrolizumab 2/1/24  Maintenance pembrolizumab 4/25/24-7/25/24     Palliative RT to R 8th rib lesion 7/2-7/3/24     PET scan 8/7/24 showed progressive disease.     Guardant 360CDx 8/15/24 all VUS. No actionable alteration.     8/29/24 C1 gemcitabine   9/19/24: C2 gemcitabine    10/3/24 POD on scans     10/17/24 C1 docetaxel   11/7/24: C2 docetaxel    Interval History:  Here for follow up and evaluation prior to cycle 2 docetaxel. He tolerated first cycle fairly well.  Overall he feels more tired and having more pain and swelling in the right arm x1 month. Also complaining of persistent skin abscesses in the right flank/back not resolving with antibiotics.   He denies fever, chills, night sweats. Denies breathing changes, cough, chest pain.   His pain is well controlled with 2 tabs of norco every 6-8 hours. Mild peripheral neuropathy stable.   He has been eating and drinking well. Weight is stable.   Patient will be traveling to [facility] on 11/16/24 and will return on 12/6/24.       Performance Status: ECOG 1    Current Medications:    Current Outpatient Medications:     pregabalin (LYRICA) 50 MG Oral Cap, Take 1 capsule (50 mg total) by mouth 3 (three) times daily., Disp: 90 capsule, Rfl: 1    sulfamethoxazole-trimethoprim -160 MG  Oral Tab per tablet, Take 1 tablet by mouth 2 (two) times daily., Disp: 20 tablet, Rfl: 0    Tiotropium Bromide-Olodaterol 2.5-2.5 MCG/ACT Inhalation Aero Soln, Inhale 2 puffs into the lungs every morning., Disp: 4 g, Rfl: 1    HYDROcodone-acetaminophen  MG Oral Tab, Take 1 tablet by mouth every 4 (four) hours as needed for Pain (for cancer related pain; MAX dose is 6 tabs per day)., Disp: 60 tablet, Rfl: 0    metFORMIN 850 MG Oral Tab, Take 1 tablet (850 mg total) by mouth 2 (two) times daily with meals., Disp: 180 tablet, Rfl: 3    lisinopril 30 MG Oral Tab, Take 1 tablet (30 mg total) by mouth daily., Disp: 90 tablet, Rfl: 2    metoprolol succinate  MG Oral Tablet 24 Hr, Take 1 tablet (100 mg total) by mouth daily., Disp: 90 tablet, Rfl: 1    glipiZIDE ER 5 MG Oral Tablet 24 Hr, Take 1 tablet (5 mg total) by mouth daily., Disp: 90 tablet, Rfl: 1    polyethylene glycol, PEG 3350, 17 g Oral Powd Pack, Take 17 g by mouth daily., Disp: , Rfl:     atorvastatin 20 MG Oral Tab, Take 1 tablet (20 mg total) by mouth nightly., Disp: 90 tablet, Rfl: 3    prochlorperazine (COMPAZINE) 10 mg tablet, Take 1 tablet (10 mg total) by mouth every 6 (six) hours as needed for Nausea., Disp: 30 tablet, Rfl: 3    ondansetron (ZOFRAN) 8 MG tablet, Take 1 tablet (8 mg total) by mouth every 8 (eight) hours as needed for Nausea., Disp: 30 tablet, Rfl: 3    aspirin 81 MG Oral Tab EC, Take 1 tablet (81 mg total) by mouth daily., Disp: , Rfl:     Review of Systems:  10 -point systems reviewed, all negative except as mentioned in the HPI/interval history.     Vital Signs:  BP 97/55 (BP Location: Right arm, Patient Position: Sitting, Cuff Size: large)   Pulse 74   Temp 97.5 °F (36.4 °C) (Oral)   Resp 18   Ht 1.702 m (5' 7\")   Wt 89.8 kg (198 lb)   SpO2 97%   BMI 31.01 kg/m²     Wt Readings from Last 6 Encounters:   11/07/24 89.8 kg (198 lb)   10/24/24 89.4 kg (197 lb)   10/24/24 89.4 kg (197 lb)   10/24/24 89.4 kg (197 lb)    10/17/24 90.3 kg (199 lb)   10/10/24 92.1 kg (203 lb)      Physical Examination:  General: Aert and oriented x 3, not in acute distress.  Psych:  Mood and affect appropriate  HEENT: Non-icteric sclera. Oropharynx is clear.   Neck: No palpable lymphadenopathy. Neck is supple.  Chest: Clear to auscultation.   Heart: Regular rate and rhythm.  Abdomen: Soft, non tender with good bowel sounds.   Extremities: + RUE edema with tenderness. No LE edema.   Neurological: Grossly intact.     Laboratory:  Lab Results   Component Value Date    WBC 8.8 11/07/2024    RBC 3.24 (L) 11/07/2024    HGB 8.9 (L) 11/07/2024    HCT 28.7 (L) 11/07/2024    MCV 88.6 11/07/2024    MCH 27.5 11/07/2024    MCHC 31.0 11/07/2024    RDW 18.9 (H) 11/07/2024    .0 11/07/2024     Lab Results   Component Value Date     11/07/2024    K 5.0 11/07/2024     11/07/2024    CO2 27.0 11/07/2024    BUN 10 11/07/2024    CREATSERUM 0.98 11/07/2024    GLU 71 11/07/2024    CA 10.5 (H) 11/07/2024    ALKPHO 86 11/07/2024    ALT 20 11/07/2024    AST 17 11/07/2024    BILT 0.2 11/07/2024    ALB 4.2 11/07/2024    TP 6.9 11/07/2024     Lab Results   Component Value Date/Time    TSH 3.333 07/25/2024 08:28 AM    TSH 2.573 07/05/2024 10:18 AM    TSH 2.706 06/13/2024 12:35 PM    TSH 3.369 05/20/2024 12:56 PM    TSH 3.325 04/24/2024 10:15 AM    TSH 2.917 04/03/2024 08:39 AM     Radiology:  US VENOUS DOPPLER ARM RIGHT - DIAG IMG (CPT=93971)    Result Date: 11/7/2024  CONCLUSION:  Completely occlusive DVT involving one branch of the paired right brachial veins.    Dictated by (CST): Rajesh Tapia MD on 11/07/2024 at 3:40 PM     Finalized by (CST): Rajesh Tapia MD on 11/07/2024 at 3:42 PM          CT CHEST PE AORTA (IV ONLY) (CPT=71260)    Result Date: 10/3/2024  CONCLUSION: No PE.  Left upper lobe mass has increased in size    Dictated by (CST): Chai Maguire MD on 10/03/2024 at 4:52 PM     Finalized by (CST): Chai Maguire MD on 10/03/2024 at 4:56  PM          XR ELBOW, COMPLETE (MIN 3 VIEWS), RIGHT (CPT=73080)    Result Date: 10/3/2024  CONCLUSION:  1. No acute appearing fracture or dislocation.  Mild mineralization/ossification adjacent to the lateral and medial distal humeral condyles may suggest sequelae of previous enthesopathy/epicondylitis.  Correlate clinically.  See above.    Dictated by (CST): Alex Brambila MD on 10/03/2024 at 2:31 PM     Finalized by (CST): Alex Brambila MD on 10/03/2024 at 2:32 PM            Impression and Plan:    Metastatic squamous cell carcinoma of the left upper lung, PD-L1 0%  --Carboplatin paclitaxel pembrolizuamb-initiated 2/1/24 and completed 4 cycles on 4/4/24; dose reduce carbo to AUC 5 and paclitaxel by 20% due CIPN to feet.   --Imaging 4/2024 after C4 with favorable tx response.   --Continued on maintenance pembrolizumab q3 wks, completed 9 cycles as of  7/25/24 with no autoimmune toxicity.  --Restaging CT and PET scans 8/2024 showed progression of disease. Guardant (ctDNA sequencing) with no targetable alteration. He was not interested in clinical trials or referral to a tertiary center.  --Received 2 cycles of gemcitabine 8/29/24- 9/26/24 with no response.   --CT scans in ER on 10/3/24 for LUQ showed progression of disease with new splenic metastasis (2.5 cm) and increasing LUCINDA mass.   --Switched chemo to docetaxel 35 mg/m2 day 1 and 8 every 21 days and tolerated cycle 1 fairly well.   --Proceed with C2D1 docetaxel today via PIV in the LUE. Labs adequate.   --Repeat scans in early January or sooner depending on his symptoms, to assess response.    --Refer to IR for port placement.   --OK to delay C3 until after 12/6/24 due to patient's travel.     Bone metastases  Cancer related pain  --S/p palliative XRT 7/3/24 to enlarging right 8th rib destructive bone lesion  --Bilateral upper quadrant pain due to metastases.   --Continue Norco 10/325 mg Q6-8H PRN. Patient following closely with palliative care for pain  management    RUE swelling  --Venous doppler Stat showed acute DVT in the right brachial vein, likely related to IV catheters.   --Start eliquis 10 mg bid x7 days, then 5 mg bid.  --Ok to hold eliquis 48 hrs prior to port placement next week     Skin abscesses  --3 lesions over the right flank/back, s/p keflex and bactrim without improvement  --Refer to surgery for consideration of I&D    Chemo induced neuropathy- stable. Continue lyrica      Chemo induced anemia- will continue to monitor and consider RAI therapy if worsening anemia.       RTC 1 week for C2D8 docetaxel      More than 2 hours spent on this encounter, more than 50% of that time was spent on patient's counseling and/or coordination of care. The treatment plan discussed the patient and his wife in details. All questions answered to their satisfaction.       Мария Spears MD   Hematology/Oncology

## 2024-11-07 NOTE — PROGRESS NOTES
Tarik will be leaving for a trip to [facility] on 11/16/2024 - 12/6/2024.    He will need a [place] refill with a larger quantity so he has enough medication to last him the entirety of his trip.     I sent in a script for Norco 10/325mg, # 150 tabs.    I will see Tarik in December for a Palliative Care follow up visit.     Tarik was appreciative of refill and will let me know if he needs anything before he leaves the country.

## 2024-11-07 NOTE — PROGRESS NOTES
Pt here for C2D1 Drug(s)TAXOTERE.  Arrives Ambulating independently, accompanied by Spouse     Patient was evaluated today by MD, ASHIA, and Treatment Nurse.    Patient reporting R arm pain, swelling and dissatisfaction with IV starts lately. MD discussed options with patient, patient is agreeable to port placement. IR called patient on cell during infusion time to discuss instructions prior to arriving for port placement. Patient and wife had trouble interpreting some of the instructions. Spoke with IR nurse and instructed her to call back with [facility]  so none of the instructions are misinterpreted. RN states she will call with  this afternoon.  Until port is placed, PICC line nurse called and placed PIV via doppler to L arm, good blood return noted. Labs sent.   Port will be placed 11/13 at 10am.   Made PICC line nurse appt for patient's next treatment 11/14 in case port placement does not happen.     Oral medications included in this regimen:  no    Patient confirms comprehension of cancer treatment schedule:  yes    Pregnancy screening:  Not applicable    Modifications in dose or schedule:  No    Medications appearance and physical integrity checked by RN: yes.    Chemotherapy IV pump settings verified by 2 RNs:  Yes.  Frequency of blood return and site check throughout administration: Prior to administration, Prior to each drug, and At completion of therapy     Infusion/treatment outcome:  patient tolerated treatment without incident    PIV removed.    Patient escorted via wheelchair by staff to [facility] for R Arm US to r/o DVT.      Education Record    Learner:  Patient and Spouse  Barriers / Limitations:  Language  Method:  Discussion and Printed material  Education / instructions given:  Medication, plan of care, R arm US to r/o DVT, schedule, port placement upon return from [facility]  Outcome:  Shows understanding    Discharged Home, Ambulating independently, accompanied  by:Spouse    Patient/family verbalized understanding of future appointments: by printed AVS

## 2024-11-08 ENCOUNTER — DOCUMENTATION ONLY (OUTPATIENT)
Dept: HEMATOLOGY/ONCOLOGY | Facility: HOSPITAL | Age: 69
End: 2024-11-08

## 2024-11-08 ENCOUNTER — OFFICE VISIT (OUTPATIENT)
Dept: INTERNAL MEDICINE CLINIC | Facility: CLINIC | Age: 69
End: 2024-11-08

## 2024-11-08 VITALS
RESPIRATION RATE: 18 BRPM | HEIGHT: 67 IN | SYSTOLIC BLOOD PRESSURE: 136 MMHG | WEIGHT: 200 LBS | HEART RATE: 86 BPM | OXYGEN SATURATION: 98 % | DIASTOLIC BLOOD PRESSURE: 86 MMHG | TEMPERATURE: 98 F | BODY MASS INDEX: 31.39 KG/M2

## 2024-11-08 DIAGNOSIS — C79.51 LUNG CANCER METASTATIC TO BONE (HCC): ICD-10-CM

## 2024-11-08 DIAGNOSIS — I10 ESSENTIAL HYPERTENSION: ICD-10-CM

## 2024-11-08 DIAGNOSIS — E11.40 TYPE 2 DIABETES MELLITUS WITH DIABETIC NEUROPATHY, UNSPECIFIED WHETHER LONG TERM INSULIN USE (HCC): ICD-10-CM

## 2024-11-08 DIAGNOSIS — L02.212 CUTANEOUS ABSCESS OF BACK EXCLUDING BUTTOCKS: Primary | ICD-10-CM

## 2024-11-08 DIAGNOSIS — C34.90 LUNG CANCER METASTATIC TO BONE (HCC): ICD-10-CM

## 2024-11-08 PROBLEM — D69.6 THROMBOCYTOPENIA: Chronic | Status: ACTIVE | Noted: 2024-11-08

## 2024-11-08 PROBLEM — D69.6 THROMBOCYTOPENIA (HCC): Chronic | Status: ACTIVE | Noted: 2024-11-08

## 2024-11-08 PROCEDURE — 1159F MED LIST DOCD IN RCRD: CPT | Performed by: INTERNAL MEDICINE

## 2024-11-08 PROCEDURE — 3008F BODY MASS INDEX DOCD: CPT | Performed by: INTERNAL MEDICINE

## 2024-11-08 PROCEDURE — 3075F SYST BP GE 130 - 139MM HG: CPT | Performed by: INTERNAL MEDICINE

## 2024-11-08 PROCEDURE — 1126F AMNT PAIN NOTED NONE PRSNT: CPT | Performed by: INTERNAL MEDICINE

## 2024-11-08 PROCEDURE — 3044F HG A1C LEVEL LT 7.0%: CPT | Performed by: INTERNAL MEDICINE

## 2024-11-08 PROCEDURE — 3079F DIAST BP 80-89 MM HG: CPT | Performed by: INTERNAL MEDICINE

## 2024-11-08 PROCEDURE — 1160F RVW MEDS BY RX/DR IN RCRD: CPT | Performed by: INTERNAL MEDICINE

## 2024-11-08 PROCEDURE — 36415 COLL VENOUS BLD VENIPUNCTURE: CPT | Performed by: INTERNAL MEDICINE

## 2024-11-08 PROCEDURE — 99214 OFFICE O/P EST MOD 30 MIN: CPT | Performed by: INTERNAL MEDICINE

## 2024-11-08 NOTE — PROGRESS NOTES
Per Dr. Spears patient to hold Eliquis for 48 hours prior to port placement and to resume the day after port placement.  Discussed with patient and  on 11/7/24 patient and wife stated understanding of the instructions.

## 2024-11-09 PROBLEM — C78.89 METASTASIS TO SPLEEN (HCC): Status: ACTIVE | Noted: 2024-11-09

## 2024-11-09 PROBLEM — Z51.11 CHEMOTHERAPY MANAGEMENT, ENCOUNTER FOR: Status: ACTIVE | Noted: 2024-11-09

## 2024-11-09 PROBLEM — C79.51 METASTASIS TO BONE (HCC): Status: ACTIVE | Noted: 2024-11-09

## 2024-11-09 PROBLEM — G89.3 CANCER RELATED PAIN: Status: ACTIVE | Noted: 2024-11-09

## 2024-11-09 PROBLEM — I82.621 ACUTE DEEP VEIN THROMBOSIS (DVT) OF BRACHIAL VEIN OF RIGHT UPPER EXTREMITY (HCC): Status: ACTIVE | Noted: 2024-11-09

## 2024-11-09 PROBLEM — T45.1X5A ANTINEOPLASTIC CHEMOTHERAPY INDUCED ANEMIA: Status: ACTIVE | Noted: 2024-11-09

## 2024-11-09 PROBLEM — D64.81 ANTINEOPLASTIC CHEMOTHERAPY INDUCED ANEMIA: Status: ACTIVE | Noted: 2024-11-09

## 2024-11-09 LAB
EST. AVERAGE GLUCOSE BLD GHB EST-MCNC: 140 MG/DL (ref 68–126)
HBA1C MFR BLD: 6.5 % (ref ?–5.7)

## 2024-11-11 ENCOUNTER — OFFICE VISIT (OUTPATIENT)
Dept: SURGERY | Facility: CLINIC | Age: 69
End: 2024-11-11

## 2024-11-11 VITALS — SYSTOLIC BLOOD PRESSURE: 120 MMHG | HEART RATE: 91 BPM | DIASTOLIC BLOOD PRESSURE: 57 MMHG

## 2024-11-11 DIAGNOSIS — L02.212 CUTANEOUS ABSCESS OF BACK EXCLUDING BUTTOCKS: Primary | ICD-10-CM

## 2024-11-11 PROCEDURE — 1159F MED LIST DOCD IN RCRD: CPT

## 2024-11-11 PROCEDURE — 1160F RVW MEDS BY RX/DR IN RCRD: CPT

## 2024-11-11 PROCEDURE — 3078F DIAST BP <80 MM HG: CPT

## 2024-11-11 PROCEDURE — 3074F SYST BP LT 130 MM HG: CPT

## 2024-11-11 PROCEDURE — 99202 OFFICE O/P NEW SF 15 MIN: CPT

## 2024-11-11 RX ORDER — SULFAMETHOXAZOLE AND TRIMETHOPRIM 800; 160 MG/1; MG/1
1 TABLET ORAL 2 TIMES DAILY
Qty: 20 TABLET | Refills: 0 | Status: SHIPPED | OUTPATIENT
Start: 2024-11-11

## 2024-11-11 NOTE — H&P
HPI:    Patient ID: Tarik Camara is a 68 year old male presenting with   Chief Complaint   Patient presents with    Post-Op     Pt here for wound check on left arm.  Pt denies pain at current time.        Tarik is a pleasant 67yo male presenting for consultation from Dr. Erazo concerning a lower back abscess. Present for the past month, reportedly growing slightly and draining in the past. Currently feeling well, minimal pain. No fever or chills. Patient has history of metastatic squamous cell carcinoma of the lung on chemotherapy. He is travelling to [facility] soon, and will be gone for 3 weeks.     Past Medical History:    Bell's palsy    COPD (chronic obstructive pulmonary disease) (HCC)    Degenerative joint disease (DJD) of lumbar spine    Diabetes (HCC)    Diabetes mellitus, type II (HCC)    Heart attack (HCC)    High blood pressure    High cholesterol    Hyperlipidemia    Hypertension    Osteoarthritis    Sleep apnea    no CPAP     Past Surgical History:   Procedure Laterality Date    Appendectomy      Coronary stent placement      Palate/uvula surgery unlisted      Ventral hernia repair  2021    primary repair, ventral and umbilical hernias     Family History   Problem Relation Age of Onset    No Known Problems Mother     Heart Disorder Father     Diabetes Neg     Glaucoma Neg     Macular degeneration Neg      Social History     Socioeconomic History    Marital status:      Spouse name: Not on file    Number of children: 3    Years of education: Not on file    Highest education level: Not on file   Occupational History    Occupation:    Tobacco Use    Smoking status: Former     Current packs/day: 0.00     Types: Cigarettes     Quit date: 3/19/2020     Years since quittin.6    Smokeless tobacco: Never    Tobacco comments:     Occasionally, not in last 6 months   Vaping Use    Vaping status: Never Used   Substance and Sexual Activity    Alcohol use: Not Currently    Drug use:  Never    Sexual activity: Not on file   Other Topics Concern    Not on file   Social History Narrative    Not on file     Social Drivers of Health     Financial Resource Strain: Low Risk  (11/7/2023)    Financial Resource Strain     Difficulty of Paying Living Expenses: Not hard at all     Med Affordability: No   Food Insecurity: Not on file   Transportation Needs: No Transportation Needs (11/7/2023)    Transportation Needs     Lack of Transportation: No   Physical Activity: Not on file   Stress: Not on file   Social Connections: Not on file   Housing Stability: Not on file       Review of Systems   Constitutional:  Negative for chills, diaphoresis and fever.   Respiratory:  Negative for shortness of breath.    Cardiovascular:  Negative for chest pain.   Gastrointestinal:  Negative for abdominal distention, abdominal pain, constipation, diarrhea, nausea and vomiting.   Skin:  Positive for wound.   Neurological:  Negative for weakness.           Current Outpatient Medications   Medication Sig Dispense Refill    sulfamethoxazole-trimethoprim -160 MG Oral Tab per tablet Take 1 tablet by mouth 2 (two) times daily. 20 tablet 0    HYDROcodone-acetaminophen  MG Oral Tab Take 1 tablet by mouth every 4 (four) hours as needed for Pain (for cancer related pain; MAX dose is 6 tabs per day). 150 tablet 0    ELIQUIS DVT/PE STARTER PACK 5 MG Oral Tablet Therapy Pack Take 10 mg by mouth 2 (two) times a day for 7 days, THEN 5 mg 2 (two) times a day for 21 days. 1 each 0    pregabalin (LYRICA) 50 MG Oral Cap Take 1 capsule (50 mg total) by mouth 3 (three) times daily. 90 capsule 1    Tiotropium Bromide-Olodaterol 2.5-2.5 MCG/ACT Inhalation Aero Soln Inhale 2 puffs into the lungs every morning. 4 g 1    metFORMIN 850 MG Oral Tab Take 1 tablet (850 mg total) by mouth 2 (two) times daily with meals. 180 tablet 3    lisinopril 30 MG Oral Tab Take 1 tablet (30 mg total) by mouth daily. 90 tablet 2    metoprolol succinate ER  100 MG Oral Tablet 24 Hr Take 1 tablet (100 mg total) by mouth daily. 90 tablet 1    glipiZIDE ER 5 MG Oral Tablet 24 Hr Take 1 tablet (5 mg total) by mouth daily. 90 tablet 1    polyethylene glycol, PEG 3350, 17 g Oral Powd Pack Take 17 g by mouth daily.      atorvastatin 20 MG Oral Tab Take 1 tablet (20 mg total) by mouth nightly. 90 tablet 3    prochlorperazine (COMPAZINE) 10 mg tablet Take 1 tablet (10 mg total) by mouth every 6 (six) hours as needed for Nausea. 30 tablet 3    ondansetron (ZOFRAN) 8 MG tablet Take 1 tablet (8 mg total) by mouth every 8 (eight) hours as needed for Nausea. 30 tablet 3    aspirin 81 MG Oral Tab EC Take 1 tablet (81 mg total) by mouth daily.         Allergies:Allergies[1]   PHYSICAL EXAM:   /57   Pulse 91   Physical Exam  Constitutional:       General: He is not in acute distress.     Appearance: Normal appearance.   HENT:      Head: Normocephalic and atraumatic.      Right Ear: External ear normal.      Left Ear: External ear normal.      Nose: Nose normal.   Pulmonary:      Effort: Pulmonary effort is normal. No respiratory distress.   Abdominal:      General: There is no distension.      Palpations: Abdomen is soft.      Tenderness: There is no abdominal tenderness. There is no guarding or rebound.   Skin:     Comments: Right lower back with a wound. Minimal tenderness to palpation, mild surrounding erythema. Wound doesn't appear open, no drainage on exam. Firm, no fluctuance appreciated.   Neurological:      Mental Status: He is alert and oriented to person, place, and time.   Psychiatric:         Mood and Affect: Mood normal.         Behavior: Behavior normal.         Thought Content: Thought content normal.         Judgment: Judgment normal.                 ASSESSMENT/PLAN:   Diagnoses and all orders for this visit:    Cutaneous abscess of back excluding buttocks    Other orders  -     sulfamethoxazole-trimethoprim -160 MG Oral Tab per tablet; Take 1 tablet by  mouth 2 (two) times daily.    Discussed diagnosis with patient. The wound is firm, minimally tender, not amenable to incision and drainage at this time. Bactrim prescribed, advised patient to complete course of antibiotics. When he returns from [facility], we can discuss proceeding with surgical management of wound. He is understanding and agreeable.          Rio Issa PA-C  11/11/2024         [1] No Known Allergies

## 2024-11-13 ENCOUNTER — HOSPITAL ENCOUNTER (OUTPATIENT)
Dept: INTERVENTIONAL RADIOLOGY/VASCULAR | Facility: HOSPITAL | Age: 69
Discharge: HOME | End: 2024-11-13
Attending: INTERNAL MEDICINE | Admitting: RADIOLOGY
Payer: MEDICARE

## 2024-11-13 VITALS
OXYGEN SATURATION: 97 % | HEART RATE: 71 BPM | SYSTOLIC BLOOD PRESSURE: 152 MMHG | HEIGHT: 67 IN | BODY MASS INDEX: 31.08 KG/M2 | DIASTOLIC BLOOD PRESSURE: 79 MMHG | RESPIRATION RATE: 18 BRPM | WEIGHT: 198 LBS | TEMPERATURE: 98 F

## 2024-11-13 DIAGNOSIS — R60.0 EDEMA OF RIGHT UPPER EXTREMITY: ICD-10-CM

## 2024-11-13 DIAGNOSIS — Z51.11 CHEMOTHERAPY MANAGEMENT, ENCOUNTER FOR: ICD-10-CM

## 2024-11-13 DIAGNOSIS — C77.1 NON-SMALL CELL LUNG CANCER METASTATIC TO INTRATHORACIC LYMPH NODE (HCC): ICD-10-CM

## 2024-11-13 DIAGNOSIS — C34.90 NON-SMALL CELL LUNG CANCER METASTATIC TO INTRATHORACIC LYMPH NODE (HCC): ICD-10-CM

## 2024-11-13 PROBLEM — Z45.2 ENCOUNTER FOR CARE RELATED TO PORT-A-CATH: Status: ACTIVE | Noted: 2024-11-13

## 2024-11-13 LAB — GLUCOSE BLDC GLUCOMTR-MCNC: 133 MG/DL (ref 70–99)

## 2024-11-13 PROCEDURE — 0JH63WZ INSERTION OF TOTALLY IMPLANTABLE VASCULAR ACCESS DEVICE INTO CHEST SUBCUTANEOUS TISSUE AND FASCIA, PERCUTANEOUS APPROACH: ICD-10-PCS | Performed by: RADIOLOGY

## 2024-11-13 PROCEDURE — B548ZZA ULTRASONOGRAPHY OF SUPERIOR VENA CAVA, GUIDANCE: ICD-10-PCS | Performed by: RADIOLOGY

## 2024-11-13 PROCEDURE — 99152 MOD SED SAME PHYS/QHP 5/>YRS: CPT | Performed by: RADIOLOGY

## 2024-11-13 PROCEDURE — 77001 FLUOROGUIDE FOR VEIN DEVICE: CPT | Performed by: RADIOLOGY

## 2024-11-13 PROCEDURE — B5181ZA FLUOROSCOPY OF SUPERIOR VENA CAVA USING LOW OSMOLAR CONTRAST, GUIDANCE: ICD-10-PCS | Performed by: RADIOLOGY

## 2024-11-13 PROCEDURE — 99153 MOD SED SAME PHYS/QHP EA: CPT | Performed by: RADIOLOGY

## 2024-11-13 PROCEDURE — 82962 GLUCOSE BLOOD TEST: CPT

## 2024-11-13 PROCEDURE — 02HV33Z INSERTION OF INFUSION DEVICE INTO SUPERIOR VENA CAVA, PERCUTANEOUS APPROACH: ICD-10-PCS | Performed by: RADIOLOGY

## 2024-11-13 PROCEDURE — 36415 COLL VENOUS BLD VENIPUNCTURE: CPT

## 2024-11-13 PROCEDURE — 36561 INSERT TUNNELED CV CATH: CPT | Performed by: RADIOLOGY

## 2024-11-13 RX ORDER — LIDOCAINE HYDROCHLORIDE 20 MG/ML
INJECTION, SOLUTION INFILTRATION; PERINEURAL
Status: COMPLETED
Start: 2024-11-13 | End: 2024-11-13

## 2024-11-13 RX ORDER — MIDAZOLAM HYDROCHLORIDE 1 MG/ML
INJECTION INTRAMUSCULAR; INTRAVENOUS
Status: COMPLETED
Start: 2024-11-13 | End: 2024-11-13

## 2024-11-13 RX ORDER — SODIUM CHLORIDE 9 MG/ML
INJECTION, SOLUTION INTRAVENOUS CONTINUOUS
Status: DISCONTINUED | OUTPATIENT
Start: 2024-11-13 | End: 2024-11-13

## 2024-11-13 RX ORDER — SODIUM CHLORIDE 9 MG/ML
10 INJECTION, SOLUTION INTRAMUSCULAR; INTRAVENOUS; SUBCUTANEOUS ONCE
OUTPATIENT
Start: 2024-11-13

## 2024-11-13 RX ADMIN — SODIUM CHLORIDE: 9 INJECTION, SOLUTION INTRAVENOUS at 09:15:00

## 2024-11-13 NOTE — DISCHARGE INSTRUCTIONS
INTERVENTIONAL RADIOLOGY  [facility]  [phone]     Patient Name:  Tarik Camara    Procedure:  Port Placement     Site Care: Dermabond (skin glue) has been applied to your incision.  Do not scrub the area.  Allow the Dermabond to flake off on its own.                                       Activity/Diet  No heavy lifting or strenuous activity for 48 hours.  Drink plenty of fluids, unless you have otherwise been told to restrict your fluid intake.  Do not drink alcohol for 24 hours.  Do not drive,  operate heavy machinery, make important decisions or sign legal documents today.    Medications:  Make no changes to your existing medications.    Contact Interventional Radiology at [phone] if you have severe/unrelieved pain, fever, chills, dizziness/lightheadedness, or drainage/bleeding from your incision site.

## 2024-11-13 NOTE — IVS NOTE
DISCHARGE NOTE     Pt is able to sit up and ambulate without difficulty.   Pt voided and tolerated fluids and food.   Procedural site remains dry and intact   No signs and symptoms of bleeding/hematoma noted.   IV access removed  Instruction provided, patient/family verbalizes understanding.   Dr. Manzano spoke with patient/family pre procedure.     Pt discharge via wheelchair to [facility]     Follow up Appointment: n/a    New Prescription: n/a    Information booklet on Port given to patient and family

## 2024-11-13 NOTE — INTERVAL H&P NOTE
The above referenced H&P was reviewed by Jackson Manzano MD on 11/13/2024, the patient was examined and no significant changes have occurred in the patient's condition since the H&P was performed.  Risks, benefits, alternative treatments and consequences of no treatment were discussed.  We will proceed with procedure as planned.      Jackson Manzano MD  11/13/2024  10:47 AM

## 2024-11-14 ENCOUNTER — OFFICE VISIT (OUTPATIENT)
Dept: HEMATOLOGY/ONCOLOGY | Facility: HOSPITAL | Age: 69
End: 2024-11-14
Attending: INTERNAL MEDICINE
Payer: MEDICARE

## 2024-11-14 VITALS
HEIGHT: 67 IN | BODY MASS INDEX: 30.76 KG/M2 | SYSTOLIC BLOOD PRESSURE: 91 MMHG | RESPIRATION RATE: 16 BRPM | DIASTOLIC BLOOD PRESSURE: 48 MMHG | HEART RATE: 78 BPM | WEIGHT: 196 LBS | OXYGEN SATURATION: 95 % | TEMPERATURE: 98 F

## 2024-11-14 VITALS
OXYGEN SATURATION: 95 % | DIASTOLIC BLOOD PRESSURE: 48 MMHG | WEIGHT: 196.69 LBS | SYSTOLIC BLOOD PRESSURE: 91 MMHG | RESPIRATION RATE: 16 BRPM | BODY MASS INDEX: 31 KG/M2 | TEMPERATURE: 98 F | HEART RATE: 78 BPM

## 2024-11-14 DIAGNOSIS — Z51.12 ENCOUNTER FOR ANTINEOPLASTIC CHEMOTHERAPY AND IMMUNOTHERAPY: ICD-10-CM

## 2024-11-14 DIAGNOSIS — Z51.11 CHEMOTHERAPY MANAGEMENT, ENCOUNTER FOR: Primary | ICD-10-CM

## 2024-11-14 DIAGNOSIS — C34.92 STAGE IV SQUAMOUS CELL CARCINOMA OF LEFT LUNG (HCC): ICD-10-CM

## 2024-11-14 DIAGNOSIS — E11.9 TYPE 2 DIABETES MELLITUS WITHOUT COMPLICATION, WITHOUT LONG-TERM CURRENT USE OF INSULIN (HCC): ICD-10-CM

## 2024-11-14 DIAGNOSIS — C34.92 STAGE IV SQUAMOUS CELL CARCINOMA OF LEFT LUNG (HCC): Primary | ICD-10-CM

## 2024-11-14 DIAGNOSIS — D64.81 ANTINEOPLASTIC CHEMOTHERAPY INDUCED ANEMIA: ICD-10-CM

## 2024-11-14 DIAGNOSIS — Z51.11 CHEMOTHERAPY MANAGEMENT, ENCOUNTER FOR: ICD-10-CM

## 2024-11-14 DIAGNOSIS — Z51.11 ENCOUNTER FOR ANTINEOPLASTIC CHEMOTHERAPY AND IMMUNOTHERAPY: ICD-10-CM

## 2024-11-14 DIAGNOSIS — T45.1X5A ANTINEOPLASTIC CHEMOTHERAPY INDUCED ANEMIA: ICD-10-CM

## 2024-11-14 DIAGNOSIS — T78.40XA HYPERSENSITIVITY REACTION: ICD-10-CM

## 2024-11-14 LAB
BASOPHILS # BLD AUTO: 0.01 X10(3) UL (ref 0–0.2)
BASOPHILS NFR BLD AUTO: 0.1 %
DEPRECATED HBV CORE AB SER IA-ACNC: 701 NG/ML
DEPRECATED RDW RBC AUTO: 58.7 FL (ref 35.1–46.3)
EOSINOPHIL # BLD AUTO: 0.07 X10(3) UL (ref 0–0.7)
EOSINOPHIL NFR BLD AUTO: 0.8 %
ERYTHROCYTE [DISTWIDTH] IN BLOOD BY AUTOMATED COUNT: 18.6 % (ref 11–15)
EST. AVERAGE GLUCOSE BLD GHB EST-MCNC: 140 MG/DL (ref 68–126)
HBA1C MFR BLD: 6.5 % (ref ?–5.7)
HCT VFR BLD AUTO: 27.9 %
HGB BLD-MCNC: 8.5 G/DL
IMM GRANULOCYTES # BLD AUTO: 0.05 X10(3) UL (ref 0–1)
IMM GRANULOCYTES NFR BLD: 0.6 %
IRON SATN MFR SERPL: 4 %
IRON SERPL-MCNC: 10 UG/DL
LYMPHOCYTES # BLD AUTO: 0.43 X10(3) UL (ref 1–4)
LYMPHOCYTES NFR BLD AUTO: 4.7 %
MCH RBC QN AUTO: 27 PG (ref 26–34)
MCHC RBC AUTO-ENTMCNC: 30.5 G/DL (ref 31–37)
MCV RBC AUTO: 88.6 FL
MONOCYTES # BLD AUTO: 0.36 X10(3) UL (ref 0.1–1)
MONOCYTES NFR BLD AUTO: 4 %
NEUTROPHILS # BLD AUTO: 8.17 X10 (3) UL (ref 1.5–7.7)
NEUTROPHILS # BLD AUTO: 8.17 X10(3) UL (ref 1.5–7.7)
NEUTROPHILS NFR BLD AUTO: 89.8 %
PLATELET # BLD AUTO: 375 10(3)UL (ref 150–450)
RBC # BLD AUTO: 3.15 X10(6)UL
TIBC SERPL-MCNC: 240 UG/DL (ref 250–425)
TRANSFERRIN SERPL-MCNC: 161 MG/DL (ref 215–365)
WBC # BLD AUTO: 9.1 X10(3) UL (ref 4–11)

## 2024-11-14 PROCEDURE — 96375 TX/PRO/DX INJ NEW DRUG ADDON: CPT

## 2024-11-14 PROCEDURE — 83540 ASSAY OF IRON: CPT

## 2024-11-14 PROCEDURE — 83036 HEMOGLOBIN GLYCOSYLATED A1C: CPT

## 2024-11-14 PROCEDURE — 85025 COMPLETE CBC W/AUTO DIFF WBC: CPT

## 2024-11-14 PROCEDURE — 99214 OFFICE O/P EST MOD 30 MIN: CPT | Performed by: PHYSICIAN ASSISTANT

## 2024-11-14 PROCEDURE — 84466 ASSAY OF TRANSFERRIN: CPT

## 2024-11-14 PROCEDURE — S0028 INJECTION, FAMOTIDINE, 20 MG: HCPCS

## 2024-11-14 PROCEDURE — 96413 CHEMO IV INFUSION 1 HR: CPT

## 2024-11-14 PROCEDURE — 82728 ASSAY OF FERRITIN: CPT

## 2024-11-14 RX ORDER — FAMOTIDINE 10 MG/ML
INJECTION, SOLUTION INTRAVENOUS
Status: DISPENSED
Start: 2024-11-14 | End: 2024-11-14

## 2024-11-14 RX ORDER — METHYLPREDNISOLONE SODIUM SUCCINATE 40 MG/ML
INJECTION INTRAMUSCULAR; INTRAVENOUS
Status: DISPENSED
Start: 2024-11-14 | End: 2024-11-15

## 2024-11-14 RX ORDER — METHYLPREDNISOLONE SODIUM SUCCINATE 40 MG/ML
40 INJECTION INTRAMUSCULAR; INTRAVENOUS ONCE
Status: COMPLETED | OUTPATIENT
Start: 2024-11-14 | End: 2024-11-14

## 2024-11-14 RX ORDER — FAMOTIDINE 10 MG/ML
20 INJECTION, SOLUTION INTRAVENOUS ONCE
Status: COMPLETED | OUTPATIENT
Start: 2024-11-14 | End: 2024-11-14

## 2024-11-14 RX ADMIN — FAMOTIDINE 20 MG: 10 INJECTION, SOLUTION INTRAVENOUS at 11:42:00

## 2024-11-14 RX ADMIN — METHYLPREDNISOLONE SODIUM SUCCINATE 40 MG: 40 INJECTION INTRAMUSCULAR; INTRAVENOUS at 12:08:00

## 2024-11-14 NOTE — PROGRESS NOTES
11/14/2024      ICD-10-CM    1. 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)  C34.92       2. Encounter for antineoplastic chemotherapy and immunotherapy  Z51.11     Z51.12       3. Chemotherapy management, encounter for  Z51.11         Current Outpatient Medications   Medication Sig Dispense Refill    sulfamethoxazole-trimethoprim -160 MG Oral Tab per tablet Take 1 tablet by mouth 2 (two) times daily. 20 tablet 0    HYDROcodone-acetaminophen  MG Oral Tab Take 1 tablet by mouth every 4 (four) hours as needed for Pain (for cancer related pain; MAX dose is 6 tabs per day). 150 tablet 0    ELIQUIS DVT/PE STARTER PACK 5 MG Oral Tablet Therapy Pack Take 10 mg by mouth 2 (two) times a day for 7 days, THEN 5 mg 2 (two) times a day for 21 days. 1 each 0    pregabalin (LYRICA) 50 MG Oral Cap Take 1 capsule (50 mg total) by mouth 3 (three) times daily. 90 capsule 1    Tiotropium Bromide-Olodaterol 2.5-2.5 MCG/ACT Inhalation Aero Soln Inhale 2 puffs into the lungs every morning. 4 g 1    metFORMIN 850 MG Oral Tab Take 1 tablet (850 mg total) by mouth 2 (two) times daily with meals. 180 tablet 3    lisinopril 30 MG Oral Tab Take 1 tablet (30 mg total) by mouth daily. 90 tablet 2    metoprolol succinate  MG Oral Tablet 24 Hr Take 1 tablet (100 mg total) by mouth daily. 90 tablet 1    glipiZIDE ER 5 MG Oral Tablet 24 Hr Take 1 tablet (5 mg total) by mouth daily. 90 tablet 1    polyethylene glycol, PEG 3350, 17 g Oral Powd Pack Take 17 g by mouth daily.      atorvastatin 20 MG Oral Tab Take 1 tablet (20 mg total) by mouth nightly. 90 tablet 3    prochlorperazine (COMPAZINE) 10 mg tablet Take 1 tablet (10 mg total) by mouth every 6 (six) hours as needed for Nausea. 30 tablet 3    ondansetron (ZOFRAN) 8 MG tablet Take 1 tablet (8 mg total) by mouth every 8 (eight) hours as needed for Nausea. 30 tablet 3    aspirin 81 MG Oral Tab EC Take 1 tablet (81 mg total) by mouth daily.       Patient has no known  allergies.\  11/14/24  Called to infusion room  with RN Joslyn, patient with flushed face and abdominal pain 7/10, after 15 minutes of taxotere infusion.    Patient followed for lung cancer, today getting chemotherapy C2D8 Docetaxel (Taxotere), received pre med IV ondansetron 8 mg and dexamethasone 20 mg. Basel BP 87/44, HR89, afebrile. Wife at bedside.    Assessment:  BP 86/44, HR 80, reported 7/10 abdominal pain, decreased to 4/10 after stopped docetaxel and NSS given.  Clear bs annetta anterior and posterior, occasional wheeze, no cough or wob. Face with flushed erythema nose and annetta face cheeks. WWP, annetta radial pulses +2, denies dizziness, aox3. Abd distended, non tender.    Plan:   IV famotidine 20 mg given, abd pain decreased to 2/10, continue mild facial erythema on bridge of nose.  250 ml NSS given, continue NSS.  IV methylprednisolone 40 mg IV given, decreased pain and facial flushing.  250 ml more NSS given.   Docetaxel restarted half rate and rechallenged without further complications.    Will notify Dr. Spears and patient has follow up 12/12/24.    MANJINDER Hsu

## 2024-11-14 NOTE — PROGRESS NOTES
Pt here for C2D8 Drug(s)TAXOTERE.  Arrives Ambulating independently, accompanied by Spouse     Patient was evaluated today by Treatment Nurse.    R chest port accessed in lab, good blood return noted.    Oral medications included in this regimen:  no    Patient confirms comprehension of cancer treatment schedule:  yes    Pregnancy screening:  Not applicable    Modifications in dose or schedule:  Yes, patient is going to [facility] next week. Will not be back until 12/6. Patient to resume treatment 12/12.    Medications appearance and physical integrity checked by RN: yes.    Chemotherapy IV pump settings verified by 2 RNs:  Yes.  Frequency of blood return and site check throughout administration: Prior to administration, Prior to each drug, and At completion of therapy     Infusion/treatment outcome:  hypersensitivity protocol initiated - see documentation      15 minutes after Taxotere start, patient reports severe, \"sharp\" abdominal pain 7/10 on pain scale. Flushed face noted. Stopped treatment and Reaction phone called. DENA Rosas responded. Pepcid 20mg IV, solumedrol 40mg IV, and 250 ml given. Patient reported a resolve of symptoms and Taxotere restarted at half rate after 1 hour. Increased to full rate after 15 min and patient completed treatment with no further issues. Radha LEE RN came to see patient after infusion. Alison FERNANDES notified of patient completion.  to be made aware per Rajani.     Port flushed and de-accessed.    Education Record    Learner:  Patient and Spouse  Barriers / Limitations:  Language  Method:  Discussion and Printed material  Education / instructions given:  Medication, plan of care, schedule  Outcome:  Shows understanding    Discharged Home, Ambulating independently, accompanied by:Spouse    Patient/family verbalized understanding of future appointments: by printed AVS

## 2024-11-14 NOTE — PROGRESS NOTES
Subjective:   Patient ID: Tarik Camara is a 68 year old male.    HPI    Patient comes in for follow-up complaining of having few small abscesses on the body he said 2 different antibiotics much improved he has a follow-up with his surgeon on Monday otherwise no changes he is getting treatment with oncology for the metastatic cancer    History/Other:   Review of Systems   Constitutional: Negative.  Negative for fatigue and fever.   HENT: Negative.  Negative for congestion.    Eyes: Negative.    Respiratory: Negative.  Negative for cough, shortness of breath and wheezing.    Cardiovascular: Negative.  Negative for chest pain, palpitations and leg swelling.   Gastrointestinal: Negative.    Endocrine: Negative for cold intolerance and heat intolerance.   Genitourinary: Negative.  Negative for dysuria, flank pain and hematuria.   Musculoskeletal: Negative.  Negative for arthralgias, back pain and myalgias.   Skin: Negative.         Few small abscesses on the back   Neurological: Negative.  Negative for dizziness, tremors, syncope, weakness and headaches.   Psychiatric/Behavioral: Negative.  Negative for agitation, behavioral problems and suicidal ideas. The patient is not nervous/anxious.      Current Outpatient Medications   Medication Sig Dispense Refill    HYDROcodone-acetaminophen  MG Oral Tab Take 1 tablet by mouth every 4 (four) hours as needed for Pain (for cancer related pain; MAX dose is 6 tabs per day). 150 tablet 0    ELIQUIS DVT/PE STARTER PACK 5 MG Oral Tablet Therapy Pack Take 10 mg by mouth 2 (two) times a day for 7 days, THEN 5 mg 2 (two) times a day for 21 days. 1 each 0    pregabalin (LYRICA) 50 MG Oral Cap Take 1 capsule (50 mg total) by mouth 3 (three) times daily. 90 capsule 1    Tiotropium Bromide-Olodaterol 2.5-2.5 MCG/ACT Inhalation Aero Soln Inhale 2 puffs into the lungs every morning. 4 g 1    metFORMIN 850 MG Oral Tab Take 1 tablet (850 mg total) by mouth 2 (two) times daily with meals. 180  tablet 3    lisinopril 30 MG Oral Tab Take 1 tablet (30 mg total) by mouth daily. 90 tablet 2    metoprolol succinate  MG Oral Tablet 24 Hr Take 1 tablet (100 mg total) by mouth daily. 90 tablet 1    glipiZIDE ER 5 MG Oral Tablet 24 Hr Take 1 tablet (5 mg total) by mouth daily. 90 tablet 1    polyethylene glycol, PEG 3350, 17 g Oral Powd Pack Take 17 g by mouth daily.      atorvastatin 20 MG Oral Tab Take 1 tablet (20 mg total) by mouth nightly. 90 tablet 3    prochlorperazine (COMPAZINE) 10 mg tablet Take 1 tablet (10 mg total) by mouth every 6 (six) hours as needed for Nausea. 30 tablet 3    ondansetron (ZOFRAN) 8 MG tablet Take 1 tablet (8 mg total) by mouth every 8 (eight) hours as needed for Nausea. 30 tablet 3    aspirin 81 MG Oral Tab EC Take 1 tablet (81 mg total) by mouth daily.      sulfamethoxazole-trimethoprim -160 MG Oral Tab per tablet Take 1 tablet by mouth 2 (two) times daily. 20 tablet 0     Allergies:Allergies[1]    Objective:   Physical Exam  Vitals and nursing note reviewed.   Constitutional:       Appearance: He is well-developed.   HENT:      Head: Normocephalic and atraumatic.      Right Ear: External ear normal.      Left Ear: External ear normal.      Nose: Nose normal.   Eyes:      Conjunctiva/sclera: Conjunctivae normal.      Pupils: Pupils are equal, round, and reactive to light.   Cardiovascular:      Rate and Rhythm: Normal rate and regular rhythm.      Heart sounds: Normal heart sounds.   Pulmonary:      Effort: Pulmonary effort is normal.      Breath sounds: Normal breath sounds.   Abdominal:      General: Bowel sounds are normal.      Palpations: Abdomen is soft.   Genitourinary:     Penis: Normal.       Prostate: Normal.      Rectum: Normal.   Musculoskeletal:         General: Normal range of motion.      Cervical back: Normal range of motion and neck supple.   Skin:     General: Skin is warm and dry.      Comments: Few small abscesses on the back   Neurological:       Mental Status: He is alert and oriented to person, place, and time.      Deep Tendon Reflexes: Reflexes are normal and symmetric.         Assessment & Plan:   1. Cutaneous abscess of back excluding buttocks follow-up with surgeon   2. Type 2 diabetes mellitus with diabetic neuropathy, unspecified whether long term insulin use (HCC) continue current treatment will recheck   3. Essential hypertension continue current treatment watch diet   4. 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

## 2024-11-15 ENCOUNTER — TELEPHONE (OUTPATIENT)
Dept: HEMATOLOGY/ONCOLOGY | Facility: HOSPITAL | Age: 69
End: 2024-11-15

## 2024-11-15 NOTE — TELEPHONE ENCOUNTER
Toxicities: C2 D8 Docetaxel on 11/14/2024    Condition Update: Abdominal Pain/Facial Flushing during Docetaxel infusion.    Tarik reports his abdominal pain and flushing resolved before he left the infusion center. Today he feels \"good.\" He thanked me for checking on him.

## 2024-12-12 ENCOUNTER — APPOINTMENT (OUTPATIENT)
Dept: HEMATOLOGY/ONCOLOGY | Facility: HOSPITAL | Age: 69
End: 2024-12-12
Attending: INTERNAL MEDICINE
Payer: MEDICARE

## 2024-12-12 ENCOUNTER — OFFICE VISIT (OUTPATIENT)
Dept: HEMATOLOGY/ONCOLOGY | Facility: HOSPITAL | Age: 69
End: 2024-12-12
Attending: NURSE PRACTITIONER
Payer: MEDICARE

## 2024-12-12 ENCOUNTER — SOCIAL WORK SERVICES (OUTPATIENT)
Dept: HEMATOLOGY/ONCOLOGY | Facility: HOSPITAL | Age: 69
End: 2024-12-12

## 2024-12-12 VITALS
SYSTOLIC BLOOD PRESSURE: 149 MMHG | DIASTOLIC BLOOD PRESSURE: 77 MMHG | WEIGHT: 199.38 LBS | RESPIRATION RATE: 18 BRPM | HEIGHT: 67 IN | TEMPERATURE: 98 F | BODY MASS INDEX: 31.29 KG/M2 | HEART RATE: 82 BPM | OXYGEN SATURATION: 96 %

## 2024-12-12 VITALS
BODY MASS INDEX: 31.29 KG/M2 | HEIGHT: 67 IN | HEART RATE: 82 BPM | WEIGHT: 199.38 LBS | OXYGEN SATURATION: 96 % | SYSTOLIC BLOOD PRESSURE: 149 MMHG | TEMPERATURE: 98 F | RESPIRATION RATE: 18 BRPM | DIASTOLIC BLOOD PRESSURE: 77 MMHG

## 2024-12-12 DIAGNOSIS — D64.81 ANTINEOPLASTIC CHEMOTHERAPY INDUCED ANEMIA: ICD-10-CM

## 2024-12-12 DIAGNOSIS — M79.89 SWELLING OF RIGHT UPPER EXTREMITY: ICD-10-CM

## 2024-12-12 DIAGNOSIS — T45.1X5A ANTINEOPLASTIC CHEMOTHERAPY INDUCED ANEMIA: ICD-10-CM

## 2024-12-12 DIAGNOSIS — C78.89 METASTASIS TO SPLEEN (HCC): ICD-10-CM

## 2024-12-12 DIAGNOSIS — Z51.11 CHEMOTHERAPY MANAGEMENT, ENCOUNTER FOR: ICD-10-CM

## 2024-12-12 DIAGNOSIS — T40.2X5A THERAPEUTIC OPIOID INDUCED CONSTIPATION: ICD-10-CM

## 2024-12-12 DIAGNOSIS — G89.3 CANCER RELATED PAIN: ICD-10-CM

## 2024-12-12 DIAGNOSIS — Z71.89 GOALS OF CARE, COUNSELING/DISCUSSION: ICD-10-CM

## 2024-12-12 DIAGNOSIS — R06.02 SHORTNESS OF BREATH: ICD-10-CM

## 2024-12-12 DIAGNOSIS — C79.51 METASTASIS TO BONE (HCC): ICD-10-CM

## 2024-12-12 DIAGNOSIS — C34.92 STAGE IV SQUAMOUS CELL CARCINOMA OF LEFT LUNG (HCC): Primary | ICD-10-CM

## 2024-12-12 DIAGNOSIS — C34.92 STAGE IV SQUAMOUS CELL CARCINOMA OF LEFT LUNG (HCC): ICD-10-CM

## 2024-12-12 DIAGNOSIS — Z71.89 ADVANCE CARE PLANNING: ICD-10-CM

## 2024-12-12 DIAGNOSIS — I82.621 ACUTE DEEP VEIN THROMBOSIS (DVT) OF BRACHIAL VEIN OF RIGHT UPPER EXTREMITY (HCC): ICD-10-CM

## 2024-12-12 DIAGNOSIS — C34.12 MALIGNANT NEOPLASM OF UPPER LOBE OF LEFT LUNG (HCC): Primary | ICD-10-CM

## 2024-12-12 DIAGNOSIS — Z51.5 PALLIATIVE CARE ENCOUNTER: Primary | ICD-10-CM

## 2024-12-12 DIAGNOSIS — K59.03 THERAPEUTIC OPIOID INDUCED CONSTIPATION: ICD-10-CM

## 2024-12-12 LAB
ALBUMIN SERPL-MCNC: 3.8 G/DL (ref 3.2–4.8)
ALBUMIN/GLOB SERPL: 1.5 {RATIO} (ref 1–2)
ALP LIVER SERPL-CCNC: 97 U/L
ALT SERPL-CCNC: 13 U/L
ANION GAP SERPL CALC-SCNC: 7 MMOL/L (ref 0–18)
AST SERPL-CCNC: 14 U/L (ref ?–34)
BASOPHILS # BLD AUTO: 0.02 X10(3) UL (ref 0–0.2)
BASOPHILS NFR BLD AUTO: 0.2 %
BILIRUB SERPL-MCNC: 0.2 MG/DL (ref 0.2–1.1)
BUN BLD-MCNC: 13 MG/DL (ref 9–23)
BUN/CREAT SERPL: 18.3 (ref 10–20)
CALCIUM BLD-MCNC: 9.3 MG/DL (ref 8.7–10.4)
CHLORIDE SERPL-SCNC: 106 MMOL/L (ref 98–112)
CO2 SERPL-SCNC: 26 MMOL/L (ref 21–32)
CREAT BLD-MCNC: 0.71 MG/DL
DEPRECATED RDW RBC AUTO: 63.1 FL (ref 35.1–46.3)
EGFRCR SERPLBLD CKD-EPI 2021: 99 ML/MIN/1.73M2 (ref 60–?)
EOSINOPHIL # BLD AUTO: 0.23 X10(3) UL (ref 0–0.7)
EOSINOPHIL NFR BLD AUTO: 2.7 %
ERYTHROCYTE [DISTWIDTH] IN BLOOD BY AUTOMATED COUNT: 19.1 % (ref 11–15)
GLOBULIN PLAS-MCNC: 2.6 G/DL (ref 2–3.5)
GLUCOSE BLD-MCNC: 127 MG/DL (ref 70–99)
HCT VFR BLD AUTO: 26.3 %
HGB BLD-MCNC: 7.7 G/DL
IMM GRANULOCYTES # BLD AUTO: 0.03 X10(3) UL (ref 0–1)
IMM GRANULOCYTES NFR BLD: 0.4 %
LYMPHOCYTES # BLD AUTO: 1.06 X10(3) UL (ref 1–4)
LYMPHOCYTES NFR BLD AUTO: 12.7 %
MCH RBC QN AUTO: 26.6 PG (ref 26–34)
MCHC RBC AUTO-ENTMCNC: 29.3 G/DL (ref 31–37)
MCV RBC AUTO: 90.7 FL
MONOCYTES # BLD AUTO: 0.7 X10(3) UL (ref 0.1–1)
MONOCYTES NFR BLD AUTO: 8.4 %
NEUTROPHILS # BLD AUTO: 6.33 X10 (3) UL (ref 1.5–7.7)
NEUTROPHILS # BLD AUTO: 6.33 X10(3) UL (ref 1.5–7.7)
NEUTROPHILS NFR BLD AUTO: 75.6 %
OSMOLALITY SERPL CALC.SUM OF ELEC: 290 MOSM/KG (ref 275–295)
PLATELET # BLD AUTO: 416 10(3)UL (ref 150–450)
POTASSIUM SERPL-SCNC: 4.3 MMOL/L (ref 3.5–5.1)
PROT SERPL-MCNC: 6.4 G/DL (ref 5.7–8.2)
RBC # BLD AUTO: 2.9 X10(6)UL
SODIUM SERPL-SCNC: 139 MMOL/L (ref 136–145)
WBC # BLD AUTO: 8.4 X10(3) UL (ref 4–11)

## 2024-12-12 PROCEDURE — 80053 COMPREHEN METABOLIC PANEL: CPT

## 2024-12-12 PROCEDURE — 96413 CHEMO IV INFUSION 1 HR: CPT

## 2024-12-12 PROCEDURE — 99215 OFFICE O/P EST HI 40 MIN: CPT | Performed by: NURSE PRACTITIONER

## 2024-12-12 PROCEDURE — 96375 TX/PRO/DX INJ NEW DRUG ADDON: CPT

## 2024-12-12 PROCEDURE — S0028 INJECTION, FAMOTIDINE, 20 MG: HCPCS

## 2024-12-12 PROCEDURE — 85025 COMPLETE CBC W/AUTO DIFF WBC: CPT

## 2024-12-12 PROCEDURE — 96372 THER/PROPH/DIAG INJ SC/IM: CPT

## 2024-12-12 RX ORDER — SODIUM CHLORIDE 9 MG/ML
10 INJECTION, SOLUTION INTRAMUSCULAR; INTRAVENOUS; SUBCUTANEOUS ONCE
Status: CANCELLED | OUTPATIENT
Start: 2025-01-02

## 2024-12-12 RX ORDER — FAMOTIDINE 10 MG/ML
20 INJECTION, SOLUTION INTRAVENOUS 2 TIMES DAILY
Status: DISCONTINUED | OUTPATIENT
Start: 2024-12-12 | End: 2024-12-12

## 2024-12-12 RX ORDER — FAMOTIDINE 10 MG/ML
INJECTION, SOLUTION INTRAVENOUS
Status: COMPLETED
Start: 2024-12-12 | End: 2024-12-12

## 2024-12-12 RX ORDER — FAMOTIDINE 10 MG/ML
20 INJECTION, SOLUTION INTRAVENOUS 2 TIMES DAILY
Status: CANCELLED
Start: 2024-12-12

## 2024-12-12 RX ORDER — SODIUM CHLORIDE 9 MG/ML
10 INJECTION, SOLUTION INTRAMUSCULAR; INTRAVENOUS; SUBCUTANEOUS ONCE
OUTPATIENT
Start: 2025-01-02

## 2024-12-12 RX ADMIN — FAMOTIDINE 20 MG: 10 INJECTION, SOLUTION INTRAVENOUS at 11:00:00

## 2024-12-12 NOTE — PROGRESS NOTES
Palliative Care Follow Up Note     Patient Name: Tarik Camara   YOB: 1955   Medical Record Number: [id number]   Date of visit: 12/12/2024     The 21st Century Cures Act makes medical notes like these available to patients in the interest of transparency. Please be advised this is a medical document. Medical documents are intended to carry relevant information, facts as evident, and the clinical opinion of the practitioner. The medical note is intended as peer to peer communication and may appear blunt or direct. It is written in medical language and may contain abbreviations or verbiage that are unfamiliar.     Chief Complaint/Reason for Visit:  Chief Complaint   Patient presents with    Palliative Care       Past Medical History/Past Surgical History:   History obtained from [employer] In addition to the patient, PMH/PSH is significant as shown below.    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     Onc: Dr. Spears    HPI:      Tarik agreed to use [employer] today. [facility]  Radhames # [id number] translated today's visit.    Hospital admissions @ [facility] in past year: 0  ER visits @ [facility] in past year: 2    Patient seen and examined along with his wife present today. Tarik is awake, alert, oriented x 4, answers questions appropriately, is a reliable historian, and is in NAD today.    Cancer related pain reports in R anterior rib cage and R thoracic back. On Norco - see ROS.     Still swelling in RUE, but improving per pt. On Eliquis.    Returned 1 week ago from 3 week trip to [facility]. Tarik reports that he had a nice time seeing family. Denies any health issues while out of the country.    See ROS.    Problem List:  Patient Active Problem List   Diagnosis    Coronary artery disease involving native coronary artery of native heart with angina pectoris (HCC)    Hyperlipidemia    Essential hypertension    Erectile dysfunction    Microalbuminuria    History of Bell's palsy     Paraseptal emphysema (HCC)    Type 2 diabetes mellitus without retinopathy (HCC)    Presbyopia of both eyes    Age-related nuclear cataract of both eyes    Type 2 diabetes mellitus with diabetic neuropathy (HCC)    Conjunctivochalasis of both eyes    Mass of left lung    Lung cancer (HCC)    Abdominal bloating    Chronic idiopathic constipation    Encounter for antineoplastic chemotherapy and immunotherapy    Neuropathy of both feet    Pain from bone metastases (HCC)    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)    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)    Encounter for antineoplastic immunotherapy    Chemotherapy-induced peripheral neuropathy (HCC)    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)    Abscess of skin of abdomen    Thrombocytopenia (HCC)    Chemotherapy management, encounter for    Antineoplastic chemotherapy induced anemia    Cancer related pain    Metastasis to bone (HCC)    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)    Acute deep vein thrombosis (DVT) of brachial vein of right upper extremity (HCC)    Encounter for care related to Port-a-Cath        Medical History:  Past Medical History:    Bell's palsy    COPD (chronic obstructive pulmonary disease) (HCC)    Degenerative joint disease (DJD) of lumbar spine    Diabetes (HCC)    Diabetes mellitus, type II (HCC)    Heart attack (HCC)    High blood pressure    High cholesterol    Hyperlipidemia    Hypertension    Osteoarthritis    Sleep apnea    no CPAP       Surgical History:  Past Surgical History:   Procedure Laterality Date    Appendectomy  1975    Coronary stent placement  2010    Palate/uvula surgery unlisted  2010    Ventral hernia repair  06/29/2021    primary repair, ventral and umbilical hernias       Allergies:  No Known Allergies    Family History:  Family History   Problem Relation Age of Onset    No Known Problems Mother     Heart Disorder Father     Diabetes Neg     Glaucoma Neg     Macular degeneration Neg         Social History:  Social History     Socioeconomic History    Marital status:     Number of children: 3   Occupational History    Occupation: [employer]   Tobacco Use    Smoking status: Former     Current packs/day: 0.00     Types: Cigarettes     Quit date: 3/19/2020     Years since quittin.7    Smokeless tobacco: Never    Tobacco comments:     Occasionally, not in last 6 months   Vaping Use    Vaping status: Never Used   Substance and Sexual Activity    Alcohol use: Not Currently    Drug use: Never       Goals of care counseling/advance care planning discussion:     We discussed patient's current clinical condition. I provided education about the typical disease trajectory of metastatic lung cancer with associated symptoms and decline over time.     I discussed the importance of advance care planning prior to crisis with Tarik.     HCPOA/Health Surrogate:    Tarik has not completed saul HCPOA documentation.    I discussed [place]rogate [place]    [place] Surrogate: Socrates Camara (wife)    Code Status/POLST Documentation:    Tarik wishes to remain FULL CODE at this time.        Medications:  Current Outpatient Medications   Medication Sig Dispense Refill    ondansetron (ZOFRAN) 8 MG tablet Take 1 tablet (8 mg total) by mouth every 8 (eight) hours as needed for Nausea. 60 tablet 3    HYDROcodone-acetaminophen  MG Oral Tab Take 1 tablet by mouth every 4 (four) hours as needed for Pain (for cancer related pain; MAX dose is 6 tabs per day). 150 tablet 0    pregabalin (LYRICA) 50 MG Oral Cap Take 1 capsule (50 mg total) by mouth 3 (three) times daily. 90 capsule 1    Tiotropium Bromide-Olodaterol 2.5-2.5 MCG/ACT Inhalation Aero Soln Inhale 2 puffs into the lungs every morning. 4 g 1    metFORMIN 850 MG Oral Tab Take 1 tablet (850 mg total) by mouth 2 (two) times daily with meals. 180 tablet 3    lisinopril 30 MG Oral Tab Take 1 tablet (30 mg total) by mouth daily. 90 tablet 2    metoprolol succinate ER  100 MG Oral Tablet 24 Hr Take 1 tablet (100 mg total) by mouth daily. 90 tablet 1    glipiZIDE ER 5 MG Oral Tablet 24 Hr Take 1 tablet (5 mg total) by mouth daily. 90 tablet 1    polyethylene glycol, PEG 3350, 17 g Oral Powd Pack Take 17 g by mouth daily.      atorvastatin 20 MG Oral Tab Take 1 tablet (20 mg total) by mouth nightly. 90 tablet 3    prochlorperazine (COMPAZINE) 10 mg tablet Take 1 tablet (10 mg total) by mouth every 6 (six) hours as needed for Nausea. 30 tablet 3    aspirin 81 MG Oral Tab EC Take 1 tablet (81 mg total) by mouth daily.         Review of Systems:  Review of Systems   Constitutional:  Positive for malaise/fatigue.        Appetite good     HENT: Negative.     Eyes: Negative.    Respiratory:  Positive for shortness of breath (ROBISON; relieved with short periods of rest). Negative for cough and hemoptysis.    Cardiovascular:  Positive for leg swelling. Negative for chest pain and palpitations.   Gastrointestinal:  Negative for abdominal pain, constipation, diarrhea, nausea and vomiting.   Musculoskeletal:         R anterior rib pain and R thoracic back pain - cancer related  -Sharp/pinching in character  -Intermittent; some episodes are more severe than others  -Taking 2 tabs Norco 10/325mg 1-2 times daily reduces pain (usually taking 4-6 tabs Norco/day)  -Discussed MS Contin today to add to regimen; Tarik does not wish to start MOrphine  -Pain is stable on Dowell per Pt report        RUE swelling  -US done 11/7/2024 which was + for RUE DVT  -Swelling improving per pt  -Able to move RUE without difficulty   Skin: Negative.    Neurological:  Positive for tingling (On Lyrica (twice daily) for peripheral neuropathy). Negative for dizziness, weakness and headaches.   Psychiatric/Behavioral: Negative.  Negative for depression, hallucinations, memory loss, substance abuse and suicidal ideas. The patient is not nervous/anxious and does not have insomnia.        Physical Examination:  Physical  Exam  Constitutional:       Appearance: Normal appearance. He is obese. He is ill-appearing.   HENT:      Head: Normocephalic and atraumatic.      Right Ear: External ear normal.      Left Ear: External ear normal.      Nose: Nose normal.      Mouth/Throat:      Mouth: Mucous membranes are moist.      Pharynx: Oropharynx is clear.   Eyes:      General: No scleral icterus.     Conjunctiva/sclera: Conjunctivae normal.   Cardiovascular:      Rate and Rhythm: Normal rate and regular rhythm.      Pulses: Normal pulses.      Heart sounds: No murmur heard.  Pulmonary:      Effort: Pulmonary effort is normal. No respiratory distress.      Breath sounds: Normal breath sounds.   Abdominal:      General: Bowel sounds are normal. There is no distension.      Palpations: Abdomen is soft.   Musculoskeletal:         General: Swelling (RUE swelling; Active ROM in RUE; denies pain) present. Normal range of motion.      Cervical back: Normal range of motion and neck supple.      Right lower leg: Edema present.      Left lower leg: Edema present.   Skin:     General: Skin is warm and dry.      Coloration: Skin is not pale.   Neurological:      General: No focal deficit present.      Mental Status: He is alert and oriented to person, place, and time. Mental status is at baseline.      Motor: No weakness.      Gait: Gait normal.   Psychiatric:         Mood and Affect: Mood normal.         Behavior: Behavior normal.         Thought Content: Thought content normal.         Judgment: Judgment normal.       Palliative Care Goals of Care:  Discussed with patient/family today: Yes  Patient's preference about sharing medical information: Patient and family may receive information  Patient's decision making preferences: Fully involved and speak with family  Code status: FULL CODE  Have advanced directives been discussed with patient or healthcare power of : Yes        Healthcare Agent Appointed: Yes  Healthcare Agent's Name: Socrates  Jax (wife)  Healthcare Agent's Phone Number: [phone]          Palliative Care Assessment/Plan:  1. Palliative care encounter    2. Cancer related pain    3. Therapeutic opioid induced constipation    4. Shortness of breath    5. Swelling of right upper extremity    6. Goals of care, counseling/discussion    7. Advance care planning    8. 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)    9. Metastasis to bone (HCC)    10. 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)        Cancer Related Pain  -Discussed addiction vs tolerance  -Reviewed [place]   -Discussed MOA and explained side effects of pain medications  -Max daily Tylenol limit is 3,000mg  -CONTINUE Hydrocodone/Acetaminophen (Norco) to 10/325mg - take 1-2 tablet(s) every 4 hours as needed for pain; MAX daily dose is 6 tablets of Norco per day  -CONTINUE Pregabalin (Lyrica) 50mg - take 1 tablet every 8 hours; please do not skip doses  -I offered to add MS Contin at bedtime for better pain control, Tarik would like to hold off on this at this time  -AVOID ALL NSAIDS - due to HTN  -Discussed indication for opioid medications for cancer related pain per NCCN guidelines  -Discussed common SE of opioid meds which include, but are not limited to: drowsiness, n/v, stomach upset, constipation  -Excessive or misuse of opioid medications may cause respiratory depression, CNS depression, and possibly death  -Discussed to NEVER self adjust pain med/opioid doses or stop these meds abruptly as this may lead to opioid withdrawal  -Discussed signs and symptoms of withdrawal which include, but are not limited to: rhinorrhea, diarrhea, irritability, chills, sweating, insomnia, mood swings, anxiety, increased pain      Therapeutic opioid induced constipation  -Norco will cause constipation  -Senna-S - take 2 tablets daily  -If you experience loose stool or diarrhea, reduce Senna-S to 1 tablet per day or hold this medication for 1-2 days until constipation resolves  -INCREASE water  intake       Dyspnea  -Discussed when SOB to lean over bedside table/table/TV tray with arms on table/tray and arch back to help open airways while taking slow, deep breaths   -Dicussed and educated pt on pursed lip breathing  -Suggested pt use a fan at home to blow air on her during SOB episodes  -Hx emphysema  -On Stiolto        RUE Swelling  -+ DVT per RUE US done 11/7/2024  -On Eliquis per Onc  -Swelling improving  -Elevate RUE  -Call office for increased swelling/SOB  -To ER for s/s PE/DVT       Goals of care counseling/discussion  -Pt is FULL CODE  -Continue supportive medical treatments; pt plans to continue pursuing cancer treatment  -Provided emotional support to pt/family who appear to be coping adequately  -Patient is agreeable to hospitalization, if indicated  -Patient is agreeable to following up with outpatient palliative care  -See above narrative for further details      Advance care planning counseling/discussion  -Pt is FULL CODE  Discussed [place] Surrogate [place]  -Health Surrogate is Socrates Camara (wife)  -See above narrative for further details    Palliative Performance Scale 70%    Emotional support was provided to patient and family today: Yes    Palliative Care Follow-up:  I spent a total of  45 minutes with the patient today, which included all of the following:direct face to face contact, history taking, physical examination, and >50% was spent counseling and coordinating care.    Thank you for allowing the Palliative Care Team to participate in the care of your patient. I will continue to follow clinically.    MANJINDER STARKS DNP, FNP-BC, [facility]  [phone]  12/12/2024

## 2024-12-12 NOTE — PROGRESS NOTES
HEAVENLY met with patient and spouse in infusion room 9 to discuss senior services. Patient speaks Kazakh only. SW used  services  Leslie ID # [id number]. Patient expressed interest in services provided for seniors in [facility]. HEAVENLY discussed various services. HEAVENLY explained that [facility] provided caregiver services excluded spouse and they would have to either use another friend or family member or an home health aid can be assigned from a home care agency. SW discussed other services such as meals on wheels, life alert etc. Patient expressed interest in meals on wheels. HEAVENLY faxed referral to [facility] to [phone]. HEAVENLY informed patient that he will receive a call from the [facility] to assessment him for eligibility for services.

## 2024-12-12 NOTE — PROGRESS NOTES
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  PDL1 0%     Diagnosis 1/3/2024 via transbronchial biopsy bronchoscopy with Dr. Carrillo.     On CT 12/22/23 4.2 x 3.3 cm left upper lobe mass enlarged AP reflection lymph node left hilar adenopathy.  PET/CT showed bone metastasis     Initiated Carboplatin Paclitaxel Pembrolizumab 2/1/24  Maintenance pembrolizumab 4/25/24-7/25/24     Palliative RT to R 8th rib lesion 7/2-7/3/24     PET scan 8/7/24 showed progressive disease.     Guardant 360CDx 8/15/24 all VUS. No actionable alteration.     8/29/24 C1 gemcitabine   9/19/24: C2 gemcitabine    10/3/24 POD on scans     10/17/24 C1 docetaxel   11/7/24: C2 docetaxel  12/12/24: C3 docetaxel      Interval History:    Patient is here for a follow up visit, presents with his wife.  He declined the use of the language line twice.     He returned from [facility], he had a very nice trip and reports he was eating well there but now eating less since he has returned home for unknown reason.  He reports pain is controlled when using Norco but occasionally right sided back and right sided rib pain flares up at night.  He says his breathing is fairly stable though this can also have periods of worsening with exertion or at night.      Patient continue to have wound on his right lower flank that is not open, not draining and with little change per patient despite completing course of Bactrim as recommended by surgery team.     His RUE swelling has improved but mild swelling to hand noted; patient reports compliance with Eliquis.    Denies headache, weight loss,  fever/chills, vision change, sore throat, chest pain, cough, N/V/D/C.      Performance Status: ECOG 1-2    Current Medications:    Current Outpatient Medications:     ondansetron (ZOFRAN) 8 MG tablet, Take 1 tablet (8 mg total) by mouth every 8 (eight) hours as needed for Nausea., Disp: 60 tablet, Rfl: 3    sulfamethoxazole-trimethoprim -160 MG Oral Tab per tablet, Take 1 tablet by mouth 2 (two) times daily., Disp: 20 tablet, Rfl: 0    HYDROcodone-acetaminophen  MG Oral Tab, Take 1 tablet by mouth every 4 (four) hours as needed for Pain (for cancer related pain; MAX dose is 6 tabs per day)., Disp: 150 tablet, Rfl: 0    pregabalin (LYRICA) 50 MG Oral Cap, Take 1 capsule (50 mg total) by mouth 3 (three) times daily., Disp: 90 capsule, Rfl: 1    Tiotropium Bromide-Olodaterol 2.5-2.5 MCG/ACT Inhalation Aero Soln, Inhale 2 puffs into the lungs every morning., Disp: 4 g, Rfl: 1    metFORMIN 850 MG Oral Tab, Take 1 tablet (850 mg total) by mouth 2 (two) times daily with meals., Disp: 180 tablet, Rfl: 3    lisinopril 30 MG Oral Tab, Take 1 tablet (30 mg total) by mouth daily., Disp: 90 tablet, Rfl: 2    metoprolol succinate  MG Oral Tablet 24 Hr, Take 1 tablet (100 mg total) by mouth daily., Disp: 90 tablet, Rfl: 1    glipiZIDE ER 5 MG Oral Tablet 24 Hr, Take 1 tablet (5 mg total) by mouth daily., Disp: 90 tablet, Rfl: 1    polyethylene glycol, PEG 3350, 17 g Oral Powd Pack, Take 17 g by mouth daily., Disp: , Rfl:     atorvastatin 20 MG Oral Tab, Take 1 tablet (20 mg total) by mouth nightly., Disp: 90 tablet, Rfl: 3    prochlorperazine (COMPAZINE) 10 mg tablet, Take 1 tablet (10 mg total) by mouth every 6 (six) hours as needed for Nausea., Disp: 30 tablet, Rfl: 3    aspirin 81 MG Oral Tab EC, Take 1 tablet (81 mg total) by mouth daily., Disp: , Rfl:     Review of Systems:  10 -point systems reviewed, all negative except as mentioned in the HPI/interval history.     Vital Signs:  /77 (BP Location:  Left arm, Patient Position: Sitting, Cuff Size: large)   Pulse 82   Temp 98.3 °F (36.8 °C) (Oral)   Resp 18   Ht 1.702 m (5' 7\")   Wt 90.4 kg (199 lb 6.4 oz)   SpO2 96%   BMI 31.23 kg/m²     Wt Readings from Last 6 Encounters:   12/12/24 90.4 kg (199 lb 6.4 oz)   12/12/24 90.4 kg (199 lb 6.4 oz)   11/14/24 88.9 kg (196 lb)   11/14/24 89.2 kg (196 lb 11.2 oz)   11/07/24 89.8 kg (198 lb)   11/08/24 90.7 kg (200 lb)      Physical Examination:  General: Alert and oriented x 3, not in acute distress.  Psych:  Mood and affect appropriate  HEENT: Non-icteric sclera. Oropharynx is clear.   Neck: No palpable lymphadenopathy. Neck is supple.  Chest: Clear to auscultation bilaterally  Heart: Regular rate and rhythm.  Abdomen: Soft, non tender with good bowel sounds.   Extremities: Trace BLE edema, R hand with mild swelling  Neurological: Grossly intact  Skin: Right lower flank with non-open 2 cm well circumscribed wound, not tender to palpation.  No evidence of drainage or surrounding erythema    Laboratory:  Lab Results   Component Value Date    WBC 8.4 12/12/2024    RBC 2.90 (L) 12/12/2024    HGB 7.7 (L) 12/12/2024    HCT 26.3 (L) 12/12/2024    MCV 90.7 12/12/2024    MCH 26.6 12/12/2024    MCHC 29.3 (L) 12/12/2024    RDW 19.1 (H) 12/12/2024    .0 12/12/2024     Lab Results   Component Value Date     12/12/2024    K 4.3 12/12/2024     12/12/2024    CO2 26.0 12/12/2024    BUN 13 12/12/2024    CREATSERUM 0.71 12/12/2024     (H) 12/12/2024    CA 9.3 12/12/2024    ALKPHO 97 12/12/2024    ALT 13 12/12/2024    AST 14 12/12/2024    BILT 0.2 12/12/2024    ALB 3.8 12/12/2024    TP 6.4 12/12/2024     Lab Results   Component Value Date/Time    TSH 3.333 07/25/2024 08:28 AM    TSH 2.573 07/05/2024 10:18 AM    TSH 2.706 06/13/2024 12:35 PM    TSH 3.369 05/20/2024 12:56 PM    TSH 3.325 04/24/2024 10:15 AM    TSH 2.917 04/03/2024 08:39 AM     Radiology:  IR PORT A CATH INSERTION EXCHNGE CHECK    Result Date:  11/13/2024  CONCLUSION:  Ultrasound and fluoroscopic guided surgical placement of chest port    Dictated by (CST): Jackson Manzano MD on 11/13/2024 at 2:33 PM     Finalized by (CST): Jackson Manzano MD on 11/13/2024 at 2:33 PM          US VENOUS DOPPLER ARM RIGHT - DIAG IMG (CPT=93971)    Result Date: 11/7/2024  CONCLUSION:  Completely occlusive DVT involving one branch of the paired right brachial veins.    Dictated by (CST): Rajesh Tapia MD on 11/07/2024 at 3:40 PM     Finalized by (CST): Rajesh Tapia MD on 11/07/2024 at 3:42 PM          CT CHEST PE AORTA (IV ONLY) (CPT=71260)    Result Date: 10/3/2024  CONCLUSION: No PE.  Left upper lobe mass has increased in size    Dictated by (CST): Chai Maguire MD on 10/03/2024 at 4:52 PM     Finalized by (CST): Chai Maguire MD on 10/03/2024 at 4:56 PM          XR ELBOW, COMPLETE (MIN 3 VIEWS), RIGHT (CPT=73080)    Result Date: 10/3/2024  CONCLUSION:  1. No acute appearing fracture or dislocation.  Mild mineralization/ossification adjacent to the lateral and medial distal humeral condyles may suggest sequelae of previous enthesopathy/epicondylitis.  Correlate clinically.  See above.    Dictated by (CST): Alex Brambila MD on 10/03/2024 at 2:31 PM     Finalized by (CST): Alex Brambila MD on 10/03/2024 at 2:32 PM            Impression and Plan:    Metastatic squamous cell carcinoma of the left upper lung, PD-L1 0%  --Carboplatin paclitaxel pembrolizuamb-initiated 2/1/24 and completed 4 cycles on 4/4/24; dose reduce carbo to AUC 5 and paclitaxel by 20% due CIPN to feet.   --Imaging 4/2024 after C4 with favorable tx response.   --Continued on maintenance pembrolizumab q3 wks, completed 9 cycles as of  7/25/24 with no autoimmune toxicity.  --Restaging CT and PET scans 8/2024 showed progression of disease. Guardant (ctDNA sequencing) with no targetable alteration. He was not interested in clinical trials or referral to a tertiary center.  --Received 2 cycles of  gemcitabine 8/29/24- 9/26/24 with no response.   --CT scans in ER on 10/3/24 for LUQ showed progression of disease with new splenic metastasis (2.5 cm) and increasing LUCINDA mass.   --Switched chemo to docetaxel 35 mg/m2 day 1 and 8 every 21 days and tolerated cycle 1 fairly well.   --port placed 11/13/24  --Proceed with C3D1 docetaxel today. Labs adequate.   --Repeat scans in early January to assess response, CT ordered          Bone metastases  Cancer related pain  --S/p palliative XRT 7/3/24 to enlarging right 8th rib destructive bone lesion  --Bilateral upper quadrant pain due to metastases.   --Continue Norco 10/325 mg Q6-8H PRN. Patient following closely with palliative care for pain management        RUE swelling  --stat Venous doppler showed acute DVT in the right brachial vein, likely related to IV catheters.   --Started eliquis 10 mg bid x7 days, now to continue 5 mg bid for a minimum of 3 months      Skin abscesses  --3 lesions over the right flank/back, s/p keflex and bactrim without improvement  --Referred to surgery for consideration of I&D, given Bactrim with little improvement. Patient to make follow up appt with surgery again      Chemo induced neuropathy- stable. Continue lyrica        Chemo induced anemia- will continue to monitor and consider RAI therapy if worsening anemia.   -pt to get Procrit today      RTC in 3 weeks for C4    MANJINDER Cai   Hematology/Oncology

## 2024-12-19 ENCOUNTER — NURSE ONLY (OUTPATIENT)
Age: 69
End: 2024-12-19
Attending: INTERNAL MEDICINE
Payer: MEDICARE

## 2024-12-19 ENCOUNTER — OFFICE VISIT (OUTPATIENT)
Age: 69
End: 2024-12-19
Attending: INTERNAL MEDICINE
Payer: MEDICARE

## 2024-12-19 VITALS
DIASTOLIC BLOOD PRESSURE: 68 MMHG | HEIGHT: 67 IN | OXYGEN SATURATION: 97 % | TEMPERATURE: 98 F | HEART RATE: 74 BPM | SYSTOLIC BLOOD PRESSURE: 119 MMHG | RESPIRATION RATE: 18 BRPM | WEIGHT: 192.31 LBS | BODY MASS INDEX: 30.18 KG/M2

## 2024-12-19 DIAGNOSIS — C34.92 STAGE IV SQUAMOUS CELL CARCINOMA OF LEFT LUNG (HCC): Primary | ICD-10-CM

## 2024-12-19 DIAGNOSIS — R06.02 SHORTNESS OF BREATH: Primary | ICD-10-CM

## 2024-12-19 DIAGNOSIS — Z51.11 CHEMOTHERAPY MANAGEMENT, ENCOUNTER FOR: ICD-10-CM

## 2024-12-19 LAB
BASOPHILS # BLD AUTO: 0.03 X10(3) UL (ref 0–0.2)
BASOPHILS NFR BLD AUTO: 0.4 %
DEPRECATED RDW RBC AUTO: 60.5 FL (ref 35.1–46.3)
EOSINOPHIL # BLD AUTO: 0.17 X10(3) UL (ref 0–0.7)
EOSINOPHIL NFR BLD AUTO: 2.3 %
ERYTHROCYTE [DISTWIDTH] IN BLOOD BY AUTOMATED COUNT: 18.3 % (ref 11–15)
HCT VFR BLD AUTO: 25.4 %
HGB BLD-MCNC: 7.5 G/DL
IMM GRANULOCYTES # BLD AUTO: 0.04 X10(3) UL (ref 0–1)
IMM GRANULOCYTES NFR BLD: 0.5 %
LYMPHOCYTES # BLD AUTO: 0.98 X10(3) UL (ref 1–4)
LYMPHOCYTES NFR BLD AUTO: 13 %
MCH RBC QN AUTO: 27.1 PG (ref 26–34)
MCHC RBC AUTO-ENTMCNC: 29.5 G/DL (ref 31–37)
MCV RBC AUTO: 91.7 FL
MONOCYTES # BLD AUTO: 0.7 X10(3) UL (ref 0.1–1)
MONOCYTES NFR BLD AUTO: 9.3 %
NEUTROPHILS # BLD AUTO: 5.6 X10 (3) UL (ref 1.5–7.7)
NEUTROPHILS # BLD AUTO: 5.6 X10(3) UL (ref 1.5–7.7)
NEUTROPHILS NFR BLD AUTO: 74.5 %
PLATELET # BLD AUTO: 428 10(3)UL (ref 150–450)
RBC # BLD AUTO: 2.77 X10(6)UL
WBC # BLD AUTO: 7.5 X10(3) UL (ref 4–11)

## 2024-12-19 RX ORDER — FAMOTIDINE 10 MG/ML
INJECTION, SOLUTION INTRAVENOUS
Status: DISPENSED
Start: 2024-12-19 | End: 2024-12-19

## 2024-12-19 RX ORDER — FAMOTIDINE 10 MG/ML
20 INJECTION, SOLUTION INTRAVENOUS 2 TIMES DAILY
Status: CANCELLED
Start: 2024-12-19

## 2024-12-19 RX ORDER — DIPHENHYDRAMINE HYDROCHLORIDE 50 MG/ML
INJECTION INTRAMUSCULAR; INTRAVENOUS
Status: DISPENSED
Start: 2024-12-19 | End: 2024-12-19

## 2024-12-19 RX ORDER — DIPHENHYDRAMINE HYDROCHLORIDE 50 MG/ML
12.5 INJECTION INTRAMUSCULAR; INTRAVENOUS ONCE
Status: COMPLETED | OUTPATIENT
Start: 2024-12-19 | End: 2024-12-19

## 2024-12-19 RX ORDER — FAMOTIDINE 10 MG/ML
20 INJECTION, SOLUTION INTRAVENOUS ONCE
Status: COMPLETED | OUTPATIENT
Start: 2024-12-19 | End: 2024-12-19

## 2024-12-19 RX ADMIN — DIPHENHYDRAMINE HYDROCHLORIDE 12.5 MG: 50 INJECTION INTRAMUSCULAR; INTRAVENOUS at 11:19:00

## 2024-12-19 RX ADMIN — FAMOTIDINE 20 MG: 10 INJECTION, SOLUTION INTRAVENOUS at 09:27:00

## 2024-12-19 NOTE — PROGRESS NOTES
Pt here for C3 D8 taxotere.  Arrives Ambulating independently, accompanied by Spouse     Patient was evaluated today by Treatment Nurse.    Oral medications included in this regimen:  no    Patient confirms comprehension of cancer treatment schedule:  yes    Pregnancy screening:  Not applicable    Modifications in dose or schedule:  No    Medications appearance and physical integrity checked by RN: yes.    Chemotherapy IV pump settings verified by 2 RNs:  Yes.  Frequency of blood return and site check throughout administration: Prior to administration, Prior to each drug, and At completion of therapy     Infusion/treatment outcome:  Pt did complain of itching to his back during infusion. States he does experiencing itching on his skin often, not just during infusion. Pt was requesting a medication to help itching. Notified ALICIA Lane who ordered one-time dose of 12.5mg IV benadryl. Pt reported relief of symptoms. Paused taxotere to give benadryl but patient was able to tolerate infusion. Pt does not believe the itching was related to the taxotere.    Education Record    Learner:  Patient  Barriers / Limitations:  Language  Method:  Reinforcement  Education / instructions given:  Plan of care, benadryl  Outcome:  Shows understanding    Discharged Home, Ambulating independently, accompanied by:Spouse    Patient/family verbalized understanding of future appointments: by printed AVS

## 2024-12-24 DIAGNOSIS — G89.3 CANCER RELATED PAIN: ICD-10-CM

## 2024-12-24 RX ORDER — HYDROCODONE BITARTRATE AND ACETAMINOPHEN 10; 325 MG/1; MG/1
1 TABLET ORAL EVERY 4 HOURS PRN
Qty: 60 TABLET | Refills: 0 | Status: SHIPPED | OUTPATIENT
Start: 2024-12-24

## 2024-12-24 NOTE — TELEPHONE ENCOUNTER
Patient is at  requesting a refill on HYDROcodone-acetaminophen  MG Oral Tab .Patient's pharmacy is the [facility]/pharmacy #[id number] - [place], [place] - [address] [phone], [phone]  12/24/24

## 2024-12-25 ENCOUNTER — APPOINTMENT (OUTPATIENT)
Dept: GENERAL RADIOLOGY | Facility: HOSPITAL | Age: 69
End: 2024-12-25
Attending: EMERGENCY MEDICINE
Payer: MEDICARE

## 2024-12-25 ENCOUNTER — HOSPITAL ENCOUNTER (EMERGENCY)
Facility: HOSPITAL | Age: 69
Discharge: HOME | End: 2024-12-25
Attending: EMERGENCY MEDICINE
Payer: MEDICARE

## 2024-12-25 VITALS
SYSTOLIC BLOOD PRESSURE: 125 MMHG | RESPIRATION RATE: 18 BRPM | HEART RATE: 80 BPM | BODY MASS INDEX: 30.31 KG/M2 | HEIGHT: 68 IN | DIASTOLIC BLOOD PRESSURE: 67 MMHG | TEMPERATURE: 99 F | OXYGEN SATURATION: 98 % | WEIGHT: 200 LBS

## 2024-12-25 DIAGNOSIS — R53.1 WEAKNESS GENERALIZED: Primary | ICD-10-CM

## 2024-12-25 LAB
ALBUMIN SERPL-MCNC: 3.6 G/DL (ref 3.2–4.8)
ALP LIVER SERPL-CCNC: 86 U/L
ALT SERPL-CCNC: 14 U/L
ANION GAP SERPL CALC-SCNC: 7 MMOL/L (ref 0–18)
AST SERPL-CCNC: 11 U/L (ref ?–34)
BASOPHILS # BLD AUTO: 0.01 X10(3) UL (ref 0–0.2)
BASOPHILS NFR BLD AUTO: 0.2 %
BILIRUB DIRECT SERPL-MCNC: 0.1 MG/DL (ref ?–0.3)
BILIRUB SERPL-MCNC: 0.3 MG/DL (ref 0.2–1.1)
BILIRUB UR QL: NEGATIVE
BUN BLD-MCNC: 13 MG/DL (ref 9–23)
BUN/CREAT SERPL: 16.7 (ref 10–20)
CALCIUM BLD-MCNC: 9.9 MG/DL (ref 8.7–10.4)
CHLORIDE SERPL-SCNC: 106 MMOL/L (ref 98–112)
CLARITY UR: CLEAR
CO2 SERPL-SCNC: 26 MMOL/L (ref 21–32)
COLOR UR: YELLOW
CREAT BLD-MCNC: 0.78 MG/DL
DEPRECATED RDW RBC AUTO: 59.5 FL (ref 35.1–46.3)
EGFRCR SERPLBLD CKD-EPI 2021: 96 ML/MIN/1.73M2 (ref 60–?)
EOSINOPHIL # BLD AUTO: 0.04 X10(3) UL (ref 0–0.7)
EOSINOPHIL NFR BLD AUTO: 0.7 %
ERYTHROCYTE [DISTWIDTH] IN BLOOD BY AUTOMATED COUNT: 18.3 % (ref 11–15)
GLUCOSE BLD-MCNC: 107 MG/DL (ref 70–99)
GLUCOSE UR-MCNC: NORMAL MG/DL
HCT VFR BLD AUTO: 24.2 %
HGB BLD-MCNC: 7.3 G/DL
HGB UR QL STRIP.AUTO: NEGATIVE
HYALINE CASTS #/AREA URNS AUTO: PRESENT /LPF
IMM GRANULOCYTES # BLD AUTO: 0.02 X10(3) UL (ref 0–1)
IMM GRANULOCYTES NFR BLD: 0.3 %
KETONES UR-MCNC: 20 MG/DL
LEUKOCYTE ESTERASE UR QL STRIP.AUTO: NEGATIVE
LYMPHOCYTES # BLD AUTO: 0.82 X10(3) UL (ref 1–4)
LYMPHOCYTES NFR BLD AUTO: 13.7 %
MCH RBC QN AUTO: 27.1 PG (ref 26–34)
MCHC RBC AUTO-ENTMCNC: 30.2 G/DL (ref 31–37)
MCV RBC AUTO: 90 FL
MONOCYTES # BLD AUTO: 0.62 X10(3) UL (ref 0.1–1)
MONOCYTES NFR BLD AUTO: 10.4 %
NEUTROPHILS # BLD AUTO: 4.46 X10 (3) UL (ref 1.5–7.7)
NEUTROPHILS # BLD AUTO: 4.46 X10(3) UL (ref 1.5–7.7)
NEUTROPHILS NFR BLD AUTO: 74.7 %
NITRITE UR QL STRIP.AUTO: NEGATIVE
OSMOLALITY SERPL CALC.SUM OF ELEC: 289 MOSM/KG (ref 275–295)
PH UR: 6 [PH] (ref 5–8)
PLATELET # BLD AUTO: 375 10(3)UL (ref 150–450)
POTASSIUM SERPL-SCNC: 4.3 MMOL/L (ref 3.5–5.1)
PROT SERPL-MCNC: 6.1 G/DL (ref 5.7–8.2)
PROT UR-MCNC: 30 MG/DL
RBC # BLD AUTO: 2.69 X10(6)UL
SODIUM SERPL-SCNC: 139 MMOL/L (ref 136–145)
SP GR UR STRIP: 1.03 (ref 1–1.03)
UROBILINOGEN UR STRIP-ACNC: NORMAL
WBC # BLD AUTO: 6 X10(3) UL (ref 4–11)

## 2024-12-25 PROCEDURE — 85025 COMPLETE CBC W/AUTO DIFF WBC: CPT | Performed by: EMERGENCY MEDICINE

## 2024-12-25 PROCEDURE — 93010 ELECTROCARDIOGRAM REPORT: CPT

## 2024-12-25 PROCEDURE — 93005 ELECTROCARDIOGRAM TRACING: CPT

## 2024-12-25 PROCEDURE — 96360 HYDRATION IV INFUSION INIT: CPT

## 2024-12-25 PROCEDURE — 99284 EMERGENCY DEPT VISIT MOD MDM: CPT

## 2024-12-25 PROCEDURE — 81001 URINALYSIS AUTO W/SCOPE: CPT | Performed by: EMERGENCY MEDICINE

## 2024-12-25 PROCEDURE — 80076 HEPATIC FUNCTION PANEL: CPT | Performed by: EMERGENCY MEDICINE

## 2024-12-25 PROCEDURE — 71045 X-RAY EXAM CHEST 1 VIEW: CPT | Performed by: EMERGENCY MEDICINE

## 2024-12-25 PROCEDURE — 80048 BASIC METABOLIC PNL TOTAL CA: CPT | Performed by: EMERGENCY MEDICINE

## 2024-12-25 NOTE — ED INITIAL ASSESSMENT (HPI)
Pt arrives through triage with wife      complaints of generalized  weakness that started earlier today. Denies fevers or pain or NVD        Hx of COPD, dm, htn, hld,

## 2024-12-25 NOTE — ED QUICK NOTES
PT to ED with wife reporting weakness x 3 days, currently receiving chemotherapy for lung cancer.   Unsuccessful port access by this RN.

## 2024-12-26 LAB
ATRIAL RATE: 91 BPM
P AXIS: 25 DEGREES
P-R INTERVAL: 140 MS
Q-T INTERVAL: 344 MS
QRS DURATION: 72 MS
QTC CALCULATION (BEZET): 423 MS
R AXIS: -29 DEGREES
T AXIS: 17 DEGREES
VENTRICULAR RATE: 91 BPM

## 2024-12-26 NOTE — ED PROVIDER NOTES
Patient Seen in: [facility] Emergency Department    History     Chief Complaint   Patient presents with    Weakness       HPI    Patient presents to the ED complaining of generalized weakness starting earlier today.  He is currently on immunotherapy for lung cancer.  Denies fevers, chills, chest pain, abdominal pain, urinary symptoms or other complaints.  States that he just feels weak which is chronic but slightly worse today.    History reviewed.   Past Medical History:    Bell's palsy    COPD (chronic obstructive pulmonary disease) ([facility])    Degenerative joint disease (DJD) of lumbar spine    Diabetes ([facility])    Diabetes mellitus, type II ([facility])    Heart attack ([facility])    High blood pressure    High cholesterol    Hyperlipidemia    Hypertension    Osteoarthritis    Sleep apnea    no CPAP       History reviewed.   Past Surgical History:   Procedure Laterality Date    Appendectomy      Coronary stent placement      Palate/uvula surgery unlisted      Ventral hernia repair  2021    primary repair, ventral and umbilical hernias         Medications :  Prescriptions Prior to Admission[1]     Family History   Problem Relation Age of Onset    No Known Problems Mother     Heart Disorder Father     Diabetes Neg     Glaucoma Neg     Macular degeneration Neg        Smoking Status:   Social History     Socioeconomic History    Marital status:     Number of children: 3   Occupational History    Occupation: [employer]   Tobacco Use    Smoking status: Former     Current packs/day: 0.00     Types: Cigarettes     Quit date: 3/19/2020     Years since quittin.7    Smokeless tobacco: Never    Tobacco comments:     Occasionally, not in last 6 months   Vaping Use    Vaping status: Never Used   Substance and Sexual Activity    Alcohol use: Not Currently    Drug use: Never       Constitutional and vital signs reviewed.      Social History and Family History elements reviewed from today, pertinent positives to  the presenting problem noted.    Physical Exam     ED Triage Vitals [12/25/24 0131]   /70   Pulse 87   Resp 22   Temp 99.3 °F (37.4 °C)   Temp src Oral   SpO2 96 %   O2 Device None (Room air)       All measures to prevent infection transmission during my interaction with the patient were taken. Handwashing was performed prior to and after the exam.  Stethoscope and any equipment used during my examination was cleaned with super sani-cloth germicidal wipes following the exam.     Physical Exam  Vitals and nursing note reviewed.   Constitutional:       General: He is not in acute distress.     Appearance: He is well-developed. He is not ill-appearing or toxic-appearing.   HENT:      Head: Normocephalic and atraumatic.   Eyes:      General:         Right eye: No discharge.         Left eye: No discharge.      Conjunctiva/sclera: Conjunctivae normal.   Neck:      Trachea: No tracheal deviation.   Cardiovascular:      Rate and Rhythm: Normal rate and regular rhythm.   Pulmonary:      Effort: Pulmonary effort is normal. No respiratory distress.      Breath sounds: Normal breath sounds. No stridor.   Abdominal:      General: There is no distension.      Palpations: Abdomen is soft.      Tenderness: There is no abdominal tenderness.   Musculoskeletal:         General: No deformity.   Skin:     General: Skin is warm and dry.   Neurological:      Mental Status: He is alert and oriented to person, place, and time.   Psychiatric:         Mood and Affect: Mood normal.         Behavior: Behavior normal.         ED Course        Labs Reviewed   BASIC METABOLIC PANEL (8) - Abnormal; Notable for the following components:       Result Value    Glucose 107 (*)     All other components within normal limits   CBC WITH DIFFERENTIAL WITH PLATELET - Abnormal; Notable for the following components:    RBC 2.69 (*)     HGB 7.3 (*)     HCT 24.2 (*)     MCHC 30.2 (*)     RDW-SD 59.5 (*)     RDW 18.3 (*)     Lymphocyte Absolute 0.82 (*)      All other components within normal limits   URINALYSIS WITH CULTURE REFLEX - Abnormal; Notable for the following components:    Ketones Urine 20 (*)     Protein Urine 30 (*)     Bacteria Urine Rare (*)     Squamous Epi. Cells Few (*)     Hyaline Casts Present (*)     All other components within normal limits   HEPATIC FUNCTION PANEL (7) - Normal     EKG    Rate, intervals and axes as noted on EKG Report.  Rate: Normal, 91 bpm  Rhythm: Sinus Rhythm  Reading: Normal intervals, LVH, no ST changes, borderline EKG           As Interpreted by me    Imaging Results Available and Reviewed while in ED: XR CHEST AP PORTABLE  (CPT=71045)    Result Date: 12/25/2024  CONCLUSION:  1. Enlarging wedge-shaped peripheral subpleural mass with surrounding ground-glass opacity.  2. Worsening extensive basilar-predominant interstitial lung disease.  3. Radiographic findings suggesting COPD.   A preliminary report was issued by the [facility] teleradiology service. There are no major discrepancies.  elm-remote.   Dictated by (CST): Luis E Desir MD on 12/25/2024 at 6:38 AM     Finalized by (CST): Luis E Desir MD on 12/25/2024 at 6:40 AM         ED Medications Administered:   Medications   sodium chloride 0.9 % IV bolus 1,000 mL (0 mL Intravenous Stopped 12/25/24 0349)   heparin (Porcine) 100 Units/mL lock flush 500 Units (500 Units Intravenous Given 12/25/24 0403)         MDM     Vitals:    12/25/24 0215 12/25/24 0245 12/25/24 0300 12/25/24 0345   BP: 109/58 123/63 125/67    Pulse: 82 79 79 80   Resp:   20 18   Temp:       TempSrc:       SpO2: 95% 96% 96% 98%   Weight:       Height:         *I personally reviewed and interpreted all ED vitals.    Pulse Ox: 98%, Room air, Normal     Monitor Interpretation:   normal sinus rhythm as interpreted by me.  The cardiac monitor was ordered to monitor heart rate.    Differential Diagnosis/ Diagnostic Considerations: Infection, dehydration, UTI, other    Complicating Factors: The  patient already has does not have any pertinent problems on file. to contribute to the complexity of this ED evaluation.    Medical Decision Making  Patient presents to the ED complaining of feeling generally weak.  No other specific symptoms and he states weakness is chronic.  Patient nondistressed on examination.  Workup without concerning findings.  Patient declining further workup in the ED and now adamant on leaving the ED and going home.  He understands he is leaving before a complete workup has been completed and AGAINST MEDICAL ADVICE.  He would like to leave the ED and states that he can follow-up with his PCP.  Patient encouraged to return if he feels worse or wants to complete his workup.    Problems Addressed:  Weakness generalized: chronic illness or injury with exacerbation, progression, or side effects of treatment    Amount and/or Complexity of Data Reviewed  Labs: ordered. Decision-making details documented in ED Course.        Condition upon leaving the department: Stable    Disposition and Plan     Clinical Impression:  1. Weakness generalized        Disposition:  Discharge    Follow-up:  Jacob Erazo MD  [address] [place] [id number]  [phone]    Schedule an appointment as soon as possible for a visit in 3 day(s)        Medications Prescribed:  Discharge Medication List as of 12/25/2024  4:11 AM                           [1] (Not in a hospital admission)

## 2024-12-27 ENCOUNTER — PATIENT OUTREACH (OUTPATIENT)
Dept: CASE MANAGEMENT | Age: 69
End: 2024-12-27

## 2024-12-27 NOTE — PROGRESS NOTES
Via Pat interpretor Left message on mailbox for patient to call nurse care manager back for transitions of care call.  Nurse care  information included in message.

## 2025-01-02 ENCOUNTER — APPOINTMENT (OUTPATIENT)
Dept: GENERAL RADIOLOGY | Facility: HOSPITAL | Age: 70
End: 2025-01-02
Attending: EMERGENCY MEDICINE
Payer: MEDICARE

## 2025-01-02 ENCOUNTER — OFFICE VISIT (OUTPATIENT)
Age: 70
End: 2025-01-02
Attending: INTERNAL MEDICINE
Payer: MEDICARE

## 2025-01-02 ENCOUNTER — HOSPITAL ENCOUNTER (EMERGENCY)
Facility: HOSPITAL | Age: 70
Discharge: HOME | End: 2025-01-02
Attending: EMERGENCY MEDICINE
Payer: MEDICARE

## 2025-01-02 ENCOUNTER — NURSE ONLY (OUTPATIENT)
Age: 70
End: 2025-01-02
Attending: INTERNAL MEDICINE
Payer: MEDICARE

## 2025-01-02 ENCOUNTER — TELEPHONE (OUTPATIENT)
Dept: INTERNAL MEDICINE CLINIC | Facility: CLINIC | Age: 70
End: 2025-01-02

## 2025-01-02 VITALS
HEIGHT: 67 IN | HEART RATE: 73 BPM | WEIGHT: 187.81 LBS | DIASTOLIC BLOOD PRESSURE: 81 MMHG | BODY MASS INDEX: 29.48 KG/M2 | OXYGEN SATURATION: 95 % | RESPIRATION RATE: 16 BRPM | SYSTOLIC BLOOD PRESSURE: 153 MMHG | TEMPERATURE: 98 F

## 2025-01-02 VITALS
RESPIRATION RATE: 18 BRPM | BODY MASS INDEX: 29 KG/M2 | WEIGHT: 187.81 LBS | DIASTOLIC BLOOD PRESSURE: 66 MMHG | OXYGEN SATURATION: 95 % | HEART RATE: 72 BPM | SYSTOLIC BLOOD PRESSURE: 118 MMHG | TEMPERATURE: 99 F

## 2025-01-02 DIAGNOSIS — R11.0 NAUSEA: Primary | ICD-10-CM

## 2025-01-02 DIAGNOSIS — R53.1 WEAKNESS GENERALIZED: ICD-10-CM

## 2025-01-02 DIAGNOSIS — M79.2 NEUROPATHIC PAIN DUE TO RADIATION: Primary | ICD-10-CM

## 2025-01-02 DIAGNOSIS — Z51.11 CHEMOTHERAPY MANAGEMENT, ENCOUNTER FOR: ICD-10-CM

## 2025-01-02 DIAGNOSIS — C34.92 STAGE IV SQUAMOUS CELL CARCINOMA OF LEFT LUNG (HCC): Primary | ICD-10-CM

## 2025-01-02 LAB
ALBUMIN SERPL-MCNC: 3.9 G/DL (ref 3.2–4.8)
ALBUMIN SERPL-MCNC: 3.9 G/DL (ref 3.2–4.8)
ALBUMIN/GLOB SERPL: 1.3 {RATIO} (ref 1–2)
ALBUMIN/GLOB SERPL: 1.4 {RATIO} (ref 1–2)
ALP LIVER SERPL-CCNC: 101 U/L
ALP LIVER SERPL-CCNC: 104 U/L
ALT SERPL-CCNC: 10 U/L
ALT SERPL-CCNC: 10 U/L
ANION GAP SERPL CALC-SCNC: 6 MMOL/L (ref 0–18)
ANION GAP SERPL CALC-SCNC: 7 MMOL/L (ref 0–18)
AST SERPL-CCNC: 12 U/L (ref ?–34)
AST SERPL-CCNC: 13 U/L (ref ?–34)
BASOPHILS # BLD AUTO: 0.02 X10(3) UL (ref 0–0.2)
BASOPHILS # BLD AUTO: 0.02 X10(3) UL (ref 0–0.2)
BASOPHILS NFR BLD AUTO: 0.2 %
BASOPHILS NFR BLD AUTO: 0.2 %
BILIRUB SERPL-MCNC: 0.2 MG/DL (ref 0.2–1.1)
BILIRUB SERPL-MCNC: 0.2 MG/DL (ref 0.2–1.1)
BILIRUB UR QL: NEGATIVE
BUN BLD-MCNC: 13 MG/DL (ref 9–23)
BUN BLD-MCNC: 14 MG/DL (ref 9–23)
BUN/CREAT SERPL: 16.3 (ref 10–20)
BUN/CREAT SERPL: 18.2 (ref 10–20)
CALCIUM BLD-MCNC: 10.2 MG/DL (ref 8.7–10.4)
CALCIUM BLD-MCNC: 10.4 MG/DL (ref 8.7–10.4)
CHLORIDE SERPL-SCNC: 104 MMOL/L (ref 98–112)
CHLORIDE SERPL-SCNC: 104 MMOL/L (ref 98–112)
CLARITY UR: CLEAR
CO2 SERPL-SCNC: 27 MMOL/L (ref 21–32)
CO2 SERPL-SCNC: 28 MMOL/L (ref 21–32)
COLOR UR: YELLOW
CREAT BLD-MCNC: 0.77 MG/DL
CREAT BLD-MCNC: 0.8 MG/DL
DEPRECATED RDW RBC AUTO: 56.1 FL (ref 35.1–46.3)
DEPRECATED RDW RBC AUTO: 56.6 FL (ref 35.1–46.3)
EGFRCR SERPLBLD CKD-EPI 2021: 95 ML/MIN/1.73M2 (ref 60–?)
EGFRCR SERPLBLD CKD-EPI 2021: 96 ML/MIN/1.73M2 (ref 60–?)
EOSINOPHIL # BLD AUTO: 0.01 X10(3) UL (ref 0–0.7)
EOSINOPHIL # BLD AUTO: 0.03 X10(3) UL (ref 0–0.7)
EOSINOPHIL NFR BLD AUTO: 0.1 %
EOSINOPHIL NFR BLD AUTO: 0.4 %
ERYTHROCYTE [DISTWIDTH] IN BLOOD BY AUTOMATED COUNT: 17.5 % (ref 11–15)
ERYTHROCYTE [DISTWIDTH] IN BLOOD BY AUTOMATED COUNT: 17.6 % (ref 11–15)
FLUAV + FLUBV RNA SPEC NAA+PROBE: NEGATIVE
FLUAV + FLUBV RNA SPEC NAA+PROBE: NEGATIVE
GLOBULIN PLAS-MCNC: 2.7 G/DL (ref 2–3.5)
GLOBULIN PLAS-MCNC: 2.9 G/DL (ref 2–3.5)
GLUCOSE BLD-MCNC: 154 MG/DL (ref 70–99)
GLUCOSE BLD-MCNC: 158 MG/DL (ref 70–99)
GLUCOSE UR-MCNC: NORMAL MG/DL
HCT VFR BLD AUTO: 26.8 %
HCT VFR BLD AUTO: 28 %
HGB BLD-MCNC: 8.1 G/DL
HGB BLD-MCNC: 8.7 G/DL
HGB UR QL STRIP.AUTO: NEGATIVE
IMM GRANULOCYTES # BLD AUTO: 0.04 X10(3) UL (ref 0–1)
IMM GRANULOCYTES # BLD AUTO: 0.05 X10(3) UL (ref 0–1)
IMM GRANULOCYTES NFR BLD: 0.5 %
IMM GRANULOCYTES NFR BLD: 0.6 %
KETONES UR-MCNC: NEGATIVE MG/DL
LEUKOCYTE ESTERASE UR QL STRIP.AUTO: NEGATIVE
LYMPHOCYTES # BLD AUTO: 0.68 X10(3) UL (ref 1–4)
LYMPHOCYTES # BLD AUTO: 0.89 X10(3) UL (ref 1–4)
LYMPHOCYTES NFR BLD AUTO: 10.7 %
LYMPHOCYTES NFR BLD AUTO: 8.3 %
MCH RBC QN AUTO: 26.5 PG (ref 26–34)
MCH RBC QN AUTO: 27.4 PG (ref 26–34)
MCHC RBC AUTO-ENTMCNC: 30.2 G/DL (ref 31–37)
MCHC RBC AUTO-ENTMCNC: 31.1 G/DL (ref 31–37)
MCV RBC AUTO: 87.6 FL
MCV RBC AUTO: 88.3 FL
MONOCYTES # BLD AUTO: 0.48 X10(3) UL (ref 0.1–1)
MONOCYTES # BLD AUTO: 0.63 X10(3) UL (ref 0.1–1)
MONOCYTES NFR BLD AUTO: 5.8 %
MONOCYTES NFR BLD AUTO: 7.6 %
NEUTROPHILS # BLD AUTO: 6.68 X10 (3) UL (ref 1.5–7.7)
NEUTROPHILS # BLD AUTO: 6.68 X10(3) UL (ref 1.5–7.7)
NEUTROPHILS # BLD AUTO: 7 X10 (3) UL (ref 1.5–7.7)
NEUTROPHILS # BLD AUTO: 7 X10(3) UL (ref 1.5–7.7)
NEUTROPHILS NFR BLD AUTO: 80.5 %
NEUTROPHILS NFR BLD AUTO: 85.1 %
NITRITE UR QL STRIP.AUTO: NEGATIVE
OSMOLALITY SERPL CALC.SUM OF ELEC: 289 MOSM/KG (ref 275–295)
OSMOLALITY SERPL CALC.SUM OF ELEC: 290 MOSM/KG (ref 275–295)
PH UR: 5.5 [PH] (ref 5–8)
PLATELET # BLD AUTO: 441 10(3)UL (ref 150–450)
PLATELET # BLD AUTO: 446 10(3)UL (ref 150–450)
POTASSIUM SERPL-SCNC: 4.4 MMOL/L (ref 3.5–5.1)
POTASSIUM SERPL-SCNC: 4.8 MMOL/L (ref 3.5–5.1)
PROT SERPL-MCNC: 6.6 G/DL (ref 5.7–8.2)
PROT SERPL-MCNC: 6.8 G/DL (ref 5.7–8.2)
PROT UR-MCNC: 20 MG/DL
RBC # BLD AUTO: 3.06 X10(6)UL
RBC # BLD AUTO: 3.17 X10(6)UL
RSV RNA SPEC NAA+PROBE: NEGATIVE
SARS-COV-2 RNA RESP QL NAA+PROBE: NOT DETECTED
SODIUM SERPL-SCNC: 138 MMOL/L (ref 136–145)
SODIUM SERPL-SCNC: 138 MMOL/L (ref 136–145)
SP GR UR STRIP: 1.03 (ref 1–1.03)
TSI SER-ACNC: 3.08 UIU/ML (ref 0.55–4.78)
UROBILINOGEN UR STRIP-ACNC: NORMAL
WBC # BLD AUTO: 8.2 X10(3) UL (ref 4–11)
WBC # BLD AUTO: 8.3 X10(3) UL (ref 4–11)

## 2025-01-02 PROCEDURE — 85025 COMPLETE CBC W/AUTO DIFF WBC: CPT | Performed by: EMERGENCY MEDICINE

## 2025-01-02 PROCEDURE — 99284 EMERGENCY DEPT VISIT MOD MDM: CPT

## 2025-01-02 PROCEDURE — 84443 ASSAY THYROID STIM HORMONE: CPT | Performed by: EMERGENCY MEDICINE

## 2025-01-02 PROCEDURE — 0241U SARS-COV-2/FLU A AND B/RSV BY PCR (GENEXPERT): CPT | Performed by: EMERGENCY MEDICINE

## 2025-01-02 PROCEDURE — 71045 X-RAY EXAM CHEST 1 VIEW: CPT | Performed by: EMERGENCY MEDICINE

## 2025-01-02 PROCEDURE — 36415 COLL VENOUS BLD VENIPUNCTURE: CPT

## 2025-01-02 PROCEDURE — 80053 COMPREHEN METABOLIC PANEL: CPT | Performed by: EMERGENCY MEDICINE

## 2025-01-02 PROCEDURE — 81001 URINALYSIS AUTO W/SCOPE: CPT | Performed by: EMERGENCY MEDICINE

## 2025-01-02 RX ORDER — PREGABALIN 50 MG/1
50 CAPSULE ORAL 3 TIMES DAILY
Qty: 90 CAPSULE | Refills: 1 | Status: SHIPPED | OUTPATIENT
Start: 2025-01-02

## 2025-01-02 RX ORDER — DEXAMETHASONE SODIUM PHOSPHATE 4 MG/ML
8 VIAL (ML) INJECTION ONCE
Status: COMPLETED | OUTPATIENT
Start: 2025-01-02 | End: 2025-01-02

## 2025-01-02 NOTE — ED QUICK NOTES
Pts port was already accessed by cancer center upon arrival to ER. This RN de-accessed pt's port upon discharge from the ER.

## 2025-01-02 NOTE — ED INITIAL ASSESSMENT (HPI)
Patient via cancer center to ED with complaint of NV weakness since yesterday.       Patient is AXOX4.

## 2025-01-02 NOTE — TELEPHONE ENCOUNTER
Need refill  is completely out     Current Outpatient Medications:   .,  pregabalin (LYRICA) 50 MG Oral Cap, Take 1 capsule (50 mg total) by mouth 3 (three) times daily., Disp: 90 capsule, Rfl: 1

## 2025-01-02 NOTE — PROGRESS NOTES
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  PDL1 0%     Diagnosis 1/3/2024 via transbronchial biopsy bronchoscopy with Dr. Carrillo.     On CT 12/22/23 4.2 x 3.3 cm left upper lobe mass enlarged AP reflection lymph node left hilar adenopathy.  PET/CT showed bone metastasis     Initiated Carboplatin Paclitaxel Pembrolizumab 2/1/24  Maintenance pembrolizumab 4/25/24-7/25/24     Palliative RT to R 8th rib lesion 7/2-7/3/24     PET scan 8/7/24 showed progressive disease.     Guardant 360CDx 8/15/24 all VUS. No actionable alteration.     8/29/24 C1 gemcitabine   9/19/24: C2 gemcitabine    10/3/24 POD on scans     10/17/24 C1 docetaxel   11/7/24: C2 docetaxel  12/12/24: C3 docetaxel  1/2/25: C4 docetaxel due, defer due to ER evaluation for weakness and uncontrolled symptoms of N/V and decreased appetite    Interval History:    Patient is here for a follow up visit, presents with his wife.  He declined the use of the language line     Pt went to ER on 12/25/24 for weakness and left AMA. Today he and his wife report he is very weak, having N/V, and having decreased appetite.  He reports feeling poorly, his wife is emotional discussing his recent decline.  They are agreeable for ER evaluation of these persistent symptoms        Performance Status: ECOG 1-2    Current Medications:    Current Outpatient Medications:     HYDROcodone-acetaminophen  MG Oral Tab, Take 1 tablet by mouth every 4 (four) hours as needed for Pain (for cancer related pain; MAX dose is 6 tabs per day)., Disp: 60 tablet, Rfl: 0    Tiotropium Bromide-Olodaterol 2.5-2.5 MCG/ACT  Inhalation Aero Soln, Inhale 2 puffs into the lungs every morning., Disp: 4 g, Rfl: 1    apixaban (ELIQUIS) 5 MG Oral Tab, Take 1 tablet (5 mg total) by mouth 2 (two) times daily., Disp: 30 tablet, Rfl: 5    ondansetron (ZOFRAN) 8 MG tablet, Take 1 tablet (8 mg total) by mouth every 8 (eight) hours as needed for Nausea., Disp: 60 tablet, Rfl: 3    pregabalin (LYRICA) 50 MG Oral Cap, Take 1 capsule (50 mg total) by mouth 3 (three) times daily., Disp: 90 capsule, Rfl: 1    metFORMIN 850 MG Oral Tab, Take 1 tablet (850 mg total) by mouth 2 (two) times daily with meals., Disp: 180 tablet, Rfl: 3    lisinopril 30 MG Oral Tab, Take 1 tablet (30 mg total) by mouth daily., Disp: 90 tablet, Rfl: 2    metoprolol succinate  MG Oral Tablet 24 Hr, Take 1 tablet (100 mg total) by mouth daily., Disp: 90 tablet, Rfl: 1    glipiZIDE ER 5 MG Oral Tablet 24 Hr, Take 1 tablet (5 mg total) by mouth daily., Disp: 90 tablet, Rfl: 1    polyethylene glycol, PEG 3350, 17 g Oral Powd Pack, Take 17 g by mouth daily., Disp: , Rfl:     atorvastatin 20 MG Oral Tab, Take 1 tablet (20 mg total) by mouth nightly., Disp: 90 tablet, Rfl: 3    prochlorperazine (COMPAZINE) 10 mg tablet, Take 1 tablet (10 mg total) by mouth every 6 (six) hours as needed for Nausea., Disp: 30 tablet, Rfl: 3    aspirin 81 MG Oral Tab EC, Take 1 tablet (81 mg total) by mouth daily., Disp: , Rfl:     Review of Systems:  10 -point systems reviewed, all negative except as mentioned in the HPI/interval history.     Vital Signs:  /81 (BP Location: Left arm, Patient Position: Sitting, Cuff Size: adult)   Pulse 73   Temp 97.8 °F (36.6 °C) (Oral)   Resp 16   Ht 1.702 m (5' 7\")   Wt 85.2 kg (187 lb 12.8 oz)   SpO2 95%   BMI 29.41 kg/m²     Wt Readings from Last 6 Encounters:   01/02/25 85.2 kg (187 lb 12.8 oz)   12/25/24 90.7 kg (200 lb)   12/19/24 87.2 kg (192 lb 4.8 oz)   12/12/24 90.4 kg (199 lb 6.4 oz)   12/12/24 90.4 kg (199 lb 6.4 oz)   11/14/24 88.9 kg (196  lb)      Physical Examination:  General: Alert and oriented x 3, appears weak  Psych:  Mood and affect appropriate  HEENT: Non-icteric sclera. Oropharynx is clear.   Neck: Neck is supple.  Chest: Clear to auscultation bilaterally  Heart: Regular rate and rhythm.  Abdomen: Soft, non tender with good bowel sounds.   Extremities: Trace BLE edema  Neurological: Grossly intact      Laboratory:  Lab Results   Component Value Date    WBC 8.3 01/02/2025    RBC 3.06 (L) 01/02/2025    HGB 8.1 (L) 01/02/2025    HCT 26.8 (L) 01/02/2025    MCV 87.6 01/02/2025    MCH 26.5 01/02/2025    MCHC 30.2 (L) 01/02/2025    RDW 17.6 (H) 01/02/2025    .0 01/02/2025     Lab Results   Component Value Date     12/25/2024    K 4.3 12/25/2024     12/25/2024    CO2 26.0 12/25/2024    BUN 13 12/25/2024    CREATSERUM 0.78 12/25/2024     (H) 12/25/2024    CA 9.9 12/25/2024    ALKPHO 86 12/25/2024    ALT 14 12/25/2024    AST 11 12/25/2024    BILT 0.3 12/25/2024    ALB 3.6 12/25/2024    TP 6.1 12/25/2024     Lab Results   Component Value Date/Time    TSH 3.333 07/25/2024 08:28 AM    TSH 2.573 07/05/2024 10:18 AM    TSH 2.706 06/13/2024 12:35 PM    TSH 3.369 05/20/2024 12:56 PM    TSH 3.325 04/24/2024 10:15 AM    TSH 2.917 04/03/2024 08:39 AM     Radiology:  XR CHEST AP PORTABLE  (CPT=71045)    Result Date: 12/25/2024  CONCLUSION:  1. Enlarging wedge-shaped peripheral subpleural mass with surrounding ground-glass opacity.  2. Worsening extensive basilar-predominant interstitial lung disease.  3. Radiographic findings suggesting COPD.   A preliminary report was issued by the [facility] Radiology teleradiology service. There are no major discrepancies.  elm-remote.   Dictated by (CST): Luis E Desir MD on 12/25/2024 at 6:38 AM     Finalized by (CST): Luis E Desir MD on 12/25/2024 at 6:40 AM          IR PORT A CATH INSERTION EXCHNGE CHECK    Result Date: 11/13/2024  CONCLUSION:  Ultrasound and fluoroscopic guided surgical  placement of chest port    Dictated by (CST): Jackson Manzano MD on 11/13/2024 at 2:33 PM     Finalized by (CST): Jackson Manzano MD on 11/13/2024 at 2:33 PM          US VENOUS DOPPLER ARM RIGHT - DIAG IMG (CPT=93971)    Result Date: 11/7/2024  CONCLUSION:  Completely occlusive DVT involving one branch of the paired right brachial veins.    Dictated by (CST): Rajesh Tapia MD on 11/07/2024 at 3:40 PM     Finalized by (CST): Rajesh Tapia MD on 11/07/2024 at 3:42 PM            Impression and Plan:    Metastatic squamous cell carcinoma of the left upper lung, PD-L1 0%  --Carboplatin paclitaxel pembrolizuamb-initiated 2/1/24 and completed 4 cycles on 4/4/24; dose reduce carbo to AUC 5 and paclitaxel by 20% due CIPN to feet.   --Imaging 4/2024 after C4 with favorable tx response.   --Continued on maintenance pembrolizumab q3 wks, completed 9 cycles as of  7/25/24 with no autoimmune toxicity.  --Restaging CT and PET scans 8/2024 showed progression of disease. Guardant (ctDNA sequencing) with no targetable alteration. He was not interested in clinical trials or referral to a tertiary center.  --Received 2 cycles of gemcitabine 8/29/24- 9/26/24 with no response.   --CT scans in ER on 10/3/24 for LUQ showed progression of disease with new splenic metastasis (2.5 cm) and increasing LUCINDA mass.   --Switched chemo to docetaxel 35 mg/m2 day 1 and 8 every 21 days and tolerated cycle 1 fairly well.   --port placed 11/13/24  --Defer C4D1 docetaxel today and will send patient to ER for evaluation of persistent weakness, uncontrolled N/V,   --Repeat scans in early January to assess response, CT ordered          Bone metastases  Cancer related pain  --S/p palliative XRT 7/3/24 to enlarging right 8th rib destructive bone lesion  --Bilateral upper quadrant pain due to metastases.   --Continue Norco 10/325 mg Q6-8H PRN. Patient following closely with palliative care for pain management        RUE swelling  --stat Venous doppler  showed acute DVT in the right brachial vein, likely related to IV catheters.   --Started eliquis 10 mg bid x7 days, now to continue 5 mg bid for a minimum of 3 months      Skin abscesses  --3 lesions over the right flank/back, s/p keflex and bactrim without improvement  --Referred to surgery for consideration of I&D, given Bactrim with little improvement. Patient to make follow up appt with surgery again      Chemo induced neuropathy- stable. Continue lyrica        Chemo induced anemia- will continue to monitor and consider RAI therapy if worsening anemia.           MANJINDER Cai   Hematology/Oncology

## 2025-01-02 NOTE — PROGRESS NOTES
Message received from clinic APN   No tx today, sent to ED for weakness N/V     Port was not de accessed in clinic     Clinic RN to follow up regarding future appointments

## 2025-01-03 NOTE — ED PROVIDER NOTES
Patient Seen in: [facility] Emergency Department    History     Chief Complaint   Patient presents with    Nausea/Vomiting/Diarrhea    Weakness     Stated Complaint: stg 4 lung cancer, nausea/ vomiting/ weakness    HPI    Patient complains of nausea, anorexia and malaise.  This has been going on for weeks.  Has stage 4 lung cancer. No fever. No new shortness of brteath..    Alleviating factors: none  Exacerbating factors: any activity    Past Medical History:    Bell's palsy    COPD (chronic obstructive pulmonary disease) (HCC)    Degenerative joint disease (DJD) of lumbar spine    Diabetes (HCC)    Diabetes mellitus, type II (HCC)    Heart attack (HCC)    High blood pressure    High cholesterol    Hyperlipidemia    Hypertension    Osteoarthritis    Sleep apnea    no CPAP       Past Surgical History:   Procedure Laterality Date    Appendectomy      Coronary stent placement      Palate/uvula surgery unlisted      Ventral hernia repair  2021    primary repair, ventral and umbilical hernias            Family History   Problem Relation Age of Onset    No Known Problems Mother     Heart Disorder Father     Diabetes Neg     Glaucoma Neg     Macular degeneration Neg        Social History     Socioeconomic History    Marital status:     Number of children: 3   Occupational History    Occupation:    Tobacco Use    Smoking status: Former     Current packs/day: 0.00     Types: Cigarettes     Quit date: 3/19/2020     Years since quittin.7    Smokeless tobacco: Never    Tobacco comments:     Occasionally, not in last 6 months   Vaping Use    Vaping status: Never Used   Substance and Sexual Activity    Alcohol use: Not Currently    Drug use: Never     Social Drivers of Health     Financial Resource Strain: Low Risk  (2023)    Financial Resource Strain     Difficulty of Paying Living Expenses: Not hard at all     Med Affordability: No   Transportation Needs: No Transportation Needs  (11/7/2023)    Transportation Needs     Lack of Transportation: No       Review of Systems    Positive for stated complaint: stg 4 lung cancer, nausea/ vomiting/ weakness  Other systems are as noted in HPI.  Constitutional and vital signs reviewed.      All other systems reviewed and negative except as noted above.    PSFH elements reviewed from today and agreed except as otherwise stated in HPI.    Physical Exam     ED Triage Vitals [01/02/25 0959]   /69   Pulse 72   Resp 18   Temp 98.8 °F (37.1 °C)   Temp src Temporal   SpO2 97 %   O2 Device None (Room air)       Current:/66   Pulse 72   Temp 98.8 °F (37.1 °C) (Temporal)   Resp 18   Wt 85.2 kg   SpO2 95%   BMI 29.42 kg/m²    PULSE OX nl  GENERAL: apppears tired and pale  HEAD: normocephalic, atraumatic,   EYES: PERRLA, EOMI, conj sclera clear  THROAT: mmm, no lesions  NECK: supple, no meningeal signs  LUNGS: decreased breath sounds b lower lung fields  CARDIO: RRR without murmur  GI: abdomen is soft and non tender, no masses, nl bowel sounds   EXTREMITIES: from, 4+/5 strength in all 4 ext, no edema  NEURO: alert and oiented *3, 2-12 intact, no focal deficit noted  SKIN: good skin turgor, no  rashes  PSYCH: calm, cooperative,    Differential includes:malignancy related vs. Pneumonia vs metabolic abnormalities    ED Course     Labs Reviewed   CBC WITH DIFFERENTIAL WITH PLATELET - Abnormal; Notable for the following components:       Result Value    RBC 3.17 (*)     HGB 8.7 (*)     HCT 28.0 (*)     RDW-SD 56.1 (*)     RDW 17.5 (*)     Lymphocyte Absolute 0.68 (*)     All other components within normal limits   COMP METABOLIC PANEL (14) - Abnormal; Notable for the following components:    Glucose 158 (*)     All other components within normal limits   URINALYSIS WITH CULTURE REFLEX - Abnormal; Notable for the following components:    Protein Urine 20 (*)     All other components within normal limits   TSH W REFLEX TO FREE T4 - Normal   SARS-COV-2/FLU  A AND B/RSV BY PCR (GENEXPERT) - Normal    Narrative:     This test is intended for the qualitative detection and differentiation of SARS-CoV-2, influenza A, influenza B, and respiratory syncytial virus (RSV) viral RNA in nasopharyngeal or nares swabs from individuals suspected of respiratory viral infection consistent with COVID-19 by their healthcare provider. Signs and symptoms of respiratory viral infection due to SARS-CoV-2, influenza, and RSV can be similar.    Test performed using the Xpert Xpress SARS-CoV-2/FLU/RSV (real time RT-PCR)  assay on the GeneXpert instrument, [employer], [place] [address].   This test is being used under the Food and Drug Administration's Emergency Use Authorization.    The authorized Fact Sheet for Healthcare Providers for this assay is available upon request from the laboratory.   RAINBOW DRAW LAVENDER   RAINBOW DRAW LIGHT GREEN   RAINBOW DRAW BLUE       MDM       Cardiac Monitor:   Pulse Readings from Last 1 Encounters:   01/02/25 72   , sinus, 72  interpreted by me.    Radiology findings:   XR CHEST AP PORTABLE  (CPT=71045)    Result Date: 1/2/2025  CONCLUSION:   Masslike densities within the left lung consistent with patient's known ligaments is unchanged.  Right lower lobe atelectasis is unchanged.    Dictated by (CST): Herberth Pollard MD on 1/02/2025 at 11:13 AM     Finalized by (CST): Herberth Pollard MD on 1/02/2025 at 11:14 AM               Medical Decision Making  Problems Addressed:  Nausea: chronic illness or injury with exacerbation, progression, or side effects of treatment  Weakness generalized: chronic illness or injury with exacerbation, progression, or side effects of treatment    Amount and/or Complexity of Data Reviewed  External Data Reviewed: notes.     Details: Sent from oncology reviewed notes  Labs: ordered. Decision-making details documented in ED Course.  Radiology: ordered. Decision-making details documented in ED Course.        Disposition and Plan      Clinical Impression:  1. Nausea    2. Weakness generalized        Disposition:  Discharge    Follow-up:  Jacob Erazo MD  [address] [facility] [address]  [phone]    Follow up        Medications Prescribed:  Discharge Medication List as of 1/2/2025 12:15 PM

## 2025-01-06 NOTE — PROGRESS NOTES
Third attempt -  Left message on mailbox for patient to call nurse care manager back for transitions of care call.  Nurse care  information included in message.      Patient called nurse care manager back and declined assessment.

## 2025-01-08 ENCOUNTER — HOSPITAL ENCOUNTER (EMERGENCY)
Facility: HOSPITAL | Age: 70
Discharge: HOME | End: 2025-01-08
Attending: EMERGENCY MEDICINE
Payer: MEDICARE

## 2025-01-08 VITALS
OXYGEN SATURATION: 100 % | RESPIRATION RATE: 18 BRPM | TEMPERATURE: 97 F | SYSTOLIC BLOOD PRESSURE: 175 MMHG | HEART RATE: 100 BPM | DIASTOLIC BLOOD PRESSURE: 89 MMHG

## 2025-01-08 DIAGNOSIS — R11.2 NAUSEA AND VOMITING IN ADULT: Primary | ICD-10-CM

## 2025-01-08 LAB
ALBUMIN SERPL-MCNC: 3.9 G/DL (ref 3.2–4.8)
ALBUMIN/GLOB SERPL: 1.3 {RATIO} (ref 1–2)
ALP LIVER SERPL-CCNC: 112 U/L
ALT SERPL-CCNC: 15 U/L
ANION GAP SERPL CALC-SCNC: 8 MMOL/L (ref 0–18)
AST SERPL-CCNC: 12 U/L (ref ?–34)
BASOPHILS # BLD AUTO: 0.01 X10(3) UL (ref 0–0.2)
BASOPHILS NFR BLD AUTO: 0.2 %
BILIRUB SERPL-MCNC: 0.3 MG/DL (ref 0.2–1.1)
BUN BLD-MCNC: 17 MG/DL (ref 9–23)
BUN/CREAT SERPL: 24.3 (ref 10–20)
CALCIUM BLD-MCNC: 10 MG/DL (ref 8.7–10.4)
CHLORIDE SERPL-SCNC: 104 MMOL/L (ref 98–112)
CO2 SERPL-SCNC: 29 MMOL/L (ref 21–32)
CREAT BLD-MCNC: 0.7 MG/DL
DEPRECATED RDW RBC AUTO: 55.8 FL (ref 35.1–46.3)
EGFRCR SERPLBLD CKD-EPI 2021: 99 ML/MIN/1.73M2 (ref 60–?)
EOSINOPHIL # BLD AUTO: 0 X10(3) UL (ref 0–0.7)
EOSINOPHIL NFR BLD AUTO: 0 %
ERYTHROCYTE [DISTWIDTH] IN BLOOD BY AUTOMATED COUNT: 17.5 % (ref 11–15)
GLOBULIN PLAS-MCNC: 2.9 G/DL (ref 2–3.5)
GLUCOSE BLD-MCNC: 164 MG/DL (ref 70–99)
HCT VFR BLD AUTO: 28.1 %
HGB BLD-MCNC: 8.3 G/DL
IMM GRANULOCYTES # BLD AUTO: 0.03 X10(3) UL (ref 0–1)
IMM GRANULOCYTES NFR BLD: 0.5 %
LYMPHOCYTES # BLD AUTO: 0.64 X10(3) UL (ref 1–4)
LYMPHOCYTES NFR BLD AUTO: 9.7 %
MAGNESIUM SERPL-MCNC: 1.3 MG/DL (ref 1.6–2.6)
MCH RBC QN AUTO: 25.5 PG (ref 26–34)
MCHC RBC AUTO-ENTMCNC: 29.5 G/DL (ref 31–37)
MCV RBC AUTO: 86.5 FL
MONOCYTES # BLD AUTO: 0.48 X10(3) UL (ref 0.1–1)
MONOCYTES NFR BLD AUTO: 7.3 %
NEUTROPHILS # BLD AUTO: 5.42 X10 (3) UL (ref 1.5–7.7)
NEUTROPHILS # BLD AUTO: 5.42 X10(3) UL (ref 1.5–7.7)
NEUTROPHILS NFR BLD AUTO: 82.3 %
OSMOLALITY SERPL CALC.SUM OF ELEC: 297 MOSM/KG (ref 275–295)
PLATELET # BLD AUTO: 374 10(3)UL (ref 150–450)
POTASSIUM SERPL-SCNC: 4.6 MMOL/L (ref 3.5–5.1)
PROT SERPL-MCNC: 6.8 G/DL (ref 5.7–8.2)
RBC # BLD AUTO: 3.25 X10(6)UL
SODIUM SERPL-SCNC: 141 MMOL/L (ref 136–145)
WBC # BLD AUTO: 6.6 X10(3) UL (ref 4–11)

## 2025-01-08 PROCEDURE — S0028 INJECTION, FAMOTIDINE, 20 MG: HCPCS | Performed by: EMERGENCY MEDICINE

## 2025-01-08 PROCEDURE — 96375 TX/PRO/DX INJ NEW DRUG ADDON: CPT

## 2025-01-08 PROCEDURE — 85025 COMPLETE CBC W/AUTO DIFF WBC: CPT | Performed by: EMERGENCY MEDICINE

## 2025-01-08 PROCEDURE — 96365 THER/PROPH/DIAG IV INF INIT: CPT

## 2025-01-08 PROCEDURE — 83735 ASSAY OF MAGNESIUM: CPT | Performed by: EMERGENCY MEDICINE

## 2025-01-08 PROCEDURE — 96361 HYDRATE IV INFUSION ADD-ON: CPT

## 2025-01-08 PROCEDURE — 99285 EMERGENCY DEPT VISIT HI MDM: CPT

## 2025-01-08 PROCEDURE — 99284 EMERGENCY DEPT VISIT MOD MDM: CPT

## 2025-01-08 PROCEDURE — 80053 COMPREHEN METABOLIC PANEL: CPT | Performed by: EMERGENCY MEDICINE

## 2025-01-08 RX ORDER — FAMOTIDINE 10 MG/ML
20 INJECTION, SOLUTION INTRAVENOUS ONCE
Status: COMPLETED | OUTPATIENT
Start: 2025-01-08 | End: 2025-01-08

## 2025-01-08 RX ORDER — ONDANSETRON 2 MG/ML
4 INJECTION INTRAMUSCULAR; INTRAVENOUS ONCE
Status: COMPLETED | OUTPATIENT
Start: 2025-01-08 | End: 2025-01-08

## 2025-01-08 RX ORDER — MAGNESIUM SULFATE HEPTAHYDRATE 40 MG/ML
2 INJECTION, SOLUTION INTRAVENOUS ONCE
Status: COMPLETED | OUTPATIENT
Start: 2025-01-08 | End: 2025-01-08

## 2025-01-08 NOTE — ED PROVIDER NOTES
Patient Seen in: [facility] Emergency Department      History     Chief Complaint   Patient presents with    Vomiting     Stated Complaint: vomiting    Subjective:   HPI          Objective:     Past Medical History:    Bell's palsy    COPD (chronic obstructive pulmonary disease) (HCC)    Degenerative joint disease (DJD) of lumbar spine    Diabetes (HCC)    Diabetes mellitus, type II (HCC)    Heart attack (HCC)    High blood pressure    High cholesterol    Hyperlipidemia    Hypertension    Osteoarthritis    Sleep apnea    no CPAP              Past Surgical History:   Procedure Laterality Date    Appendectomy      Coronary stent placement      Palate/uvula surgery unlisted      Ventral hernia repair  2021    primary repair, ventral and umbilical hernias                Social History     Socioeconomic History    Marital status:     Number of children: 3   Occupational History    Occupation:    Tobacco Use    Smoking status: Former     Current packs/day: 0.00     Types: Cigarettes     Quit date: 3/19/2020     Years since quittin.8    Smokeless tobacco: Never    Tobacco comments:     Occasionally, not in last 6 months   Vaping Use    Vaping status: Never Used   Substance and Sexual Activity    Alcohol use: Not Currently    Drug use: Never     Social Drivers of Health     Financial Resource Strain: Low Risk  (2023)    Financial Resource Strain     Difficulty of Paying Living Expenses: Not hard at all     Med Affordability: No   Transportation Needs: No Transportation Needs (2023)    Transportation Needs     Lack of Transportation: No                  Physical Exam     ED Triage Vitals [25 1010]   /86   Pulse 117   Resp 18   Temp 97.3 °F (36.3 °C)   Temp src Temporal   SpO2 98 %   O2 Device None (Room air)       Current Vitals:   Vital Signs  BP: (!) 175/89  Pulse: 100  Resp: 18  Temp: 97.3 °F (36.3 °C)  Temp src: Temporal    Oxygen Therapy  SpO2: 100 %  O2  Device: None (Room air)        Physical Exam        ED Course     Labs Reviewed   CBC WITH DIFFERENTIAL WITH PLATELET - Abnormal; Notable for the following components:       Result Value    RBC 3.25 (*)     HGB 8.3 (*)     HCT 28.1 (*)     MCH 25.5 (*)     MCHC 29.5 (*)     RDW-SD 55.8 (*)     RDW 17.5 (*)     Lymphocyte Absolute 0.64 (*)     All other components within normal limits   COMP METABOLIC PANEL (14) - Abnormal; Notable for the following components:    Glucose 164 (*)     BUN/CREA Ratio 24.3 (*)     Calculated Osmolality 297 (*)     All other components within normal limits   MAGNESIUM - Abnormal; Notable for the following components:    Magnesium 1.3 (*)     All other components within normal limits   RAINBOW DRAW BLUE                   MDM      69-year-old male with history of CAD, COPD, diabetes, hypertension, and stage IV lung cancer currently on chemotherapy presents today with vomiting.  Patient states vomiting started last night with little oral intake since then.  He has had similar episodes in the past.  He was seen on January 2 for similar episodes at that time, chest x-ray, viral PCR, and labs were performed and relatively reassuring.  Patient is scheduled for chemo again tomorrow.  Denies fevers, chest pain, shortness of breath, diarrhea, or other associated symptoms.    On exam, slightly hypertensive, borderline tachycardic, afebrile, pale, dry mucous membranes, nontoxic-appearing.  Clear lungs.  Soft and nontender abdomen.  Mild extremity swelling    Differential: Chemotherapy side effect, electrolyte abnormality, dehydration, gastroenteritis    Patient started on antiemetics and IV fluids.  Labs performed showing baseline anemia, mild hypomagnesemia, but otherwise normal electrolytes.    On reexamination, after IV fluids, Zofran, and Pepcid, symptoms improved and patient able to tolerate oral intake without difficulty.    Will plan to replete magnesium in the ED and discharge for oncology  follow-up with return precautions.    Medical Decision Making      Disposition and Plan     Clinical Impression:  1. Nausea and vomiting in adult         Disposition:  Discharge  1/8/2025  1:08 pm    Follow-up:  Your oncology team    Follow up  Call today to discuss whether or not you should continue your chemo tomorrow or not.    We recommend that you schedule follow up care with a primary care provider within the next three months to obtain basic health screening including reassessment of your blood pressure.      Medications Prescribed:  Current Discharge Medication List              Supplementary Documentation:

## 2025-01-09 ENCOUNTER — APPOINTMENT (OUTPATIENT)
Age: 70
End: 2025-01-09
Attending: INTERNAL MEDICINE
Payer: MEDICARE

## 2025-01-09 ENCOUNTER — HOSPITAL ENCOUNTER (OUTPATIENT)
Dept: MRI IMAGING | Facility: HOSPITAL | Age: 70
Discharge: HOME | End: 2025-01-09
Attending: INTERNAL MEDICINE
Payer: MEDICARE

## 2025-01-09 ENCOUNTER — PATIENT OUTREACH (OUTPATIENT)
Dept: CASE MANAGEMENT | Age: 70
End: 2025-01-09

## 2025-01-09 ENCOUNTER — HOSPITAL ENCOUNTER (OUTPATIENT)
Dept: CT IMAGING | Facility: HOSPITAL | Age: 70
Discharge: HOME | End: 2025-01-09
Attending: INTERNAL MEDICINE
Payer: MEDICARE

## 2025-01-09 ENCOUNTER — NURSE ONLY (OUTPATIENT)
Age: 70
End: 2025-01-09
Attending: INTERNAL MEDICINE
Payer: MEDICARE

## 2025-01-09 ENCOUNTER — OFFICE VISIT (OUTPATIENT)
Age: 70
End: 2025-01-09
Attending: INTERNAL MEDICINE
Payer: MEDICARE

## 2025-01-09 VITALS
WEIGHT: 176 LBS | HEART RATE: 72 BPM | TEMPERATURE: 96 F | RESPIRATION RATE: 16 BRPM | SYSTOLIC BLOOD PRESSURE: 132 MMHG | DIASTOLIC BLOOD PRESSURE: 73 MMHG | HEIGHT: 67 IN | OXYGEN SATURATION: 96 % | BODY MASS INDEX: 27.62 KG/M2

## 2025-01-09 VITALS
RESPIRATION RATE: 16 BRPM | TEMPERATURE: 96 F | BODY MASS INDEX: 27.62 KG/M2 | HEIGHT: 67 IN | DIASTOLIC BLOOD PRESSURE: 73 MMHG | HEART RATE: 72 BPM | SYSTOLIC BLOOD PRESSURE: 132 MMHG | OXYGEN SATURATION: 96 % | WEIGHT: 176 LBS

## 2025-01-09 DIAGNOSIS — C34.92 STAGE IV SQUAMOUS CELL CARCINOMA OF LEFT LUNG (HCC): ICD-10-CM

## 2025-01-09 DIAGNOSIS — T40.2X5A THERAPEUTIC OPIOID INDUCED CONSTIPATION: ICD-10-CM

## 2025-01-09 DIAGNOSIS — C34.92 STAGE IV SQUAMOUS CELL CARCINOMA OF LEFT LUNG (HCC): Primary | ICD-10-CM

## 2025-01-09 DIAGNOSIS — C78.89 METASTASIS TO SPLEEN (HCC): ICD-10-CM

## 2025-01-09 DIAGNOSIS — R11.2 NAUSEA AND VOMITING, UNSPECIFIED VOMITING TYPE: ICD-10-CM

## 2025-01-09 DIAGNOSIS — Z51.11 CHEMOTHERAPY MANAGEMENT, ENCOUNTER FOR: ICD-10-CM

## 2025-01-09 DIAGNOSIS — C79.51 LUNG CANCER METASTATIC TO BONE (HCC): ICD-10-CM

## 2025-01-09 DIAGNOSIS — K59.03 THERAPEUTIC OPIOID INDUCED CONSTIPATION: ICD-10-CM

## 2025-01-09 DIAGNOSIS — R63.4 ABNORMAL WEIGHT LOSS: ICD-10-CM

## 2025-01-09 DIAGNOSIS — G89.3 CANCER RELATED PAIN: ICD-10-CM

## 2025-01-09 DIAGNOSIS — C79.51 METASTASIS TO BONE (HCC): ICD-10-CM

## 2025-01-09 DIAGNOSIS — R47.81 SLURRED SPEECH: ICD-10-CM

## 2025-01-09 DIAGNOSIS — C34.92 MALIGNANT NEOPLASM OF LEFT LUNG, UNSPECIFIED PART OF LUNG (HCC): ICD-10-CM

## 2025-01-09 DIAGNOSIS — Z71.89 GOALS OF CARE, COUNSELING/DISCUSSION: ICD-10-CM

## 2025-01-09 DIAGNOSIS — Z51.5 PALLIATIVE CARE ENCOUNTER: Primary | ICD-10-CM

## 2025-01-09 DIAGNOSIS — R53.81 DECLINING FUNCTIONAL STATUS: ICD-10-CM

## 2025-01-09 DIAGNOSIS — C34.90 LUNG CANCER METASTATIC TO BONE (HCC): ICD-10-CM

## 2025-01-09 LAB
ALBUMIN SERPL-MCNC: 3.8 G/DL (ref 3.2–4.8)
ALBUMIN/GLOB SERPL: 1.4 {RATIO} (ref 1–2)
ALP LIVER SERPL-CCNC: 104 U/L
ALT SERPL-CCNC: 17 U/L
ANION GAP SERPL CALC-SCNC: 8 MMOL/L (ref 0–18)
AST SERPL-CCNC: 12 U/L (ref ?–34)
BASOPHILS # BLD AUTO: 0.03 X10(3) UL (ref 0–0.2)
BASOPHILS NFR BLD AUTO: 0.4 %
BILIRUB SERPL-MCNC: 0.2 MG/DL (ref 0.2–1.1)
BUN BLD-MCNC: 13 MG/DL (ref 9–23)
BUN/CREAT SERPL: 15.7 (ref 10–20)
CALCIUM BLD-MCNC: 10 MG/DL (ref 8.7–10.4)
CHLORIDE SERPL-SCNC: 106 MMOL/L (ref 98–112)
CO2 SERPL-SCNC: 29 MMOL/L (ref 21–32)
CREAT BLD-MCNC: 0.83 MG/DL
DEPRECATED RDW RBC AUTO: 56.9 FL (ref 35.1–46.3)
EGFRCR SERPLBLD CKD-EPI 2021: 94 ML/MIN/1.73M2 (ref 60–?)
EOSINOPHIL # BLD AUTO: 0.05 X10(3) UL (ref 0–0.7)
EOSINOPHIL NFR BLD AUTO: 0.6 %
ERYTHROCYTE [DISTWIDTH] IN BLOOD BY AUTOMATED COUNT: 17.8 % (ref 11–15)
GLOBULIN PLAS-MCNC: 2.7 G/DL (ref 2–3.5)
GLUCOSE BLD-MCNC: 128 MG/DL (ref 70–99)
HCT VFR BLD AUTO: 26.7 %
HGB BLD-MCNC: 8.3 G/DL
IMM GRANULOCYTES # BLD AUTO: 0.06 X10(3) UL (ref 0–1)
IMM GRANULOCYTES NFR BLD: 0.8 %
LYMPHOCYTES # BLD AUTO: 0.68 X10(3) UL (ref 1–4)
LYMPHOCYTES NFR BLD AUTO: 8.7 %
MCH RBC QN AUTO: 27 PG (ref 26–34)
MCHC RBC AUTO-ENTMCNC: 31.1 G/DL (ref 31–37)
MCV RBC AUTO: 87 FL
MONOCYTES # BLD AUTO: 0.54 X10(3) UL (ref 0.1–1)
MONOCYTES NFR BLD AUTO: 6.9 %
NEUTROPHILS # BLD AUTO: 6.5 X10 (3) UL (ref 1.5–7.7)
NEUTROPHILS # BLD AUTO: 6.5 X10(3) UL (ref 1.5–7.7)
NEUTROPHILS NFR BLD AUTO: 82.6 %
OSMOLALITY SERPL CALC.SUM OF ELEC: 298 MOSM/KG (ref 275–295)
PLATELET # BLD AUTO: 374 10(3)UL (ref 150–450)
POTASSIUM SERPL-SCNC: 4.2 MMOL/L (ref 3.5–5.1)
PROT SERPL-MCNC: 6.5 G/DL (ref 5.7–8.2)
RBC # BLD AUTO: 3.07 X10(6)UL
SODIUM SERPL-SCNC: 143 MMOL/L (ref 136–145)
WBC # BLD AUTO: 7.9 X10(3) UL (ref 4–11)

## 2025-01-09 PROCEDURE — 74177 CT ABD & PELVIS W/CONTRAST: CPT | Performed by: INTERNAL MEDICINE

## 2025-01-09 PROCEDURE — 71260 CT THORAX DX C+: CPT | Performed by: INTERNAL MEDICINE

## 2025-01-09 RX ORDER — HYDROCODONE BITARTRATE AND ACETAMINOPHEN 10; 325 MG/1; MG/1
1 TABLET ORAL EVERY 4 HOURS PRN
Qty: 30 TABLET | Refills: 0 | Status: SHIPPED | OUTPATIENT
Start: 2025-01-09 | End: 2025-01-09 | Stop reason: CLARIF

## 2025-01-09 RX ORDER — MORPHINE SULFATE 15 MG/1
15 TABLET, FILM COATED, EXTENDED RELEASE ORAL EVERY 12 HOURS SCHEDULED
Qty: 60 TABLET | Refills: 0 | Status: SHIPPED | OUTPATIENT
Start: 2025-01-09 | End: 2025-02-08

## 2025-01-09 RX ORDER — HYDROCODONE BITARTRATE AND ACETAMINOPHEN 10; 325 MG/1; MG/1
1 TABLET ORAL EVERY 4 HOURS PRN
Qty: 150 TABLET | Refills: 0 | Status: SHIPPED | OUTPATIENT
Start: 2025-01-09

## 2025-01-09 NOTE — PROGRESS NOTES
NCM spoke with patient briefly states this is not a good time to talk. NCM will try calling back later. Pt agreeable.

## 2025-01-09 NOTE — PROGRESS NOTES
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  PDL1 0%     Diagnosis 1/3/2024 via transbronchial biopsy bronchoscopy with Dr. Carrillo.     On CT 12/22/23 4.2 x 3.3 cm left upper lobe mass enlarged AP reflection lymph node left hilar adenopathy.  PET/CT showed bone metastasis     Initiated Carboplatin Paclitaxel Pembrolizumab 2/1/24  Maintenance pembrolizumab 4/25/24-7/25/24     Palliative RT to R 8th rib lesion 7/2-7/3/24     PET scan 8/7/24 showed progressive disease.     Guardant 360CDx 8/15/24 all VUS. No actionable alteration.     8/29/24 C1 gemcitabine   9/19/24: C2 gemcitabine    10/3/24 POD on scans     10/17/24 C1 docetaxel   11/7/24: C2 docetaxel  12/12/24: C3 docetaxel  1/2/25: C4 docetaxel due, defer due to ER evaluation for weakness and uncontrolled symptoms of N/V and decreased appetite    Interval History:  Patient is here for a follow up visit and consideration of next chemotherapy. He is in wheelchair, accompanied by his wife.  Doing poorly overall; he feels extremely weak and tired, multiple ER visits for intractable nausea and vomiting.   Poor appetite and oral intake; lost 20 lb since last month. He is complaining of shortness of breath at rest. No fever, cough, chest pain.   Also repots dry mouth and slurred speech in the last few days. Wife noted some confusion and memory issues.   His pain is moderately controlled with current pain meds.     Performance Status: ECOG 2-3    Current Medications:    Current Outpatient Medications:     pregabalin (LYRICA) 50 MG Oral Cap, Take 1 capsule (50 mg total) by mouth 3 (three) times  daily., Disp: 90 capsule, Rfl: 1    HYDROcodone-acetaminophen  MG Oral Tab, Take 1 tablet by mouth every 4 (four) hours as needed for Pain (for cancer related pain; MAX dose is 6 tabs per day)., Disp: 60 tablet, Rfl: 0    Tiotropium Bromide-Olodaterol 2.5-2.5 MCG/ACT Inhalation Aero Soln, Inhale 2 puffs into the lungs every morning., Disp: 4 g, Rfl: 1    apixaban (ELIQUIS) 5 MG Oral Tab, Take 1 tablet (5 mg total) by mouth 2 (two) times daily., Disp: 30 tablet, Rfl: 5    ondansetron (ZOFRAN) 8 MG tablet, Take 1 tablet (8 mg total) by mouth every 8 (eight) hours as needed for Nausea., Disp: 60 tablet, Rfl: 3    metFORMIN 850 MG Oral Tab, Take 1 tablet (850 mg total) by mouth 2 (two) times daily with meals., Disp: 180 tablet, Rfl: 3    lisinopril 30 MG Oral Tab, Take 1 tablet (30 mg total) by mouth daily., Disp: 90 tablet, Rfl: 2    metoprolol succinate  MG Oral Tablet 24 Hr, Take 1 tablet (100 mg total) by mouth daily., Disp: 90 tablet, Rfl: 1    glipiZIDE ER 5 MG Oral Tablet 24 Hr, Take 1 tablet (5 mg total) by mouth daily., Disp: 90 tablet, Rfl: 1    polyethylene glycol, PEG 3350, 17 g Oral Powd Pack, Take 17 g by mouth daily., Disp: , Rfl:     atorvastatin 20 MG Oral Tab, Take 1 tablet (20 mg total) by mouth nightly., Disp: 90 tablet, Rfl: 3    prochlorperazine (COMPAZINE) 10 mg tablet, Take 1 tablet (10 mg total) by mouth every 6 (six) hours as needed for Nausea., Disp: 30 tablet, Rfl: 3    aspirin 81 MG Oral Tab EC, Take 1 tablet (81 mg total) by mouth daily., Disp: , Rfl:     Review of Systems:  10 -point systems reviewed, all negative except as mentioned in the HPI/interval history.     Vital Signs:  /73 (BP Location: Left arm, Patient Position: Standing, Cuff Size: adult)   Pulse 72   Temp (!) 96.1 °F (35.6 °C) (Tympanic)   Resp 16   Ht 1.702 m (5' 7\")   Wt 79.8 kg (176 lb)   SpO2 96%   BMI 27.57 kg/m²     Wt Readings from Last 6 Encounters:   01/09/25 79.8 kg (176 lb)   01/02/25 85.2  kg (187 lb 13.3 oz)   01/02/25 85.2 kg (187 lb 12.8 oz)   12/25/24 90.7 kg (200 lb)   12/19/24 87.2 kg (192 lb 4.8 oz)   12/12/24 90.4 kg (199 lb 6.4 oz)      Physical Examination:  General: Alert and oriented x 3, appears weak  Psych:  Mood and affect appropriate  HEENT: Non-icteric sclera. Oropharynx is clear.   Neck: Neck is supple.  Chest: Clear to auscultation bilaterally  Heart: Regular rate and rhythm.  Abdomen: Soft, non tender with good bowel sounds.   Extremities: Trace BLE edema  Neurological: Grossly intact      Laboratory:  Lab Results   Component Value Date    WBC 7.9 01/09/2025    RBC 3.07 (L) 01/09/2025    HGB 8.3 (L) 01/09/2025    HCT 26.7 (L) 01/09/2025    MCV 87.0 01/09/2025    MCH 27.0 01/09/2025    MCHC 31.1 01/09/2025    RDW 17.8 (H) 01/09/2025    .0 01/09/2025     Lab Results   Component Value Date     01/09/2025    K 4.2 01/09/2025     01/09/2025    CO2 29.0 01/09/2025    BUN 13 01/09/2025    CREATSERUM 0.83 01/09/2025     (H) 01/09/2025    CA 10.0 01/09/2025    ALKPHO 104 01/09/2025    ALT 17 01/09/2025    AST 12 01/09/2025    BILT 0.2 01/09/2025    ALB 3.8 01/09/2025    TP 6.5 01/09/2025     Lab Results   Component Value Date/Time    TSH 3.085 01/02/2025 10:15 AM    TSH 3.333 07/25/2024 08:28 AM    TSH 2.573 07/05/2024 10:18 AM    TSH 2.706 06/13/2024 12:35 PM    TSH 3.369 05/20/2024 12:56 PM    TSH 3.325 04/24/2024 10:15 AM     Radiology:  CT CHEST+ABDOMEN+PELVIS(ALL CNTRST ONLY)(CPT=71260/52335)    Result Date: 1/9/2025  CONCLUSION:  1. History of metastatic squamous cell carcinoma of the left lung.  Unfavorable change since comparison CT imaging from October, 2024 with findings that suggest interval disease progression.  Specifically, a dominant 4.6 cm left upper lobe lung mass has slightly increased in size since prior.  There is also a new ill-defined 4.3 cm left upper lobe lung mass, which is located just above the dominant mass and is concerning for disease  progression (or less likely a superimposed infectious process/pneumonia).  Other smaller satellite nodules in the left upper lobe could relate to disease progression or infection.  Subcentimeter nodule at the periphery of the right middle lobe is also new since prior with similar differential.  These new areas of nodularity should be reassessed on anticipated follow-up. 2. Large 7 x 3 cm destructive soft tissue lesion that arises from the right anterolateral 8th rib and has increased in size since prior.  There are new associated destructive changes of the right lateral 7th and 9th ribs (with stable chronic destruction of the 8th rib).  Findings are most compatible with progression of bone/soft tissue metastases. 3. Ill-defined 2.8 cm hypoenhancing left thyroid mass has slightly increased in size since October, 2024 chest CT and is new since July, 2024 chest CT.  This is highly suspicious for a worsening left thyroid metastasis.  Large 3.1 cm hypoenhancing splenic metastasis has also slightly increased in size since October, 2024 CT. 4. At least 3 enhancing subcutaneous nodules in the anterior and posterior right lateral chest wall, which are new since prior and concerning for new soft tissue metastases. 5. Ill-defined 1.4 cm enhancing left suprarenal nodule is new since prior and concerning for metastatic disease. 6. Small 1.7 cm enhancing metastasis at the periphery of the left gluteal musculature has slightly increased in size since prior. 7. Advanced emphysema. 8. Small dependent left pleural effusion is new since prior; malignant pleural fluid cannot be excluded. 9. Metastatic left hilar lymphadenopathy is very similar in size and morphology as compared with prior. 10. Coronary and peripheral atherosclerosis. 11. Lesser incidental findings as above.   elm-remote  Dictated by (CST): Tomasz Lawson MD on 1/09/2025 at 10:56 AM     Finalized by (CST): Tomasz Lawson MD on 1/09/2025 at 11:23 AM          XR CHEST  AP PORTABLE  (CPT=71045)    Result Date: 1/2/2025  CONCLUSION:   Masslike densities within the left lung consistent with patient's known ligaments is unchanged.  Right lower lobe atelectasis is unchanged.    Dictated by (CST): Herberth Pollard MD on 1/02/2025 at 11:13 AM     Finalized by (CST): Herberth Pollard MD on 1/02/2025 at 11:14 AM          XR CHEST AP PORTABLE  (CPT=71045)    Result Date: 12/25/2024  CONCLUSION:  1. Enlarging wedge-shaped peripheral subpleural mass with surrounding ground-glass opacity.  2. Worsening extensive basilar-predominant interstitial lung disease.  3. Radiographic findings suggesting COPD.   A preliminary report was issued by the [facility] teleradiology service. There are no major discrepancies.  elm-remote.   Dictated by (CST): Luis E Desir MD on 12/25/2024 at 6:38 AM     Finalized by (CST): Luis E Desir MD on 12/25/2024 at 6:40 AM          IR PORT A CATH INSERTION EXCHNGE CHECK    Result Date: 11/13/2024  CONCLUSION:  Ultrasound and fluoroscopic guided surgical placement of chest port    Dictated by (CST): Jackson Manzano MD on 11/13/2024 at 2:33 PM     Finalized by (CST): Jackson Manzano MD on 11/13/2024 at 2:33 PM          US VENOUS DOPPLER ARM RIGHT - DIAG IMG (CPT=93971)    Result Date: 11/7/2024  CONCLUSION:  Completely occlusive DVT involving one branch of the paired right brachial veins.    Dictated by (CST): Rajesh Tapia MD on 11/07/2024 at 3:40 PM     Finalized by (CST): Rajesh Tapia MD on 11/07/2024 at 3:42 PM            Impression and Plan:    Metastatic squamous cell carcinoma of the left upper lung, PD-L1 0%  --Carboplatin paclitaxel pembrolizuamb-initiated 2/1/24 and completed 4 cycles on 4/4/24; dose reduce carbo to AUC 5 and paclitaxel by 20% due CIPN to feet.   --Imaging 4/2024 after C4 with favorable tx response.   --Continued on maintenance pembrolizumab q3 wks, completed 9 cycles as of  7/25/24 with no autoimmune toxicity.  --Restaging CT and  PET scans 8/2024 showed progression of disease. Guardant (ctDNA sequencing) with no targetable alteration. He was not interested in clinical trials or referral to a tertiary center.  --Received 2 cycles of gemcitabine 8/29/24- 9/26/24 with no response.   --CT scans in ER on 10/3/24 for LUQ showed progression of disease with new splenic metastasis (2.5 cm) and increasing LUCINDA mass.   --Switched chemo to docetaxel 35 mg/m2 day 1 and 8 every 21 days and started cycle 1 on 10/17/24.   --He completed 3 cycles of docetaxel with poor tolerance and due for C4 today. Treatment deferred last week due to N/V requiring ER visit.     --Overall he is doing poorly and declining rapidly likely due to progressive disease. STAT CT C/A/P today showed significant POD as above.   --I reviewed the CT scan results with the patient and wife and discussed the findings in details. Unfortunately treatment options are very limited at this point, and he is no longer a candidate for further chemotherapy due his poor performance status. I recommended to continue with palliative care only, focusing on symptom management and quality of life.   --I strongly encouraged him to return home to [facility] to spent his remaining time with his children and family. They agreed and will start making travel arrangements.     Slurred speech  Cognitive changes  --New changes noted in the last few days, concerning for brain metastases.   --STAT brain MRI w/wo contrast ordered, but patient could not tolerate the test due to claustrophobia. He also refused other  brain imaging.     Bone metastases  Cancer related pain  --S/p palliative XRT 7/3/24 to enlarging right 8th rib destructive bone lesion  --Pain management per palliative care service.     RUE swelling  --Acute DVT in the right brachial vein, diagnosed 11/2024, likely related to IV catheters.   --On eliquis 5 mg bid. Ok to discontinue, since he will be transitioning to comfort care.     Chemo induced  neuropathy  --Stable on Lyrica. Continue      100 minutes spent on this encounter, including extensive review of tests and goals of care discussion. More than 50% of the time was spent on patient counseling and coordination of care.       Мария Spears MD   Hematology/Oncology

## 2025-01-09 NOTE — PROGRESS NOTES
Palliative Care Follow Up Note     Patient Name: Tarik Camara   YOB: 1955   Medical Record Number: [id number]   Date of visit: 1/9/2025     The 21st Century Cures Act makes medical notes like these available to patients in the interest of transparency. Please be advised this is a medical document. Medical documents are intended to carry relevant information, facts as evident, and the clinical opinion of the practitioner. The medical note is intended as peer to peer communication and may appear blunt or direct. It is written in medical language and may contain abbreviations or verbiage that are unfamiliar.     Chief Complaint/Reason for Visit:  Chief Complaint   Patient presents with    Palliative Care       Past Medical History/Past Surgical History:   History obtained from [facility] In addition to the patient, PMH/PSH is significant as shown below.    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     Onc: Dr. Spears    HPI:      Tarik's brother in law translated visit today. Tarik and wife declined Language Line.    Hospital admissions @ [facility] in past year: 0  ER visits @ [facility] in past year: 4    Patient seen and examined along with his wife and brother in law present today. Tarik is awake, alert, oriented x 4, answers questions appropriately, is a reliable historian, and is in NAD today.    Cancer related pain reports in R anterior rib cage and R thoracic back. On [place] - see ROS.     Tarik's performance status has declined; he feels weaker and more fatigued.    Was seen in ER twice in 2 weeks for n/v/weakness.    Plan is to return to [place] to pass away.     See ROS.    Problem List:  Patient Active Problem List   Diagnosis    Coronary artery disease involving native coronary artery of native heart with angina pectoris (HCC)    Hyperlipidemia    Essential hypertension    Erectile dysfunction    Microalbuminuria    History of Bell's palsy    Paraseptal emphysema (HCC)    Type  2 diabetes mellitus without retinopathy (HCC)    Presbyopia of both eyes    Age-related nuclear cataract of both eyes    Type 2 diabetes mellitus with diabetic neuropathy (HCC)    Conjunctivochalasis of both eyes    Mass of left lung    Lung cancer (HCC)    Abdominal bloating    Chronic idiopathic constipation    Encounter for antineoplastic chemotherapy and immunotherapy    Neuropathy of both feet    Pain from bone metastases (HCC)    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)    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)    Encounter for antineoplastic immunotherapy    Chemotherapy-induced peripheral neuropathy (HCC)    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)    Abscess of skin of abdomen    Thrombocytopenia (HCC)    Chemotherapy management, encounter for    Antineoplastic chemotherapy induced anemia    Cancer related pain    Metastasis to bone (HCC)    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)    Acute deep vein thrombosis (DVT) of brachial vein of right upper extremity (HCC)    Encounter for care related to Port-a-Cath        Medical History:  Past Medical History:    Bell's palsy    COPD (chronic obstructive pulmonary disease) (HCC)    Degenerative joint disease (DJD) of lumbar spine    Diabetes (HCC)    Diabetes mellitus, type II (HCC)    Heart attack (HCC)    High blood pressure    High cholesterol    Hyperlipidemia    Hypertension    Osteoarthritis    Sleep apnea    no CPAP       Surgical History:  Past Surgical History:   Procedure Laterality Date    Appendectomy  1975    Coronary stent placement  2010    Palate/uvula surgery unlisted  2010    Ventral hernia repair  06/29/2021    primary repair, ventral and umbilical hernias       Allergies:  No Known Allergies    Family History:  Family History   Problem Relation Age of Onset    No Known Problems Mother     Heart Disorder Father     Diabetes Neg     Glaucoma Neg     Macular degeneration Neg        Social History:  Social History      Socioeconomic History    Marital status:     Number of children: 3   Occupational History    Occupation: [employer]   Tobacco Use    Smoking status: Former     Current packs/day: 0.00     Types: Cigarettes     Quit date: 3/19/2020     Years since quittin.8    Smokeless tobacco: Never    Tobacco comments:     Occasionally, not in last 6 months   Vaping Use    Vaping status: Never Used   Substance and Sexual Activity    Alcohol use: Not Currently    Drug use: Never       Goals of care counseling/advance care planning discussion:     We discussed patient's current clinical condition. I provided education about the typical disease trajectory of metastatic lung cancer with associated symptoms and decline over time.     I discussed the importance of advance care planning prior to crisis with Tarik.     HCPOA/Health Surrogate:    Tarik has not completed saul HCPOA documentation.    [place]    [place] Surrogate: Socrates Camara (wife)    Code Status/POLST Documentation:    Tarik wishes to remain FULL CODE at this time.        Medications:  Current Outpatient Medications   Medication Sig Dispense Refill    morphINE ER 15 MG Oral Tab CR Take 1 tablet (15 mg total) by mouth every 12 (twelve) hours. 60 tablet 0    HYDROcodone-acetaminophen  MG Oral Tab Take 1 tablet by mouth every 4 (four) hours as needed for Pain (for cancer related pain; MAX dose is 6 tabs per day). 150 tablet 0    pregabalin (LYRICA) 50 MG Oral Cap Take 1 capsule (50 mg total) by mouth 3 (three) times daily. 90 capsule 1    Tiotropium Bromide-Olodaterol 2.5-2.5 MCG/ACT Inhalation Aero Soln Inhale 2 puffs into the lungs every morning. 4 g 1    apixaban (ELIQUIS) 5 MG Oral Tab Take 1 tablet (5 mg total) by mouth 2 (two) times daily. 30 tablet 5    ondansetron (ZOFRAN) 8 MG tablet Take 1 tablet (8 mg total) by mouth every 8 (eight) hours as needed for Nausea. 60 tablet 3    metFORMIN 850 MG Oral Tab Take 1 tablet (850  mg total) by mouth 2 (two) times daily with meals. 180 tablet 3    lisinopril 30 MG Oral Tab Take 1 tablet (30 mg total) by mouth daily. 90 tablet 2    metoprolol succinate  MG Oral Tablet 24 Hr Take 1 tablet (100 mg total) by mouth daily. 90 tablet 1    glipiZIDE ER 5 MG Oral Tablet 24 Hr Take 1 tablet (5 mg total) by mouth daily. 90 tablet 1    polyethylene glycol, PEG 3350, 17 g Oral Powd Pack Take 17 g by mouth daily.      atorvastatin 20 MG Oral Tab Take 1 tablet (20 mg total) by mouth nightly. 90 tablet 3    prochlorperazine (COMPAZINE) 10 mg tablet Take 1 tablet (10 mg total) by mouth every 6 (six) hours as needed for Nausea. 30 tablet 3    aspirin 81 MG Oral Tab EC Take 1 tablet (81 mg total) by mouth daily.         Review of Systems:  Review of Systems   Constitutional:  Positive for malaise/fatigue.        Appetite good     HENT: Negative.     Eyes: Negative.    Respiratory:  Positive for shortness of breath (ROBISON; relieved with short periods of rest). Negative for cough and hemoptysis.    Cardiovascular:  Positive for leg swelling. Negative for chest pain and palpitations.   Gastrointestinal:  Negative for abdominal pain, constipation, diarrhea, nausea and vomiting.   Musculoskeletal:         R anterior rib pain and R thoracic back pain - cancer related  -Sharp/pinching in character  -Intermittent; some episodes are more severe than others  -Taking 2 tabs Norco 10/325mg 1-2 times daily reduces pain (usually taking 4-6 tabs Norco/day)  -Discussed MS Contin today to add to regimen; Tarik now agrees to add Morphine to pain med regimen        RUE swelling  -US done 11/7/2024 which was + for RUE DVT  -Able to move RUE, but RUE feels weak   Skin: Negative.    Neurological:  Positive for tingling (On Lyrica (twice daily) for peripheral neuropathy). Negative for dizziness, weakness and headaches.   Psychiatric/Behavioral: Negative.  Negative for depression, hallucinations, memory loss, substance abuse and  suicidal ideas. The patient is not nervous/anxious and does not have insomnia.        Physical Examination:  Physical Exam  Vitals reviewed.   Constitutional:       Appearance: Normal appearance. He is obese. He is ill-appearing.   HENT:      Head: Normocephalic and atraumatic.      Right Ear: External ear normal.      Left Ear: External ear normal.      Nose: Nose normal.      Mouth/Throat:      Mouth: Mucous membranes are moist.      Pharynx: Oropharynx is clear.   Eyes:      General: No scleral icterus.     Conjunctiva/sclera: Conjunctivae normal.   Cardiovascular:      Rate and Rhythm: Normal rate and regular rhythm.      Pulses: Normal pulses.      Heart sounds: No murmur heard.  Pulmonary:      Effort: Pulmonary effort is normal. No respiratory distress.      Breath sounds: Normal breath sounds.   Abdominal:      General: Bowel sounds are normal. There is no distension.      Palpations: Abdomen is soft.   Musculoskeletal:         General: Swelling (RUE swelling; Active ROM in RUE; denies pain) present. Normal range of motion.      Cervical back: Normal range of motion and neck supple.      Right lower leg: Edema present.      Left lower leg: Edema present.   Skin:     General: Skin is warm and dry.      Coloration: Skin is not pale.   Neurological:      General: No focal deficit present.      Mental Status: He is alert and oriented to person, place, and time. Mental status is at baseline.      Motor: No weakness.      Gait: Gait normal.   Psychiatric:         Mood and Affect: Mood normal.         Behavior: Behavior normal.         Thought Content: Thought content normal.         Judgment: Judgment normal.       Palliative Care Goals of Care:  Discussed with patient/family today: Yes  Patient's preference about sharing medical information: Patient and family may receive information  Patient's decision making preferences: Fully involved and speak with family  Code status: FULL CODE  Have advanced directives been  discussed with patient or healthcare power of : Yes        Healthcare Agent Appointed: Yes  Healthcare Agent's Name: Socrates Camara (wife)  Healthcare Agent's Phone Number: [phone]          Palliative Care Assessment/Plan:  1. Palliative care encounter    2. Cancer related pain  - morphINE ER 15 MG Oral Tab CR; Take 1 tablet (15 mg total) by mouth every 12 (twelve) hours.  Dispense: 60 tablet; Refill: 0  - HYDROcodone-acetaminophen  MG Oral Tab; Take 1 tablet by mouth every 4 (four) hours as needed for Pain (for cancer related pain; MAX dose is 6 tabs per day).  Dispense: 150 tablet; Refill: 0    3. Therapeutic opioid induced constipation    4. Declining functional status    5. Goals of care, counseling/discussion    6. 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)    8. 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)        Cancer Related Pain  -Discussed addiction vs tolerance  -Reviewed [place]   -Discussed MOA and explained side effects of pain medications  -Max daily Tylenol limit is 3,000mg  -START MS Contin 15mg Q12H  -CONTINUE Hydrocodone/Acetaminophen (Norco) to 10/325mg - take 1-2 tablet(s) every 4 hours as needed for breakthrough pain; MAX daily dose is 6 tablets of Norco per day  -CONTINUE Pregabalin (Lyrica) 50mg - take 1 tablet every 8 hours; please do not skip doses  -I offered to add MS Contin at bedtime for better pain control, Rafaelr would like to hold off on this at this time  -AVOID ALL NSAIDS - due to HTN  -Discussed indication for opioid medications for cancer related pain per NCCN guidelines  -Discussed common SE of opioid meds which include, but are not limited to: drowsiness, n/v, stomach upset, constipation  -Excessive or misuse of opioid medications may cause respiratory depression, CNS depression, and possibly death  -Discussed to NEVER self adjust pain med/opioid doses or stop these meds abruptly as this may lead to opioid withdrawal  -Discussed  signs and symptoms of withdrawal which include, but are not limited to: rhinorrhea, diarrhea, irritability, chills, sweating, insomnia, mood swings, anxiety, increased pain       Goals of care counseling/discussion  -Pt is FULL CODE  -No longer a candidate for anti cancer related treatments  -There is progression of cancer  -Plan is for Tarik to return to [place] to pass away  -I recommended that he return to [place] sooner than later  -Provided emotional support to pt/family who appear to be coping adequately  -See above narrative for further details      Advance care planning counseling/discussion  -Pt is FULL CODE  [place]  -Health Surrogate is Socrates Camara (wife)  -See above narrative for further details    Palliative Performance Scale 50%    Emotional support was provided to patient and family today: Yes    Palliative Care Follow-up:  I spent a total of  45 minutes with the patient today, which included all of the following:direct face to face contact, history taking, physical examination, and >50% was spent counseling and coordinating care.    Thank you for allowing the Palliative Care Team to participate in the care of your patient. I will continue to follow clinically.    MANJINDER STARKS DNP, FNP-BC, [facility]  [phone]  1/9/2025

## 2025-01-13 ENCOUNTER — TELEPHONE (OUTPATIENT)
Dept: HEMATOLOGY/ONCOLOGY | Facility: CLINIC | Age: 70
End: 2025-01-13

## 2025-01-13 DIAGNOSIS — C34.90 MALIGNANT NEOPLASM OF LUNG, UNSPECIFIED LATERALITY, UNSPECIFIED PART OF LUNG (HCC): ICD-10-CM

## 2025-01-13 DIAGNOSIS — R11.0 CHEMOTHERAPY-INDUCED NAUSEA: ICD-10-CM

## 2025-01-13 DIAGNOSIS — I82.621 ACUTE DEEP VEIN THROMBOSIS (DVT) OF BRACHIAL VEIN OF RIGHT UPPER EXTREMITY (HCC): ICD-10-CM

## 2025-01-13 DIAGNOSIS — T45.1X5A CHEMOTHERAPY-INDUCED NAUSEA: ICD-10-CM

## 2025-01-13 DIAGNOSIS — M79.2 NEUROPATHIC PAIN DUE TO RADIATION: ICD-10-CM

## 2025-01-13 DIAGNOSIS — Z51.11 CHEMOTHERAPY MANAGEMENT, ENCOUNTER FOR: ICD-10-CM

## 2025-01-13 RX ORDER — PROCHLORPERAZINE MALEATE 10 MG
10 TABLET ORAL EVERY 6 HOURS PRN
Qty: 30 TABLET | Refills: 3 | Status: SHIPPED | OUTPATIENT
Start: 2025-01-13

## 2025-01-13 RX ORDER — LISINOPRIL 30 MG/1
30 TABLET ORAL DAILY
Qty: 90 TABLET | Refills: 1 | Status: SHIPPED | OUTPATIENT
Start: 2025-01-13

## 2025-01-13 RX ORDER — PREGABALIN 50 MG/1
50 CAPSULE ORAL 3 TIMES DAILY
Qty: 90 CAPSULE | Refills: 1 | Status: SHIPPED | OUTPATIENT
Start: 2025-01-13

## 2025-01-13 RX ORDER — METOPROLOL SUCCINATE 100 MG/1
100 TABLET, EXTENDED RELEASE ORAL DAILY
Qty: 90 TABLET | Refills: 1 | Status: SHIPPED | OUTPATIENT
Start: 2025-01-13

## 2025-01-13 RX ORDER — ASPIRIN 81 MG/1
81 TABLET ORAL DAILY
Qty: 90 TABLET | Refills: 1 | Status: SHIPPED | OUTPATIENT
Start: 2025-01-13

## 2025-01-13 RX ORDER — ATORVASTATIN CALCIUM 20 MG/1
20 TABLET, FILM COATED ORAL NIGHTLY
Qty: 90 TABLET | Refills: 1 | Status: SHIPPED | OUTPATIENT
Start: 2025-01-13

## 2025-01-13 RX ORDER — ONDANSETRON 8 MG/1
8 TABLET, FILM COATED ORAL EVERY 8 HOURS PRN
Qty: 60 TABLET | Refills: 3 | Status: SHIPPED | OUTPATIENT
Start: 2025-01-13

## 2025-01-13 RX ORDER — GLIPIZIDE 5 MG/1
5 TABLET, FILM COATED, EXTENDED RELEASE ORAL DAILY
Qty: 90 TABLET | Refills: 1 | Status: SHIPPED | OUTPATIENT
Start: 2025-01-13

## 2025-01-13 NOTE — TELEPHONE ENCOUNTER
[place]  send to [facility] in [address]     Current Outpatient Medications:       pregabalin (LYRICA) 50 MG Oral Cap, Take 1 capsule (50 mg total) by mouth 3 (three) times daily., Disp: 90 capsule, Rfl: 1        apixaban (ELIQUIS) 5 MG Oral Tab, Take 1 tablet (5 mg total) by mouth 2 (two) times daily., Disp: 30 tablet, Rfl: 5      ondansetron (ZOFRAN) 8 MG tablet, Take 1 tablet (8 mg total) by mouth every 8 (eight) hours as needed for Nausea., Disp: 60 tablet, Rfl: 3      lisinopril 30 MG Oral Tab, Take 1 tablet (30 mg total) by mouth daily., Disp: 90 tablet, Rfl: 2      metoprolol succinate  MG Oral Tablet 24 Hr, Take 1 tablet (100 mg total) by mouth daily., Disp: 90 tablet, Rfl: 1      glipiZIDE ER 5 MG Oral Tablet 24 Hr, Take 1 tablet (5 mg total) by mouth daily., Disp: 90 tablet, Rfl: 1    atorvastatin 20 MG Oral Tab, Take 1 tablet (20 mg total) by mouth nightly., Disp: 90 tablet, Rfl: 3      prochlorperazine (COMPAZINE) 10 mg tablet,   Take 1 tablet (10 mg total) by mouth every 6 (six) hours as needed for Nausea., Disp: 30 tablet, Rfl: 3      aspirin 81 MG Oral Tab EC, Take 1 tablet (81 mg total) by mouth daily., Disp: , Rfl:

## 2025-01-13 NOTE — TELEPHONE ENCOUNTER
Family Medical Leave Act forms received via email from office, Valid Release of Information for [employer] attached, logged for processing adv'd agent processing Stat's for today.

## 2025-01-13 NOTE — TELEPHONE ENCOUNTER
Cld patient , spoke w/ wife, patient wife is seeking Cont. Family Medical Leave Act from 1/15/2025-3 months to care for patient.

## 2025-01-15 ENCOUNTER — TELEPHONE (OUTPATIENT)
Dept: INTERNAL MEDICINE CLINIC | Facility: CLINIC | Age: 70
End: 2025-01-15

## 2025-01-16 NOTE — TELEPHONE ENCOUNTER
Dr. Spears,       Please sign off on form if you agree to: Family Medical Leave Act Spouse Cont. 01/15/2025-3 mths aprx 04/15/2025  (place your signature on the first page only)    -From your Inbasket, Highlight the patient and click Chart   -Double click the 01/13/2025 Forms Completion telephone encounter  -Scroll down to the Media section   -Click the blue Hyperlink: Family Medical Leave Act Dr. Мария Spears 01/16/2025  -Click Acknowledge located in the top right ribbon/menu   -Drag the mouse into the blank space of the document and a + sign will appear. Left click to   electronically sign the document.     Thank you,  Lisa CANDELARIA

## 2025-01-17 NOTE — TELEPHONE ENCOUNTER
Re-Scanned     Dr. Spears      Please sign off on form if you agree to: Family Medical Leave Act Spouse Cont. 01/15/2025-3 mths aprx 04/15/2025  (place your signature on the first page only)    -From your Inbasket, Highlight the patient and click Chart   -Double click the 01/13/2025 Forms Completion telephone encounter  -Scroll down to the Media section   -Click the blue Hyperlink: Family Medical Leave Act Dr. Мария Spears 01/17/2025  -Click Acknowledge located in the top right ribbon/menu   -Drag the mouse into the blank space of the document and a + sign will appear. Left click to   electronically sign the document.     Thank you,  Lisa CANDELARIA

## 2025-01-23 ENCOUNTER — APPOINTMENT (OUTPATIENT)
Age: 70
End: 2025-01-23
Attending: INTERNAL MEDICINE
Payer: MEDICARE

## 2025-01-23 NOTE — TELEPHONE ENCOUNTER
Family Medical Leave Act forms for spouse faxed to [facility], will call patient to adv.     Left message to call back     Archiving forms.

## 2025-01-30 ENCOUNTER — APPOINTMENT (OUTPATIENT)
Age: 70
End: 2025-01-30
Attending: INTERNAL MEDICINE
Payer: MEDICARE

## 2025-02-13 ENCOUNTER — TELEPHONE (OUTPATIENT)
Dept: INTERNAL MEDICINE CLINIC | Facility: CLINIC | Age: 70
End: 2025-02-13

## 2025-03-11 ENCOUNTER — TELEPHONE (OUTPATIENT)
Dept: INTERNAL MEDICINE CLINIC | Facility: CLINIC | Age: 70
End: 2025-03-11

## 2025-04-24 ENCOUNTER — TELEPHONE (OUTPATIENT)
Dept: INTERNAL MEDICINE CLINIC | Facility: CLINIC | Age: 70
End: 2025-04-24

## 2025-05-14 ENCOUNTER — TELEPHONE (OUTPATIENT)
Facility: LOCATION | Age: 70
End: 2025-05-14

## (undated) DEVICE — NEEDLE ASP VIZISHOT 21G 2MM

## (undated) DEVICE — SUTURE ETHIBOND 1 CT-1

## (undated) DEVICE — FORCEPS BX 100CM 1.8MM RJ STD

## (undated) DEVICE — DRAPE SHEET LAPAROTOMY

## (undated) DEVICE — MINOR GENERAL: Brand: MEDLINE INDUSTRIES, INC.

## (undated) DEVICE — CONMED SCOPE SAVER BITE BLOCK, 20X27 MM: Brand: SCOPE SAVER

## (undated) DEVICE — SYRINGE MED 10ML LL TIP W/O SFTY DISP

## (undated) DEVICE — ENCORE® LATEX ACCLAIM SIZE 8, STERILE LATEX POWDER-FREE SURGICAL GLOVE: Brand: ENCORE

## (undated) DEVICE — Device: Brand: ION

## (undated) DEVICE — YANKAUER,BULB TIP,W/O VENT,RIGID,STERILE: Brand: MEDLINE

## (undated) DEVICE — ENCORE® LATEX MICRO SIZE 8, STERILE LATEX POWDER-FREE SURGICAL GLOVE: Brand: ENCORE

## (undated) DEVICE — 60 ML SYRINGE REGULAR TIP: Brand: MONOJECT

## (undated) DEVICE — VIOLET BRAIDED (POLYGLACTIN 910), SYNTHETIC ABSORBABLE SUTURE: Brand: COATED VICRYL

## (undated) DEVICE — SINGLE USE BIOPSY VALVE MAJ-210: Brand: SINGLE USE BIOPSY VALVE (STERILE)

## (undated) DEVICE — Device: Brand: BALLOON

## (undated) DEVICE — ADAPTER TBNG DIA15MM SWVL FBROPT BRONCHSCP

## (undated) DEVICE — SINGLE USE SUCTION VALVE MAJ-209: Brand: SINGLE USE SUCTION VALVE (STERILE)

## (undated) DEVICE — UNDYED BRAIDED (POLYGLACTIN 910), SYNTHETIC ABSORBABLE SUTURE: Brand: COATED VICRYL

## (undated) DEVICE — MEDI-VAC NON-CONDUCTIVE SUCTION TUBING: Brand: CARDINAL HEALTH

## (undated) DEVICE — SOL  .9 1000ML BTL

## (undated) DEVICE — BIOPSY NEEDLE, 23G: Brand: FLEXISION

## (undated) DEVICE — KIT CLEAN ENDOKIT 1.1OZ GOWNX2

## (undated) DEVICE — VISION PROBE ADAPTER AND SUCTION ADAPTER

## (undated) DEVICE — SWIVEL CONNECTOR

## (undated) NOTE — Clinical Note
Gustavo Erazo, I saw Tarik today. He is weaker, more fatigued, physical status has declined. His cancer has progressed. His plan is to return to [place] (hopefully very soon) to pass away. Just wanted to pass the info along.  Take care, Marlin

## (undated) NOTE — LETTER
[facility]  [address] [place] [address]  Phone: [phone]  Fax: [phone]   1/22/2024              Tarik Jax        [address] [address]       Hello,     This is the office of Dr. Jacob Erazo    Thank you for putting your trust in [facility].  Our goal is to deliver the highest quality healthcare and an exceptional patient experience. Upon reviewing of your medical record shows you are due for the following:         Annual Medicare Physical     Please call [phone] to schedule your appointment or schedule online via iFormulary.     If you changed to a new provider at another facility, please notify the clinic to update your records.    If you had any recent testing at another facility, please have your results faxed to our office at [phone].     Thank you and have a great day!

## (undated) NOTE — LETTER
5/27/2021          To Whom It May Concern:    Dashawn Miller is currently under my medical care and may return to work on 05/27/21. Activity is restricted as follows: no lifting more than 10 Lb until further notice.      If you require additional information p

## (undated) NOTE — LETTER
[facility]  Administration of Anesthesia  ITarik agree to be cared for by a physician anesthesiologist alone and/or with a nurse anesthetist, who is specially trained to monitor me and give me medicine to put me to sleep or keep me comfortable during my procedure    I understand that my anesthesiologist and/or anesthetist is not an employee or agent of [facility] or [employer]. He or she works for [facility]    As the patient asking for anesthesia services, I agree to:  Allow the anesthesiologist (anesthesia doctor) to give me medicine and do additional procedures as necessary. Some examples are: Starting or using an “IV” to give me medicine, fluids or blood during my procedure, and having a breathing tube placed to help me breathe when I’m asleep (intubation). In the event that my heart stops working properly, I understand that my anesthesiologist will make every effort to sustain my life, unless otherwise directed by [facility] Do Not Resuscitate documents.  Tell my anesthesia doctor before my procedure:  If I am pregnant.  The last time that I ate or drank.  iii. All of the medicines I take (including prescriptions, herbal supplements, and pills I can buy without a prescription (including street drugs/illegal medications). Failure to inform my anesthesiologist about these medicines may increase my risk of anesthetic complications.  iv.If I am allergic to anything or have had a reaction to anesthesia before.  I understand how the anesthesia medicine will help me (benefits).  I understand that with any type of anesthesia medicine there are risks:  The most common risks are: nausea, vomiting, sore throat, muscle soreness, damage to my eyes, mouth, or teeth (from breathing tube placement).  Rare risks include: remembering what happened during my procedure, allergic reactions to medications, injury to my airway, heart, lungs, vision, nerves, or muscles  and in extremely rare instances death.  My doctor has explained to me other choices available to me for my care (alternatives).  Pregnant Patients (“epidural”):  I understand that the risks of having an epidural (medicine given into my back to help control pain during labor), include itching, low blood pressure, difficulty urinating, headache or slowing of the baby’s heart. Very rare risks include infection, bleeding, seizure, irregular heart rhythms and nerve injury.  Regional Anesthesia (“spinal”, “epidural”, & “nerve blocks”):  I understand that rare but potential complications include headache, bleeding, infection, seizure, irregular heart rhythms, and nerve injury.    _____________________________________________________________________________  Patient (or Representative) Signature/Relationship to Patient  Date   Time    _____________________________________________________________________________   Name (if used)    Language/Organization   Time    _____________________________________________________________________________  Nurse Anesthetist Signature     Date   Time  _____________________________________________________________________________  Anesthesiologist Signature     Date   Time  I have discussed the procedure and information above with the patient (or patient’s representative) and answered their questions. The patient or their representative has agreed to have anesthesia services.    _____________________________________________________________________________  Witness        Date   Time  I have verified that the signature is that of the patient or patient’s representative, and that it was signed before the procedure  Patient Name: Tarik Camara     : 1955                 Printed: 2023 at 8:16 AM    Medical Record #: [id number]                                            Page 1 of 1  ----------ANESTHESIA CONSENT----------

## (undated) NOTE — LETTER
Patient: Rosalia Gavin   YOB: 1955   Date of Visit: 7/12/2021     Dear  Dr. Toni Gross MD,    Thank you for referring Rosalia Gavin to my practice. Please find my assessment and plan below.       Umbilical hernia with obstruction, without gangrene  (

## (undated) NOTE — LETTER
5/24/2021          To Whom It May Concern:    Roni Morgan is currently under my medical care and may not return to work at this time. Please excuse Alycia Valdivia for 3 days. He may return to work on 5/27/21. Activity is restricted as follows: none.     If you r

## (undated) NOTE — LETTER
2/4/2022              [facility]        [address]          CERTIFIED         Dear Dolly Valerio records indicate that the tests ordered for you by Anita Campoverde, DO  have not been done.   If you have, in fact, already c

## (undated) NOTE — LETTER
May 18, 2021    Siva Son MD  [address] [address]     Patient: Cliff Callahan   YOB: 1955   Date of Visit: 5/18/2021       Dear Dr. Masood Beckwith MD:    Thank you for referring Cliff Callahan to me for evaluation.  Here is my total) by mouth daily. 30 tablet 2   • metroNIDAZOLE 0.75 % External Lotion Apply 1 Application topically 2 (two) times a day. 59 mL 1   • Metoprolol Succinate  MG Oral Tablet 24 Hr Take 1 tablet (100 mg total) by mouth daily.  90 tablet 1   • atorvas Normal Normal    Periphery Normal Normal            Refraction     Wearing Rx     Type: forgot-OTC reading only   Patient thinks his OTC reading glasses are a +3.00           Manifest Refraction (Auto)       Sphere Cylinder Axis    Right +0.50 +0.75 080

## (undated) NOTE — LETTER
Patient: David Harmon   YOB: 1955   Date of Visit: 6/4/2021     Dear  Dr. Alma Huddleston MD,    Thank you for referring David Harmon to my practice. Please find my assessment and plan below.           Umbilical hernia with obstruction, without gangrene

## (undated) NOTE — LETTER
Patient: Trish Sebastian   YOB: 1955   Date of Visit: 8/16/2021     Dear  Dr. Antonio Penn MD,    Thank you for referring Trish Sebastian to my practice. Please find my assessment and plan below.       Umbilical hernia with obstruction, without gangrene  (

## (undated) NOTE — LETTER
[address], [place], [place]  Authorization for Surgical Operation and Procedure                                                                                           I hereby authorize Giles Carrillo DO, my physician and his/her assistants (if applicable), which may include medical students, residents, and/or fellows, to perform the following surgical operation/ procedure and administer such anesthesia as may be determined necessary by my physician: Operation/Procedure name (s) ROBOT-ASSISTED NAVIGATIONAL BRONCHOSCOPY/ ENDOBRONCHIAL ULTRASOUND (EBUS) on Tarik Camara   2.   I recognize that during the surgical operation/procedure, unforeseen conditions may necessitate additional or different procedures than those listed above.  I, therefore, further authorize and request that the above-named surgeon, assistants, or designees perform such procedures as are, in their judgment, necessary and desirable.    3.   My surgeon/physician has discussed prior to my surgery the potential benefits, risks and side effects of this procedure; the likelihood of achieving goals; and potential problems that might occur during recuperation.  They also discussed reasonable alternatives to the procedure, including risks, benefits, and side effects related to the alternatives and risks related to not receiving this procedure.  I have had all my questions answered and I acknowledge that no guarantee has been made as to the result that may be obtained.    4.   Should the need arise during my operation/procedure, which includes change of level of care prior to discharge, I also consent to the administration of blood and/or blood products.  Further, I understand that despite careful testing and screening of blood or blood products by collecting agencies, I may still be subject to ill effects as a result of receiving a blood transfusion and/or blood products.  The following are some, but not all, of the  potential risks that can occur: fever and allergic reactions, hemolytic reactions, transmission of diseases such as Hepatitis, AIDS and Cytomegalovirus (CMV) and fluid overload.  In the event that I wish to have an autologous transfusion of my own blood, or a directed donor transfusion, I will discuss this with my physician.  Check only if Refusing Blood or Blood Products  I understand refusal of blood or blood products as deemed necessary by my physician may have serious consequences to my condition to include possible death. I hereby assume responsibility for my refusal and release the hospital, its personnel, and my physicians from any responsibility for the consequences of my refusal.    o  Refuse   5.   I authorize the use of any specimen, organs, tissues, body parts or foreign objects that may be removed from my body during the operation/procedure for diagnosis, research or teaching purposes and their subsequent disposal by hospital authorities.  I also authorize the release of specimen test results and/or written reports to my treating physician on the hospital medical staff or other referring or consulting physicians involved in my care, at the discretion of the Pathologist or my treating physician.    6.   I consent to the photographing or videotaping of the operations or procedures to be performed, including appropriate portions of my body for medical, scientific, or educational purposes, provided my identity is not revealed by the pictures or by descriptive texts accompanying them.  If the procedure has been photographed/videotaped, the surgeon will obtain the original picture, image, videotape or CD.  The hospital will not be responsible for storage, release or maintenance of the picture, image, tape or CD.    7.   I consent to the presence of a  or observers in the operating room as deemed necessary by my physician or their designees.    8.   I recognize that in the event my procedure  results in extended X-Ray/fluoroscopy time, I may develop a skin reaction.    9. If I have a Do Not Attempt Resuscitation (DNAR) order in place, that status will be suspended while in the operating room, procedural suite, and during the recovery period unless otherwise explicitly stated by me (or a person authorized to consent on my behalf). The surgeon or my attending physician will determine when the applicable recovery period ends for purposes of reinstating the DNAR order.  10. Patients having a sterilization procedure: I understand that if the procedure is successful the results will be permanent and it will therefore be impossible for me to inseminate, conceive, or bear children.  I also understand that the procedure is intended to result in sterility, although the result has not been guaranteed.   11. I acknowledge that my physician has explained sedation/analgesia administration to me including the risk and benefits I consent to the administration of sedation/analgesia as may be necessary or desirable in the judgment of my physician.    I CERTIFY THAT I HAVE READ AND FULLY UNDERSTAND THE ABOVE CONSENT TO OPERATION and/or OTHER PROCEDURE.     _________________________________________ _________________________________     ___________________________________  Signature of Patient     Signature of Responsible Person                   Printed Name of Responsible Person                              _________________________________________ ______________________________        ___________________________________  Signature of Witness         Date  Time         Relationship to Patient    STATEMENT OF PHYSICIAN My signature below affirms that prior to the time of the procedure; I have explained to the patient and/or his/her legal representative, the risks and benefits involved in the proposed treatment and any reasonable alternative to the proposed treatment. I have also explained the risks and benefits involved in  refusal of the proposed treatment and alternatives to the proposed treatment and have answered the patient's questions. If I have a significant financial interest in a co-management agreement or a significant financial interest in any product or implant, or other significant relationship used in this procedure/surgery, I have disclosed this and had a discussion with my patient.     _______________________________________________________________ _____________________________  (Signature of Physician)                                                                                         (Date)                                   (Time)  Patient Name: Tarik Camara    : 1955   Printed: 2023      Medical Record #: [id number]                                              Page 1 of 1

## (undated) NOTE — Clinical Note
Gustavo kasper - I saw Mr. Camara, who was recently diagnosed with metastatic lung cancer. I am treating mild constipation issues, and I told him to hold off on any screening colonoscopy at this time given his lung condition. Faviola Snell

## (undated) NOTE — LETTER
7/12/2021          To Whom It May Concern:    Julia Medina may return to work light duty: no lifting over 15 lbs   and no strenuous activity.   Pt may return to work regular duty  effective Aug. 9th  If you require additional information please contact our of

## (undated) NOTE — LETTER
October 26, 2023      No Recipients     Patient: Domo Elena   YOB: 1955   Date of Visit: 10/26/2023       Dear Dr. Kristal Slaughter Recipients: Thank you for referring Domo Elena to me for evaluation. Here is my assessment and plan of care:    Domo Elena is a 75year old male. HPI:     HPI    Pt is here for a diabetic eye exam.  Pt complains of frequent tearing in the right eye for about 6 months. Pt was given some allergy drops from his primary care but he only used them once. Pt states that vision is good for both near and distance. Patient states he was given a prescription for glasses from us but he only got OTC readers which he likes. Pt was in [facility] and he had trouble with glare, he was given some glasses by an  which also help. Pt has been a diabetic for 2 years       Pt's diabetes is currently controlled by pills  Pt checks BS does not check   Pt's last blood sugar was  118 on 12/19/22  Last HA1C was 7.3 on 12/19/22  Endocrinologist: none    Consult:per Dr. Indio Bullock  Last edited by Ivett Mann, O.T. on 10/26/2023  2:40 PM.        Patient History:  Past Medical History:   Diagnosis Date    Bell's palsy     COPD (chronic obstructive pulmonary disease) ([facility] [address]     Degenerative joint disease (DJD) of lumbar spine     Diabetes mellitus, type II ([facility] [address]     Heart attack ([facility] [address] 01/01/2010    Hyperlipidemia     Hypertension     Osteoarthritis     Sleep apnea     no CPAP       Surgical History: Domo Elena has a past surgical history that includes appendectomy (1975); palate/uvula surgery unlisted (2010); coronary stent placement (2010); and Ventral hernia repair (06/29/2021) (primary repair, ventral and umbilical hernias).     Family History   Problem Relation Age of Onset    No Known Problems Mother     Heart Disorder Father     Diabetes Neg     Glaucoma Neg     Macular degeneration Neg        Social History:   Social History     Socioeconomic History    Marital status:     Number of children: 3   Occupational History    Occupation: [employer]   Tobacco Use    Smoking status: Former     Packs/day: 1     Types: Cigarettes     Quit date: 3/19/2020     Years since quitting: 3.6    Smokeless tobacco: Never   Vaping Use    Vaping Use: Never used   Substance and Sexual Activity    Alcohol use: Not Currently    Drug use: Never       Medications:  Current Outpatient Medications   Medication Sig Dispense Refill    atorvastatin 20 MG Oral Tab Take 1 tablet (20 mg total) by mouth nightly. 90 tablet 1    lisinopril 5 MG Oral Tab Take 1 tablet (5 mg total) by mouth daily. 90 tablet 1    metoprolol succinate  MG Oral Tablet 24 Hr Take 1 tablet (100 mg total) by mouth daily. 90 tablet 1    metFORMIN 850 MG Oral Tab Take 1 tablet (850 mg total) by mouth 2 (two) times daily with meals. 180 tablet 3    gabapentin 100 MG Oral Cap Take 2 capsules (200 mg total) by mouth every evening. 60 capsule 1    nicotine polacrilex 4 MG Mouth/Throat Gum Take 4 mg by mouth as needed for Smoking cessation. Allergies:  Not on File    ROS:     ROS    Positive for: Endocrine, Eyes  Negative for: Constitutional, Gastrointestinal, Neurological, Skin, Genitourinary, Musculoskeletal, HENT, Cardiovascular, Respiratory, Psychiatric, Allergic/Imm, Heme/Lymph  Last edited by Liz Walker O.T. on 10/26/2023  2:40 PM.          PHYSICAL EXAM:     Base Eye Exam       Visual Acuity (Snellen - Linear)         Right Left    Dist sc 20/40 -2 20/50 +1    Dist ph sc 20/25 -2 20/30 -2    Near  20/20 20/20   Checked near [place] with loose lens.              Tonometry (Icare, 2:58 PM)         Right Left    Pressure 10 9              Pupils         Pupils    Right PERRL    Left PERRL              Visual Fields         Left Right     Full Full              Extraocular Movement         Right Left     Full, Ortho Full, Ortho              Neuro/Psych       Oriented x3: Yes    Mood/Affect: Normal              Dilation       Both eyes: 1.0% Mydriacyl and 2.5% [place] Synephrine @ 2:58 PM                  Slit Lamp and Fundus Exam       Slit Lamp Exam         Right Left    Lids/Lashes Dermatochalasis, Meibomian gland dysfunction Dermatochalasis, Meibomian gland dysfunction    Conjunctiva/Sclera Nasal/temp pinguecula, Conjunctivochalasis Nasal/temp pinguecula, Conjunctivochalasis    Cornea 1-2+ Arcus 1-2+ Arcus    Anterior Chamber Deep and quiet Deep and quiet    Iris Normal Normal    Lens 1+ Nuclear sclerosis, Trace Cortical cataract 1+ Nuclear sclerosis    Vitreous Clear Clear              Fundus Exam         Right Left    Disc Good rim, Temporal crescent Good rim, Temporal crescent    C/D Ratio 0.3 0.3    Macula Normal- no BDR Normal- no BDR    Vessels Normal Normal    Periphery Normal Normal                  Lacrimal Exam       Schirmers         Right Left     11 mm 10 mm      Anesthesia: Yes                  Refraction       Wearing Rx         Sphere Cylinder    Right +3.00 Sphere    Left +3.00 Sphere      Type: Forgot glasses              Manifest Refraction         Sphere Cylinder [place] Dist [place] Add Near [place]    Right +1.00 +0.50 080 20/30- +3.00 20/20    Left +2.00 Sphere  20/30- +3.00 20/20              Final Rx         Sphere Cylinder [place] Dist [facility] Add Near [place]    Right +1.00 +0.50 080 20/30- +3.00 20/20    Left +2.00 Sphere  20/30- +3.00 20/20      Type: Flat top bifocal                     ASSESSMENT/PLAN:     Diagnoses and Plan:     Type 2 diabetes mellitus without retinopathy (HCC)  Diabetes type II: no background of retinopathy, no signs of neovascularization noted. Discussed ocular and systemic benefits of blood sugar control. Diagnosis and treatment discussed in detail with patient. Will see patient in 1 year for a diabetic exam    Stressed importance of yearly eye exams    Age-related nuclear cataract of both eyes  Discussed early cataracts with patient. No treatment recommended at this time.      New glasses Rx given today, update as needed    Conjunctivochalasis of both eyes  Recommend warm compresses 2 times a day    No orders of the defined types were placed in this encounter. Meds This Visit:  Requested Prescriptions      No prescriptions requested or ordered in this encounter        Follow up instructions:  Return in about 1 year (around 10/26/2024) for Diabetic eye exam.    10/26/2023  Scribed by: Roxanna Rai MD        If you have questions, please do not hesitate to call me. I look forward to following Anastasiia Zimmer along with you.     Sincerely,        Roxanna Rai MD        CC:   No Recipients    Document electronically generated by: Roxanna Rai MD